# Patient Record
Sex: FEMALE | Race: WHITE | NOT HISPANIC OR LATINO | Employment: OTHER | URBAN - METROPOLITAN AREA
[De-identification: names, ages, dates, MRNs, and addresses within clinical notes are randomized per-mention and may not be internally consistent; named-entity substitution may affect disease eponyms.]

---

## 2020-04-08 ENCOUNTER — TELEPHONE (OUTPATIENT)
Dept: INTERNAL MEDICINE | Facility: CLINIC | Age: 65
End: 2020-04-08

## 2020-04-08 NOTE — TELEPHONE ENCOUNTER
----- Message from Annabel Burris sent at 4/8/2020 12:42 PM CDT -----  Contact: Self  Pt is calling to speak with Staff regarding rescheduling her appt she missed yesterday.    She can be reached at [phone].    Thank you.

## 2020-06-08 ENCOUNTER — OFFICE VISIT (OUTPATIENT)
Dept: OPHTHALMOLOGY | Facility: CLINIC | Age: 65
End: 2020-06-08
Payer: MEDICARE

## 2020-06-08 DIAGNOSIS — H25.13 NUCLEAR SCLEROSIS, BILATERAL: Primary | ICD-10-CM

## 2020-06-08 DIAGNOSIS — H52.03 HYPEROPIA WITH ASTIGMATISM AND PRESBYOPIA, BILATERAL: ICD-10-CM

## 2020-06-08 DIAGNOSIS — H25.043 POSTERIOR SUBCAPSULAR AGE-RELATED CATARACT OF BOTH EYES: ICD-10-CM

## 2020-06-08 DIAGNOSIS — H52.203 HYPEROPIA WITH ASTIGMATISM AND PRESBYOPIA, BILATERAL: ICD-10-CM

## 2020-06-08 DIAGNOSIS — H52.4 HYPEROPIA WITH ASTIGMATISM AND PRESBYOPIA, BILATERAL: ICD-10-CM

## 2020-06-08 PROCEDURE — 92310 PR CONTACT LENS FITTING (NO CHANGE): ICD-10-PCS | Mod: CSM,HCNC,S$GLB, | Performed by: OPTOMETRIST

## 2020-06-08 PROCEDURE — 99999 PR PBB SHADOW E&M-EST. PATIENT-LVL I: CPT | Mod: PBBFAC,HCNC,, | Performed by: OPTOMETRIST

## 2020-06-08 PROCEDURE — 92015 DETERMINE REFRACTIVE STATE: CPT | Mod: HCNC,S$GLB,, | Performed by: OPTOMETRIST

## 2020-06-08 PROCEDURE — 92004 COMPRE OPH EXAM NEW PT 1/>: CPT | Mod: HCNC,S$GLB,, | Performed by: OPTOMETRIST

## 2020-06-08 PROCEDURE — 92004 PR EYE EXAM, NEW PATIENT,COMPREHESV: ICD-10-PCS | Mod: HCNC,S$GLB,, | Performed by: OPTOMETRIST

## 2020-06-08 PROCEDURE — 92015 PR REFRACTION: ICD-10-PCS | Mod: HCNC,S$GLB,, | Performed by: OPTOMETRIST

## 2020-06-08 PROCEDURE — 92310 CONTACT LENS FITTING OU: CPT | Mod: CSM,HCNC,S$GLB, | Performed by: OPTOMETRIST

## 2020-06-08 PROCEDURE — 99999 PR PBB SHADOW E&M-EST. PATIENT-LVL I: ICD-10-PCS | Mod: PBBFAC,HCNC,, | Performed by: OPTOMETRIST

## 2020-06-08 RX ORDER — IBUPROFEN 200 MG
600 TABLET ORAL EVERY 6 HOURS PRN
COMMUNITY

## 2020-06-08 RX ORDER — AMITRIPTYLINE HYDROCHLORIDE 50 MG/1
300 TABLET, FILM COATED ORAL NIGHTLY
COMMUNITY
Start: 2019-01-29 | End: 2020-06-17 | Stop reason: SDUPTHER

## 2020-06-08 NOTE — PROGRESS NOTES
HPI     New Patient  Last Eye Exam May 2019   HPI    Any vision changes since last exam: Yes, decrease with overall vision,   sometimes have to take contact lens out to see distance better and little   trouble reading small print while wearing contacts.   Eye pain: No  Other ocular symptoms: Sometimes Itchy and Dry eyes, using visine ou prn       Do you wear currently wear glasses or contacts? Both    Interested in contacts today? Yes    Do you plan on getting new glasses today? Yes        Last edited by Bethany Bermudez on 6/8/2020  3:12 PM. (History)            Assessment /Plan     For exam results, see Encounter Report.    Nuclear sclerosis, bilateral    Posterior subcapsular age-related cataract of both eyes  Cataract accounts for change in vision. Patient is at the option stage.   Cataract surgery will improve vision, but necessity is dependent on patient's symptoms.   Patient prefers to wait on surgery at this time. Pt to call back if symptoms worsen before next appointment.    Hyperopia with astigmatism and presbyopia, bilateral  Eyeglass Final Rx     Eyeglass Final Rx       Sphere Cylinder Axis Add    Right +0.75 +1.50 175 +2.50    Left +0.25 +0.75 005 +2.50    Expiration Date:  6/9/2021              Will try updating mono contacts first, if va not adequate, consider cat eval    Order trials       See at dispense

## 2020-06-17 ENCOUNTER — HOSPITAL ENCOUNTER (OUTPATIENT)
Dept: RADIOLOGY | Facility: HOSPITAL | Age: 65
Discharge: HOME | End: 2020-06-17
Attending: INTERNAL MEDICINE
Payer: MEDICARE

## 2020-06-17 ENCOUNTER — OFFICE VISIT (OUTPATIENT)
Dept: INTERNAL MEDICINE | Facility: CLINIC | Age: 65
End: 2020-06-17
Payer: MEDICARE

## 2020-06-17 ENCOUNTER — TELEPHONE (OUTPATIENT)
Dept: INTERNAL MEDICINE | Facility: CLINIC | Age: 65
End: 2020-06-17

## 2020-06-17 VITALS
HEART RATE: 96 BPM | WEIGHT: 150.13 LBS | OXYGEN SATURATION: 95 % | TEMPERATURE: 97 F | DIASTOLIC BLOOD PRESSURE: 82 MMHG | SYSTOLIC BLOOD PRESSURE: 130 MMHG

## 2020-06-17 DIAGNOSIS — N30.00 ACUTE CYSTITIS WITHOUT HEMATURIA: Primary | ICD-10-CM

## 2020-06-17 DIAGNOSIS — Z12.31 ENCOUNTER FOR SCREENING MAMMOGRAM FOR BREAST CANCER: ICD-10-CM

## 2020-06-17 DIAGNOSIS — Z23 NEED FOR PNEUMOCOCCAL VACCINATION: ICD-10-CM

## 2020-06-17 DIAGNOSIS — R05.3 CHRONIC COUGH: ICD-10-CM

## 2020-06-17 DIAGNOSIS — Z00.00 ROUTINE GENERAL MEDICAL EXAMINATION AT A HEALTH CARE FACILITY: Primary | ICD-10-CM

## 2020-06-17 DIAGNOSIS — F33.41 RECURRENT MAJOR DEPRESSIVE DISORDER, IN PARTIAL REMISSION: ICD-10-CM

## 2020-06-17 DIAGNOSIS — R09.89 ABNORMAL LUNG SOUNDS: ICD-10-CM

## 2020-06-17 DIAGNOSIS — R30.0 DYSURIA: ICD-10-CM

## 2020-06-17 DIAGNOSIS — Z78.0 ASYMPTOMATIC MENOPAUSAL STATE: ICD-10-CM

## 2020-06-17 DIAGNOSIS — Z13.6 SCREENING FOR CARDIOVASCULAR CONDITION: ICD-10-CM

## 2020-06-17 DIAGNOSIS — Z11.59 NEED FOR HEPATITIS C SCREENING TEST: ICD-10-CM

## 2020-06-17 PROCEDURE — 99387 INIT PM E/M NEW PAT 65+ YRS: CPT | Mod: 25,HCNC,S$GLB, | Performed by: INTERNAL MEDICINE

## 2020-06-17 PROCEDURE — 99499 UNLISTED E&M SERVICE: CPT | Mod: HCNC,S$GLB,, | Performed by: INTERNAL MEDICINE

## 2020-06-17 PROCEDURE — 99499 RISK ADDL DX/OHS AUDIT: ICD-10-PCS | Mod: HCNC,S$GLB,, | Performed by: INTERNAL MEDICINE

## 2020-06-17 PROCEDURE — 90732 PNEUMOCOCCAL POLYSACCHARIDE VACCINE 23-VALENT =>2YO SQ IM: ICD-10-PCS | Mod: HCNC,S$GLB,, | Performed by: INTERNAL MEDICINE

## 2020-06-17 PROCEDURE — 1126F AMNT PAIN NOTED NONE PRSNT: CPT | Mod: HCNC,S$GLB,, | Performed by: INTERNAL MEDICINE

## 2020-06-17 PROCEDURE — 71046 XR CHEST PA AND LATERAL: ICD-10-PCS | Mod: 26,HCNC,, | Performed by: RADIOLOGY

## 2020-06-17 PROCEDURE — 1126F PR PAIN SEVERITY QUANTIFIED, NO PAIN PRESENT: ICD-10-PCS | Mod: HCNC,S$GLB,, | Performed by: INTERNAL MEDICINE

## 2020-06-17 PROCEDURE — 1101F PR PT FALLS ASSESS DOC 0-1 FALLS W/OUT INJ PAST YR: ICD-10-PCS | Mod: HCNC,CPTII,S$GLB, | Performed by: INTERNAL MEDICINE

## 2020-06-17 PROCEDURE — 90732 PPSV23 VACC 2 YRS+ SUBQ/IM: CPT | Mod: HCNC,S$GLB,, | Performed by: INTERNAL MEDICINE

## 2020-06-17 PROCEDURE — 71046 X-RAY EXAM CHEST 2 VIEWS: CPT | Mod: 26,HCNC,, | Performed by: RADIOLOGY

## 2020-06-17 PROCEDURE — 99213 PR OFFICE/OUTPT VISIT, EST, LEVL III, 20-29 MIN: ICD-10-PCS | Mod: HCNC,25,S$GLB, | Performed by: INTERNAL MEDICINE

## 2020-06-17 PROCEDURE — 1159F PR MEDICATION LIST DOCUMENTED IN MEDICAL RECORD: ICD-10-PCS | Mod: HCNC,S$GLB,, | Performed by: INTERNAL MEDICINE

## 2020-06-17 PROCEDURE — 1159F MED LIST DOCD IN RCRD: CPT | Mod: HCNC,S$GLB,, | Performed by: INTERNAL MEDICINE

## 2020-06-17 PROCEDURE — 71046 X-RAY EXAM CHEST 2 VIEWS: CPT | Mod: TC,HCNC

## 2020-06-17 PROCEDURE — 99213 OFFICE O/P EST LOW 20 MIN: CPT | Mod: HCNC,25,S$GLB, | Performed by: INTERNAL MEDICINE

## 2020-06-17 PROCEDURE — 99999 PR PBB SHADOW E&M-EST. PATIENT-LVL V: CPT | Mod: PBBFAC,HCNC,, | Performed by: INTERNAL MEDICINE

## 2020-06-17 PROCEDURE — G0009 PNEUMOCOCCAL POLYSACCHARIDE VACCINE 23-VALENT =>2YO SQ IM: ICD-10-PCS | Mod: HCNC,S$GLB,, | Performed by: INTERNAL MEDICINE

## 2020-06-17 PROCEDURE — G0009 ADMIN PNEUMOCOCCAL VACCINE: HCPCS | Mod: HCNC,S$GLB,, | Performed by: INTERNAL MEDICINE

## 2020-06-17 PROCEDURE — 99387 PR PREVENTIVE VISIT,NEW,65 & OVER: ICD-10-PCS | Mod: 25,HCNC,S$GLB, | Performed by: INTERNAL MEDICINE

## 2020-06-17 PROCEDURE — 1101F PT FALLS ASSESS-DOCD LE1/YR: CPT | Mod: HCNC,CPTII,S$GLB, | Performed by: INTERNAL MEDICINE

## 2020-06-17 PROCEDURE — 99999 PR PBB SHADOW E&M-EST. PATIENT-LVL V: ICD-10-PCS | Mod: PBBFAC,HCNC,, | Performed by: INTERNAL MEDICINE

## 2020-06-17 RX ORDER — NITROFURANTOIN 25; 75 MG/1; MG/1
100 CAPSULE ORAL 2 TIMES DAILY
Qty: 14 CAPSULE | Refills: 0 | Status: SHIPPED | OUTPATIENT
Start: 2020-06-17 | End: 2020-06-24

## 2020-06-17 RX ORDER — AMITRIPTYLINE HYDROCHLORIDE 100 MG/1
300 TABLET ORAL NIGHTLY
Qty: 90 TABLET | Refills: 0 | Status: SHIPPED | OUTPATIENT
Start: 2020-06-17 | End: 2020-07-17

## 2020-06-17 NOTE — PROGRESS NOTES
Subjective:      Patient ID: Caitlin Ahmadi is a 65 y.o. female.    Chief Complaint: Establish Care    Dysuria   This is a new problem. Episode onset: 2 to 3 weeks. The problem occurs intermittently. The problem has been waxing and waning. The quality of the pain is described as aching (at end of urination). The pain is moderate. There has been no fever. There is no history of pyelonephritis. Pertinent negatives include no chills, discharge, flank pain, hematuria, hesitancy or rash. Treatments tried: cranberry tablets. The treatment provided mild relief. There is no history of diabetes mellitus, hypertension, kidney stones, recurrent UTIs or a urological procedure.    Physical Exam  Constitutional:       General: She is not in acute distress.     Appearance: She is well-developed.   HENT:      Head: Normocephalic and atraumatic.   Eyes:      Pupils: Pupils are equal, round, and reactive to light.   Neck:      Musculoskeletal: Neck supple.      Thyroid: No thyromegaly.   Cardiovascular:      Rate and Rhythm: Normal rate and regular rhythm.   Pulmonary:      Breath sounds: Rales present. No wheezing or rhonchi.   Abdominal:      General: Bowel sounds are normal.      Palpations: Abdomen is soft.      Tenderness: There is no abdominal tenderness.   Lymphadenopathy:      Cervical: No cervical adenopathy.   Skin:     General: Skin is warm and dry.   Neurological:      Mental Status: She is alert and oriented to person, place, and time.   Psychiatric:         Behavior: Behavior normal.       64 yo with   Patient Active Problem List   Diagnosis    Recurrent major depressive disorder, in partial remission     History reviewed. No pertinent past medical history.    Here today for annual prev exam.  Compliant with meds without significant side effects. Energy and appetite are good.   Reports nl cologuard one year ago with Dr. Erazo. Quit smoking about 20 years ago.   Pt also c/o dysuria  Pt also reports chronic cough.        Past Surgical History:   Procedure Laterality Date    ADENOIDECTOMY      CHOLECYSTECTOMY      HERNIA REPAIR      HYSTERECTOMY      TONSILLECTOMY      TOTAL KNEE ARTHROPLASTY Bilateral      Social History     Socioeconomic History    Marital status:      Spouse name: Not on file    Number of children: Not on file    Years of education: Not on file    Highest education level: Not on file   Occupational History    Not on file   Social Needs    Financial resource strain: Not on file    Food insecurity     Worry: Not on file     Inability: Not on file    Transportation needs     Medical: Not on file     Non-medical: Not on file   Tobacco Use    Smoking status: Former Smoker     Types: Cigarettes     Start date: 1970     Quit date: 1990     Years since quittin.0    Smokeless tobacco: Never Used   Substance and Sexual Activity    Alcohol use: Yes     Alcohol/week: 5.0 standard drinks     Types: 5 Glasses of wine per week    Drug use: Never    Sexual activity: Yes     Partners: Male   Lifestyle    Physical activity     Days per week: Not on file     Minutes per session: Not on file    Stress: Not on file   Relationships    Social connections     Talks on phone: Not on file     Gets together: Not on file     Attends Synagogue service: Not on file     Active member of club or organization: Not on file     Attends meetings of clubs or organizations: Not on file     Relationship status: Not on file   Other Topics Concern    Not on file   Social History Narrative    Not on file     family history includes Alzheimer's disease in her father; No Known Problems in her mother.    Review of Systems   Constitutional: Negative for chills and fever.   HENT: Negative for ear pain and sore throat.    Respiratory: Positive for cough (chronic intermittent for years). Negative for shortness of breath and wheezing.    Cardiovascular: Negative for chest pain.   Gastrointestinal: Negative for  abdominal pain and blood in stool.   Genitourinary: Negative for dysuria, flank pain, hematuria and hesitancy.   Skin: Negative for rash and wound.   Neurological: Negative for seizures and syncope.     Objective:   /82 (BP Location: Left arm, Patient Position: Sitting, BP Method: Medium (Manual))   Pulse 96   Temp 97.2 °F (36.2 °C) (Tympanic)   Wt 68.1 kg (150 lb 2.1 oz)   SpO2 95%     Physical Exam  Constitutional:       General: She is not in acute distress.     Appearance: She is well-developed.   HENT:      Head: Normocephalic and atraumatic.   Eyes:      Pupils: Pupils are equal, round, and reactive to light.   Neck:      Musculoskeletal: Neck supple.      Thyroid: No thyromegaly.   Cardiovascular:      Rate and Rhythm: Normal rate and regular rhythm.   Pulmonary:      Breath sounds: Rales present. No wheezing or rhonchi.   Abdominal:      General: Bowel sounds are normal.      Palpations: Abdomen is soft.      Tenderness: There is no abdominal tenderness.   Lymphadenopathy:      Cervical: No cervical adenopathy.   Skin:     General: Skin is warm and dry.   Neurological:      Mental Status: She is alert and oriented to person, place, and time.   Psychiatric:         Behavior: Behavior normal.         Assessment:     1. Routine general medical examination at a health care facility    2. Encounter for screening mammogram for breast cancer    3. Recurrent major depressive disorder, in partial remission    4. Asymptomatic menopausal state    5. Screening for cardiovascular condition    6. Need for hepatitis C screening test    7. Need for pneumococcal vaccination    8. Dysuria    9. Abnormal lung sounds    10. Chronic cough      Plan:   Routine general medical examination at a health care facility  Heart healthy diet and reg exercise   reviewed    Encounter for screening mammogram for breast cancer  -     Mammo Digital Screening Bilat; Future; Expected date: 06/17/2020    Recurrent major depressive  disorder, in partial remission  Stable on current meds  -     Comprehensive metabolic panel; Future; Expected date: 06/17/2020  -     CBC auto differential; Future; Expected date: 06/17/2020  -     TSH; Future; Expected date: 06/17/2020    Asymptomatic menopausal state  -     DXA Bone Density Spine And Hip; Future; Expected date: 06/17/2020    Screening for cardiovascular condition  -     Lipid Panel; Future; Expected date: 06/17/2020    Need for hepatitis C screening test  -     Hepatitis C Antibody; Future; Expected date: 06/17/2020    Need for pneumococcal vaccination  -     (In Office Administered) Pneumococcal Polysaccharide Vaccine (23 Valent) (SQ/IM)    Dysuria  -     Urinalysis; Future; Expected date: 06/17/2020    Abnormal lung sounds  -     X-Ray Chest PA And Lateral; Future; Expected date: 06/17/2020  -     Ambulatory referral/consult to Pulmonology; Future; Expected date: 06/24/2020    Chronic cough  Comments:  present for years. reports h/o mult normal cxr. also EF good 10 years ago.   Orders:  -     X-Ray Chest PA And Lateral; Future; Expected date: 06/17/2020    Other orders  -     amitriptyline (ELAVIL) 100 MG tablet; Take 3 tablets (300 mg total) by mouth every evening.  Dispense: 90 tablet; Refill: 0          Problem List Items Addressed This Visit        Psychiatric    Recurrent major depressive disorder, in partial remission    Overview     Started amitriptyline in her 30s. 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 Has gradually increased amitriptyline over past several years. Self increased from 250 to 300 during covid. On 250mg for 2 to 3 years.          Relevant Orders    Comprehensive metabolic panel    CBC auto differential    TSH      Other Visit Diagnoses     Routine general medical examination at a health care facility    -  Primary    Encounter for screening mammogram for breast cancer        Relevant Orders    Mammo Digital Screening Bilat    Asymptomatic  menopausal state        Relevant Orders    DXA Bone Density Spine And Hip    Screening for cardiovascular condition        Relevant Orders    Lipid Panel    Need for hepatitis C screening test        Relevant Orders    Hepatitis C Antibody    Need for pneumococcal vaccination        Relevant Orders    (In Office Administered) Pneumococcal Polysaccharide Vaccine (23 Valent) (SQ/IM) (Completed)    Dysuria        Relevant Orders    Urinalysis (Completed)    Abnormal lung sounds        Relevant Orders    X-Ray Chest PA And Lateral (Completed)    Ambulatory referral/consult to Pulmonology    Chronic cough        present for years. reports h/o mult normal cxr. also EF good 10 years ago.     Relevant Orders    X-Ray Chest PA And Lateral (Completed)          Follow up in 4 weeks (on 7/15/2020), or if symptoms worsen or fail to improve.

## 2020-06-18 ENCOUNTER — TELEPHONE (OUTPATIENT)
Dept: INTERNAL MEDICINE | Facility: CLINIC | Age: 65
End: 2020-06-18

## 2020-06-18 NOTE — TELEPHONE ENCOUNTER
Pt stated her arm is red and irritated after having her pneumonia vaccine today. I advised her to put some ice on it and monitor for now, and if it gets worse or does not get better, to contact us so she can come in to have it looked at. She verbalized understanding.

## 2020-06-18 NOTE — TELEPHONE ENCOUNTER
----- Message from Jennyfer Madden sent at 6/18/2020  1:06 PM CDT -----  .Type:  Needs Medical Advice    Who Called: Caitlin Ahmadi  Symptoms (please be specific): redness/pain arm  How long has patient had these symptoms:  1 day  Pharmacy name and phone #:    Would the patient rather a call back or a response via [facility]? Call back  Best Call Back Number:[phone]  Additional Information: pt states she is experiencing redness and soreness following pneumonia vaccination

## 2020-07-06 ENCOUNTER — LAB VISIT (OUTPATIENT)
Dept: LAB | Facility: HOSPITAL | Age: 65
End: 2020-07-06
Attending: INTERNAL MEDICINE
Payer: MEDICARE

## 2020-07-06 ENCOUNTER — OFFICE VISIT (OUTPATIENT)
Dept: OPHTHALMOLOGY | Facility: CLINIC | Age: 65
End: 2020-07-06
Payer: MEDICARE

## 2020-07-06 DIAGNOSIS — F33.41 RECURRENT MAJOR DEPRESSIVE DISORDER, IN PARTIAL REMISSION: ICD-10-CM

## 2020-07-06 DIAGNOSIS — Z11.59 NEED FOR HEPATITIS C SCREENING TEST: ICD-10-CM

## 2020-07-06 DIAGNOSIS — Z13.6 SCREENING FOR CARDIOVASCULAR CONDITION: ICD-10-CM

## 2020-07-06 DIAGNOSIS — H52.4 HYPEROPIA WITH ASTIGMATISM AND PRESBYOPIA, BILATERAL: Primary | ICD-10-CM

## 2020-07-06 DIAGNOSIS — H52.03 HYPEROPIA WITH ASTIGMATISM AND PRESBYOPIA, BILATERAL: Primary | ICD-10-CM

## 2020-07-06 DIAGNOSIS — H52.203 HYPEROPIA WITH ASTIGMATISM AND PRESBYOPIA, BILATERAL: Primary | ICD-10-CM

## 2020-07-06 LAB
ALBUMIN SERPL BCP-MCNC: 3.8 G/DL (ref 3.5–5.2)
ALP SERPL-CCNC: 64 U/L (ref 55–135)
ALT SERPL W/O P-5'-P-CCNC: 23 U/L (ref 10–44)
ANION GAP SERPL CALC-SCNC: 9 MMOL/L (ref 8–16)
AST SERPL-CCNC: 20 U/L (ref 10–40)
BASOPHILS # BLD AUTO: 0.04 K/UL (ref 0–0.2)
BASOPHILS NFR BLD: 0.9 % (ref 0–1.9)
BILIRUB SERPL-MCNC: 0.4 MG/DL (ref 0.1–1)
BUN SERPL-MCNC: 16 MG/DL (ref 8–23)
CALCIUM SERPL-MCNC: 9 MG/DL (ref 8.7–10.5)
CHLORIDE SERPL-SCNC: 105 MMOL/L (ref 95–110)
CHOLEST SERPL-MCNC: 247 MG/DL (ref 120–199)
CHOLEST/HDLC SERPL: 4.2 {RATIO} (ref 2–5)
CO2 SERPL-SCNC: 25 MMOL/L (ref 23–29)
CREAT SERPL-MCNC: 0.8 MG/DL (ref 0.5–1.4)
DIFFERENTIAL METHOD: ABNORMAL
EOSINOPHIL # BLD AUTO: 0.1 K/UL (ref 0–0.5)
EOSINOPHIL NFR BLD: 3 % (ref 0–8)
ERYTHROCYTE [DISTWIDTH] IN BLOOD BY AUTOMATED COUNT: 13.5 % (ref 11.5–14.5)
EST. GFR  (AFRICAN AMERICAN): >60 ML/MIN/1.73 M^2
EST. GFR  (NON AFRICAN AMERICAN): >60 ML/MIN/1.73 M^2
GLUCOSE SERPL-MCNC: 101 MG/DL (ref 70–110)
HCT VFR BLD AUTO: 39.3 % (ref 37–48.5)
HDLC SERPL-MCNC: 59 MG/DL (ref 40–75)
HDLC SERPL: 23.9 % (ref 20–50)
HGB BLD-MCNC: 12.3 G/DL (ref 12–16)
IMM GRANULOCYTES # BLD AUTO: 0.03 K/UL (ref 0–0.04)
IMM GRANULOCYTES NFR BLD AUTO: 0.7 % (ref 0–0.5)
LDLC SERPL CALC-MCNC: 149 MG/DL (ref 63–159)
LYMPHOCYTES # BLD AUTO: 1.6 K/UL (ref 1–4.8)
LYMPHOCYTES NFR BLD: 36.6 % (ref 18–48)
MCH RBC QN AUTO: 29.9 PG (ref 27–31)
MCHC RBC AUTO-ENTMCNC: 31.3 G/DL (ref 32–36)
MCV RBC AUTO: 95 FL (ref 82–98)
MONOCYTES # BLD AUTO: 0.6 K/UL (ref 0.3–1)
MONOCYTES NFR BLD: 13.2 % (ref 4–15)
NEUTROPHILS # BLD AUTO: 2 K/UL (ref 1.8–7.7)
NEUTROPHILS NFR BLD: 45.6 % (ref 38–73)
NONHDLC SERPL-MCNC: 188 MG/DL
NRBC BLD-RTO: 0 /100 WBC
PLATELET # BLD AUTO: 223 K/UL (ref 150–350)
PMV BLD AUTO: 10.8 FL (ref 9.2–12.9)
POTASSIUM SERPL-SCNC: 4.3 MMOL/L (ref 3.5–5.1)
PROT SERPL-MCNC: 7.4 G/DL (ref 6–8.4)
RBC # BLD AUTO: 4.12 M/UL (ref 4–5.4)
SODIUM SERPL-SCNC: 139 MMOL/L (ref 136–145)
TRIGL SERPL-MCNC: 195 MG/DL (ref 30–150)
TSH SERPL DL<=0.005 MIU/L-ACNC: 2.4 UIU/ML (ref 0.4–4)
WBC # BLD AUTO: 4.32 K/UL (ref 3.9–12.7)

## 2020-07-06 PROCEDURE — 99499 UNLISTED E&M SERVICE: CPT | Mod: HCNC,S$GLB,, | Performed by: OPTOMETRIST

## 2020-07-06 PROCEDURE — 80061 LIPID PANEL: CPT | Mod: HCNC

## 2020-07-06 PROCEDURE — 80053 COMPREHEN METABOLIC PANEL: CPT | Mod: HCNC

## 2020-07-06 PROCEDURE — 99499 NO LOS: ICD-10-PCS | Mod: HCNC,S$GLB,, | Performed by: OPTOMETRIST

## 2020-07-06 PROCEDURE — 85025 COMPLETE CBC W/AUTO DIFF WBC: CPT | Mod: HCNC

## 2020-07-06 PROCEDURE — 99999 PR PBB SHADOW E&M-EST. PATIENT-LVL II: ICD-10-PCS | Mod: PBBFAC,HCNC,, | Performed by: OPTOMETRIST

## 2020-07-06 PROCEDURE — 99999 PR PBB SHADOW E&M-EST. PATIENT-LVL II: CPT | Mod: PBBFAC,HCNC,, | Performed by: OPTOMETRIST

## 2020-07-06 PROCEDURE — 36415 COLL VENOUS BLD VENIPUNCTURE: CPT | Mod: HCNC

## 2020-07-06 PROCEDURE — 84443 ASSAY THYROID STIM HORMONE: CPT | Mod: HCNC

## 2020-07-06 PROCEDURE — 86803 HEPATITIS C AB TEST: CPT | Mod: HCNC

## 2020-07-06 NOTE — PROGRESS NOTES
HPI     Last seen by DNL on 6/8/2020 for yearly eye exam  Patient here today for CTL follow up  Patient states ctl are making her dizzy   States CTL are comfortable  Vision blurred    Last edited by Alma Delia Phillips, PCT on 7/6/2020  9:12 AM. (History)            Assessment /Plan     For exam results, see Encounter Report.    Hyperopia with astigmatism and presbyopia, bilateral      Updated cls with improved near va  Distance unchanged    Contact Lens Prescription (7/6/2020)        Brand Sphere Cylinder Axis    Right 1-Day Acuvue Moist for Astigmatism +3.50 -0.75 090    Left 1-Day Acuvue Moist +1.25      Expiration Date: 7/7/2021    Replacement: Daily    Wearing Schedule: Daily wear        She will try this and if va not adequate, she will call back for cat eval appt    Otherwise, ok to order supply and f/u 1 yr with dilated exam or PRN  Discussed above and all questions were answered.     7/7/2020  Pt called and req to go back to her original rx  Contact Lens Prescription (7/6/2020)        Brand Base Curve Diameter Sphere    Right 1-Day Acuvue Moist 8.5 14.2 +3.75    Left 1-Day Acuvue Moist 8.5 14.2 +1.25    Expiration Date: 7/9/2021    Replacement: Daily    Wearing Schedule: Daily wear        RTC PRN

## 2020-07-08 ENCOUNTER — TELEPHONE (OUTPATIENT)
Dept: OPHTHALMOLOGY | Facility: CLINIC | Age: 65
End: 2020-07-08

## 2020-07-08 LAB — HCV AB SERPL QL IA: NEGATIVE

## 2020-07-08 NOTE — TELEPHONE ENCOUNTER
Left message letting her know we updated her RX and she can view it in MYCHART      ----- Message from Del Boyce OD sent at 7/8/2020  9:02 AM CDT -----  Contact: pt  I updated her rx in her chart. Please contact her and let her know.     Thanks  ----- Message -----  From: ROVERTO Cobb  Sent: 7/7/2020  11:03 AM CDT  To: Del Boyce OD    States she wants to go back to her old RX    ----- Message -----  From: Kyara Anguiano  Sent: 7/7/2020  10:15 AM CDT  To: Aust[facility] Joseph Staff    Pt requesting a call back regarding her contact lenses. She states that they are making her dizzy. Please call pt back at [phone]

## 2020-07-10 ENCOUNTER — HOSPITAL ENCOUNTER (OUTPATIENT)
Dept: RADIOLOGY | Facility: HOSPITAL | Age: 65
Discharge: HOME | End: 2020-07-10
Attending: INTERNAL MEDICINE
Payer: MEDICARE

## 2020-07-10 VITALS — WEIGHT: 150.13 LBS

## 2020-07-10 DIAGNOSIS — Z12.31 ENCOUNTER FOR SCREENING MAMMOGRAM FOR BREAST CANCER: ICD-10-CM

## 2020-07-10 PROCEDURE — 77067 SCR MAMMO BI INCL CAD: CPT | Mod: 26,HCNC,, | Performed by: RADIOLOGY

## 2020-07-10 PROCEDURE — 77063 BREAST TOMOSYNTHESIS BI: CPT | Mod: 26,HCNC,, | Performed by: RADIOLOGY

## 2020-07-10 PROCEDURE — 77063 MAMMO DIGITAL SCREENING BILAT WITH TOMOSYNTHESIS_CAD: ICD-10-PCS | Mod: 26,HCNC,, | Performed by: RADIOLOGY

## 2020-07-10 PROCEDURE — 77067 SCR MAMMO BI INCL CAD: CPT | Mod: TC,HCNC

## 2020-07-10 PROCEDURE — 77067 MAMMO DIGITAL SCREENING BILAT WITH TOMOSYNTHESIS_CAD: ICD-10-PCS | Mod: 26,HCNC,, | Performed by: RADIOLOGY

## 2020-07-22 ENCOUNTER — OFFICE VISIT (OUTPATIENT)
Dept: INTERNAL MEDICINE | Facility: CLINIC | Age: 65
End: 2020-07-22
Payer: MEDICARE

## 2020-07-22 VITALS
BODY MASS INDEX: 23.56 KG/M2 | TEMPERATURE: 99 F | WEIGHT: 155.44 LBS | HEART RATE: 98 BPM | OXYGEN SATURATION: 97 % | DIASTOLIC BLOOD PRESSURE: 82 MMHG | SYSTOLIC BLOOD PRESSURE: 138 MMHG | HEIGHT: 68 IN

## 2020-07-22 DIAGNOSIS — N30.00 ACUTE CYSTITIS WITHOUT HEMATURIA: ICD-10-CM

## 2020-07-22 DIAGNOSIS — M85.89 OSTEOPENIA OF MULTIPLE SITES: ICD-10-CM

## 2020-07-22 DIAGNOSIS — R09.89 ABNORMAL LUNG SOUNDS: Primary | ICD-10-CM

## 2020-07-22 PROCEDURE — 1159F PR MEDICATION LIST DOCUMENTED IN MEDICAL RECORD: ICD-10-PCS | Mod: HCNC,S$GLB,, | Performed by: INTERNAL MEDICINE

## 2020-07-22 PROCEDURE — 3288F FALL RISK ASSESSMENT DOCD: CPT | Mod: HCNC,CPTII,S$GLB, | Performed by: INTERNAL MEDICINE

## 2020-07-22 PROCEDURE — 1159F MED LIST DOCD IN RCRD: CPT | Mod: HCNC,S$GLB,, | Performed by: INTERNAL MEDICINE

## 2020-07-22 PROCEDURE — 3288F PR FALLS RISK ASSESSMENT DOCUMENTED: ICD-10-PCS | Mod: HCNC,CPTII,S$GLB, | Performed by: INTERNAL MEDICINE

## 2020-07-22 PROCEDURE — 3008F PR BODY MASS INDEX (BMI) DOCUMENTED: ICD-10-PCS | Mod: HCNC,CPTII,S$GLB, | Performed by: INTERNAL MEDICINE

## 2020-07-22 PROCEDURE — 1100F PR PT FALLS ASSESS DOC 2+ FALLS/FALL W/INJURY/YR: ICD-10-PCS | Mod: HCNC,CPTII,S$GLB, | Performed by: INTERNAL MEDICINE

## 2020-07-22 PROCEDURE — 3008F BODY MASS INDEX DOCD: CPT | Mod: HCNC,CPTII,S$GLB, | Performed by: INTERNAL MEDICINE

## 2020-07-22 PROCEDURE — 99213 OFFICE O/P EST LOW 20 MIN: CPT | Mod: HCNC,S$GLB,, | Performed by: INTERNAL MEDICINE

## 2020-07-22 PROCEDURE — 1100F PTFALLS ASSESS-DOCD GE2>/YR: CPT | Mod: HCNC,CPTII,S$GLB, | Performed by: INTERNAL MEDICINE

## 2020-07-22 PROCEDURE — 1126F PR PAIN SEVERITY QUANTIFIED, NO PAIN PRESENT: ICD-10-PCS | Mod: HCNC,S$GLB,, | Performed by: INTERNAL MEDICINE

## 2020-07-22 PROCEDURE — 99999 PR PBB SHADOW E&M-EST. PATIENT-LVL IV: ICD-10-PCS | Mod: PBBFAC,HCNC,, | Performed by: INTERNAL MEDICINE

## 2020-07-22 PROCEDURE — 1126F AMNT PAIN NOTED NONE PRSNT: CPT | Mod: HCNC,S$GLB,, | Performed by: INTERNAL MEDICINE

## 2020-07-22 PROCEDURE — 99213 PR OFFICE/OUTPT VISIT, EST, LEVL III, 20-29 MIN: ICD-10-PCS | Mod: HCNC,S$GLB,, | Performed by: INTERNAL MEDICINE

## 2020-07-22 PROCEDURE — 99999 PR PBB SHADOW E&M-EST. PATIENT-LVL IV: CPT | Mod: PBBFAC,HCNC,, | Performed by: INTERNAL MEDICINE

## 2020-07-22 NOTE — PROGRESS NOTES
"Subjective:      Patient ID: Caitlin Ahmadi is a 65 y.o. female.    Chief Complaint: Follow-up    HPI     64 yo with   Patient Active Problem List   Diagnosis    Recurrent major depressive disorder, in partial remission    Osteopenia of multiple sites     History reviewed. No pertinent past medical history.    Here today for f/u on mult med problems. Feeling well. No new c/o.     family history includes Alzheimer's disease in her father; No Known Problems in her mother.  1 out 3 Brothers with bypass surgery in his 60s.   Parents without h/o heart disease.    Review of Systems   Constitutional: Negative for chills and fever.   Respiratory: Negative for cough.    Cardiovascular: Negative for chest pain.   Gastrointestinal: Negative for abdominal pain.     Objective:   /82 (BP Location: Left arm, Patient Position: Sitting, BP Method: Medium (Manual))   Pulse 98   Temp 98.9 °F (37.2 °C) (Tympanic)   Ht 5' 8" (1.727 m)   Wt 70.5 kg (155 lb 6.8 oz)   SpO2 97%   BMI 23.63 kg/m²     Physical Exam  Constitutional:       General: She is not in acute distress.     Appearance: She is well-developed.   Cardiovascular:      Rate and Rhythm: Normal rate.   Pulmonary:      Effort: Pulmonary effort is normal.   Skin:     General: Skin is warm and dry.   Neurological:      Mental Status: She is alert.   Psychiatric:         Behavior: Behavior normal.         Assessment:     1. Abnormal lung sounds    2. Osteopenia of multiple sites    3. Acute cystitis without hematuria      Plan:   Abnormal lung sounds  Has pulm f/u  Osteopenia of multiple sites  New dx  Calcium and vit d as discussed    Acute cystitis without hematuria  Symptoms resolved      Lab Frequency Next Occurrence   Ambulatory referral/consult to Pulmonology Once 06/24/2020       Problem List Items Addressed This Visit        Orthopedic    Osteopenia of multiple sites    Overview     Low fx risk on dxa 7/2020           Other Visit Diagnoses     Abnormal lung " sounds    -  Primary    Acute cystitis without hematuria              Follow up in about 1 year (around 7/22/2021), or if symptoms worsen or fail to improve.

## 2020-07-22 NOTE — PATIENT INSTRUCTIONS
0183-4841 mg of calcium daily. Preferably through diet. If use a supplement, take calcium citrate 500-600 mg per dose.     Vitamin D3 over counter at 1000 IU daily.

## 2020-08-18 ENCOUNTER — OFFICE VISIT (OUTPATIENT)
Dept: PULMONOLOGY | Facility: CLINIC | Age: 65
End: 2020-08-18
Payer: MEDICARE

## 2020-08-18 ENCOUNTER — LAB VISIT (OUTPATIENT)
Dept: LAB | Facility: HOSPITAL | Age: 65
End: 2020-08-18
Attending: INTERNAL MEDICINE
Payer: MEDICARE

## 2020-08-18 ENCOUNTER — PATIENT OUTREACH (OUTPATIENT)
Dept: ADMINISTRATIVE | Facility: OTHER | Age: 65
End: 2020-08-18

## 2020-08-18 VITALS
HEIGHT: 68 IN | WEIGHT: 156.5 LBS | OXYGEN SATURATION: 97 % | BODY MASS INDEX: 23.72 KG/M2 | RESPIRATION RATE: 18 BRPM | TEMPERATURE: 98 F | DIASTOLIC BLOOD PRESSURE: 78 MMHG | SYSTOLIC BLOOD PRESSURE: 124 MMHG | HEART RATE: 98 BPM

## 2020-08-18 DIAGNOSIS — R09.89 BIBASILAR CRACKLES: ICD-10-CM

## 2020-08-18 DIAGNOSIS — R09.89 ABNORMAL LUNG SOUNDS: ICD-10-CM

## 2020-08-18 DIAGNOSIS — R06.02 SOBOE (SHORTNESS OF BREATH ON EXERTION): ICD-10-CM

## 2020-08-18 DIAGNOSIS — R05.3 CHRONIC COUGH: ICD-10-CM

## 2020-08-18 DIAGNOSIS — Z87.891 FORMER SMOKER: ICD-10-CM

## 2020-08-18 DIAGNOSIS — R09.89 BIBASILAR CRACKLES: Primary | ICD-10-CM

## 2020-08-18 LAB
BNP SERPL-MCNC: 19 PG/ML (ref 0–99)
RHEUMATOID FACT SERPL-ACNC: 157 IU/ML (ref 0–15)

## 2020-08-18 PROCEDURE — 99204 OFFICE O/P NEW MOD 45 MIN: CPT | Mod: HCNC,S$GLB,, | Performed by: INTERNAL MEDICINE

## 2020-08-18 PROCEDURE — 86606 ASPERGILLUS ANTIBODY: CPT | Mod: 59,HCNC

## 2020-08-18 PROCEDURE — 86039 ANTINUCLEAR ANTIBODIES (ANA): CPT | Mod: HCNC

## 2020-08-18 PROCEDURE — 82164 ANGIOTENSIN I ENZYME TEST: CPT | Mod: HCNC

## 2020-08-18 PROCEDURE — 86235 NUCLEAR ANTIGEN ANTIBODY: CPT | Mod: 59,HCNC

## 2020-08-18 PROCEDURE — 85652 RBC SED RATE AUTOMATED: CPT | Mod: HCNC

## 2020-08-18 PROCEDURE — 1101F PR PT FALLS ASSESS DOC 0-1 FALLS W/OUT INJ PAST YR: ICD-10-PCS | Mod: HCNC,CPTII,S$GLB, | Performed by: INTERNAL MEDICINE

## 2020-08-18 PROCEDURE — 83880 ASSAY OF NATRIURETIC PEPTIDE: CPT | Mod: HCNC

## 2020-08-18 PROCEDURE — 86038 ANTINUCLEAR ANTIBODIES: CPT | Mod: HCNC

## 2020-08-18 PROCEDURE — 82785 ASSAY OF IGE: CPT | Mod: HCNC

## 2020-08-18 PROCEDURE — 1159F PR MEDICATION LIST DOCUMENTED IN MEDICAL RECORD: ICD-10-PCS | Mod: HCNC,S$GLB,, | Performed by: INTERNAL MEDICINE

## 2020-08-18 PROCEDURE — 86140 C-REACTIVE PROTEIN: CPT | Mod: HCNC

## 2020-08-18 PROCEDURE — 3008F BODY MASS INDEX DOCD: CPT | Mod: HCNC,CPTII,S$GLB, | Performed by: INTERNAL MEDICINE

## 2020-08-18 PROCEDURE — 99204 PR OFFICE/OUTPT VISIT, NEW, LEVL IV, 45-59 MIN: ICD-10-PCS | Mod: HCNC,S$GLB,, | Performed by: INTERNAL MEDICINE

## 2020-08-18 PROCEDURE — 3008F PR BODY MASS INDEX (BMI) DOCUMENTED: ICD-10-PCS | Mod: HCNC,CPTII,S$GLB, | Performed by: INTERNAL MEDICINE

## 2020-08-18 PROCEDURE — 1101F PT FALLS ASSESS-DOCD LE1/YR: CPT | Mod: HCNC,CPTII,S$GLB, | Performed by: INTERNAL MEDICINE

## 2020-08-18 PROCEDURE — 99999 PR PBB SHADOW E&M-EST. PATIENT-LVL III: CPT | Mod: PBBFAC,HCNC,, | Performed by: INTERNAL MEDICINE

## 2020-08-18 PROCEDURE — 86431 RHEUMATOID FACTOR QUANT: CPT | Mod: HCNC

## 2020-08-18 PROCEDURE — 1159F MED LIST DOCD IN RCRD: CPT | Mod: HCNC,S$GLB,, | Performed by: INTERNAL MEDICINE

## 2020-08-18 PROCEDURE — 99999 PR PBB SHADOW E&M-EST. PATIENT-LVL III: ICD-10-PCS | Mod: PBBFAC,HCNC,, | Performed by: INTERNAL MEDICINE

## 2020-08-18 RX ORDER — ALBUTEROL SULFATE 90 UG/1
2 AEROSOL, METERED RESPIRATORY (INHALATION) EVERY 6 HOURS PRN
Qty: 6.7 G | Refills: 11 | Status: SHIPPED | OUTPATIENT
Start: 2020-08-18 | End: 2021-06-07 | Stop reason: SDUPTHER

## 2020-08-18 NOTE — LETTER
August 18, 2020      Amol Steve MD  [address] [place] [address]           [facility]  [address] [facility] [place] [address]  Phone: [phone]  Fax: [phone]          Patient: Caitlin Ahmadi   MR Number: [id number]   YOB: 1955   Date of Visit: 8/18/2020       Dear Dr. Amol Steve:    Thank you for referring Caitlin Ahmadi to me for evaluation. Attached you will find relevant portions of my assessment and plan of care.    If you have questions, please do not hesitate to call me. I look forward to following Caitlin Ahmadi along with you.    Sincerely,    Jin Lloyd MD    Enclosure  CC:  No Recipients    If you would like to receive this communication electronically, please contact [email or web address] or [phone] to request more information on [email or web address] Link access.    For providers and/or their staff who would like to refer a patient to [facility], please contact us through our one-stop-shop provider referral line, [facility], at [phone].    If you feel you have received this communication in error or would no longer like to receive these types of communications, please e-mail [email or web address]

## 2020-08-18 NOTE — PROGRESS NOTES
Health Maintenance Due   Topic Date Due    TETANUS VACCINE  03/11/1972    Shingles Vaccine (1 of 2) 03/11/2004    Colorectal Cancer Screening  03/11/2004     Updates were requested from care everywhere.  Chart was reviewed for overdue Proactive [facility] Encounters ([facility]) topics (CRS, Breast Cancer Screening, Eye exam)  Health Maintenance has been updated.  LINKS immunization registry triggered.  Immunizations were reconciled.

## 2020-08-18 NOTE — PROGRESS NOTES
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    No wheezing no history of asthma.    Twenty pack year smoker quit 30 years ago.    Does have 4 cats and 2 dogs at home.    History of mitral valve prolapse last echo 5 years ago was okay.    Denies personal family history of connective tissue disorder.        Review of Systems   Constitutional: Positive for fatigue and weakness. Negative for fever and chills.   HENT: Positive for congestion. Negative for nosebleeds.    Eyes: Negative for redness.   Respiratory: Positive for cough, sputum production, shortness of breath and dyspnea on extertion. Negative for choking.    Cardiovascular: Negative for chest pain.   Genitourinary: Negative for hematuria.   Endocrine: Negative for cold intolerance.    Musculoskeletal: Positive for arthralgias.   Skin: Negative for rash.   Gastrointestinal: Negative for vomiting.   Neurological: Negative for syncope.   Hematological: Negative for adenopathy.   Psychiatric/Behavioral: Negative for confusion. The patient is nervous/anxious.        Review of patient's allergies indicates:   Allergen Reactions    Hydrocodone-acetaminophen Nausea Only and Nausea And Vomiting    Meloxicam Rash    Sulfa (sulfonamide antibiotics) Rash       Current Outpatient Medications   Medication Sig Dispense Refill    albuterol (PROVENTIL/VENTOLIN HFA) 90 mcg/actuation inhaler Inhale 2 puffs into the lungs every 6 (six) hours as needed for Wheezing. Rescue 6.7 g 11    amitriptyline (ELAVIL) 100 MG tablet TAKE 3 TABLETS(300 MG) BY MOUTH EVERY EVENING (Patient taking differently: 300 mg. ) 90 tablet 0    ibuprofen (ADVIL,MOTRIN)  "200 MG tablet Take 600 mg by mouth every 6 (six) hours as needed.       No current facility-administered medications for this visit.        History reviewed. No pertinent past medical history.  Past Surgical History:   Procedure Laterality Date    ADENOIDECTOMY      CHOLECYSTECTOMY      HERNIA REPAIR      HYSTERECTOMY      TONSILLECTOMY      TOTAL KNEE ARTHROPLASTY Bilateral      Family History   Problem Relation Age of Onset    No Known Problems Mother     Alzheimer's disease Father      Social History     Tobacco Use    Smoking status: Former Smoker     Packs/day: 1.00     Years: 20.00     Pack years: 20.00     Types: Cigarettes     Start date: 1970     Quit date: 1990     Years since quittin.1    Smokeless tobacco: Never Used   Substance Use Topics    Alcohol use: Yes     Alcohol/week: 5.0 standard drinks     Types: 5 Glasses of wine per week    Drug use: Never          OBJECTIVE:     Vital Signs (Most Recent)  Vital Signs  Temp: 97.5 °F (36.4 °C)  Temp src: Temporal  Pulse: 98  Resp: 18  SpO2: 97 %  BP: 124/78  Height and Weight  Height: 5' 8" (172.7 cm)  Weight: 71 kg (156 lb 8.4 oz)  BSA (Calculated - sq m): 1.85 sq meters  BMI (Calculated): 23.8  Weight in (lb) to have BMI = 25: 164.1]  Wt Readings from Last 2 Encounters:   20 71 kg (156 lb 8.4 oz)   20 70.5 kg (155 lb 6.8 oz)         Physical Exam:  Physical Exam   Constitutional: She is oriented to person, place, and time. She appears well-developed and well-nourished.   HENT:   Head: Normocephalic.   Neck: Neck supple.   Cardiovascular: Normal rate and regular rhythm.   Pulmonary/Chest: Normal expansion and effort normal. No stridor. No respiratory distress. She has rales. She exhibits no tenderness.   Noticeable bilateral crackles more pronounced at the bases   Abdominal: Soft.   Musculoskeletal:         General: No tenderness.   Lymphadenopathy:     She has no cervical adenopathy.   Neurological: She is alert and " oriented to person, place, and time. Gait normal.   Skin: Skin is warm. No cyanosis. Nails show no clubbing.   Psychiatric: She has a normal mood and affect. Her behavior is normal. Judgment and thought content normal.   Nursing note and vitals reviewed.      Laboratory  Lab Results   Component Value Date    WBC 4.32 07/06/2020    RBC 4.12 07/06/2020    HGB 12.3 07/06/2020    HCT 39.3 07/06/2020    MCV 95 07/06/2020    MCH 29.9 07/06/2020    MCHC 31.3 (L) 07/06/2020    RDW 13.5 07/06/2020     07/06/2020    MPV 10.8 07/06/2020    GRAN 2.0 07/06/2020    GRAN 45.6 07/06/2020    LYMPH 1.6 07/06/2020    LYMPH 36.6 07/06/2020    MONO 0.6 07/06/2020    MONO 13.2 07/06/2020    EOS 0.1 07/06/2020    BASO 0.04 07/06/2020    EOSINOPHIL 3.0 07/06/2020    BASOPHIL 0.9 07/06/2020       BMP  Lab Results   Component Value Date     07/06/2020    K 4.3 07/06/2020     07/06/2020    CO2 25 07/06/2020    BUN 16 07/06/2020    CREATININE 0.8 07/06/2020    CALCIUM 9.0 07/06/2020    ANIONGAP 9 07/06/2020    ESTGFRAFRICA >60.0 07/06/2020    EGFRNONAA >60.0 07/06/2020    AST 20 07/06/2020    ALT 23 07/06/2020    PROT 7.4 07/06/2020       No results found for: BNP    Lab Results   Component Value Date    TSH 2.397 07/06/2020       No results found for: SEDRATE    No results found for: CRP    No results found for: IGE    No results found for: ASPERGILLUS  No results found for: AFUMIGATUSCL     No results found for: ACE    Diagnostic Results:  I have personally reviewed today the following studies :    CXR 6/2020:    The cardiac and mediastinal silhouettes appear within normal limits.   The lungs are clear bilaterally.  No acute osseous findings demonstrated      ASSESSMENT/PLAN:     Bibasilar crackles  -     Complete PFT without bronchodilator; Future  -     Brain Natriuretic Peptide; Future; Expected date: 08/18/2020  -     Sedimentation rate; Future; Expected date: 08/18/2020  -     C-Reactive Protein; Future; Expected  date: 08/18/2020  -     MATT Screen w/Reflex; Future; Expected date: 08/18/2020  -     Rheumatoid factor; Future; Expected date: 08/18/2020  -     Angiotensin Converting Enzyme; Future; Expected date: 08/18/2020  -     Fungal Precipitins (Hypersensitivity PneumonitisI); Future; Expected date: 08/18/2020    Abnormal lung sounds  -     Ambulatory referral/consult to Pulmonology  -     Complete PFT without bronchodilator; Future  -     Brain Natriuretic Peptide; Future; Expected date: 08/18/2020  -     Sedimentation rate; Future; Expected date: 08/18/2020  -     C-Reactive Protein; Future; Expected date: 08/18/2020  -     MATT Screen w/Reflex; Future; Expected date: 08/18/2020  -     Rheumatoid factor; Future; Expected date: 08/18/2020  -     Angiotensin Converting Enzyme; Future; Expected date: 08/18/2020    SOBOE (shortness of breath on exertion)  -     Complete PFT without bronchodilator; Future  -     Brain Natriuretic Peptide; Future; Expected date: 08/18/2020  -     Sedimentation rate; Future; Expected date: 08/18/2020  -     C-Reactive Protein; Future; Expected date: 08/18/2020  -     MATT Screen w/Reflex; Future; Expected date: 08/18/2020  -     Rheumatoid factor; Future; Expected date: 08/18/2020  -     Angiotensin Converting Enzyme; Future; Expected date: 08/18/2020  -     Stress test, pulmonary; Future  -     albuterol (PROVENTIL/VENTOLIN HFA) 90 mcg/actuation inhaler; Inhale 2 puffs into the lungs every 6 (six) hours as needed for Wheezing. Rescue  Dispense: 6.7 g; Refill: 11    Chronic cough  -     Complete PFT without bronchodilator; Future  -     Brain Natriuretic Peptide; Future; Expected date: 08/18/2020  -     Sedimentation rate; Future; Expected date: 08/18/2020  -     C-Reactive Protein; Future; Expected date: 08/18/2020  -     MATT Screen w/Reflex; Future; Expected date: 08/18/2020  -     Rheumatoid factor; Future; Expected date: 08/18/2020  -     Angiotensin Converting Enzyme; Future; Expected date:  08/18/2020  -     IgE; Future; Expected date: 08/18/2020  -     albuterol (PROVENTIL/VENTOLIN HFA) 90 mcg/actuation inhaler; Inhale 2 puffs into the lungs every 6 (six) hours as needed for Wheezing. Rescue  Dispense: 6.7 g; Refill: 11  -     COVID-19 Routine Screening; Future  -     Fungal Precipitins (Hypersensitivity PneumonitisI); Future; Expected date: 08/18/2020    Former smoker     Albuterol p.r.n.    Rule out CHF/ILD/rule out exposure related etiologies.    Check 6 min walking test and PFT.      Will decide on the need of high-resolution CT scan of the chest and 2D echo accordingly.    Further recommendation to follow above workup.        Follow up in about 1 month (around 9/18/2020).    This note was prepared using voice recognition system and is likely to have sound alike errors that may have been overlooked even after proof reading.  Please call me with any questions    Discussed diagnosis, its evaluation, treatment and usual course. All questions answered.    Thank you for the courtesy of participating in the care of this patient    Jin Lloyd MD

## 2020-08-19 ENCOUNTER — TELEPHONE (OUTPATIENT)
Dept: PULMONOLOGY | Facility: CLINIC | Age: 65
End: 2020-08-19

## 2020-08-19 DIAGNOSIS — R09.89 BIBASILAR CRACKLES: Primary | ICD-10-CM

## 2020-08-19 DIAGNOSIS — R76.8 RHEUMATOID FACTOR POSITIVE: ICD-10-CM

## 2020-08-19 DIAGNOSIS — J84.9 ILD (INTERSTITIAL LUNG DISEASE): ICD-10-CM

## 2020-08-19 LAB
CRP SERPL-MCNC: 4 MG/L (ref 0–8.2)
ERYTHROCYTE [SEDIMENTATION RATE] IN BLOOD BY WESTERGREN METHOD: 11 MM/HR (ref 0–36)
IGE SERPL-ACNC: <35 IU/ML (ref 0–100)

## 2020-08-20 ENCOUNTER — TELEPHONE (OUTPATIENT)
Dept: PULMONOLOGY | Facility: CLINIC | Age: 65
End: 2020-08-20

## 2020-08-20 LAB
ACE SERPL-CCNC: NORMAL U/L
ANA PATTERN 1: NORMAL
ANA SER QL IF: POSITIVE
ANA TITR SER IF: NORMAL {TITER}

## 2020-08-20 NOTE — TELEPHONE ENCOUNTER
----- Message from Maricruz Camara sent at 8/20/2020 11:35 AM CDT -----  Regarding: Return Call  Contact: pt  Type:  Patient Returning Call    Who Called: pt  Who Left Message for Patient: Jin Lloyd MD office   Does the patient know what this is regarding?:   Would the patient rather a call back or a response via [facility]?  Call back   Best Call Back Number:[phone]  Additional Information:

## 2020-08-21 ENCOUNTER — LAB VISIT (OUTPATIENT)
Dept: OTOLARYNGOLOGY | Facility: CLINIC | Age: 65
End: 2020-08-21
Payer: MEDICARE

## 2020-08-21 DIAGNOSIS — R05.3 CHRONIC COUGH: ICD-10-CM

## 2020-08-21 PROCEDURE — U0003 INFECTIOUS AGENT DETECTION BY NUCLEIC ACID (DNA OR RNA); SEVERE ACUTE RESPIRATORY SYNDROME CORONAVIRUS 2 (SARS-COV-2) (CORONAVIRUS DISEASE [COVID-19]), AMPLIFIED PROBE TECHNIQUE, MAKING USE OF HIGH THROUGHPUT TECHNOLOGIES AS DESCRIBED BY CMS-2020-01-R: HCPCS | Mod: HCNC

## 2020-08-22 LAB — SARS-COV-2 RNA RESP QL NAA+PROBE: NOT DETECTED

## 2020-08-24 ENCOUNTER — CLINICAL SUPPORT (OUTPATIENT)
Dept: PULMONOLOGY | Facility: CLINIC | Age: 65
End: 2020-08-24
Payer: MEDICARE

## 2020-08-24 VITALS — HEIGHT: 68 IN | WEIGHT: 158.75 LBS | BODY MASS INDEX: 24.06 KG/M2

## 2020-08-24 DIAGNOSIS — R06.02 SOBOE (SHORTNESS OF BREATH ON EXERTION): ICD-10-CM

## 2020-08-24 DIAGNOSIS — R09.89 BIBASILAR CRACKLES: ICD-10-CM

## 2020-08-24 DIAGNOSIS — R05.3 CHRONIC COUGH: ICD-10-CM

## 2020-08-24 DIAGNOSIS — R09.89 ABNORMAL LUNG SOUNDS: ICD-10-CM

## 2020-08-24 LAB
A FUMIGATUS1 AB SER QL ID: ABNORMAL
A FUMIGATUS6 AB SER QL ID: ABNORMAL
A PULLULANS AB SER QL ID: DETECTED
ANTI SM ANTIBODY: 0.12 RATIO (ref 0–0.99)
ANTI SM/RNP ANTIBODY: 0.15 RATIO (ref 0–0.99)
ANTI-SM INTERPRETATION: NEGATIVE
ANTI-SM/RNP INTERPRETATION: NEGATIVE
ANTI-SSA ANTIBODY: 0.13 RATIO (ref 0–0.99)
ANTI-SSA INTERPRETATION: NEGATIVE
ANTI-SSB ANTIBODY: 0.1 RATIO (ref 0–0.99)
ANTI-SSB INTERPRETATION: NEGATIVE
BRPFT: ABNORMAL
DLCO ADJ PRE: 17.22 ML/(MIN*MMHG) (ref 18.68–30.15)
DLCO SINGLE BREATH LLN: 18.68
DLCO SINGLE BREATH PRE REF: 70.5 %
DLCO SINGLE BREATH REF: 24.42
DLCOC SBVA LLN: 3.07
DLCOC SBVA PRE REF: 100.1 %
DLCOC SBVA REF: 4.34
DLCOC SINGLE BREATH LLN: 18.68
DLCOC SINGLE BREATH PRE REF: 70.5 %
DLCOC SINGLE BREATH REF: 24.42
DLCOVA LLN: 3.07
DLCOVA PRE REF: 100.1 %
DLCOVA PRE: 4.34 ML/(MIN*MMHG*L) (ref 3.07–5.61)
DLCOVA REF: 4.34
DLVAADJ PRE: 4.34 ML/(MIN*MMHG*L) (ref 3.07–5.61)
DSDNA AB SER-ACNC: NORMAL [IU]/ML
ERV LLN: -16449.25
ERV PRE REF: 51.3 %
ERV REF: 0.75
FEF 25 75 LLN: 1.05
FEF 25 75 PRE REF: 78.1 %
FEF 25 75 REF: 2.19
FEV1 FVC LLN: 65
FEV1 FVC PRE REF: 96.7 %
FEV1 FVC REF: 78
FEV1 LLN: 1.95
FEV1 PRE REF: 81.3 %
FEV1 REF: 2.64
FRCPLETH LLN: 2.12
FRCPLETH PREREF: 86.8 %
FRCPLETH REF: 2.94
FVC LLN: 2.53
FVC PRE REF: 83.3 %
FVC REF: 3.41
IVC PRE: 2.85 L (ref 2.53–4.3)
IVC SINGLE BREATH LLN: 2.53
IVC SINGLE BREATH PRE REF: 83.6 %
IVC SINGLE BREATH REF: 3.41
MVV LLN: 88
MVV PRE REF: 75.5 %
MVV REF: 103
PEF LLN: 4.64
PEF PRE REF: 79.3 %
PEF REF: 6.58
PIGEON SERUM AB QL ID: ABNORMAL
PRE DLCO: 17.22 ML/(MIN*MMHG) (ref 18.68–30.15)
PRE ERV: 0.39 L (ref -16449.25–16450.75)
PRE FEF 25 75: 1.71 L/S (ref 1.05–3.33)
PRE FET 100: 7.38 SEC
PRE FEV1 FVC: 75.5 % (ref 65.33–90.76)
PRE FEV1: 2.15 L (ref 1.95–3.33)
PRE FRC PL: 2.55 L
PRE FVC: 2.84 L (ref 2.53–4.3)
PRE MVV: 78 L/MIN (ref 87.84–118.84)
PRE PEF: 5.22 L/S (ref 4.64–8.52)
PRE RV: 2.14 L (ref 1.61–2.76)
PRE TLC: 5.02 L (ref 4.64–6.62)
RAW LLN: 3.06
RAW PRE REF: 164.1 %
RAW PRE: 5.02 CMH2O*S/L (ref 3.06–3.06)
RAW REF: 3.06
RV LLN: 1.61
RV PRE REF: 98 %
RV REF: 2.19
RVTLC LLN: 32
RVTLC PRE REF: 103.1 %
RVTLC PRE: 42.66 % (ref 31.81–50.99)
RVTLC REF: 41
S RECTIVIRGULA AB SER QL ID: ABNORMAL
T VULGARIS1 AB SER QL ID: ABNORMAL
TLC LLN: 4.64
TLC PRE REF: 89.3 %
TLC REF: 5.63
VA PRE: 3.97 L (ref 5.48–5.48)
VA SINGLE BREATH LLN: 5.48
VA SINGLE BREATH PRE REF: 72.5 %
VA SINGLE BREATH REF: 5.48
VC LLN: 2.53
VC PRE REF: 84.4 %
VC PRE: 2.88 L (ref 2.53–4.3)
VC REF: 3.41
VTGRAWPRE: 2.41 L

## 2020-08-24 PROCEDURE — 94618 PULMONARY STRESS TESTING: ICD-10-PCS | Mod: HCNC,S$GLB,, | Performed by: INTERNAL MEDICINE

## 2020-08-24 PROCEDURE — 94726 PULM FUNCT TST PLETHYSMOGRAP: ICD-10-PCS | Mod: HCNC,S$GLB,, | Performed by: INTERNAL MEDICINE

## 2020-08-24 PROCEDURE — 94726 PLETHYSMOGRAPHY LUNG VOLUMES: CPT | Mod: HCNC,S$GLB,, | Performed by: INTERNAL MEDICINE

## 2020-08-24 PROCEDURE — 94729 PR C02/MEMBANE DIFFUSE CAPACITY: ICD-10-PCS | Mod: HCNC,S$GLB,, | Performed by: INTERNAL MEDICINE

## 2020-08-24 PROCEDURE — 94010 BREATHING CAPACITY TEST: ICD-10-PCS | Mod: HCNC,S$GLB,, | Performed by: INTERNAL MEDICINE

## 2020-08-24 PROCEDURE — 94729 DIFFUSING CAPACITY: CPT | Mod: HCNC,S$GLB,, | Performed by: INTERNAL MEDICINE

## 2020-08-24 PROCEDURE — 94618 PULMONARY STRESS TESTING: CPT | Mod: HCNC,S$GLB,, | Performed by: INTERNAL MEDICINE

## 2020-08-24 PROCEDURE — 94010 BREATHING CAPACITY TEST: CPT | Mod: HCNC,S$GLB,, | Performed by: INTERNAL MEDICINE

## 2020-08-24 NOTE — PROCEDURES
"[facility] - Pulmonary Function [facility]  Six Minute Walk     SUMMARY     Ordering Provider: Dr. Lloyd   Interpreting Provider: Dr. Lloyd  Performing nurse/tech/RT: CINDY Hunter CRT  Diagnosis: Shortness of Breath  Height: 5' 8" (172.7 cm)  Weight: 72 kg (158 lb 11.7 oz)  BMI (Calculated): 24.1   Patient Race:             Phase Oxygen Assessment Supplemental O2 Heart   Rate Blood Pressure [place] Dyspnea Scale Rating   Resting 100 % Room Air 96 bpm 138/82 3   Exercise        Minute        1 99 % Room Air 105 bpm     2 97 % Room Air 108 bpm     3 99 % Room Air 110 bpm     4 100 % Room Air 109 bpm     5 99 % Room Air 107 bpm     6  99 % Room Air 108 bpm 148/79 4   Recovery        Minute        1 100 % Room Air 107 bpm     2 100 % Room Air 99 bpm     3 100 % Room Air 98 bpm     4 100 % Room Air 94 bpm 133/70 3     Six Minute Walk Summary  6MWT Status: completed without stopping  Patient Reported: Dyspnea, Lightheadedness     Interpretation:  Did the patient stop or pause?: No                                         Total Time Walked (Calculated): 360 seconds  Final Partial Lap Distance (feet): 0 feet  Total Distance Meters (Calculated): 487.68 meters  Predicted Distance Meters (Calculated): 485.04 meters  Percentage of Predicted (Calculated): 100.54  Peak VO2 (Calculated): 18.61  Mets: 5.32  Has The Patient Had a Previous Six Minute Walk Test?: No       Previous 6MWT Results  Has The Patient Had a Previous Six Minute Walk Test?: No    "

## 2020-08-27 ENCOUNTER — TELEPHONE (OUTPATIENT)
Dept: RHEUMATOLOGY | Facility: CLINIC | Age: 65
End: 2020-08-27

## 2020-08-28 ENCOUNTER — OFFICE VISIT (OUTPATIENT)
Dept: RHEUMATOLOGY | Facility: CLINIC | Age: 65
End: 2020-08-28
Payer: MEDICARE

## 2020-08-28 ENCOUNTER — HOSPITAL ENCOUNTER (OUTPATIENT)
Dept: RADIOLOGY | Facility: HOSPITAL | Age: 65
Discharge: HOME | End: 2020-08-28
Attending: INTERNAL MEDICINE
Payer: MEDICARE

## 2020-08-28 VITALS
HEART RATE: 92 BPM | SYSTOLIC BLOOD PRESSURE: 126 MMHG | BODY MASS INDEX: 23.87 KG/M2 | DIASTOLIC BLOOD PRESSURE: 79 MMHG | WEIGHT: 156.94 LBS

## 2020-08-28 DIAGNOSIS — Z71.89 COUNSELING ON HEALTH PROMOTION AND DISEASE PREVENTION: ICD-10-CM

## 2020-08-28 DIAGNOSIS — R76.8 RHEUMATOID FACTOR POSITIVE: Primary | ICD-10-CM

## 2020-08-28 DIAGNOSIS — R76.8 RHEUMATOID FACTOR POSITIVE: ICD-10-CM

## 2020-08-28 DIAGNOSIS — R53.82 CHRONIC FATIGUE: ICD-10-CM

## 2020-08-28 PROCEDURE — 73130 XR HAND COMPLETE 3 VIEWS BILATERAL: ICD-10-PCS | Mod: 26,50,HCNC, | Performed by: RADIOLOGY

## 2020-08-28 PROCEDURE — 1101F PT FALLS ASSESS-DOCD LE1/YR: CPT | Mod: HCNC,CPTII,S$GLB, | Performed by: INTERNAL MEDICINE

## 2020-08-28 PROCEDURE — 99999 PR PBB SHADOW E&M-EST. PATIENT-LVL III: CPT | Mod: PBBFAC,HCNC,, | Performed by: INTERNAL MEDICINE

## 2020-08-28 PROCEDURE — 99999 PR PBB SHADOW E&M-EST. PATIENT-LVL III: ICD-10-PCS | Mod: PBBFAC,HCNC,, | Performed by: INTERNAL MEDICINE

## 2020-08-28 PROCEDURE — 1159F PR MEDICATION LIST DOCUMENTED IN MEDICAL RECORD: ICD-10-PCS | Mod: HCNC,S$GLB,, | Performed by: INTERNAL MEDICINE

## 2020-08-28 PROCEDURE — 1159F MED LIST DOCD IN RCRD: CPT | Mod: HCNC,S$GLB,, | Performed by: INTERNAL MEDICINE

## 2020-08-28 PROCEDURE — 73130 X-RAY EXAM OF HAND: CPT | Mod: TC,50,HCNC

## 2020-08-28 PROCEDURE — 73130 X-RAY EXAM OF HAND: CPT | Mod: 26,50,HCNC, | Performed by: RADIOLOGY

## 2020-08-28 PROCEDURE — 1126F PR PAIN SEVERITY QUANTIFIED, NO PAIN PRESENT: ICD-10-PCS | Mod: HCNC,S$GLB,, | Performed by: INTERNAL MEDICINE

## 2020-08-28 PROCEDURE — 99205 OFFICE O/P NEW HI 60 MIN: CPT | Mod: HCNC,S$GLB,, | Performed by: INTERNAL MEDICINE

## 2020-08-28 PROCEDURE — 3008F PR BODY MASS INDEX (BMI) DOCUMENTED: ICD-10-PCS | Mod: HCNC,CPTII,S$GLB, | Performed by: INTERNAL MEDICINE

## 2020-08-28 PROCEDURE — 1126F AMNT PAIN NOTED NONE PRSNT: CPT | Mod: HCNC,S$GLB,, | Performed by: INTERNAL MEDICINE

## 2020-08-28 PROCEDURE — 3008F BODY MASS INDEX DOCD: CPT | Mod: HCNC,CPTII,S$GLB, | Performed by: INTERNAL MEDICINE

## 2020-08-28 PROCEDURE — 1101F PR PT FALLS ASSESS DOC 0-1 FALLS W/OUT INJ PAST YR: ICD-10-PCS | Mod: HCNC,CPTII,S$GLB, | Performed by: INTERNAL MEDICINE

## 2020-08-28 PROCEDURE — 99205 PR OFFICE/OUTPT VISIT, NEW, LEVL V, 60-74 MIN: ICD-10-PCS | Mod: HCNC,S$GLB,, | Performed by: INTERNAL MEDICINE

## 2020-08-28 RX ORDER — PREDNISONE 5 MG/1
TABLET ORAL
Qty: 56 TABLET | Refills: 0 | Status: SHIPPED | OUTPATIENT
Start: 2020-08-28 | End: 2020-09-27

## 2020-08-28 RX ORDER — PREDNISONE 5 MG/1
TABLET ORAL
Qty: 56 TABLET | Refills: 0 | Status: SHIPPED | OUTPATIENT
Start: 2020-08-28 | End: 2020-08-28

## 2020-08-28 NOTE — PROGRESS NOTES
RHEUMATOLOGY OUTPATIENT CLINIC NOTE    8/28/2020    Attending Rheumatologist: Akhil Hernadez  Primary Care Provider: Amol Steve MD   Physician Requesting Consultation: Jin Lloyd MD  [address] [place] [address] [place] [address]  Chief Complaint/Reason For Consultation:  New pt (+RF -stiffness in hands, hips & knees)    Subjective:       HPI  Caitlin Ahmadi is a 65 y.o. White female referred for abnormal lab results (RF).  Labs done as part of work-up for chronic cough.  Patient describes chronic hand arthralgias as well.  Worst with prolonged inactivity, improved somewhat by light motion, alleviated with NSAIDs.  Denies association with prolonged morning stiffness, but refers episodic joint swelling.  Denies any active rash or photosensitivity.  Does not have tri-color discoloration of fingertips upon cold exposure or ulcers.  Denies persisent paresthesias, fever, or hematuria.    Review of Systems   Constitutional: Negative for chills, fever and malaise/fatigue.   Eyes: Negative for pain and redness.        No history uveitis.   Respiratory: Positive for cough (Chronic, stable.) and shortness of breath ([place], chronic). Negative for hemoptysis and sputum production.    Cardiovascular: Negative for chest pain and leg swelling.   Gastrointestinal: Negative for abdominal pain, blood in stool and melena.        No history of IBD.   Genitourinary: Negative for dysuria and hematuria.   Musculoskeletal: Positive for joint pain (Chronic, intermittent.  Inflammatory features.  Worst on hands.). Negative for falls.   Skin: Negative for rash.        Denies personal or family history of psoriasis.  No photosensitivity, ulcers, or Raynaud's phenomena.   Neurological: Negative for tingling and focal weakness.   Psychiatric/Behavioral: Negative for memory loss. The patient does not have insomnia.        Chronic comorbid conditions affecting medical decision making today:  History reviewed. No pertinent past medical  "history.  Past Surgical History:   Procedure Laterality Date    ADENOIDECTOMY      CHOLECYSTECTOMY      HERNIA REPAIR      HYSTERECTOMY      TONSILLECTOMY      TOTAL KNEE ARTHROPLASTY Bilateral      Family History   Problem Relation Age of Onset    No Known Problems Mother     Alzheimer's disease Father      Social History     Substance and Sexual Activity   Alcohol Use Yes    Alcohol/week: 5.0 standard drinks    Types: 5 Glasses of wine per week     Social History     Tobacco Use   Smoking Status Former Smoker    Packs/day: 1.00    Years: 20.00    Pack years: 20.00    Types: Cigarettes    Start date: 1970    Quit date: 1990    Years since quittin.2   Smokeless Tobacco Never Used     Social History     Substance and Sexual Activity   Drug Use Never       Current Outpatient Medications:     albuterol (PROVENTIL/VENTOLIN HFA) 90 mcg/actuation inhaler, Inhale 2 puffs into the lungs every 6 (six) hours as needed for Wheezing. Rescue, Disp: 6.7 g, Rfl: 11    amitriptyline (ELAVIL) 100 MG tablet, TAKE 3 TABLETS(300 MG) BY MOUTH EVERY EVENING (Patient taking differently: 300 mg. ), Disp: 90 tablet, Rfl: 0    ibuprofen (ADVIL,MOTRIN) 200 MG tablet, Take 600 mg by mouth every 6 (six) hours as needed., Disp: , Rfl:     predniSONE (DELTASONE) 5 MG tablet, Take 4 tablets (20 mg total) by mouth once daily for 4 days, THEN 3 tablets (15 mg total) once daily for 4 days, THEN 2.5 tablets (12.5 mg total) once daily for 4 days, THEN 2 tablets (10 mg total) once daily for 4 days, THEN 1 tablet (5 mg total) once daily for 7 days, THEN 1 tablet (5 mg total) every other day for 7 days., Disp: 56 tablet, Rfl: 0     Objective:         Vitals:    20 1352   BP: 126/79   Pulse: 92     Physical Exam   Constitutional: No distress.   Estimated body mass index is 23.87 kg/m² as calculated from the following:    Height as of 20: 5' 8" (1.727 m).    Weight as of this encounter: 71.2 kg (156 lb 15.5 " oz).    Wt Readings from Last 1 Encounters:  08/28/20 1352 : 71.2 kg (156 lb 15.5 oz)     HENT:   Head: Normocephalic and atraumatic.   Eyes: Conjunctivae are normal. Pupils are equal, round, and reactive to light.   Neck: Normal range of motion.   Cardiovascular: Normal rate and intact distal pulses.    Pulmonary/Chest: Effort normal. No respiratory distress.   Abdominal: Soft. She exhibits no distension.   Neurological: She is alert. Gait normal.   Skin: No rash noted. No erythema.     Musculoskeletal: Normal range of motion. Tenderness and deformity (Heberden's, Tommy's.) present.      Comments: : strong  Slight squeeze tenderness and bogginess on most PIPs, 2nd and 3rd MCP bilaterally.  No erythema, slight warmth.    AROM: intact  PROM: intact    Devices used by patient: none       Reviewed old and all outside pertinent medical records available.    All lab results personally reviewed and interpreted by me.  Lab Results   Component Value Date    WBC 4.32 07/06/2020    HGB 12.3 07/06/2020    HCT 39.3 07/06/2020    MCV 95 07/06/2020    MCH 29.9 07/06/2020    MCHC 31.3 (L) 07/06/2020    RDW 13.5 07/06/2020     07/06/2020    MPV 10.8 07/06/2020       Lab Results   Component Value Date     07/06/2020    K 4.3 07/06/2020     07/06/2020    CO2 25 07/06/2020     07/06/2020    BUN 16 07/06/2020    CALCIUM 9.0 07/06/2020    PROT 7.4 07/06/2020    ALBUMIN 3.8 07/06/2020    BILITOT 0.4 07/06/2020    AST 20 07/06/2020    ALKPHOS 64 07/06/2020    ALT 23 07/06/2020       Lab Results   Component Value Date    COLORU Yellow 06/17/2020    APPEARANCEUA Clear 06/17/2020    SPECGRAV 1.015 06/17/2020    PHUR 7.0 06/17/2020    PROTEINUA Negative 06/17/2020    KETONESU Negative 06/17/2020    LEUKOCYTESUR 2+ (A) 06/17/2020    NITRITE Positive (A) 06/17/2020       Lab Results   Component Value Date    CRP 4.0 08/18/2020       Lab Results   Component Value Date    SEDRATE 11 08/18/2020       Lab Results    Component Value Date    .0 (H) 08/18/2020    SEDRATE 11 08/18/2020       No components found for: 25OHVITDTOT, 02KMTEWC8, 68OMIUKI2, METHODNOTE    No results found for: URICACID    No components found for: TSPOTTB    Rheum Labs:   MATT 1 in 80 speckled   JULISA negative   Rheumatoid factor 157.    Infectious Labs:   COVID-19 screening negative.     Hypersensitivity pneumonitis panel: A. Pullulans Abs detected    Imaging:  All imaging reviewed and independently  interpreted by me.    Chest x-ray June 2020  The cardiac and mediastinal silhouettes appear within normal limits.   The lungs are clear bilaterally.  No acute osseous findings demonstrated.    Pulmonary function test: FVC / FEV1 / Ratio / TLC / DLCO  August 2020  83 / 81 / 76 / 89 / 70    DEXA scan  July 2020  FN: -1.9  LS: -1.8  FRAX: 1.5% hip / 10% major     ASSESSMENT / PLAN:     Caitlin Ahmadi is a 65 y.o. White female with:    1. Rheumatoid factor positive  - inflammatory joint pain, probable synovitis present, onset over 6 weeks,  several small joints joints, RF >3x ULN  - acute phase reactants WNR.  Chronic cough, abnormal hypersensitivity pneumonitis panel, Normal lung volumes with decreased DLCO  - awaiting CT scan chest per pulmonary.  Will follow up to assess for interstitial lung disease.  - Cyclic Citrullinated Peptide Antibody, IgG; Future  - Comprehensive metabolic panel; Standing  - CBC auto differential; Standing  - Quantiferon Gold TB; Future  - Protein electrophoresis, serum; Standing  - Immunofixation Electrophoresis; Standing  - predniSONE (DELTASONE) 5 MG tablet; Take 4 tablets (20 mg total) by mouth once daily for 4 days, THEN 3 tablets (15 mg total) once daily for 4 days, THEN 2.5 tablets (12.5 mg total) once daily for 4 days, THEN 2 tablets (10 mg total) once daily for 4 days, THEN 1 tablet (5 mg total) once daily for 7 days, THEN 1 tablet (5 mg total) every other day for 7 days.  Dispense: 56 tablet; Refill: 0  - X-Ray Hand  Complete Bilateral; Future  - Hepatitis Panel, Acute; Future    2. Other specified counseling  - over 10 minutes spent regarding below topics:  - Immunization counseling done.  - Weight loss counseling done.  - Nutrition and exercise counseling.  - Limitation of alcohol consumption.  - Regular exercise:  Aerobic and resistance.  - Medication counseling provided.    Follow up in about 6 weeks (around 10/9/2020).    Method of contact with patient concerns: [facility]    Disclaimer:  This note is prepared using voice recognition software and as such is likely to have errors and has not been proof read. Please contact me for questions.     Time spent: 60 minutes in face to face discussion concerning diagnosis, prognosis, review of lab and test results, benefits of treatment as well as management of disease, counseling of patient and coordination of care between various health care providers.  Greater than half the time spent was used for coordination of care and counseling of patient.    Akhil Hernadez M.D.  Rheumatology Department   [facility]

## 2020-08-28 NOTE — LETTER
August 28, 2020      Jin Lloyd MD  [address] [place] [address] [address] [place] [address]           [facility] Rheumatology  [address] [facility]  [place] [address]  Phone: [phone]  Fax: [phone]          Patient: Caitlin Ahmadi   MR Number: [id number]   YOB: 1955   Date of Visit: 8/28/2020       Dear Dr. Jin Lloyd:    Thank you for referring Caitlin Ahmadi to me for evaluation. Attached you will find relevant portions of my assessment and plan of care.    If you have questions, please do not hesitate to call me. I look forward to following Caitlin Ahmadi along with you.    Sincerely,    Akhil Hernadez MD    Enclosure  CC:  No Recipients    If you would like to receive this communication electronically, please contact [email or web address] or [phone] to request more information on [facility] access.    For providers and/or their staff who would like to refer a patient to [facility], please contact us through our one-stop-shop provider referral line, [facility], at [phone].    If you feel you have received this communication in error or would no longer like to receive these types of communications, please e-mail [email or web address]

## 2020-08-31 ENCOUNTER — HOSPITAL ENCOUNTER (OUTPATIENT)
Dept: RADIOLOGY | Facility: HOSPITAL | Age: 65
Discharge: HOME | End: 2020-08-31
Attending: INTERNAL MEDICINE
Payer: MEDICARE

## 2020-08-31 DIAGNOSIS — R09.89 BIBASILAR CRACKLES: ICD-10-CM

## 2020-08-31 DIAGNOSIS — J84.9 ILD (INTERSTITIAL LUNG DISEASE): ICD-10-CM

## 2020-08-31 PROCEDURE — 71250 CT THORAX DX C-: CPT | Mod: TC,HCNC

## 2020-08-31 PROCEDURE — 71250 CT THORAX DX C-: CPT | Mod: 26,HCNC,, | Performed by: RADIOLOGY

## 2020-08-31 PROCEDURE — 71250 CT CHEST WITHOUT CONTRAST: ICD-10-PCS | Mod: 26,HCNC,, | Performed by: RADIOLOGY

## 2020-09-22 ENCOUNTER — PATIENT OUTREACH (OUTPATIENT)
Dept: ADMINISTRATIVE | Facility: OTHER | Age: 65
End: 2020-09-22

## 2020-09-22 ENCOUNTER — OFFICE VISIT (OUTPATIENT)
Dept: PULMONOLOGY | Facility: CLINIC | Age: 65
End: 2020-09-22
Payer: MEDICARE

## 2020-09-22 VITALS
BODY MASS INDEX: 23.76 KG/M2 | HEIGHT: 68 IN | SYSTOLIC BLOOD PRESSURE: 110 MMHG | DIASTOLIC BLOOD PRESSURE: 80 MMHG | HEART RATE: 96 BPM | RESPIRATION RATE: 17 BRPM | WEIGHT: 156.75 LBS | OXYGEN SATURATION: 98 % | TEMPERATURE: 98 F

## 2020-09-22 DIAGNOSIS — R05.3 CHRONIC COUGH: ICD-10-CM

## 2020-09-22 DIAGNOSIS — Z77.120 SUSPECTED EXPOSURE TO MOLD: Primary | ICD-10-CM

## 2020-09-22 DIAGNOSIS — R76.8 RHEUMATOID FACTOR POSITIVE: ICD-10-CM

## 2020-09-22 DIAGNOSIS — R76.8 ANA POSITIVE: ICD-10-CM

## 2020-09-22 PROCEDURE — 1101F PT FALLS ASSESS-DOCD LE1/YR: CPT | Mod: HCNC,CPTII,S$GLB, | Performed by: INTERNAL MEDICINE

## 2020-09-22 PROCEDURE — 99999 PR PBB SHADOW E&M-EST. PATIENT-LVL IV: CPT | Mod: PBBFAC,HCNC,, | Performed by: INTERNAL MEDICINE

## 2020-09-22 PROCEDURE — 1101F PR PT FALLS ASSESS DOC 0-1 FALLS W/OUT INJ PAST YR: ICD-10-PCS | Mod: HCNC,CPTII,S$GLB, | Performed by: INTERNAL MEDICINE

## 2020-09-22 PROCEDURE — 99214 PR OFFICE/OUTPT VISIT, EST, LEVL IV, 30-39 MIN: ICD-10-PCS | Mod: HCNC,S$GLB,, | Performed by: INTERNAL MEDICINE

## 2020-09-22 PROCEDURE — 99999 PR PBB SHADOW E&M-EST. PATIENT-LVL IV: ICD-10-PCS | Mod: PBBFAC,HCNC,, | Performed by: INTERNAL MEDICINE

## 2020-09-22 PROCEDURE — 1159F PR MEDICATION LIST DOCUMENTED IN MEDICAL RECORD: ICD-10-PCS | Mod: HCNC,S$GLB,, | Performed by: INTERNAL MEDICINE

## 2020-09-22 PROCEDURE — 3008F PR BODY MASS INDEX (BMI) DOCUMENTED: ICD-10-PCS | Mod: HCNC,CPTII,S$GLB, | Performed by: INTERNAL MEDICINE

## 2020-09-22 PROCEDURE — 3008F BODY MASS INDEX DOCD: CPT | Mod: HCNC,CPTII,S$GLB, | Performed by: INTERNAL MEDICINE

## 2020-09-22 PROCEDURE — 99214 OFFICE O/P EST MOD 30 MIN: CPT | Mod: HCNC,S$GLB,, | Performed by: INTERNAL MEDICINE

## 2020-09-22 PROCEDURE — 1159F MED LIST DOCD IN RCRD: CPT | Mod: HCNC,S$GLB,, | Performed by: INTERNAL MEDICINE

## 2020-09-22 RX ORDER — FLUTICASONE FUROATE AND VILANTEROL TRIFENATATE 100; 25 UG/1; UG/1
1 POWDER RESPIRATORY (INHALATION) DAILY
Qty: 60 EACH | Refills: 6 | Status: SHIPPED | OUTPATIENT
Start: 2020-09-22 | End: 2021-02-08

## 2020-09-22 NOTE — PROGRESS NOTES
Pulmonary Outpatient Follow Up Visit     Subjective:       Patient ID: Caitlin Ahmadi is a 65 y.o. female.    Chief Complaint: Shortness of Breath      HPI          65-year-old female patient presenting for 6 weeks follow-up.    She has been complaining for 1 year of coughing with clear sputum production and shortness of breath.    Blood work revealed elevated rheumatoid factor, positive HP precipitins panel, positive MATT a screening test.    Treated for 1 month with slow taper of prednisone by Rheumatology.         No wheezing no history of asthma.     Twenty pack year smoker quit 30 years ago.     Does have 4 cats and 2 dogs at home.     History of mitral valve prolapse last echo 5 years ago was okay.     Denies personal family history of connective tissue disorder.  Review of Systems   Constitutional: Positive for fatigue and weakness. Negative for fever and chills.   HENT: Positive for congestion. Negative for nosebleeds.    Eyes: Negative for redness.   Respiratory: Positive for cough, sputum production, shortness of breath and dyspnea on extertion. Negative for choking.    Cardiovascular: Negative for chest pain.   Genitourinary: Negative for hematuria.   Endocrine: Negative for cold intolerance.    Musculoskeletal: Positive for arthralgias.   Skin: Negative for rash.   Gastrointestinal: Negative for vomiting.   Neurological: Negative for syncope.   Hematological: Negative for adenopathy.   Psychiatric/Behavioral: Negative for confusion. The patient is nervous/anxious.        Outpatient Encounter Medications as of 9/22/2020   Medication Sig Dispense Refill    albuterol (PROVENTIL/VENTOLIN HFA) 90 mcg/actuation inhaler Inhale 2 puffs into the lungs every 6 (six) hours as needed for Wheezing. Rescue 6.7 g 11    amitriptyline (ELAVIL) 100 MG tablet TAKE 3 TABLETS(300 MG) BY MOUTH EVERY EVENING 90 tablet 0    ibuprofen (ADVIL,MOTRIN) 200 MG tablet Take 600 mg by mouth every  "6 (six) hours as needed.      predniSONE (DELTASONE) 5 MG tablet Take 4 tablets (20 mg total) by mouth once daily for 4 days, THEN 3 tablets (15 mg total) once daily for 4 days, THEN 2.5 tablets (12.5 mg total) once daily for 4 days, THEN 2 tablets (10 mg total) once daily for 4 days, THEN 1 tablet (5 mg total) once daily for 7 days, THEN 1 tablet (5 mg total) every other day for 7 days. 56 tablet 0    fluticasone furoate-vilanteroL (BREO ELLIPTA) 100-25 mcg/dose diskus inhaler Inhale 1 puff into the lungs once daily. Controller 60 each 6     No facility-administered encounter medications on file as of 9/22/2020.        Objective:     Vital Signs (Most Recent)  Vital Signs  Temp: 97.9 °F (36.6 °C)  Pulse: 96  Resp: 17  SpO2: 98 %  BP: 110/80  Height and Weight  Height: 5' 8" (172.7 cm)  Weight: 71.1 kg (156 lb 12 oz)  BSA (Calculated - sq m): 1.85 sq meters  BMI (Calculated): 23.8  Weight in (lb) to have BMI = 25: 164.1]  Wt Readings from Last 2 Encounters:   09/22/20 71.1 kg (156 lb 12 oz)   08/28/20 71.2 kg (156 lb 15.5 oz)       Physical Exam   Constitutional: She is oriented to person, place, and time. She appears well-developed and well-nourished.   HENT:   Head: Normocephalic.   Neck: Neck supple.   Cardiovascular: Normal rate and regular rhythm.   Pulmonary/Chest: Normal expansion, effort normal and breath sounds normal. No stridor. No respiratory distress. She has no rales. She exhibits no tenderness.   Abdominal: Soft.   Musculoskeletal:         General: No tenderness.   Lymphadenopathy:     She has no cervical adenopathy.   Neurological: She is alert and oriented to person, place, and time. Gait normal.   Skin: Skin is warm. No cyanosis. Nails show no clubbing.   Psychiatric: She has a normal mood and affect. Her behavior is normal. Judgment and thought content normal.   Nursing note and vitals reviewed.      Laboratory    MATT screen positive    Rheumatoid factor elevated to 157.    A. Pullulans Abs None " Detected DetectedAbnormal          Lab Results   Component Value Date    WBC 5.92 08/28/2020    RBC 4.27 08/28/2020    HGB 12.7 08/28/2020    HCT 39.0 08/28/2020    MCV 91 08/28/2020    MCH 29.7 08/28/2020    MCHC 32.6 08/28/2020    RDW 13.4 08/28/2020     08/28/2020    MPV 10.1 08/28/2020    GRAN 3.2 08/28/2020    GRAN 54.3 08/28/2020    LYMPH 1.9 08/28/2020    LYMPH 32.4 08/28/2020    MONO 0.6 08/28/2020    MONO 9.6 08/28/2020    EOS 0.2 08/28/2020    BASO 0.05 08/28/2020    EOSINOPHIL 2.9 08/28/2020    BASOPHIL 0.8 08/28/2020       BMP  Lab Results   Component Value Date     08/28/2020    K 3.8 08/28/2020     08/28/2020    CO2 25 08/28/2020    BUN 13 08/28/2020    CREATININE 0.8 08/28/2020    CALCIUM 9.3 08/28/2020    ANIONGAP 11 08/28/2020    ESTGFRAFRICA >60 08/28/2020    EGFRNONAA >60 08/28/2020    AST 19 08/28/2020    ALT 21 08/28/2020    PROT 7.9 08/28/2020       Lab Results   Component Value Date    BNP 19 08/18/2020       Lab Results   Component Value Date    TSH 2.397 07/06/2020       Lab Results   Component Value Date    SEDRATE 11 08/18/2020       Lab Results   Component Value Date    CRP 4.0 08/18/2020     Lab Results   Component Value Date    IGE <35 08/18/2020        No results found for: ASPERGILLUS  No results found for: AFUMIGATUSCL     Lab Results   Component Value Date    ACE Test Not Performed 08/18/2020        Diagnostic Results:  I have personally reviewed today the following studies:  High-resolution CT chest August 31, 2020      FINDINGS:  Minimal bilateral apical pleuroparenchymal scarring.  Minimal bilateral scattered septal thickening.  Minimal bibasilar gravitational changes.  No focal or diffuse pleural thickening.  Mild scarring and or subsegmental atelectasis within the lingula.  No parenchymal bands, calcified or noncalcified pleural plaque.  No effusion.  No bronchiectasis.  No consolidation or mass lesion.     Trachea and mainstem bronchi unremarkable.  Thyroid  normal in appearance.  A few bilateral subcentimeter and shotty short axis axillary and mediastinal nodes.  No bulky or matted adenopathy.  No significant hilar adenopathy.     Heart size within normal limits.  No pericardial effusion.  Minimal atherosclerotic calcific plaque within the aorta without aneurysmal dilatation.     Included upper abdomen remarkable for prior cholecystectomy.  No other significant findings appreciated.  Mild degenerative change throughout the spine and generalized osteopenia.  Degenerative changes also noted in the partially visualized C-spine.  No fracture or subluxation.  No lytic or blastic lesion.    PFT August 2020      Grade A-D -acceptable quality of tracings   Normal spirometry. (FEV1/VC greater than or equal to LLN and FVC greater than or equal to LLN)   Normal lung volumes.(TLC> equal to 80% of predicted and RV % predicted is >80%)   Mild reduction in diffusion capacity -unadjusted for hemoglobin (DLCO > or equal to 60% predicted and < LLN) .     Mild decrease in maximal voluntary ventilation. (MVV % predicted is >65% predicted and less than LLN)   Coughing noted on the expiratory limb otherwise Flow volume loops are normal.   Overall there is no significant ventilatory impairment. (FEV1 >LLN)   Spirometry is normal. Lung volume determination is normal. Airway mechanics are abnormal showing increased airway resistance and decreased conductance. DLCO is mildly decreased. MVV is mildly decreased. Notes: No recent hemoglobin value available.  DLCO   interpretation assumes a normal hemoglobin value.       6 min walking test 08/24/2020 no significant O2 desaturation.  Normal walk distance.  Assessment/Plan:   Suspected exposure to mold  -     Ambulatory referral/consult to Allergy; Future; Expected date: 09/29/2020    Chronic cough  -     Spirometry with/without bronchodilator & DLCO; Future; Expected date: 12/22/2020  -     fluticasone furoate-vilanteroL (BREO ELLIPTA) 100-25  mcg/dose diskus inhaler; Inhale 1 puff into the lungs once daily. Controller  Dispense: 60 each; Refill: 6    Rheumatoid factor positive  -     Spirometry with/without bronchodilator & DLCO; Future; Expected date: 12/22/2020    MATT positive  -     Spirometry with/without bronchodilator & DLCO; Future; Expected date: 12/22/2020     Today significant resolution of bilateral crackles noted last visit on lung auscultation was noted after 1 month slow taper of prednisone.    CT scan of the chest shows septal thickening possible early RA- ILD/HP/ ?  Small airways disease     Continue albuterol p.r.n. trial of Breo 100 mcg 1 puff daily.    Patient not aware of mold exposure at her house.  Will refer to allergy for further evaluation and instructions regarding antigen avoidance.    Follow-up with rheumatology.    Repeat spirometry and DLCO next visit.    Yearly influenza vaccination.  Up-to-date on pneumococcal vaccines.      Follow up in about 3 months (around 12/22/2020).    This note was prepared using voice recognition system and is likely to have sound alike errors that may have been overlooked even after proof reading.  Please call me with any questions    Discussed diagnosis, its evaluation, treatment and usual course. All questions answered.      Jin Lloyd MD

## 2020-09-22 NOTE — PROGRESS NOTES
Health Maintenance Due   Topic Date Due    TETANUS VACCINE  03/11/1973    Shingles Vaccine (1 of 2) 03/11/2004    Colorectal Cancer Screening  03/11/2004    Influenza Vaccine (1) 08/01/2020     Updates were requested from care everywhere.  Chart was reviewed for overdue Proactive [facility] Encounters ([facility]) topics (CRS, Breast Cancer Screening, Eye exam)  Health Maintenance has been updated.  LINKS immunization registry triggered.  Immunizations were reconciled.

## 2020-10-02 ENCOUNTER — OFFICE VISIT (OUTPATIENT)
Dept: ALLERGY | Facility: CLINIC | Age: 65
End: 2020-10-02
Payer: MEDICARE

## 2020-10-02 ENCOUNTER — LAB VISIT (OUTPATIENT)
Dept: LAB | Facility: HOSPITAL | Age: 65
End: 2020-10-02
Attending: ALLERGY & IMMUNOLOGY
Payer: MEDICARE

## 2020-10-02 VITALS
BODY MASS INDEX: 23.95 KG/M2 | DIASTOLIC BLOOD PRESSURE: 74 MMHG | SYSTOLIC BLOOD PRESSURE: 120 MMHG | HEIGHT: 68 IN | TEMPERATURE: 98 F | HEART RATE: 89 BPM | WEIGHT: 158.06 LBS

## 2020-10-02 DIAGNOSIS — Z77.120 SUSPECTED EXPOSURE TO MOLD: ICD-10-CM

## 2020-10-02 DIAGNOSIS — R05.9 COUGH: ICD-10-CM

## 2020-10-02 DIAGNOSIS — R05.9 COUGH: Primary | ICD-10-CM

## 2020-10-02 LAB — IGE SERPL-ACNC: <35 IU/ML (ref 0–100)

## 2020-10-02 PROCEDURE — 1159F PR MEDICATION LIST DOCUMENTED IN MEDICAL RECORD: ICD-10-PCS | Mod: HCNC,S$GLB,, | Performed by: ALLERGY & IMMUNOLOGY

## 2020-10-02 PROCEDURE — 99999 PR PBB SHADOW E&M-EST. PATIENT-LVL IV: ICD-10-PCS | Mod: PBBFAC,HCNC,, | Performed by: ALLERGY & IMMUNOLOGY

## 2020-10-02 PROCEDURE — 99204 PR OFFICE/OUTPT VISIT, NEW, LEVL IV, 45-59 MIN: ICD-10-PCS | Mod: HCNC,S$GLB,, | Performed by: ALLERGY & IMMUNOLOGY

## 2020-10-02 PROCEDURE — 86003 ALLG SPEC IGE CRUDE XTRC EA: CPT | Mod: HCNC

## 2020-10-02 PROCEDURE — 86003 ALLG SPEC IGE CRUDE XTRC EA: CPT | Mod: 59,HCNC

## 2020-10-02 PROCEDURE — 3008F PR BODY MASS INDEX (BMI) DOCUMENTED: ICD-10-PCS | Mod: HCNC,CPTII,S$GLB, | Performed by: ALLERGY & IMMUNOLOGY

## 2020-10-02 PROCEDURE — 1101F PT FALLS ASSESS-DOCD LE1/YR: CPT | Mod: HCNC,CPTII,S$GLB, | Performed by: ALLERGY & IMMUNOLOGY

## 2020-10-02 PROCEDURE — 99999 PR PBB SHADOW E&M-EST. PATIENT-LVL IV: CPT | Mod: PBBFAC,HCNC,, | Performed by: ALLERGY & IMMUNOLOGY

## 2020-10-02 PROCEDURE — 82785 ASSAY OF IGE: CPT | Mod: HCNC

## 2020-10-02 PROCEDURE — 36415 COLL VENOUS BLD VENIPUNCTURE: CPT | Mod: HCNC

## 2020-10-02 PROCEDURE — 3008F BODY MASS INDEX DOCD: CPT | Mod: HCNC,CPTII,S$GLB, | Performed by: ALLERGY & IMMUNOLOGY

## 2020-10-02 PROCEDURE — 1101F PR PT FALLS ASSESS DOC 0-1 FALLS W/OUT INJ PAST YR: ICD-10-PCS | Mod: HCNC,CPTII,S$GLB, | Performed by: ALLERGY & IMMUNOLOGY

## 2020-10-02 PROCEDURE — 99204 OFFICE O/P NEW MOD 45 MIN: CPT | Mod: HCNC,S$GLB,, | Performed by: ALLERGY & IMMUNOLOGY

## 2020-10-02 PROCEDURE — 1159F MED LIST DOCD IN RCRD: CPT | Mod: HCNC,S$GLB,, | Performed by: ALLERGY & IMMUNOLOGY

## 2020-10-02 NOTE — PROGRESS NOTES
Subjective:       Patient ID: Caitlin Ahmadi is a 65 y.o. female.    Initial visit    Chief Complaint:  Other (Suspected exposure to mold)      HPI: 65 year old female with a history of shortness of breath for a year and cough for several years. She described the cough as dry. She was seen by Dr. 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 She denies mold in her home. She denies runny nose, itchy eyes, watery eyes. She denies nasal or eye symptoms. She does report fatigue.  She smoked for 20 years and stopped 30 years ago.    Past Medical History:  Non- contributory      Family History   Problem Relation Age of Onset    No Known Problems Mother     Alzheimer's disease Father      Current Outpatient Medications on File Prior to Visit   Medication Sig Dispense Refill    amitriptyline (ELAVIL) 100 MG tablet TAKE 3 TABLETS(300 MG) BY MOUTH EVERY EVENING 90 tablet 0    fluticasone furoate-vilanteroL (BREO ELLIPTA) 100-25 mcg/dose diskus inhaler Inhale 1 puff into the lungs once daily. Controller 60 each 6    ibuprofen (ADVIL,MOTRIN) 200 MG tablet Take 600 mg by mouth every 6 (six) hours as needed.      albuterol (PROVENTIL/VENTOLIN HFA) 90 mcg/actuation inhaler Inhale 2 puffs into the lungs every 6 (six) hours as needed for Wheezing. Rescue (Patient not taking: Reported on 10/2/2020) 6.7 g 11     No current facility-administered medications on file prior to visit.      Review of patient's allergies indicates:   Allergen Reactions    Pneumococcal vaccine Swelling    Hydrocodone-acetaminophen Nausea Only and Nausea And Vomiting    Meloxicam Rash    Sulfa (sulfonamide antibiotics) Rash       Environmental History: Cats, Dogs, Previous smoker  Review of Systems   Constitutional: Negative for chills and fever.   HENT: Negative for congestion, facial swelling, rhinorrhea and sinus pain.    Eyes: Negative for pain and discharge.   Respiratory: Positive for cough and shortness of breath.     Cardiovascular: Negative for chest pain and leg swelling.   Gastrointestinal: Negative for nausea and vomiting.   Endocrine: Negative for cold intolerance.   Skin: Negative for rash and wound.   Allergic/Immunologic: Negative for environmental allergies and immunocompromised state.   Neurological: Negative for dizziness and speech difficulty.   Hematological: Negative for adenopathy. Does not bruise/bleed easily.   Psychiatric/Behavioral: Negative for behavioral problems and suicidal ideas.        Objective:    Physical Exam  Vitals signs reviewed.   Constitutional:       General: She is not in acute distress.     Appearance: Normal appearance. She is well-developed. She is not ill-appearing, toxic-appearing or diaphoretic.   HENT:      Head: Normocephalic and atraumatic.      Right Ear: Tympanic membrane, ear canal and external ear normal. There is no impacted cerumen.      Left Ear: Tympanic membrane, ear canal and external ear normal. There is no impacted cerumen.      Nose: Nose normal. No congestion or rhinorrhea.      Mouth/Throat:      Pharynx: No oropharyngeal exudate or posterior oropharyngeal erythema.   Eyes:      General: No scleral icterus.        Right eye: No discharge.         Left eye: No discharge.      Pupils: Pupils are equal, round, and reactive to light.   Neck:      Musculoskeletal: Normal range of motion and neck supple. No neck rigidity.      Thyroid: No thyromegaly.   Cardiovascular:      Rate and Rhythm: Normal rate and regular rhythm.      Heart sounds: Normal heart sounds. No murmur. No friction rub. No gallop.    Pulmonary:      Effort: Pulmonary effort is normal. No respiratory distress.      Breath sounds: Normal breath sounds. No stridor. No wheezing, rhonchi or rales.   Abdominal:      General: Bowel sounds are normal. There is no distension.      Palpations: Abdomen is soft. There is no mass.      Tenderness: There is no abdominal tenderness. There is no guarding.    Musculoskeletal: Normal range of motion.   Lymphadenopathy:      Cervical: No cervical adenopathy.   Skin:     General: Skin is warm.      Coloration: Skin is not pale.      Findings: No erythema, lesion or rash.   Neurological:      Mental Status: She is alert and oriented to person, place, and time.   Psychiatric:         Mood and Affect: Mood normal.         Behavior: Behavior normal.         Thought Content: Thought content normal.         Judgment: Judgment normal.           Assessment:       1. Cough    2. Suspected exposure to mold         Plan:       Unable to skin test, as she is on a tricyclic antidepressant.  Will order specific igE to inhalant allergens.  She likely has hypersensitivity pneumonitis. She lacks nasal or eye symptoms.  RTC 2 weeks or sooner, if needed.    Cough  -     ALLERGEN-ALTERNARIA ALTERNATA; Future; Expected date: 10/02/2020  -     IgE; Future; Expected date: 10/02/2020  -     B[place]ia grass IgE; Future; Expected date: 10/02/2020  -     Aspergillus fumagatus IgE; Future; Expected date: 10/02/2020  -     Chaetomium globosum IgE; Future; Expected date: 10/02/2020  -     Cockroach, American IgE; Future; Expected date: 10/02/2020  -     Cladosporium IgE; Future; Expected date: 10/02/2020  -     Curvularia lunata IgE; Future; Expected date: 10/02/2020  -     D. farinae IgE; Future; Expected date: 10/02/2020  -     D. pteronyssinus IgE; Future; Expected date: 10/02/2020  -     Dog dander IgE; Future; Expected date: 10/02/2020  -     Plantain, English IgE; Future; Expected date: 10/02/2020  -     Eucalyptus IgE; Future; Expected date: 10/02/2020  -     Marsh elder, rough IgE; Future; Expected date: 10/02/2020  -     Mugwort IgE; Future; Expected date: 10/02/2020  -     Nettle IgE; Future; Expected date: 10/02/2020  -     Orchard grass IgE; Future; Expected date: 10/02/2020  -     Jerauld, western white IgE; Future; Expected date: 10/02/2020  -     Privet, common IgE; Future; Expected date:  10/02/2020  -     Ragweed, short, common IgE; Future; Expected date: 10/02/2020  -     Red top grass IgE; Future; Expected date: 10/02/2020  -     Rye grass, cultivated IgE; Future; Expected date: 10/02/2020  -     Thistle, Russian IgE; Future; Expected date: 10/02/2020  -     Stemphyllium IgE; Future; Expected date: 10/02/2020  -     David IgE; Future; Expected date: 10/02/2020  -     Faisal grass IgE; Future; Expected date: 10/02/2020  -     Allergen, Pecan Tree IgE; Future; Expected date: 10/02/2020  -     [place], black IgE; Future; Expected date: 10/02/2020  -     [place], bald IgE; Future; Expected date: 10/02/2020  -     Oak, white IgE; Future; Expected date: 10/02/2020  -     Allergen, Cocklebur; Future; Expected date: 10/02/2020  -     Cat epithelium IgE; Future; Expected date: 10/02/2020  -     Allergen, Hackberry Celtis; Future; Expected date: 10/02/2020  -     Allergen, Elm Cedar; Future; Expected date: 10/02/2020  -     Allergen-Kaufman; Future; Expected date: 10/02/2020    Suspected exposure to mold  -     Ambulatory referral/consult to Allergy

## 2020-10-02 NOTE — PATIENT INSTRUCTIONS
"Hypersensitivity pneumonitis (extrinsic allergic alveolitis): Epidemiology, causes, and pathogenesis  Author:  Clay Roberson Jr, MD  Section :  Robel Giraldo MD, MS  [place] :  Floresita Gomez MD  All topics are updated as new evidence becomes available and our peer review process is complete.  Literature review current through: Sep 2020.  This topic last updated: Aug 07, 2019.  INTRODUCTION -- Hypersensitivity pneumonitis (HP), also called extrinsic allergic alveolitis, is a complex syndrome of varying intensity, clinical presentation, and natural history rather than a single, uniform disease [1-3]. It represents an immunologic reaction to an inhaled agent, particularly an organic antigen, occurring within the pulmonary parenchyma. Numerous inciting agents have been described, including, but not limited to, agricultural dusts, bioaerosols, and certain reactive chemical species.  The epidemiology, pathogenesis, and etiologic agents of HP will be reviewed here. The major sources of these agents are discussed in the text, while the individual causes are listed in the tables. The clinical features, diagnosis, management, and prognosis of HP are discussed separately. (See "Hypersensitivity pneumonitis (extrinsic allergic alveolitis): Clinical manifestations and diagnosis" and "Hypersensitivity pneumonitis (extrinsic allergic alveolitis): Treatment, prognosis, and prevention".)  EPIDEMIOLOGY -- The overall prevalence and incidence of HP are thought to be low, but estimates vary considerably depending upon case definitions, intensity of exposure to inciting antigens, season, geographical conditions, local practices and customs, proximity to certain industries, and host risk factors [2-6]. Much of the epidemiologic information regarding HP has been derived from studies of farmers and bird fanciers. Mild or subclinical HP can escape detection or be misdiagnosed as a viral illnesses or asthma, either " of which may have nonspecific clinical findings that mimic HP.  In an analysis of a large healthcare database (150 million unique enrollees), a search algorithm was used to estimate prevalence and incidence of HP [5]. Between 2004 and 2013, 7498 cases of HP were identified. The one-year prevalence rate ranged from 1.67 to 2.71 per 100,000 persons and increased with age up to 11.2 per 100,000 among those age 65 years and older. Approximately half of the HP cases (3902) were classified as chronic hypersensitivity pneumonitis with half of those (1852) being classified as fibrotic HP. Prevalence varied by geographic location.  Farmers lung -- Farmer's lung is one of the most common forms of HP, affecting 0.4 to 7 percent of the farming population [7-11]. Prevalence (the number of cases in the population at risk at a given time) varies by region, climate, and farming practices from approximately 9 percent of farmers at risk in humid zones to <2 percent in drier zones [9,12].   The incidence of farmers lung (number of new cases in the population at risk in a given year) varies considerably and is influenced by similar factors. In [place], for example, significant variation in incidence according to season has been noted: most cases of farmer's lung occur in April near the end of the indoor feeding season for cattle, while the incidence is lowest in October, when outdoor feeding is the norm. The incidence of farmer's lung also correlates positively with the average daily rainfall during the preceding haymaking period.  Bird fanciers disease -- The reported prevalence of HP among bird fanciers is even more variable than farmers lung; estimates range from 20 to 20,000 affected individuals per 100,000 persons at risk [13,14]. The prevalence of HP is likely to be higher among bird fanciers than among farmers because contact with the inciting dominic antigens is less limited by season or geographic location. The species of  birds raised and the handling practices employed may affect prevalence, but data regarding such variables are sparse.  Despite some variation among study populations, the incidence rate of HP among pigeon breeders clearly is higher than that among farmers (see 'Farmers lung' above). Whereas the incidence rate of HP among farmers is between 8 and 540 per 100,000 per year, pigeon breeders have rates of 6000 to 21,000 per 100,000 per year (ie, 6 to 21 percent per year) [8,15-18].  The reason that only a small proportion of exposed individuals develop clinically significant HP is not known, although genetic factors have been postulated to play a major role in determining an individual's risk of disease. It is likely that the immunologic abnormalities underlying HP reflect the interplay of multiple genes involved in the immune response. (See 'Pathogenesis' below.)  Sporadic outbreaks of HP -- High attack rates have been documented during sporadic outbreaks of HP in a variety of settings:  ?52 percent of exposed office workers with humidifier lung [19]  ?37 percent of lifeguards exposed to a public swimming pool [20]  ?27 percent of workers at an injection molding plant manufacturing polyurethane foam parts for automobiles [21]  ?15 percent of office workers exposed to a contaminated forced air system [22]  Summer type HP accounts for 74 percent of cases of HP in [place] and is caused by Trichosporon cutaneum and other Trichosporon species [23,24]. The number of cases tends to increase in summers with high temperature and humidity, but overall have been on the decline since 1980 [23]. Over the same time period, the proportion of houses made of wood has declined. Symptom onset is typically described in August, but hospitalization increases in September and October. Women age 50 to 59 years are the most commonly affected group.  Effect of cigarette smoking -- Cigarette smoking is associated with a decreased risk of HP  [6,25]. As examples, smoking appears to diminish the risk of developing farmer's lung, pigeon breeder's disease, HP associated with contaminated air conditioners, and [place] summer-type HP caused by Trichosporon cutaneum [26,27]. Once the disease is established, however, smoking does not appear to attenuate its severity, and may predispose to a more chronic and severe course [28].  These observations may reflect diminished antibody responses to inhaled antigens among smokers despite similar degrees of exposure [27,29,30]. In a survey of 102 pigeon breeders, for example, an IgG antibody response to inhaled pigeon antigens was much less common among smokers (4 versus 55 percent in nonsmokers). This effect of smoking appeared to be reversible, since former smokers had a response similar to that of nonsmokers [27].  ETIOLOGIC AGENTS -- A wide range of occupations and avocations result in contact with airborne organic antigens and increase the risk of developing HP. Over 300 etiologies of HP have been reported. Some of these entities are derived from reports of a single or small number of cases and require further study. The following sections describe reported etiologies of HP by occupation and the major causative antigens [3,8,31-52]. A few organic chemical compounds (eg, isocyanates, mellitic anhydrides) are associated with HP [53] .  Farming, vegetable, or dairy cattle workers -- Farmer's lung is, as noted above, one of the most common forms of HP (see 'Epidemiology' above). There are several types of exposure and many different antigens that must be considered in farmers and cattle workers (table 1) [35,36,54]. The main source of antigens is proliferation of thermophilic actinomycetes (eg, Saccharopolyspora rickiivirgula) in hay or dust in areas of high humidity and temperatures between 40 to 60?C [54]. Round amol of hay with greater compaction tend to have higher humidity leading to increased microbial growth.  "Improved ventilation and protective respiratory equipment can reduce exposure to these microbial antigens.  HP in farmers must be distinguished from febrile, toxic reactions to inhaled mold dusts (organic dust toxic syndrome [ODTS]). ODTS is a nonimmunologic reaction that occurs 30 to 50 times more commonly than HP in farmers. HP is associated with high exposure on most days for prolonged periods, while ODTS is associated with intense exposure occurring on a single day. (See "Hypersensitivity pneumonitis (extrinsic allergic alveolitis): Clinical manifestations and diagnosis", section on 'Other processes caused by inhalation of organic agents'.)  Ventilation and water-related contamination -- HP can be caused by exposure to antigens of microorganisms that colonize forced air systems, heated water reservoirs, portable ultrasonic humidifiers, cool-mist vaporizers, wooden water buckets, swimming pools, whirlpools, hot tubs, spas, water flume slides, water-damaged carpeting, shower curtains, or metal working aerosols (table 2) [20,37-39,55-57].  Mycobacterium avium complex (MAC) is a pathogenic organism in patients with underlying lung disease or compromised immunity; in addition, MAC may cause HP in immunocompetent hosts [56,58-63]. Affected individuals often have a history of exposure to hot tubs or hot water aerosols, and the resulting HP has been referred to as "hot tub lung." This condition results in granulomatous inflammation, and avoidance of ongoing exposure is the cornerstone of therapy [58,64]. In contrast, true MAC infection may require long-term antibiotic therapy. (See "Overview of nontuberculous mycobacterial infections in HIV-negative patients" and "Treatment of Mycobacterium avium complex pulmonary infection in adults".)  A rare cause of HP was identified in association with use of a steam iron with a water reservoir that was contaminated with Sphingobacterium spiritivorum [65].  Bird and poultry " handling -- HP can be induced by exposure to excreta and proteinaceous material on dried, finely dispersed dust from pigeons, budgerigars and other parakeets, canaries, chickens, turkeys, and other birds and fowl (table 3) [66-68]. In addition to poultry workers and feather pickers, individuals can develop HP from aerosol spread of droppings by the vent of a clothes dryer, contamination of heating vents from a garage where birds are housed, and exposure to feather pillows, wreaths, and down comforters [66,69].  Veterinary work and animal handling -- Animal handling and other occupations that involve significant contact with animals and their associated organic antigens can result in a variety of HP syndromes, such as s lung due to exposure to urine, serum, and pelts of rats and gerbils and furriers lung due to exposure to animal pelts in the process of sewing furs (table 4).  Grain and flour processing and loading -- During the course of processing, grain can become colonized with a variety of microorganisms and insect pathogens, such as Sporobolomyces, Aspergillus fumigatus, Aspergillus clavatus, and Sitophilus granarius (weevil). The grain and associated micro-organisms are easily aerosolized, and significant exposure to inhaled organic antigens may ensue, leading to HP (table 5) [40,70,71].  Lumber milling, construction, wood stripping, paper and wallboard manufacture -- Trees, plants, and their products can become colonized with mold. Occupations that place an individual in contact with colonized plant products can lead to several varieties of HP (table 6) [41-43,72].  Plastic manufacture, painting, electronics industry, other chemicals -- Some inciting agents capable of causing HP may be synthetic in origin. Occupational exposure to aerosolized or gas phase organic chemicals may result in HP (table 7) [44-50,53,73]. These workers are also at increased risk for the development of occupational  "asthma or rhinitis. (See "Occupational asthma: Definitions, epidemiology, causes, and risk factors" and "Occupational rhinitis".)  Textile workers -- The pulmonary manifestations in exposures such as cotton mill dust, upholstery fabric, and nylon velvet are probably not true forms of HP, but they are included for completeness (table 8). These exposures lead to lung injury characterized pathologically by diffuse alveolar damage, desquamative interstitial pneumonitis, or other forms of pulmonary pathology [52]. A specific form of textile-related lung disease has been identified in workers exposed to rotary-cut nylon flock, and is discussed elsewhere. (See "Flock worker's lung".)  PATHOGENESIS  Immunopathogenesis -- The immunopathogenesis of HP is complex and varies with the affected individual, provocative antigen, frequency and intensity of exposure, and duration of disease [74]. It remains unclear why only few of the individuals exposed to the antigen develop the disease, consequently, a two-hit hypothesis has been suggested, wherein antigen exposure acts as the inducing factor, and genetic or environmental factors act as promoting risk factors.  In acute HP, individuals exposed to a culprit antigen elaborate specific IgG antibodies to that antigen, and with subsequent exposures develop episodic lung inflammation with immune complex formation and an influx of neutrophils [74-76].   Subacute and chronic HP are thought to result from CD4+ T helper (Th)-1 lymphocyte-mediated delayed hypersensitivity causing granuloma formation [74], likely with contributions from toll-like receptor (TLR)- 2 and 9 pathways [77]. Th 1 and Th 17 lymphocytes appear to promote lung inflammation, while T regulatory cells downregulate inflammation [78,79].   It is not known why certain individuals develop progressive lung fibrosis due to HP. An increase in CD4+ T lymphocytes relative to CD8+ T cells and increased Th 17 cells may contribute " "[75,80].   Genetic predisposition -- A genetic predisposition to HP has been suggested based on several lines of evidence [24,74,81,82]. Among exposed individuals (eg, farmers, pigeon breeders), only a minority develop HP, suggesting varying susceptibility [74]. As an example, a study of 44 patients with HP related to pigeon exposure, 50 exposed but asymptomatic individuals, and 99 healthy, unexposed controls found significant associations between HP and the presence of a number of specific MHC class II alleles [83].  A separate report describes family members with hypersensitivity due to dominic antigens, but associated with different exposures, such as a feather sleeping bag or having a bird as a pet [82]. Cohorts of familial HP have been described, predominantly in [place] summer-type HP, although the familial clustering could be related to common exposures as family members spend the summer in wooden dwellings with high ambient humidity [24,81].   An association between [id number] minor alleles and the risk of chronic hypersensitivity pneumonitis has been identified [84]. Moreover, a substantial portion of patients with chronic HP have rare, protein-altering variants in telomere-related genes, which are associated with short telomere length and reduced transplant-free survival [85].   SOCIETY GUIDELINE LINKS -- Links to society and government-sponsored guidelines from selected countries and regions around the world are provided separately. (See "Society guideline links: Interstitial lung disease".)  SUMMARY AND RECOMMENDATIONS  ?Hypersensitivity pneumonitis (HP), also called extrinsic allergic alveolitis, represents an immunologic reaction occurring within the pulmonary parenchyma caused by hypersensitivity to an inhaled agent, such as microbial, dominic, and animal antigens and, less commonly, organic compounds. (See 'Introduction' above.)  ?The prevalence and incidence of HP appear to vary considerably " "depending upon case definitions, intensity of exposure to inciting antigens, season, geographical conditions, local practices and customs, proximity to certain industries, and host risk factors. (See 'Epidemiology' above.)  ?A wide range of occupations and avocations result in inhalation of airborne organic antigens that increase the risk of developing HP; over 300 etiologies of HP have been reported. Occupations and activities associated with HP and their associated antigens are listed in the tables:  Farming, vegetable and dairy cattle workers (table 1). (See 'Farming, vegetable, or dairy cattle workers' above.)  Exposure to ventilation systems and water reservoirs (table 2). (See 'Ventilation and water-related contamination' above.)  Bird and poultry handlers (table 3). (See 'Bird and poultry handling' above.)  Animal handlers (table 4). (See 'Veterinary work and animal handling' above.)  Grain and flour processing (table 5). (See 'Grain and flour processing and loading' above.)  Lumber milling, construction, bark stripping (table 6). (See 'Lumber milling, construction, wood stripping, paper and wallboard manufacture' above.)  Plastics, paint/epoxy, electronics industries (table 7). (See 'Plastic manufacture, painting, electronics industry, other chemicals' above.)  Textile workers (table 8). The pulmonary manifestations of textile exposures such as byssinosis and flock workers lung are probably not true forms of HP. (See 'Textile workers' above and "Flock worker's lung".)  ?The immunopathogenesis of HP is complex and varies with the affected individual, provocative antigen, frequency and intensity of exposure, and duration of disease. Acute HP is predominantly mediated by antigen-antibody complex formation, while subacute and chronic HP result from an interplay of T helper (Th 1), T17, and T regulatory lymphocytes leading to lymphocyte infiltration and granuloma formation (delayed hypersensitivity) and, " "in some patients, progressive fibrotic lung disease. (See 'Pathogenesis' above.)  ?The clinical manifestations, evaluation, diagnosis, and treatment of hypersensitivity pneumonitis are discussed separately. (See "Hypersensitivity pneumonitis (extrinsic allergic alveolitis): Clinical manifestations and diagnosis" and "Hypersensitivity pneumonitis (extrinsic allergic alveolitis): Treatment, prognosis, and prevention".)  Use of UpToDate is subject to the Subscription and License Agreement.  REFERENCES  1. STERLING James, Armand Y. Keys to the diagnosis of hypersensitivity pneumonitis: The role of serum precipitins, lung biopsy, and high-resolution computed tomography. Clin Pulm Med 1996; 3:72.  2. Jenifer DE LOS SANTOS, King GERTRUDE Johnson. Controversies in hypersensitivity pneumonitis. Am Rev Respir Dis 1992; 145:1.  3. Amy SIMON. Hypersensitivity pneumonitis. In: Interstitial Lung Disease, 5th ed, King GERTRUDE Ye Jr (Eds), [facility], [place], [place], [facility] 2011. p.597.  4. Parag SIMON, Jayson DE LA CRUZ. Epidemiology of hypersensitivity pneumonitis/allergic alveolitis. Monogr Allergy 1987; 21:70.  5. Merrill Dallas ER, Junior AM, Phi K, et al. Epidemiology of Hypersensitivity Pneumonitis among an Insured Population in the [place]: A Claims-based Cohort Analysis. Sofia Am Thorac Soc 2018; 15:460.  6. Danial M, Ray J, Pieter C, Aracelis R. Extrinsic allergic alveolitis: incidence and mortality in the general population. QJM 2007; 100:233.  7. Cristian M, Mae G, Julio M, et al. Farmer's lung. Long-term outcome and lack of predictive value of bronchoalveolar lavage fibrosing factors. Am Rev Respir Dis 1993; 148:216.  8. Jayson DE LA CRUZ. The identification of hypersensitivity pneumonitis. Hosp Pract (1995) 1995; 30:57.  9. Lilly CEJA, Willie D, Apollo D, et al. Prevalence and risk factors for chronic bronchitis and farmer's lung in [place] dairy farmers. Br J Ind Med 1993; 50:941.  10. Oscar Louis " "Priscila RICHTER CP. Farmer's lung is now in decline. Ir Med J 2006; 99:203.  11. Eber SJ, Lilly MARCIAL, Jose G, et al. Hypersensitivity pneumonitis: current concepts. Eur Respir J Suppl 2001; 32:81s.  12. Mateo IW, Brett W, Kenney VE, et al. Prevalence of farmer's lung in [place]: a  survey. Br Med J 1972; 1:530.  13. Amado LT, Dorene CD, Andres L. [place] breeders' disease--a prevalence study and review. Clin Allergy 1975; 5:417.  14. Tuan DJ, Lindy JA, Chuy R. Budgerigar-fancier's lung: the commonest variety of allergic alveolitis in [place]. Br Med J 1978; 2:81.  15. Sherman QUINTANA, Rashel RICHTER, Melvina I. Prevalence and incidence of chronic bronchitis and farmer's lung with respect to age, sex, atopy, and smoking. Eur J Respir Dis Suppl 1987; 152:19.  16. Sherman EO. Work-related respiratory disorders among [place] farmers. Am J Ind Med 1990; 18:269.  17. Terho EO, Heinonen OP, Lammi S, Lamain V. Incidence of clinically confirmed farmer's lung in [place] and its relation to meteorological factors. Eur J Respir Dis Suppl 1987; 152:47.  18. Malmberg P, Rask-Ordonez A, Höglund S, et al. Incidence of organic dust toxic syndrome and allergic alveolitis in [place] farmers. Int Arch Allergy Appl Immunol 1988; 87:47.  19. Tio M, Jason P, Jessica GAITAN, Karen DG. Humidifier lung. An outbreak in office workers. Chest 1980; 77:183.  20. Jenifer CS, Chucky JW, Nickolas LS, et al. " lung": endemic granulomatous pneumonitis in an indoor swimming pool. Am J Public Health 1998; 88:1795.  21. Tello C, Yasmin D, Indio S, et al. Hypersensitivity pneumonitis-like reaction and occupational asthma associated with 1,3-bis(isocyanatomethyl) cyclohexane pre-polymer. Am J Ind Med 1996; 30:48.  22. Ashlee EF, Fredrick WH, Jessiac JN. Hypersensitivity pneumonitis due to contamination of an air conditioner. N Engl J Med 1970; 283:271.  23. Prosper Y, Jazz Y, iTff E, et al. The Relationship between the Incidence of " Summer-type Hypersensitivity Pneumonitis and Environmental Factors in [facility]. Intern Med 2017; 56:1023.  24. Asai N, Kaneko N, [place] Y, et al. Familial Summer-type Hypersensitivity Pneumonitis: A Review of 25 Families and 50 Cases in [place]. Intern Med 2016; 55:279.  25. Liam S, Ishan KS, Sarah R. Other smoking-affected pulmonary diseases. Clin Chest Med 2000; 21:121.  26. Arima K, Ando M, Hong K, et al. Effect of cigarette smoking on prevalence of summer-type hypersensitivity pneumonitis caused by Trichosporon cutaneum. Arch Environ Health 1992; 47:274.  27. Blancary C, Banganesh SW, Jose G. Effect of cigarette smoking on the antibody response to inhaled antigens and the prevalence of extrinsic allergic alveolitis among pigeon breeders. Clin Allergy 1985; 15:487.  28. Ohtsuka Y, Munakata M, Tanimura K, et al. Smoking promotes insidious and chronic farmer's lung disease, and deteriorates the clinical outcome. Intern Med 1995; 34:966.  29. Robertka H, Homma Y, Ogasawara H, et al. Five-year follow-up of Micropolyspora faeni antibody in smoking and nonsmoking farmers. Am Rev Respir Dis 1989; 140:695.  30. Jcarlos KH, Sedrick E, Gagandeep P, Niranjan MJ. Effects of cigarette smoking on diagnostic tests for work-related hypersensitivity pneumonitis: data from an outbreak of lung disease in metalworkers. Am J Ind Med 2004; 45:455.  31. Jose HY. Hypersensitivity pneumonitis. Clin Chest Med 1982; 3:503.  32. Domingo PICKARD. Hypersensitivity pneumonitis. Postgrad Med 1972; 51:120.  33. Abdulkadirtrejanee HB. Hypersensitivity pneumonitis. West J Med 1993; 159:570.  34. Torreyrsrini JW. Hypersensitivity pneumonitis. Radiol Clin North Am 1992; 30:1219.  35. Mar P, Rask-Ordonez A, Adrianna L. Exposure to microorganisms associated with allergic alveolitis and febrile reactions to mold dust in farmers. Chest 1993; 103:1202.  36. Clifford F, Quentin JR, Belkys S. Allergic alveolitis and late asthmatic reaction due to molds in  the tobacco

## 2020-10-02 NOTE — LETTER
October 2, 2020      Jin Lloyd MD  [address] [place] [address] Dr Marissa [place] [address]           [facility] Allergy/ Immunology  [address] [facility]  [place] [address]  Phone: [phone]  Fax: [phone]          Patient: Caitlin Ahmadi   MR Number: [id number]   YOB: 1955   Date of Visit: 10/2/2020       Dear Dr. Jin Lloyd:    Thank you for referring Caitlin Ahmadi to me for evaluation. Attached you will find relevant portions of my assessment and plan of care.    If you have questions, please do not hesitate to call me. I look forward to following Caitlin Ahmadi along with you.    Sincerely,    Emily Lane MD    Enclosure  CC:  No Recipients    If you would like to receive this communication electronically, please contact [email or web address] or [phone] to request more information on [email or web address] Link access.    For providers and/or their staff who would like to refer a patient to [facility], please contact us through our one-stop-shop provider referral line, [facility] , at [phone].    If you feel you have received this communication in error or would no longer like to receive these types of communications, please e-mail [email or web address]

## 2020-10-05 LAB
A ALTERNATA IGE QN: <0.1 KU/L
A FUMIGATUS IGE QN: <0.1 KU/L
ALLERGEN CHAETOMIUM GLOBOSUM IGE: <0.1 KU/L
ALLERGEN WHITE PINE TREE IGE: <0.1 KU/L
BAHIA GRASS IGE QN: <0.1 KU/L
BALD CYPRESS IGE QN: <0.1 KU/L
C HERBARUM IGE QN: <0.1 KU/L
C LUNATA IGE QN: <0.1 KU/L
CAT DANDER IGE QN: <0.1 KU/L
CHAETOMIUM GLOB. CLASS: NORMAL
COCKLEBUR IGE QN: <0.1 KU/L
COCKSFOOT IGE QN: <0.1 KU/L
COMMON RAGWEED IGE QN: <0.1 KU/L
COTTONWOOD IGE QN: <0.1 KU/L
D FARINAE IGE QN: <0.1 KU/L
D PTERONYSS IGE QN: <0.1 KU/L
DEPRECATED A ALTERNATA IGE RAST QL: NORMAL
DEPRECATED A FUMIGATUS IGE RAST QL: NORMAL
DEPRECATED BAHIA GRASS IGE RAST QL: NORMAL
DEPRECATED BALD CYPRESS IGE RAST QL: NORMAL
DEPRECATED C HERBARUM IGE RAST QL: NORMAL
DEPRECATED C LUNATA IGE RAST QL: NORMAL
DEPRECATED CAT DANDER IGE RAST QL: NORMAL
DEPRECATED COCKLEBUR IGE RAST QL: NORMAL
DEPRECATED COCKSFOOT IGE RAST QL: NORMAL
DEPRECATED COMMON RAGWEED IGE RAST QL: NORMAL
DEPRECATED COTTONWOOD IGE RAST QL: NORMAL
DEPRECATED D FARINAE IGE RAST QL: NORMAL
DEPRECATED D PTERONYSS IGE RAST QL: NORMAL
DEPRECATED DOG DANDER IGE RAST QL: NORMAL
DEPRECATED ELDER IGE RAST QL: NORMAL
DEPRECATED ENGL PLANTAIN IGE RAST QL: NORMAL
DEPRECATED GUM-TREE IGE RAST QL: NORMAL
DEPRECATED HACKBERRY TREE IGE RAST QL: NORMAL
DEPRECATED JOHNSON GRASS IGE RAST QL: NORMAL
DEPRECATED MUGWORT IGE RAST QL: NORMAL
DEPRECATED NETTLE IGE RAST QL: NORMAL
DEPRECATED PECAN/HICK TREE IGE RAST QL: NORMAL
DEPRECATED PER RYE GRASS IGE RAST QL: NORMAL
DEPRECATED PRIVET IGE RAST QL: NORMAL
DEPRECATED RED TOP GRASS IGE RAST QL: NORMAL
DEPRECATED ROACH IGE RAST QL: NORMAL
DEPRECATED SALTWORT IGE RAST QL: NORMAL
DEPRECATED TIMOTHY IGE RAST QL: NORMAL
DEPRECATED WHITE OAK IGE RAST QL: NORMAL
DEPRECATED WILLOW IGE RAST QL: NORMAL
DOG DANDER IGE QN: <0.1 KU/L
ELDER IGE QN: <0.1 KU/L
ELM CEDAR CLASS: NORMAL
ELM CEDAR, IGE: <0.1 KU/L
ENGL PLANTAIN IGE QN: <0.1 KU/L
GUM-TREE IGE QN: <0.1 KU/L
HACKBERRY CELTIS, IGE: <0.1 KU/L
JOHNSON GRASS IGE QN: <0.1 KU/L
MUGWORT IGE QN: <0.1 KU/L
NETTLE IGE QN: <0.1 KU/L
PECAN/HICK TREE IGE QN: <0.1 KU/L
PER RYE GRASS IGE QN: <0.1 KU/L
PRIVET IGE QN: <0.1 KU/L
RED TOP GRASS IGE QN: <0.1 KU/L
ROACH IGE QN: <0.1 KU/L
SALTWORT IGE QN: <0.1 KU/L
STEMPHYLIUM HERBARUM CLASS: NORMAL
STEMPHYLLIUM, IGE: <0.1 KU/L
TIMOTHY IGE QN: <0.1 KU/L
WHITE OAK IGE QN: <0.1 KU/L
WHITE PINE CLASS: NORMAL
WILLOW IGE QN: <0.1 KU/L

## 2020-10-08 ENCOUNTER — OFFICE VISIT (OUTPATIENT)
Dept: RHEUMATOLOGY | Facility: CLINIC | Age: 65
End: 2020-10-08
Payer: MEDICARE

## 2020-10-08 VITALS
BODY MASS INDEX: 24.67 KG/M2 | SYSTOLIC BLOOD PRESSURE: 128 MMHG | HEART RATE: 99 BPM | HEIGHT: 67 IN | WEIGHT: 157.19 LBS | DIASTOLIC BLOOD PRESSURE: 85 MMHG

## 2020-10-08 DIAGNOSIS — M05.9 SEROPOSITIVE RHEUMATOID ARTHRITIS: Primary | ICD-10-CM

## 2020-10-08 DIAGNOSIS — Z71.89 COUNSELING ON HEALTH PROMOTION AND DISEASE PREVENTION: ICD-10-CM

## 2020-10-08 DIAGNOSIS — Z79.899 HIGH RISK MEDICATION USE: ICD-10-CM

## 2020-10-08 PROCEDURE — 99214 OFFICE O/P EST MOD 30 MIN: CPT | Mod: HCNC,25,S$GLB, | Performed by: INTERNAL MEDICINE

## 2020-10-08 PROCEDURE — G0008 FLU VACCINE - QUADRIVALENT - ADJUVANTED: ICD-10-PCS | Mod: HCNC,S$GLB,, | Performed by: INTERNAL MEDICINE

## 2020-10-08 PROCEDURE — 1159F MED LIST DOCD IN RCRD: CPT | Mod: HCNC,S$GLB,, | Performed by: INTERNAL MEDICINE

## 2020-10-08 PROCEDURE — 1101F PT FALLS ASSESS-DOCD LE1/YR: CPT | Mod: HCNC,CPTII,S$GLB, | Performed by: INTERNAL MEDICINE

## 2020-10-08 PROCEDURE — G0008 ADMIN INFLUENZA VIRUS VAC: HCPCS | Mod: HCNC,S$GLB,, | Performed by: INTERNAL MEDICINE

## 2020-10-08 PROCEDURE — 3008F PR BODY MASS INDEX (BMI) DOCUMENTED: ICD-10-PCS | Mod: HCNC,CPTII,S$GLB, | Performed by: INTERNAL MEDICINE

## 2020-10-08 PROCEDURE — 3008F BODY MASS INDEX DOCD: CPT | Mod: HCNC,CPTII,S$GLB, | Performed by: INTERNAL MEDICINE

## 2020-10-08 PROCEDURE — 1125F PR PAIN SEVERITY QUANTIFIED, PAIN PRESENT: ICD-10-PCS | Mod: HCNC,S$GLB,, | Performed by: INTERNAL MEDICINE

## 2020-10-08 PROCEDURE — 1101F PR PT FALLS ASSESS DOC 0-1 FALLS W/OUT INJ PAST YR: ICD-10-PCS | Mod: HCNC,CPTII,S$GLB, | Performed by: INTERNAL MEDICINE

## 2020-10-08 PROCEDURE — 1159F PR MEDICATION LIST DOCUMENTED IN MEDICAL RECORD: ICD-10-PCS | Mod: HCNC,S$GLB,, | Performed by: INTERNAL MEDICINE

## 2020-10-08 PROCEDURE — 1125F AMNT PAIN NOTED PAIN PRSNT: CPT | Mod: HCNC,S$GLB,, | Performed by: INTERNAL MEDICINE

## 2020-10-08 PROCEDURE — 90694 FLU VACCINE - QUADRIVALENT - ADJUVANTED: ICD-10-PCS | Mod: HCNC,S$GLB,, | Performed by: INTERNAL MEDICINE

## 2020-10-08 PROCEDURE — 99999 PR PBB SHADOW E&M-EST. PATIENT-LVL III: CPT | Mod: PBBFAC,HCNC,, | Performed by: INTERNAL MEDICINE

## 2020-10-08 PROCEDURE — 99214 PR OFFICE/OUTPT VISIT, EST, LEVL IV, 30-39 MIN: ICD-10-PCS | Mod: HCNC,25,S$GLB, | Performed by: INTERNAL MEDICINE

## 2020-10-08 PROCEDURE — 99999 PR PBB SHADOW E&M-EST. PATIENT-LVL III: ICD-10-PCS | Mod: PBBFAC,HCNC,, | Performed by: INTERNAL MEDICINE

## 2020-10-08 PROCEDURE — 90694 VACC AIIV4 NO PRSRV 0.5ML IM: CPT | Mod: HCNC,S$GLB,, | Performed by: INTERNAL MEDICINE

## 2020-10-08 RX ORDER — FOLIC ACID 1 MG/1
1 TABLET ORAL DAILY
Qty: 30 TABLET | Refills: 4 | Status: SHIPPED | OUTPATIENT
Start: 2020-10-08 | End: 2021-03-09

## 2020-10-08 RX ORDER — METHOTREXATE 2.5 MG/1
10 TABLET ORAL
Qty: 16 TABLET | Refills: 3 | Status: SHIPPED | OUTPATIENT
Start: 2020-10-08 | End: 2021-01-08

## 2020-10-08 NOTE — PROGRESS NOTES
RHEUMATOLOGY OUTPATIENT CLINIC NOTE    10/8/2020    Attending Rheumatologist: Akhil Hernadez  Primary Care Provider: Amol Steve MD   Physician Requesting Consultation: No referring provider defined for this encounter.  Chief Complaint/Reason For Consultation:  positive rheumatoid factor    Subjective:       HPI  Caitlin Ahmadi is a 65 y.o. White female with positive rheumatoid factor comes for follow-up.    Today  Last seen August.  Provided with trial of systemic corticosteroid taper for mixed pattern arthralgias.  Refers excellent response to therapy, ran out of steroids approximately 1 week ago.  Currently denies any symptoms.  Refers significant improvement to joint pain and morning stiffness while on prednisone.  Denies new joint pain or swelling.  Does not have fever, rash, dyspnea, or worsening cough.    Addendum 11/12:  GI intolerance with oral methotrexate.  Recommended to start medication for 1 week, then resume taking 5 mg once per week.  If no symptoms should increase to 10 mg split dose after 1 month.  Will provide with Zofran to take p.r.n.    Review of Systems   Constitutional: Negative for chills, fever and malaise/fatigue.   Eyes: Negative for pain and redness.   Respiratory: Negative for cough, hemoptysis and shortness of breath.    Cardiovascular: Negative for chest pain and leg swelling.   Gastrointestinal: Negative for abdominal pain, blood in stool and melena.   Genitourinary: Negative for dysuria and hematuria.   Musculoskeletal: Negative for falls and joint pain.   Skin: Negative for rash.   Neurological: Negative for tingling, focal weakness and weakness.   Psychiatric/Behavioral: Negative for memory loss. The patient does not have insomnia.        Chronic comorbid conditions affecting medical decision making today:  History reviewed. No pertinent past medical history.  Past Surgical History:   Procedure Laterality Date    ADENOIDECTOMY      CHOLECYSTECTOMY      HERNIA REPAIR       "HYSTERECTOMY      TONSILLECTOMY      TOTAL KNEE ARTHROPLASTY Bilateral      Family History   Problem Relation Age of Onset    No Known Problems Mother     Alzheimer's disease Father      Social History     Substance and Sexual Activity   Alcohol Use Yes    Alcohol/week: 5.0 standard drinks    Types: 5 Glasses of wine per week     Social History     Tobacco Use   Smoking Status Former Smoker    Packs/day: 1.00    Years: 20.00    Pack years: 20.00    Types: Cigarettes    Start date: 1970    Quit date: 1990    Years since quittin.3   Smokeless Tobacco Never Used     Social History     Substance and Sexual Activity   Drug Use Never       Current Outpatient Medications:     amitriptyline (ELAVIL) 100 MG tablet, TAKE 3 TABLETS(300 MG) BY MOUTH EVERY EVENING, Disp: 90 tablet, Rfl: 0    fluticasone furoate-vilanteroL (BREO ELLIPTA) 100-25 mcg/dose diskus inhaler, Inhale 1 puff into the lungs once daily. Controller, Disp: 60 each, Rfl: 6    ibuprofen (ADVIL,MOTRIN) 200 MG tablet, Take 600 mg by mouth every 6 (six) hours as needed., Disp: , Rfl:     albuterol (PROVENTIL/VENTOLIN HFA) 90 mcg/actuation inhaler, Inhale 2 puffs into the lungs every 6 (six) hours as needed for Wheezing. Rescue, Disp: 6.7 g, Rfl: 11    folic acid (FOLVITE) 1 MG tablet, Take 1 tablet (1 mg total) by mouth once daily., Disp: 30 tablet, Rfl: 4    methotrexate 2.5 MG Tab, Take 4 tablets (10 mg total) by mouth every 7 days. Take 2 tablets in the AM and 2 in the PM, Disp: 16 tablet, Rfl: 3     Objective:         Vitals:    10/08/20 0916   BP: 128/85   Pulse: 99     Physical Exam   Constitutional: No distress.   Estimated body mass index is 23.87 kg/m² as calculated from the following:    Height as of 20: 5' 8" (1.727 m).    Weight as of this encounter: 71.2 kg (156 lb 15.5 oz).    Wt Readings from Last 1 Encounters:  20 1352 : 71.2 kg (156 lb 15.5 oz)     HENT:   Head: Normocephalic and atraumatic.   Eyes: " Conjunctivae are normal. Pupils are equal, round, and reactive to light.   Neck: Normal range of motion.   Cardiovascular: Normal rate and intact distal pulses.    Pulmonary/Chest: Effort normal. No respiratory distress.   Abdominal: Soft. She exhibits no distension.   Neurological: She is alert. Gait normal.   Skin: No rash noted. No erythema.     Musculoskeletal: Normal range of motion. Deformity (Heberden's, Tommy's.) present. No tenderness.      Comments: :  Able to make full fists without particular difficulty.  No synovitis appreciated.    AROM: intact  PROM: intact    Devices used by patient: none       Reviewed old and all outside pertinent medical records available.    All lab results personally reviewed and interpreted by me.  Lab Results   Component Value Date    WBC 5.92 08/28/2020    HGB 12.7 08/28/2020    HCT 39.0 08/28/2020    MCV 91 08/28/2020    MCH 29.7 08/28/2020    MCHC 32.6 08/28/2020    RDW 13.4 08/28/2020     08/28/2020    MPV 10.1 08/28/2020       Lab Results   Component Value Date     08/28/2020    K 3.8 08/28/2020     08/28/2020    CO2 25 08/28/2020    GLU 96 08/28/2020    BUN 13 08/28/2020    CALCIUM 9.3 08/28/2020    PROT 7.9 08/28/2020    ALBUMIN 4.1 08/28/2020    BILITOT 0.3 08/28/2020    AST 19 08/28/2020    ALKPHOS 72 08/28/2020    ALT 21 08/28/2020       Lab Results   Component Value Date    COLORU Yellow 06/17/2020    APPEARANCEUA Clear 06/17/2020    SPECGRAV 1.015 06/17/2020    PHUR 7.0 06/17/2020    PROTEINUA Negative 06/17/2020    KETONESU Negative 06/17/2020    LEUKOCYTESUR 2+ (A) 06/17/2020    NITRITE Positive (A) 06/17/2020       Lab Results   Component Value Date    CRP 4.0 08/18/2020       Lab Results   Component Value Date    SEDRATE 11 08/18/2020       Lab Results   Component Value Date    .0 (H) 08/18/2020    SEDRATE 11 08/18/2020       No components found for: 25OHVITDTOT, 25PHJDTT8, 83KQYSDU0, METHODNOTE    No results found for:  URICACID    No components found for: TSPOTTB    · SPE/MIRANDA 8/2020: Normal total protein, normal pattern.  No monoclonal peaks identified.     Rheum Labs:   MATT 1 in 80 speckled   JULISA negative   Rheumatoid factor 157.  CCP negative.    Infectious Labs:   COVID-19 screening negative.     Hypersensitivity pneumonitis panel: A. Pullulans Abs detected   Hepatitis profile nonreactive August 2020   QuantiFERON TB negative August 2020    Imaging:  All imaging reviewed and independently  interpreted by me.    Chest x-ray June 2020  The cardiac and mediastinal silhouettes appear within normal limits.   The lungs are clear bilaterally.  No acute osseous findings demonstrated.    X-ray hands August 2020  OA findings present most prevalent within the DIP joints.  No acute osseous abnormality or concerning erosions.      CT chest August 2020  Minimal bilateral apical pleuroparenchymal scarring.  Minimal bilateral scattered septal thickening.  Minimal bibasilar gravitational changes.  No focal or diffuse pleural thickening.  Mild scarring and or subsegmental atelectasis within the lingula.  No parenchymal bands, calcified or noncalcified pleural plaque.  No effusion.  No bronchiectasis.  No consolidation or mass lesion.  Mild degenerative change throughout the spine and generalized osteopenia. Degenerative changes also noted in the partially visualized C-spine.  No fracture or subluxation.  No lytic or blastic lesion.    Pulmonary function test: FVC / FEV1 / Ratio / TLC / DLCO  August 2020  83 / 81 / 76 / 89 / 70    DEXA scan  July 2020  FN: -1.9  LS: -1.8  FRAX: 1.5% hip / 10% major     ASSESSMENT / PLAN:     Caitlin Ahmadi is a 65 y.o. White female with:    1.  Seropositive rheumatoid arthritis  - inflammatory joint pain, probable synovitis on last visit, chronic,  several small joints joints, RF >3x ULN  - early RA very plausible.  No erosive changes noted on available imaging.  APR WNR.  - good response with systemic  corticosteroids.  Will continue DMARD therapy with methotrexate.  - clinical significant side effects of therapy discussed in detail.  - will provide with another steroid taper if patient has refractory joint pain/stiffness.    2. High risk medication use  - Compromised immune system secondary to autoimmune disease and use of immunosuppressive drugs.   - Monitor carefully for infections and toxicities.   - Advised to get immediate medical care if any infection.   - Also advised strict adherence to age appropriate vaccinations and cancer screenings with PCP.    3. Other specified counseling  - Immunization counseling done.  Will provide with high flu vaccine today  - screen for osteoporosis next visit.  - Nutrition and exercise counseling.  - Limitation of alcohol consumption.  - Regular exercise:  Aerobic and resistance.  - Medication counseling provided.    Follow up in about 3 months (around 1/8/2021).    Method of contact with patient concerns: [facility]    Disclaimer:  This note is prepared using voice recognition software and as such is likely to have errors and has not been proof read. Please contact me for questions.     Time spent: 25 minutes in face to face discussion concerning diagnosis, prognosis, review of lab and test results, benefits of treatment as well as management of disease, counseling of patient and coordination of care between various health care providers.  Greater than half the time spent was used for coordination of care and counseling of patient.    Akhil Hernadez M.D.  Rheumatology Department   [facility]

## 2020-10-09 ENCOUNTER — PATIENT MESSAGE (OUTPATIENT)
Dept: RHEUMATOLOGY | Facility: CLINIC | Age: 65
End: 2020-10-09

## 2020-10-12 RX ORDER — AMITRIPTYLINE HYDROCHLORIDE 100 MG/1
TABLET ORAL
Qty: 90 TABLET | Refills: 0 | Status: SHIPPED | OUTPATIENT
Start: 2020-10-12 | End: 2020-11-11

## 2020-10-15 ENCOUNTER — OFFICE VISIT (OUTPATIENT)
Dept: OPHTHALMOLOGY | Facility: CLINIC | Age: 65
End: 2020-10-15
Payer: MEDICARE

## 2020-10-15 DIAGNOSIS — H25.043 POSTERIOR SUBCAPSULAR AGE-RELATED CATARACT OF BOTH EYES: ICD-10-CM

## 2020-10-15 DIAGNOSIS — H52.4 HYPEROPIA WITH ASTIGMATISM AND PRESBYOPIA, BILATERAL: ICD-10-CM

## 2020-10-15 DIAGNOSIS — H52.203 HYPEROPIA WITH ASTIGMATISM AND PRESBYOPIA, BILATERAL: ICD-10-CM

## 2020-10-15 DIAGNOSIS — H25.13 NUCLEAR SCLEROSIS, BILATERAL: Primary | ICD-10-CM

## 2020-10-15 DIAGNOSIS — H52.03 HYPEROPIA WITH ASTIGMATISM AND PRESBYOPIA, BILATERAL: ICD-10-CM

## 2020-10-15 DIAGNOSIS — H04.129 DRY EYE: ICD-10-CM

## 2020-10-15 PROCEDURE — 92012 PR EYE EXAM, EST PATIENT,INTERMED: ICD-10-PCS | Mod: HCNC,S$GLB,, | Performed by: OPTOMETRIST

## 2020-10-15 PROCEDURE — 92012 INTRM OPH EXAM EST PATIENT: CPT | Mod: HCNC,S$GLB,, | Performed by: OPTOMETRIST

## 2020-10-15 NOTE — PROGRESS NOTES
HPI     Last seen by DNL on 7/6/2020 for CTL follow up  Patient here today for CTL follow up  Patient states she is currently wearing CTL for astigmatism states she can   not read well with the left eye  Since last visit she was diagnosed with RA and found out she has a mole on   her lungs  States she might just need to stick with glasses    Last edited by Alma Delia Phillips, PCT on 10/15/2020  2:01 PM.   (History)            Assessment /Plan     For exam results, see Encounter Report.    Nuclear sclerosis, bilateral  Posterior subcapsular age-related cataract of both eyes  Cataract accounts for vision change. New Rx for glasses/contacts will not improve vision.   Refer to ophthalmologist for cataract evaluation.      Dry eye  Recommended Theratears bid OU    Hyperopia with astigmatism and presbyopia, bilateral  Hold spec and cls Rx      mOCT done today, wnl OU       RTC next available for cataract evaluation with MGM or PRN if any problems.   Discussed above and answered questions.

## 2020-10-26 ENCOUNTER — TELEPHONE (OUTPATIENT)
Dept: RHEUMATOLOGY | Facility: CLINIC | Age: 65
End: 2020-10-26

## 2020-10-26 NOTE — TELEPHONE ENCOUNTER
[facility] has denied Methotrexate 2.5 mg tablets.  Called [employer] to start  expedited appeal.    EOC # [id number]      was informed it would be 72 hrs before results of appeal.

## 2020-10-29 ENCOUNTER — PATIENT MESSAGE (OUTPATIENT)
Dept: PULMONOLOGY | Facility: CLINIC | Age: 65
End: 2020-10-29

## 2020-10-30 ENCOUNTER — PATIENT MESSAGE (OUTPATIENT)
Dept: ADMINISTRATIVE | Facility: HOSPITAL | Age: 65
End: 2020-10-30

## 2020-10-30 DIAGNOSIS — R05.8 PRODUCTIVE COUGH: Primary | ICD-10-CM

## 2020-11-03 ENCOUNTER — TELEPHONE (OUTPATIENT)
Dept: RHEUMATOLOGY | Facility: CLINIC | Age: 65
End: 2020-11-03

## 2020-11-03 NOTE — TELEPHONE ENCOUNTER
[facility] has approved Methotrexate 2.5 mg tablets  16 tabs for 28 days .

## 2020-11-05 ENCOUNTER — OFFICE VISIT (OUTPATIENT)
Dept: OPHTHALMOLOGY | Facility: CLINIC | Age: 65
End: 2020-11-05
Payer: MEDICARE

## 2020-11-05 DIAGNOSIS — H25.12 NUCLEAR SCLEROSIS OF LEFT EYE: Primary | ICD-10-CM

## 2020-11-05 DIAGNOSIS — H25.11 NUCLEAR SCLEROSIS OF RIGHT EYE: ICD-10-CM

## 2020-11-05 DIAGNOSIS — H04.123 DRY EYES, BILATERAL: ICD-10-CM

## 2020-11-05 PROCEDURE — 92136 OPHTHALMIC BIOMETRY: CPT | Mod: HCNC,RT,S$GLB, | Performed by: STUDENT IN AN ORGANIZED HEALTH CARE EDUCATION/TRAINING PROGRAM

## 2020-11-05 PROCEDURE — 99999 PR PBB SHADOW E&M-EST. PATIENT-LVL II: ICD-10-PCS | Mod: PBBFAC,HCNC,, | Performed by: STUDENT IN AN ORGANIZED HEALTH CARE EDUCATION/TRAINING PROGRAM

## 2020-11-05 PROCEDURE — 99999 PR PBB SHADOW E&M-EST. PATIENT-LVL II: CPT | Mod: PBBFAC,HCNC,, | Performed by: STUDENT IN AN ORGANIZED HEALTH CARE EDUCATION/TRAINING PROGRAM

## 2020-11-05 PROCEDURE — 92004 COMPRE OPH EXAM NEW PT 1/>: CPT | Mod: HCNC,S$GLB,, | Performed by: STUDENT IN AN ORGANIZED HEALTH CARE EDUCATION/TRAINING PROGRAM

## 2020-11-05 PROCEDURE — 92004 PR EYE EXAM, NEW PATIENT,COMPREHESV: ICD-10-PCS | Mod: HCNC,S$GLB,, | Performed by: STUDENT IN AN ORGANIZED HEALTH CARE EDUCATION/TRAINING PROGRAM

## 2020-11-05 PROCEDURE — 92025 COMPUTERIZED CORNEAL TOPOGRAPHY: ICD-10-PCS | Mod: HCNC,S$GLB,, | Performed by: STUDENT IN AN ORGANIZED HEALTH CARE EDUCATION/TRAINING PROGRAM

## 2020-11-05 PROCEDURE — 92025 CPTRIZED CORNEAL TOPOGRAPHY: CPT | Mod: HCNC,S$GLB,, | Performed by: STUDENT IN AN ORGANIZED HEALTH CARE EDUCATION/TRAINING PROGRAM

## 2020-11-05 PROCEDURE — 92136 IOL MASTER - OD - RIGHT EYE: ICD-10-PCS | Mod: HCNC,RT,S$GLB, | Performed by: STUDENT IN AN ORGANIZED HEALTH CARE EDUCATION/TRAINING PROGRAM

## 2020-11-05 RX ORDER — PREDNISOLONE ACETATE 10 MG/ML
1 SUSPENSION/ DROPS OPHTHALMIC 4 TIMES DAILY
Qty: 1 BOTTLE | Refills: 3 | Status: SHIPPED | OUTPATIENT
Start: 2020-11-05 | End: 2021-02-08

## 2020-11-05 RX ORDER — OFLOXACIN 3 MG/ML
1 SOLUTION/ DROPS OPHTHALMIC 4 TIMES DAILY
Qty: 5 ML | Refills: 0 | Status: SHIPPED | OUTPATIENT
Start: 2020-11-05 | End: 2020-11-15

## 2020-11-05 NOTE — PROGRESS NOTES
HPI     The patient is here for a cataract eval referred by . The   patient states her vision is blurred and she complains of glare at night.   The patient denies any ocular pain at this time.    Last edited by Sasha Allen on 11/5/2020  9:59 AM. (History)            Assessment /Plan     For exam results, see Encounter Report.    Nuclear sclerosis of left eye- follow    Nuclear sclerosis of right eye- Visually Significant Cataract OU  Patient reports decreased vision consistent with the clinical amount of lenticular opacity, which reaches the level of visual significance and affects activities of daily living including reading and glare. Risks, benefits, and alternatives to cataract surgery were discussed.   The pt expressed a desire to proceed with surgery with the potential for some reasonable degree of visual improvement. Recommended regular use of artificial tears and good lid hygiene to optimize surgical outcome.     Discussed IOL options and refractive outcomes for this patient.    [place] right eye,   Topical  monofocal IOL.  Will aim for near  Referral to [facility] for Ophthalmic surgery  Prescriptions sent for preoperative medications  Pred, Ocuflox  Explained that patient may need glasses after surgery.      Sig hx: none  Dilation: good, 6MM OU  Alpha Blockers: none    Additional: none    Dry eyes, bilateral      Return to clinic for PCIOL OD

## 2020-11-10 ENCOUNTER — PATIENT MESSAGE (OUTPATIENT)
Dept: RHEUMATOLOGY | Facility: CLINIC | Age: 65
End: 2020-11-10

## 2020-11-10 ENCOUNTER — LAB VISIT (OUTPATIENT)
Dept: LAB | Facility: HOSPITAL | Age: 65
End: 2020-11-10
Attending: INTERNAL MEDICINE
Payer: MEDICARE

## 2020-11-10 DIAGNOSIS — R05.8 PRODUCTIVE COUGH: ICD-10-CM

## 2020-11-10 PROCEDURE — 87116 MYCOBACTERIA CULTURE: CPT | Mod: HCNC

## 2020-11-10 PROCEDURE — 87205 SMEAR GRAM STAIN: CPT | Mod: HCNC

## 2020-11-10 PROCEDURE — 87070 CULTURE OTHR SPECIMN AEROBIC: CPT | Mod: HCNC

## 2020-11-10 PROCEDURE — 87206 SMEAR FLUORESCENT/ACID STAI: CPT | Mod: HCNC

## 2020-11-10 PROCEDURE — 87015 SPECIMEN INFECT AGNT CONCNTJ: CPT | Mod: HCNC

## 2020-11-12 RX ORDER — ONDANSETRON 4 MG/1
4 TABLET, FILM COATED ORAL EVERY 8 HOURS PRN
Qty: 60 TABLET | Refills: 0 | Status: SHIPPED | OUTPATIENT
Start: 2020-11-12 | End: 2020-11-22

## 2020-11-13 LAB
BACTERIA SPEC AEROBE CULT: NORMAL
BACTERIA SPEC AEROBE CULT: NORMAL
GRAM STN SPEC: NORMAL

## 2020-11-18 DIAGNOSIS — R05.3 CHRONIC COUGHING: ICD-10-CM

## 2020-11-19 ENCOUNTER — LAB VISIT (OUTPATIENT)
Dept: LAB | Facility: HOSPITAL | Age: 65
End: 2020-11-19
Attending: INTERNAL MEDICINE
Payer: MEDICARE

## 2020-11-19 DIAGNOSIS — Z79.899 HIGH RISK MEDICATION USE: ICD-10-CM

## 2020-11-19 LAB
ALBUMIN SERPL BCP-MCNC: 3.7 G/DL (ref 3.5–5.2)
ALP SERPL-CCNC: 66 U/L (ref 55–135)
ALT SERPL W/O P-5'-P-CCNC: 35 U/L (ref 10–44)
ANION GAP SERPL CALC-SCNC: 7 MMOL/L (ref 8–16)
AST SERPL-CCNC: 25 U/L (ref 10–40)
BASOPHILS # BLD AUTO: 0.06 K/UL (ref 0–0.2)
BASOPHILS NFR BLD: 1.1 % (ref 0–1.9)
BILIRUB SERPL-MCNC: 0.4 MG/DL (ref 0.1–1)
BUN SERPL-MCNC: 14 MG/DL (ref 8–23)
CALCIUM SERPL-MCNC: 9.2 MG/DL (ref 8.7–10.5)
CHLORIDE SERPL-SCNC: 105 MMOL/L (ref 95–110)
CO2 SERPL-SCNC: 27 MMOL/L (ref 23–29)
CREAT SERPL-MCNC: 0.8 MG/DL (ref 0.5–1.4)
DIFFERENTIAL METHOD: NORMAL
EOSINOPHIL # BLD AUTO: 0.2 K/UL (ref 0–0.5)
EOSINOPHIL NFR BLD: 3.7 % (ref 0–8)
ERYTHROCYTE [DISTWIDTH] IN BLOOD BY AUTOMATED COUNT: 13.6 % (ref 11.5–14.5)
EST. GFR  (AFRICAN AMERICAN): >60 ML/MIN/1.73 M^2
EST. GFR  (NON AFRICAN AMERICAN): >60 ML/MIN/1.73 M^2
GLUCOSE SERPL-MCNC: 132 MG/DL (ref 70–110)
HCT VFR BLD AUTO: 37.8 % (ref 37–48.5)
HGB BLD-MCNC: 12.1 G/DL (ref 12–16)
IMM GRANULOCYTES # BLD AUTO: 0.02 K/UL (ref 0–0.04)
IMM GRANULOCYTES NFR BLD AUTO: 0.4 % (ref 0–0.5)
LYMPHOCYTES # BLD AUTO: 1.7 K/UL (ref 1–4.8)
LYMPHOCYTES NFR BLD: 31.3 % (ref 18–48)
MCH RBC QN AUTO: 30 PG (ref 27–31)
MCHC RBC AUTO-ENTMCNC: 32 G/DL (ref 32–36)
MCV RBC AUTO: 94 FL (ref 82–98)
MONOCYTES # BLD AUTO: 0.5 K/UL (ref 0.3–1)
MONOCYTES NFR BLD: 10.1 % (ref 4–15)
NEUTROPHILS # BLD AUTO: 2.9 K/UL (ref 1.8–7.7)
NEUTROPHILS NFR BLD: 53.4 % (ref 38–73)
NRBC BLD-RTO: 0 /100 WBC
PLATELET # BLD AUTO: 210 K/UL (ref 150–350)
PMV BLD AUTO: 10.5 FL (ref 9.2–12.9)
POTASSIUM SERPL-SCNC: 4 MMOL/L (ref 3.5–5.1)
PROT SERPL-MCNC: 7.1 G/DL (ref 6–8.4)
RBC # BLD AUTO: 4.03 M/UL (ref 4–5.4)
SODIUM SERPL-SCNC: 139 MMOL/L (ref 136–145)
WBC # BLD AUTO: 5.34 K/UL (ref 3.9–12.7)

## 2020-11-19 PROCEDURE — 80053 COMPREHEN METABOLIC PANEL: CPT | Mod: HCNC

## 2020-11-19 PROCEDURE — 36415 COLL VENOUS BLD VENIPUNCTURE: CPT | Mod: HCNC

## 2020-11-19 PROCEDURE — 85025 COMPLETE CBC W/AUTO DIFF WBC: CPT | Mod: HCNC

## 2020-11-30 ENCOUNTER — TELEPHONE (OUTPATIENT)
Dept: PULMONOLOGY | Facility: CLINIC | Age: 65
End: 2020-11-30

## 2020-11-30 NOTE — TELEPHONE ENCOUNTER
Called pt to schedule pre-procedural Covid-19 testing, 72 hours prior to Spirometry. No answer, unable to leave a message.

## 2020-12-06 ENCOUNTER — LAB VISIT (OUTPATIENT)
Dept: OTOLARYNGOLOGY | Facility: CLINIC | Age: 65
End: 2020-12-06
Payer: MEDICARE

## 2020-12-06 ENCOUNTER — PATIENT MESSAGE (OUTPATIENT)
Dept: ADMINISTRATIVE | Facility: OTHER | Age: 65
End: 2020-12-06

## 2020-12-06 DIAGNOSIS — R05.3 CHRONIC COUGHING: ICD-10-CM

## 2020-12-06 PROCEDURE — U0003 INFECTIOUS AGENT DETECTION BY NUCLEIC ACID (DNA OR RNA); SEVERE ACUTE RESPIRATORY SYNDROME CORONAVIRUS 2 (SARS-COV-2) (CORONAVIRUS DISEASE [COVID-19]), AMPLIFIED PROBE TECHNIQUE, MAKING USE OF HIGH THROUGHPUT TECHNOLOGIES AS DESCRIBED BY CMS-2020-01-R: HCPCS | Mod: HCNC

## 2020-12-07 LAB — SARS-COV-2 RNA RESP QL NAA+PROBE: NOT DETECTED

## 2020-12-08 ENCOUNTER — PATIENT OUTREACH (OUTPATIENT)
Dept: ADMINISTRATIVE | Facility: OTHER | Age: 65
End: 2020-12-08

## 2020-12-09 ENCOUNTER — OUTSIDE PLACE OF SERVICE (OUTPATIENT)
Dept: ADMINISTRATIVE | Facility: OTHER | Age: 65
End: 2020-12-09
Payer: MEDICARE

## 2020-12-09 ENCOUNTER — CLINICAL SUPPORT (OUTPATIENT)
Dept: PULMONOLOGY | Facility: CLINIC | Age: 65
End: 2020-12-09
Payer: MEDICARE

## 2020-12-09 ENCOUNTER — HOSPITAL ENCOUNTER (OUTPATIENT)
Dept: RADIOLOGY | Facility: HOSPITAL | Age: 65
Discharge: HOME | End: 2020-12-09
Attending: INTERNAL MEDICINE
Payer: MEDICARE

## 2020-12-09 ENCOUNTER — OFFICE VISIT (OUTPATIENT)
Dept: PULMONOLOGY | Facility: CLINIC | Age: 65
End: 2020-12-09
Payer: MEDICARE

## 2020-12-09 ENCOUNTER — PATIENT OUTREACH (OUTPATIENT)
Dept: ADMINISTRATIVE | Facility: HOSPITAL | Age: 65
End: 2020-12-09

## 2020-12-09 VITALS
DIASTOLIC BLOOD PRESSURE: 70 MMHG | HEART RATE: 86 BPM | WEIGHT: 160.94 LBS | TEMPERATURE: 99 F | OXYGEN SATURATION: 97 % | HEIGHT: 67 IN | SYSTOLIC BLOOD PRESSURE: 120 MMHG | BODY MASS INDEX: 25.26 KG/M2 | RESPIRATION RATE: 16 BRPM

## 2020-12-09 DIAGNOSIS — J84.9 ILD (INTERSTITIAL LUNG DISEASE): ICD-10-CM

## 2020-12-09 DIAGNOSIS — Z79.631 ON METHOTREXATE THERAPY: ICD-10-CM

## 2020-12-09 DIAGNOSIS — M05.9 SEROPOSITIVE RHEUMATOID ARTHRITIS: ICD-10-CM

## 2020-12-09 DIAGNOSIS — R76.8 RHEUMATOID FACTOR POSITIVE: ICD-10-CM

## 2020-12-09 DIAGNOSIS — R06.02 SOBOE (SHORTNESS OF BREATH ON EXERTION): ICD-10-CM

## 2020-12-09 DIAGNOSIS — J84.9 ILD (INTERSTITIAL LUNG DISEASE): Primary | ICD-10-CM

## 2020-12-09 DIAGNOSIS — R94.2 RESTRICTIVE PATTERN PRESENT ON PULMONARY FUNCTION TESTING: ICD-10-CM

## 2020-12-09 DIAGNOSIS — R94.2 DECREASED DIFFUSION CAPACITY: ICD-10-CM

## 2020-12-09 DIAGNOSIS — R76.8 ANA POSITIVE: ICD-10-CM

## 2020-12-09 DIAGNOSIS — R05.3 CHRONIC COUGH: ICD-10-CM

## 2020-12-09 LAB
BRPFT: ABNORMAL
DLCO ADJ PRE: 14.45 ML/(MIN*MMHG) (ref 17.95–29.42)
DLCO SINGLE BREATH LLN: 17.95
DLCO SINGLE BREATH PRE REF: 61 %
DLCO SINGLE BREATH REF: 23.68
DLCOC SBVA LLN: 3.04
DLCOC SBVA PRE REF: 93.1 %
DLCOC SBVA REF: 4.36
DLCOC SINGLE BREATH LLN: 17.95
DLCOC SINGLE BREATH PRE REF: 61 %
DLCOC SINGLE BREATH REF: 23.68
DLCOVA LLN: 3.04
DLCOVA PRE REF: 93.1 %
DLCOVA PRE: 4.06 ML/(MIN*MMHG*L) (ref 3.04–5.68)
DLCOVA REF: 4.36
DLVAADJ PRE: 4.06 ML/(MIN*MMHG*L) (ref 3.04–5.68)
FEF 25 75 LLN: 1.02
FEF 25 75 PRE REF: 85 %
FEF 25 75 REF: 2.12
FEV1 FVC LLN: 65
FEV1 FVC PRE REF: 98.3 %
FEV1 FVC REF: 78
FEV1 LLN: 1.87
FEV1 PRE REF: 82.7 %
FEV1 REF: 2.53
FVC LLN: 2.42
FVC PRE REF: 83.4 %
FVC REF: 3.27
IVC PRE: 2.46 L (ref 2.42–4.12)
IVC SINGLE BREATH LLN: 2.42
IVC SINGLE BREATH PRE REF: 75.3 %
IVC SINGLE BREATH REF: 3.27
PEF LLN: 4.52
PEF PRE REF: 73.1 %
PEF REF: 6.39
PRE DLCO: 14.45 ML/(MIN*MMHG) (ref 17.95–29.42)
PRE FEF 25 75: 1.8 L/S (ref 1.02–3.23)
PRE FET 100: 6.85 SEC
PRE FEV1 FVC: 76.79 % (ref 65.38–90.93)
PRE FEV1: 2.09 L (ref 1.87–3.2)
PRE FVC: 2.73 L (ref 2.42–4.12)
PRE PEF: 4.67 L/S (ref 4.52–8.26)
VA PRE: 3.56 L (ref 5.28–5.28)
VA SINGLE BREATH LLN: 5.28
VA SINGLE BREATH PRE REF: 67.5 %
VA SINGLE BREATH REF: 5.28

## 2020-12-09 PROCEDURE — 94010 BREATHING CAPACITY TEST: ICD-10-PCS | Mod: HCNC,S$GLB,, | Performed by: INTERNAL MEDICINE

## 2020-12-09 PROCEDURE — 1101F PR PT FALLS ASSESS DOC 0-1 FALLS W/OUT INJ PAST YR: ICD-10-PCS | Mod: HCNC,CPTII,S$GLB, | Performed by: INTERNAL MEDICINE

## 2020-12-09 PROCEDURE — 99999 PR PBB SHADOW E&M-EST. PATIENT-LVL IV: CPT | Mod: PBBFAC,HCNC,, | Performed by: INTERNAL MEDICINE

## 2020-12-09 PROCEDURE — 99215 OFFICE O/P EST HI 40 MIN: CPT | Mod: 25,HCNC,S$GLB, | Performed by: INTERNAL MEDICINE

## 2020-12-09 PROCEDURE — 1159F PR MEDICATION LIST DOCUMENTED IN MEDICAL RECORD: ICD-10-PCS | Mod: HCNC,S$GLB,, | Performed by: INTERNAL MEDICINE

## 2020-12-09 PROCEDURE — 94010 BREATHING CAPACITY TEST: CPT | Mod: HCNC,S$GLB,, | Performed by: INTERNAL MEDICINE

## 2020-12-09 PROCEDURE — 3008F BODY MASS INDEX DOCD: CPT | Mod: HCNC,CPTII,S$GLB, | Performed by: INTERNAL MEDICINE

## 2020-12-09 PROCEDURE — 66984 PR REMOVAL, CATARACT, W/INSRT INTRAOC LENS, W/O ENDO CYCLO: ICD-10-PCS | Mod: RT,,, | Performed by: STUDENT IN AN ORGANIZED HEALTH CARE EDUCATION/TRAINING PROGRAM

## 2020-12-09 PROCEDURE — 94729 DIFFUSING CAPACITY: CPT | Mod: HCNC,S$GLB,, | Performed by: INTERNAL MEDICINE

## 2020-12-09 PROCEDURE — 3288F PR FALLS RISK ASSESSMENT DOCUMENTED: ICD-10-PCS | Mod: HCNC,CPTII,S$GLB, | Performed by: INTERNAL MEDICINE

## 2020-12-09 PROCEDURE — 99499 UNLISTED E&M SERVICE: CPT | Mod: S$GLB,,, | Performed by: INTERNAL MEDICINE

## 2020-12-09 PROCEDURE — 94729 PR C02/MEMBANE DIFFUSE CAPACITY: ICD-10-PCS | Mod: HCNC,S$GLB,, | Performed by: INTERNAL MEDICINE

## 2020-12-09 PROCEDURE — 66984 XCAPSL CTRC RMVL W/O ECP: CPT | Mod: RT,,, | Performed by: STUDENT IN AN ORGANIZED HEALTH CARE EDUCATION/TRAINING PROGRAM

## 2020-12-09 PROCEDURE — 3008F PR BODY MASS INDEX (BMI) DOCUMENTED: ICD-10-PCS | Mod: HCNC,CPTII,S$GLB, | Performed by: INTERNAL MEDICINE

## 2020-12-09 PROCEDURE — 1101F PT FALLS ASSESS-DOCD LE1/YR: CPT | Mod: HCNC,CPTII,S$GLB, | Performed by: INTERNAL MEDICINE

## 2020-12-09 PROCEDURE — 99215 PR OFFICE/OUTPT VISIT, EST, LEVL V, 40-54 MIN: ICD-10-PCS | Mod: 25,HCNC,S$GLB, | Performed by: INTERNAL MEDICINE

## 2020-12-09 PROCEDURE — 3288F FALL RISK ASSESSMENT DOCD: CPT | Mod: HCNC,CPTII,S$GLB, | Performed by: INTERNAL MEDICINE

## 2020-12-09 PROCEDURE — 99499 RISK ADDL DX/OHS AUDIT: ICD-10-PCS | Mod: S$GLB,,, | Performed by: INTERNAL MEDICINE

## 2020-12-09 PROCEDURE — 99999 PR PBB SHADOW E&M-EST. PATIENT-LVL IV: ICD-10-PCS | Mod: PBBFAC,HCNC,, | Performed by: INTERNAL MEDICINE

## 2020-12-09 PROCEDURE — 99499 UNLISTED E&M SERVICE: CPT | Mod: ,,, | Performed by: STUDENT IN AN ORGANIZED HEALTH CARE EDUCATION/TRAINING PROGRAM

## 2020-12-09 PROCEDURE — 99499 NO LOS: ICD-10-PCS | Mod: ,,, | Performed by: STUDENT IN AN ORGANIZED HEALTH CARE EDUCATION/TRAINING PROGRAM

## 2020-12-09 PROCEDURE — 1159F MED LIST DOCD IN RCRD: CPT | Mod: HCNC,S$GLB,, | Performed by: INTERNAL MEDICINE

## 2020-12-09 NOTE — PROGRESS NOTES
Pulmonary Outpatient Follow Up Visit     Subjective:       Patient ID: Caitlin Ahmadi is a 65 y.o. female.    Chief Complaint: Shortness of Breath      HPI          65-year-old female patient presenting for 3 months follow-up.    Initially evaluated 8/18/2020  for 1 year of coughing with clear sputum production and shortness of breath.    Blood work revealed elevated rheumatoid factor, positive HP precipitins panel, positive MATT  screening test.    Treated for 1 month with slow taper of prednisone by Rheumatology Dr. Hernadez.  Drastic improvement noted.  No methotrexate 10 mg weekly.  Feeling overall well.    No wheezing no history of asthma.     Twenty pack year smoker quit 30 years ago.     Does have 4 cats and 2 dogs at home.  Allergist workup negative for allergies.     History of mitral valve prolapse last echo 5 years ago was okay.     Denies personal family history of connective tissue disorder.      Review of Systems   Constitutional: Positive for fatigue and weakness. Negative for chills.   HENT: Positive for congestion. Negative for nosebleeds.    Eyes: Negative for redness.   Respiratory: Positive for cough and dyspnea on extertion. Negative for choking.    Genitourinary: Negative for hematuria.   Endocrine: Negative for cold intolerance.    Musculoskeletal: Positive for arthralgias.   Neurological: Negative for syncope.   Hematological: Negative for adenopathy.   Psychiatric/Behavioral: Negative for confusion. The patient is nervous/anxious.        Outpatient Encounter Medications as of 12/9/2020   Medication Sig Dispense Refill    amitriptyline (ELAVIL) 100 MG tablet TAKE 3 TABLETS(300 MG) BY MOUTH EVERY EVENING 90 tablet 0    folic acid (FOLVITE) 1 MG tablet Take 1 tablet (1 mg total) by mouth once daily. 30 tablet 4    ibuprofen (ADVIL,MOTRIN) 200 MG tablet Take 600 mg by mouth every 6 (six) hours as needed.      methotrexate 2.5 MG Tab Take 4 tablets (10 mg  "total) by mouth every 7 days. Take 2 tablets in the AM and 2 in the PM 16 tablet 3    prednisoLONE acetate (PRED FORTE) 1 % DrpS Place 1 drop into the right eye 4 (four) times daily. 1 Bottle 3    albuterol (PROVENTIL/VENTOLIN HFA) 90 mcg/actuation inhaler Inhale 2 puffs into the lungs every 6 (six) hours as needed for Wheezing. Rescue (Patient not taking: Reported on 12/9/2020) 6.7 g 11    fluticasone furoate-vilanteroL (BREO ELLIPTA) 100-25 mcg/dose diskus inhaler Inhale 1 puff into the lungs once daily. Controller (Patient not taking: Reported on 12/9/2020) 60 each 6     No facility-administered encounter medications on file as of 12/9/2020.        Objective:     Vital Signs (Most Recent)  Vital Signs  Temp: 98.6 °F (37 °C)  Pulse: 86  Resp: 16  SpO2: 97 %  BP: 120/70  Height and Weight  Height: 5' 7" (170.2 cm)  Weight: 73 kg (160 lb 15 oz)  BSA (Calculated - sq m): 1.86 sq meters  BMI (Calculated): 25.2  Weight in (lb) to have BMI = 25: 159.3]  Wt Readings from Last 2 Encounters:   12/09/20 73 kg (160 lb 15 oz)   10/08/20 71.3 kg (157 lb 3 oz)       Physical Exam   Constitutional: She is oriented to person, place, and time. She appears well-developed and well-nourished.   HENT:   Head: Normocephalic.   Neck: Neck supple.   Cardiovascular: Normal rate and regular rhythm.   Pulmonary/Chest: Normal expansion, effort normal and breath sounds normal. No stridor. No respiratory distress. She has no rales. She exhibits no tenderness.   Abdominal: Soft.   Musculoskeletal:         General: No tenderness.   Lymphadenopathy:     She has no cervical adenopathy.   Neurological: She is alert and oriented to person, place, and time. Gait normal.   Skin: Skin is warm. No cyanosis. Nails show no clubbing.   Psychiatric: She has a normal mood and affect. Her behavior is normal. Judgment and thought content normal.   Nursing note and vitals reviewed.      Laboratory    MATT screen positive    Rheumatoid factor elevated to " 157.    A. Pullulans Abs None Detected DetectedAbnormal          Lab Results   Component Value Date    WBC 5.34 11/19/2020    RBC 4.03 11/19/2020    HGB 12.1 11/19/2020    HCT 37.8 11/19/2020    MCV 94 11/19/2020    MCH 30.0 11/19/2020    MCHC 32.0 11/19/2020    RDW 13.6 11/19/2020     11/19/2020    MPV 10.5 11/19/2020    GRAN 2.9 11/19/2020    GRAN 53.4 11/19/2020    LYMPH 1.7 11/19/2020    LYMPH 31.3 11/19/2020    MONO 0.5 11/19/2020    MONO 10.1 11/19/2020    EOS 0.2 11/19/2020    BASO 0.06 11/19/2020    EOSINOPHIL 3.7 11/19/2020    BASOPHIL 1.1 11/19/2020       BMP  Lab Results   Component Value Date     11/19/2020    K 4.0 11/19/2020     11/19/2020    CO2 27 11/19/2020    BUN 14 11/19/2020    CREATININE 0.8 11/19/2020    CALCIUM 9.2 11/19/2020    ANIONGAP 7 (L) 11/19/2020    ESTGFRAFRICA >60 11/19/2020    EGFRNONAA >60 11/19/2020    AST 25 11/19/2020    ALT 35 11/19/2020    PROT 7.1 11/19/2020       Lab Results   Component Value Date    BNP 19 08/18/2020       Lab Results   Component Value Date    TSH 2.397 07/06/2020       Lab Results   Component Value Date    SEDRATE 11 08/18/2020       Lab Results   Component Value Date    CRP 4.0 08/18/2020     Lab Results   Component Value Date    IGE <35 10/02/2020    IGE <35 08/18/2020        Lab Results   Component Value Date    ASPERGILLUS <0.10 10/02/2020     Lab Results   Component Value Date    AFUMIGATUSCL CLASS 0 10/02/2020        Lab Results   Component Value Date    ACE Test Not Performed 08/18/2020        Diagnostic Results:  I have personally reviewed today the following studies:    High-resolution CT chest August 31, 2020      FINDINGS:  Minimal bilateral apical pleuroparenchymal scarring.  Minimal bilateral scattered septal thickening.  Minimal bibasilar gravitational changes.  No focal or diffuse pleural thickening.  Mild scarring and or subsegmental atelectasis within the lingula.  No parenchymal bands, calcified or noncalcified pleural  plaque.  No effusion.  No bronchiectasis.  No consolidation or mass lesion.     Trachea and mainstem bronchi unremarkable.  Thyroid normal in appearance.  A few bilateral subcentimeter and shotty short axis axillary and mediastinal nodes.  No bulky or matted adenopathy.  No significant hilar adenopathy.     Heart size within normal limits.  No pericardial effusion.  Minimal atherosclerotic calcific plaque within the aorta without aneurysmal dilatation.     Included upper abdomen remarkable for prior cholecystectomy.  No other significant findings appreciated.  Mild degenerative change throughout the spine and generalized osteopenia.  Degenerative changes also noted in the partially visualized C-spine.  No fracture or subluxation.  No lytic or blastic lesion.    PFT August 2020      Grade A-D -acceptable quality of tracings   Normal spirometry. (FEV1/VC greater than or equal to LLN and FVC greater than or equal to LLN)   Normal lung volumes.(TLC> equal to 80% of predicted and RV % predicted is >80%)   Mild reduction in diffusion capacity -unadjusted for hemoglobin (DLCO > or equal to 60% predicted and < LLN) .     Mild decrease in maximal voluntary ventilation. (MVV % predicted is >65% predicted and less than LLN)   Coughing noted on the expiratory limb otherwise Flow volume loops are normal.   Overall there is no significant ventilatory impairment. (FEV1 >LLN)   Spirometry is normal. Lung volume determination is normal. Airway mechanics are abnormal showing increased airway resistance and decreased conductance. DLCO is mildly decreased. MVV is mildly decreased. Notes: No recent hemoglobin value available.  DLCO   interpretation assumes a normal hemoglobin value.       6 min walking test 08/24/2020 no significant O2 desaturation.  Normal walk distance.    Spirometry with DLCO 12/09/2020 decreasing FVC.  Decreasing DLCO.  Restrictive pattern.    Assessment/Plan:   ILD (interstitial lung disease)  -     Spirometry  Without Bronchodilator & DLCO; Future; Expected date: 03/09/2021  -     COVID-19 Routine Screening; Future; Expected date: 03/09/2021  -     X-Ray Chest PA And Lateral; Future; Expected date: 12/09/2020    SOBOE (shortness of breath on exertion)  -     Echo Color Flow Doppler? Yes; Future  -     Stress test, pulmonary; Future; Expected date: 03/09/2021    Restrictive pattern present on pulmonary function testing  -     Spirometry Without Bronchodilator & DLCO; Future; Expected date: 03/09/2021  -     Stress test, pulmonary; Future; Expected date: 03/09/2021    Decreased diffusion capacity  -     Spirometry Without Bronchodilator & DLCO; Future; Expected date: 03/09/2021  -     Stress test, pulmonary; Future; Expected date: 03/09/2021    Rheumatoid factor positive  -     X-Ray Chest PA And Lateral; Future; Expected date: 12/09/2020    Seropositive rheumatoid arthritis  -     X-Ray Chest PA And Lateral; Future; Expected date: 12/09/2020    On methotrexate therapy  -     X-Ray Chest PA And Lateral; Future; Expected date: 12/09/2020       Continue albuterol p.r.n.       Commit to Breo 100 mcg 1 puff daily.  Continue if improvement noted.    Although clinically overall stable, decline of FVC and DLCO noted.  Repeat chest x-ray today.    Will discuss with Rheumatology increasing methotrexate versus adding low-dose prednisone.    Continue folic acid.  Monitor LFTs and blood count.    Surveillance with spirometry and DLCO in 3 months.    Repeat 6 min walking test in 3 months.    Check 2D echo to rule out pulmonary hypertension.    Yearly influenza vaccination.  Up-to-date on pneumococcal vaccines.    Follow up in about 3 months (around 3/9/2021).    This note was prepared using voice recognition system and is likely to have sound alike errors that may have been overlooked even after proof reading.  Please call me with any questions    Discussed diagnosis, its evaluation, treatment and usual course. All questions  answered.      Jin Lloyd MD

## 2020-12-09 NOTE — PROGRESS NOTES
Working Colonoscopy Report     Called Pt to Scheduled Colonoscopy or Offfer CHRISTINA Tuttle/[place] for Pt to call office         Shantal PERKINS LPN Care Coordinator  Care Coordination Department  [facility]  [phone]

## 2020-12-10 ENCOUNTER — OFFICE VISIT (OUTPATIENT)
Dept: OPHTHALMOLOGY | Facility: CLINIC | Age: 65
End: 2020-12-10
Payer: MEDICARE

## 2020-12-10 DIAGNOSIS — Z98.41 CATARACT EXTRACTION STATUS OF EYE, RIGHT: Primary | ICD-10-CM

## 2020-12-10 PROCEDURE — 99024 POSTOP FOLLOW-UP VISIT: CPT | Mod: HCNC,S$GLB,, | Performed by: STUDENT IN AN ORGANIZED HEALTH CARE EDUCATION/TRAINING PROGRAM

## 2020-12-10 PROCEDURE — 99999 PR PBB SHADOW E&M-EST. PATIENT-LVL II: ICD-10-PCS | Mod: PBBFAC,HCNC,, | Performed by: STUDENT IN AN ORGANIZED HEALTH CARE EDUCATION/TRAINING PROGRAM

## 2020-12-10 PROCEDURE — 99024 PR POST-OP FOLLOW-UP VISIT: ICD-10-PCS | Mod: HCNC,S$GLB,, | Performed by: STUDENT IN AN ORGANIZED HEALTH CARE EDUCATION/TRAINING PROGRAM

## 2020-12-10 PROCEDURE — 99999 PR PBB SHADOW E&M-EST. PATIENT-LVL II: CPT | Mod: PBBFAC,HCNC,, | Performed by: STUDENT IN AN ORGANIZED HEALTH CARE EDUCATION/TRAINING PROGRAM

## 2020-12-10 NOTE — PROGRESS NOTES
HPI     Pt here today for s/p  PCIOL OD  Patient doing well  OU- Pred QID, Ocuflox QID    Last edited by Herminia Garcia on 12/10/2020  1:32 PM. (History)            Assessment /Plan     For exam results, see Encounter Report.    Cataract extraction status of eye, right- Impression/Plan:  POD#1 S/P CEIOL OD Doing well. Mild edema    Continue gtts to operative eye:  Pred 1% QID  Ciprofloxacin QID    Reinstructed in importance of absolute compliance with Post-OP instructions including medications, shield at bedtime, protective glasses during the day, and limitation of activities. Follow up appointments in approximately one and six weeks or call immediately for increased pain, redness or vision loss.     RTC 1 week w/ MRx in operated eye if planning for 2nd eye. MOCT if PH worse than 20/25

## 2020-12-16 ENCOUNTER — OFFICE VISIT (OUTPATIENT)
Dept: OPHTHALMOLOGY | Facility: CLINIC | Age: 65
End: 2020-12-16
Payer: MEDICARE

## 2020-12-16 DIAGNOSIS — Z98.41 CATARACT EXTRACTION STATUS OF EYE, RIGHT: Primary | ICD-10-CM

## 2020-12-16 PROCEDURE — 99999 PR PBB SHADOW E&M-EST. PATIENT-LVL II: ICD-10-PCS | Mod: PBBFAC,HCNC,, | Performed by: STUDENT IN AN ORGANIZED HEALTH CARE EDUCATION/TRAINING PROGRAM

## 2020-12-16 PROCEDURE — 99999 PR PBB SHADOW E&M-EST. PATIENT-LVL II: CPT | Mod: PBBFAC,HCNC,, | Performed by: STUDENT IN AN ORGANIZED HEALTH CARE EDUCATION/TRAINING PROGRAM

## 2020-12-16 PROCEDURE — 99024 PR POST-OP FOLLOW-UP VISIT: ICD-10-PCS | Mod: HCNC,S$GLB,, | Performed by: STUDENT IN AN ORGANIZED HEALTH CARE EDUCATION/TRAINING PROGRAM

## 2020-12-16 PROCEDURE — 99024 POSTOP FOLLOW-UP VISIT: CPT | Mod: HCNC,S$GLB,, | Performed by: STUDENT IN AN ORGANIZED HEALTH CARE EDUCATION/TRAINING PROGRAM

## 2020-12-16 NOTE — PROGRESS NOTES
HPI     Patient here for 1 week s/p PCIOL OD  Pt doing well still little soreness  Pt compliant with gtts    OD: Pred QID, Ocu QID    Last edited by Herminia Garcia on 12/16/2020  1:44 PM. (History)            Assessment /Plan     For exam results, see Encounter Report.    Cataract extraction status of eye, right- Impression/Plan  POW#1 S/P CEIOL OD : Doing well with no evidence of infection. Persistent inflammation. Will continue steroids.    PF QID until next visit  D/C ABx     Pt given and instructed in one week postop instructions. Can resume normal activitites and d/c eye shield. OTC reading glasses can be used until evaluated for final MR. Follow up in one month or PRN pain, redness, vision loss, or other concerns.        Return to clinic 1M DFE PO

## 2020-12-18 ENCOUNTER — HOSPITAL ENCOUNTER (OUTPATIENT)
Dept: RADIOLOGY | Facility: HOSPITAL | Age: 65
Discharge: HOME | End: 2020-12-18
Attending: INTERNAL MEDICINE
Payer: MEDICARE

## 2020-12-18 ENCOUNTER — HOSPITAL ENCOUNTER (OUTPATIENT)
Dept: CARDIOLOGY | Facility: HOSPITAL | Age: 65
Discharge: HOME | End: 2020-12-18
Attending: INTERNAL MEDICINE
Payer: MEDICARE

## 2020-12-18 VITALS — HEIGHT: 67 IN | WEIGHT: 160 LBS | BODY MASS INDEX: 25.11 KG/M2

## 2020-12-18 DIAGNOSIS — R06.02 SOBOE (SHORTNESS OF BREATH ON EXERTION): ICD-10-CM

## 2020-12-18 PROCEDURE — 93306 TTE W/DOPPLER COMPLETE: CPT | Mod: HCNC

## 2020-12-18 PROCEDURE — 93306 ECHO (CUPID ONLY): ICD-10-PCS | Mod: 26,HCNC,, | Performed by: INTERNAL MEDICINE

## 2020-12-18 PROCEDURE — 71046 XR CHEST PA AND LATERAL: ICD-10-PCS | Mod: 26,HCNC,, | Performed by: RADIOLOGY

## 2020-12-18 PROCEDURE — 71046 X-RAY EXAM CHEST 2 VIEWS: CPT | Mod: 26,HCNC,, | Performed by: RADIOLOGY

## 2020-12-18 PROCEDURE — 71046 X-RAY EXAM CHEST 2 VIEWS: CPT | Mod: TC,HCNC

## 2020-12-18 PROCEDURE — 93306 TTE W/DOPPLER COMPLETE: CPT | Mod: 26,HCNC,, | Performed by: INTERNAL MEDICINE

## 2020-12-20 LAB
AORTIC ROOT ANNULUS: 2.7 CM
AV INDEX (PROSTH): 0.78
AV MEAN GRADIENT: 5 MMHG
AV PEAK GRADIENT: 9 MMHG
AV VALVE AREA: 2.48 CM2
AV VELOCITY RATIO: 0.82
BSA FOR ECHO PROCEDURE: 1.85 M2
CV ECHO LV RWT: 0.52 CM
DOP CALC AO PEAK VEL: 1.49 M/S
DOP CALC AO VTI: 32.6 CM
DOP CALC LVOT AREA: 3.2 CM2
DOP CALC LVOT DIAMETER: 2.01 CM
DOP CALC LVOT PEAK VEL: 1.22 M/S
DOP CALC LVOT STROKE VOLUME: 80.81 CM3
DOP CALC RVOT PEAK VEL: 0.87 M/S
DOP CALC RVOT VTI: 20.82 CM
DOP CALCLVOT PEAK VEL VTI: 25.48 CM
E WAVE DECELERATION TIME: 118.67 MSEC
E/A RATIO: 0.8
E/E' RATIO: 8 M/S
ECHO LV POSTERIOR WALL: 1.04 CM (ref 0.6–1.1)
FRACTIONAL SHORTENING: 39 % (ref 28–44)
INTERVENTRICULAR SEPTUM: 1.19 CM (ref 0.6–1.1)
IVRT: 99.9 MSEC
LA MAJOR: 4.25 CM
LA MINOR: 3.3 CM
LA WIDTH: 2.99 CM
LEFT ATRIUM SIZE: 3.51 CM
LEFT ATRIUM VOLUME INDEX: 18 ML/M2
LEFT ATRIUM VOLUME: 33.14 CM3
LEFT INTERNAL DIMENSION IN SYSTOLE: 2.46 CM (ref 2.1–4)
LEFT VENTRICLE DIASTOLIC VOLUME INDEX: 38.4 ML/M2
LEFT VENTRICLE DIASTOLIC VOLUME: 70.62 ML
LEFT VENTRICLE MASS INDEX: 81 G/M2
LEFT VENTRICLE SYSTOLIC VOLUME INDEX: 11.7 ML/M2
LEFT VENTRICLE SYSTOLIC VOLUME: 21.52 ML
LEFT VENTRICULAR INTERNAL DIMENSION IN DIASTOLE: 4.01 CM (ref 3.5–6)
LEFT VENTRICULAR MASS: 149.1 G
LV LATERAL E/E' RATIO: 7.6 M/S
LV SEPTAL E/E' RATIO: 8.44 M/S
MV PEAK A VEL: 0.95 M/S
MV PEAK E VEL: 0.76 M/S
PISA TR MAX VEL: 2.04 M/S
PULM VEIN S/D RATIO: 1.63
PV MEAN GRADIENT: 2 MMHG
PV PEAK D VEL: 0.35 M/S
PV PEAK S VEL: 0.57 M/S
PV PEAK VELOCITY: 1.1 CM/S
RA MAJOR: 4.45 CM
RA WIDTH: 2.42 CM
RIGHT VENTRICULAR END-DIASTOLIC DIMENSION: 2.14 CM
SINUS: 2.31 CM
STJ: 2.24 CM
TDI LATERAL: 0.1 M/S
TDI SEPTAL: 0.09 M/S
TDI: 0.1 M/S
TR MAX PG: 17 MMHG
TRICUSPID ANNULAR PLANE SYSTOLIC EXCURSION: 2.27 CM

## 2020-12-21 ENCOUNTER — PATIENT MESSAGE (OUTPATIENT)
Dept: PULMONOLOGY | Facility: CLINIC | Age: 65
End: 2020-12-21

## 2021-01-05 ENCOUNTER — PATIENT MESSAGE (OUTPATIENT)
Dept: RHEUMATOLOGY | Facility: CLINIC | Age: 66
End: 2021-01-05

## 2021-01-08 ENCOUNTER — PATIENT MESSAGE (OUTPATIENT)
Dept: RHEUMATOLOGY | Facility: CLINIC | Age: 66
End: 2021-01-08

## 2021-01-08 ENCOUNTER — OFFICE VISIT (OUTPATIENT)
Dept: RHEUMATOLOGY | Facility: CLINIC | Age: 66
End: 2021-01-08
Payer: MEDICARE

## 2021-01-08 ENCOUNTER — LAB VISIT (OUTPATIENT)
Dept: LAB | Facility: HOSPITAL | Age: 66
End: 2021-01-08
Attending: INTERNAL MEDICINE
Payer: MEDICARE

## 2021-01-08 VITALS
HEART RATE: 97 BPM | WEIGHT: 163.13 LBS | BODY MASS INDEX: 25.6 KG/M2 | SYSTOLIC BLOOD PRESSURE: 146 MMHG | HEIGHT: 67 IN | DIASTOLIC BLOOD PRESSURE: 81 MMHG

## 2021-01-08 DIAGNOSIS — Z71.89 COUNSELING ON HEALTH PROMOTION AND DISEASE PREVENTION: ICD-10-CM

## 2021-01-08 DIAGNOSIS — M05.9 SEROPOSITIVE RHEUMATOID ARTHRITIS: Primary | ICD-10-CM

## 2021-01-08 DIAGNOSIS — M05.9 SEROPOSITIVE RHEUMATOID ARTHRITIS: ICD-10-CM

## 2021-01-08 DIAGNOSIS — Z79.899 HIGH RISK MEDICATION USE: ICD-10-CM

## 2021-01-08 LAB
ALBUMIN SERPL BCP-MCNC: 3.9 G/DL (ref 3.5–5.2)
ALP SERPL-CCNC: 85 U/L (ref 55–135)
ALT SERPL W/O P-5'-P-CCNC: 34 U/L (ref 10–44)
ANION GAP SERPL CALC-SCNC: 9 MMOL/L (ref 8–16)
AST SERPL-CCNC: 21 U/L (ref 10–40)
BASOPHILS # BLD AUTO: 0.06 K/UL (ref 0–0.2)
BASOPHILS NFR BLD: 1 % (ref 0–1.9)
BILIRUB SERPL-MCNC: 0.2 MG/DL (ref 0.1–1)
BUN SERPL-MCNC: 16 MG/DL (ref 8–23)
CALCIUM SERPL-MCNC: 9.3 MG/DL (ref 8.7–10.5)
CHLORIDE SERPL-SCNC: 105 MMOL/L (ref 95–110)
CO2 SERPL-SCNC: 25 MMOL/L (ref 23–29)
CREAT SERPL-MCNC: 0.8 MG/DL (ref 0.5–1.4)
DIFFERENTIAL METHOD: ABNORMAL
EOSINOPHIL # BLD AUTO: 0.2 K/UL (ref 0–0.5)
EOSINOPHIL NFR BLD: 2.7 % (ref 0–8)
ERYTHROCYTE [DISTWIDTH] IN BLOOD BY AUTOMATED COUNT: 14.6 % (ref 11.5–14.5)
EST. GFR  (AFRICAN AMERICAN): >60 ML/MIN/1.73 M^2
EST. GFR  (NON AFRICAN AMERICAN): >60 ML/MIN/1.73 M^2
GLUCOSE SERPL-MCNC: 105 MG/DL (ref 70–110)
HCT VFR BLD AUTO: 37.3 % (ref 37–48.5)
HGB BLD-MCNC: 12.2 G/DL (ref 12–16)
IMM GRANULOCYTES # BLD AUTO: 0.04 K/UL (ref 0–0.04)
IMM GRANULOCYTES NFR BLD AUTO: 0.6 % (ref 0–0.5)
LYMPHOCYTES # BLD AUTO: 2 K/UL (ref 1–4.8)
LYMPHOCYTES NFR BLD: 31.9 % (ref 18–48)
MCH RBC QN AUTO: 30.3 PG (ref 27–31)
MCHC RBC AUTO-ENTMCNC: 32.7 G/DL (ref 32–36)
MCV RBC AUTO: 93 FL (ref 82–98)
MONOCYTES # BLD AUTO: 0.7 K/UL (ref 0.3–1)
MONOCYTES NFR BLD: 11.3 % (ref 4–15)
NEUTROPHILS # BLD AUTO: 3.3 K/UL (ref 1.8–7.7)
NEUTROPHILS NFR BLD: 52.5 % (ref 38–73)
NRBC BLD-RTO: 0 /100 WBC
PLATELET # BLD AUTO: 238 K/UL (ref 150–350)
PMV BLD AUTO: 9.5 FL (ref 9.2–12.9)
POTASSIUM SERPL-SCNC: 3.8 MMOL/L (ref 3.5–5.1)
PROT SERPL-MCNC: 7.5 G/DL (ref 6–8.4)
RBC # BLD AUTO: 4.03 M/UL (ref 4–5.4)
SODIUM SERPL-SCNC: 139 MMOL/L (ref 136–145)
WBC # BLD AUTO: 6.3 K/UL (ref 3.9–12.7)

## 2021-01-08 PROCEDURE — 99214 OFFICE O/P EST MOD 30 MIN: CPT | Mod: S$GLB,,, | Performed by: INTERNAL MEDICINE

## 2021-01-08 PROCEDURE — 36415 COLL VENOUS BLD VENIPUNCTURE: CPT

## 2021-01-08 PROCEDURE — 1125F AMNT PAIN NOTED PAIN PRSNT: CPT | Mod: S$GLB,,, | Performed by: INTERNAL MEDICINE

## 2021-01-08 PROCEDURE — 85025 COMPLETE CBC W/AUTO DIFF WBC: CPT

## 2021-01-08 PROCEDURE — 99999 PR PBB SHADOW E&M-EST. PATIENT-LVL III: CPT | Mod: PBBFAC,,, | Performed by: INTERNAL MEDICINE

## 2021-01-08 PROCEDURE — 3008F PR BODY MASS INDEX (BMI) DOCUMENTED: ICD-10-PCS | Mod: CPTII,S$GLB,, | Performed by: INTERNAL MEDICINE

## 2021-01-08 PROCEDURE — 3288F PR FALLS RISK ASSESSMENT DOCUMENTED: ICD-10-PCS | Mod: CPTII,S$GLB,, | Performed by: INTERNAL MEDICINE

## 2021-01-08 PROCEDURE — 1125F PR PAIN SEVERITY QUANTIFIED, PAIN PRESENT: ICD-10-PCS | Mod: S$GLB,,, | Performed by: INTERNAL MEDICINE

## 2021-01-08 PROCEDURE — 3288F FALL RISK ASSESSMENT DOCD: CPT | Mod: CPTII,S$GLB,, | Performed by: INTERNAL MEDICINE

## 2021-01-08 PROCEDURE — 99214 PR OFFICE/OUTPT VISIT, EST, LEVL IV, 30-39 MIN: ICD-10-PCS | Mod: S$GLB,,, | Performed by: INTERNAL MEDICINE

## 2021-01-08 PROCEDURE — 99999 PR PBB SHADOW E&M-EST. PATIENT-LVL III: ICD-10-PCS | Mod: PBBFAC,,, | Performed by: INTERNAL MEDICINE

## 2021-01-08 PROCEDURE — 80053 COMPREHEN METABOLIC PANEL: CPT

## 2021-01-08 PROCEDURE — 1101F PR PT FALLS ASSESS DOC 0-1 FALLS W/OUT INJ PAST YR: ICD-10-PCS | Mod: CPTII,S$GLB,, | Performed by: INTERNAL MEDICINE

## 2021-01-08 PROCEDURE — 1159F MED LIST DOCD IN RCRD: CPT | Mod: S$GLB,,, | Performed by: INTERNAL MEDICINE

## 2021-01-08 PROCEDURE — 1101F PT FALLS ASSESS-DOCD LE1/YR: CPT | Mod: CPTII,S$GLB,, | Performed by: INTERNAL MEDICINE

## 2021-01-08 PROCEDURE — 3008F BODY MASS INDEX DOCD: CPT | Mod: CPTII,S$GLB,, | Performed by: INTERNAL MEDICINE

## 2021-01-08 PROCEDURE — 1159F PR MEDICATION LIST DOCUMENTED IN MEDICAL RECORD: ICD-10-PCS | Mod: S$GLB,,, | Performed by: INTERNAL MEDICINE

## 2021-01-08 RX ORDER — HYDROXYCHLOROQUINE SULFATE 200 MG/1
200 TABLET, FILM COATED ORAL 2 TIMES DAILY
Qty: 60 TABLET | Refills: 2 | Status: SHIPPED | OUTPATIENT
Start: 2021-01-08 | End: 2021-02-08

## 2021-01-08 RX ORDER — METHOTREXATE 2.5 MG/1
15 TABLET ORAL
Qty: 40 TABLET | Refills: 3 | Status: SHIPPED | OUTPATIENT
Start: 2021-01-08 | End: 2021-08-17

## 2021-01-13 LAB
ACID FAST MOD KINY STN SPEC: NORMAL
MYCOBACTERIUM SPEC QL CULT: NORMAL

## 2021-01-19 ENCOUNTER — PATIENT OUTREACH (OUTPATIENT)
Dept: ADMINISTRATIVE | Facility: OTHER | Age: 66
End: 2021-01-19

## 2021-01-19 ENCOUNTER — PATIENT MESSAGE (OUTPATIENT)
Dept: INTERNAL MEDICINE | Facility: CLINIC | Age: 66
End: 2021-01-19

## 2021-01-20 ENCOUNTER — PATIENT OUTREACH (OUTPATIENT)
Dept: ADMINISTRATIVE | Facility: OTHER | Age: 66
End: 2021-01-20

## 2021-01-21 ENCOUNTER — OFFICE VISIT (OUTPATIENT)
Dept: OPHTHALMOLOGY | Facility: CLINIC | Age: 66
End: 2021-01-21
Payer: MEDICARE

## 2021-01-21 DIAGNOSIS — Z98.41 CATARACT EXTRACTION STATUS OF EYE, RIGHT: Primary | ICD-10-CM

## 2021-01-21 DIAGNOSIS — H25.12 NUCLEAR SCLEROSIS OF LEFT EYE: ICD-10-CM

## 2021-01-21 PROCEDURE — 92136 IOL MASTER - OS - LEFT EYE: ICD-10-PCS | Mod: 26,LT,S$GLB, | Performed by: STUDENT IN AN ORGANIZED HEALTH CARE EDUCATION/TRAINING PROGRAM

## 2021-01-21 PROCEDURE — 99999 PR PBB SHADOW E&M-EST. PATIENT-LVL III: CPT | Mod: PBBFAC,,, | Performed by: STUDENT IN AN ORGANIZED HEALTH CARE EDUCATION/TRAINING PROGRAM

## 2021-01-21 PROCEDURE — 99024 POSTOP FOLLOW-UP VISIT: CPT | Mod: S$GLB,,, | Performed by: STUDENT IN AN ORGANIZED HEALTH CARE EDUCATION/TRAINING PROGRAM

## 2021-01-21 PROCEDURE — 99999 PR PBB SHADOW E&M-EST. PATIENT-LVL III: ICD-10-PCS | Mod: PBBFAC,,, | Performed by: STUDENT IN AN ORGANIZED HEALTH CARE EDUCATION/TRAINING PROGRAM

## 2021-01-21 PROCEDURE — 99024 PR POST-OP FOLLOW-UP VISIT: ICD-10-PCS | Mod: S$GLB,,, | Performed by: STUDENT IN AN ORGANIZED HEALTH CARE EDUCATION/TRAINING PROGRAM

## 2021-01-21 PROCEDURE — 92136 OPHTHALMIC BIOMETRY: CPT | Mod: 26,LT,S$GLB, | Performed by: STUDENT IN AN ORGANIZED HEALTH CARE EDUCATION/TRAINING PROGRAM

## 2021-01-21 RX ORDER — DUREZOL 0.5 MG/ML
1 EMULSION OPHTHALMIC 4 TIMES DAILY
Qty: 1 BOTTLE | Refills: 1 | Status: SHIPPED | OUTPATIENT
Start: 2021-01-21 | End: 2021-01-31

## 2021-01-26 ENCOUNTER — PATIENT MESSAGE (OUTPATIENT)
Dept: INTERNAL MEDICINE | Facility: CLINIC | Age: 66
End: 2021-01-26

## 2021-02-08 ENCOUNTER — OFFICE VISIT (OUTPATIENT)
Dept: INTERNAL MEDICINE | Facility: CLINIC | Age: 66
End: 2021-02-08
Payer: MEDICARE

## 2021-02-08 VITALS
BODY MASS INDEX: 25.64 KG/M2 | DIASTOLIC BLOOD PRESSURE: 78 MMHG | TEMPERATURE: 98 F | HEART RATE: 99 BPM | HEIGHT: 67 IN | WEIGHT: 163.38 LBS | SYSTOLIC BLOOD PRESSURE: 134 MMHG | OXYGEN SATURATION: 97 % | RESPIRATION RATE: 18 BRPM

## 2021-02-08 DIAGNOSIS — J84.9 ILD (INTERSTITIAL LUNG DISEASE): ICD-10-CM

## 2021-02-08 DIAGNOSIS — M54.50 ACUTE LEFT-SIDED LOW BACK PAIN WITHOUT SCIATICA: Primary | ICD-10-CM

## 2021-02-08 DIAGNOSIS — F33.41 RECURRENT MAJOR DEPRESSIVE DISORDER, IN PARTIAL REMISSION: ICD-10-CM

## 2021-02-08 DIAGNOSIS — F41.9 ANXIETY: ICD-10-CM

## 2021-02-08 PROCEDURE — 1125F AMNT PAIN NOTED PAIN PRSNT: CPT | Mod: S$GLB,,, | Performed by: INTERNAL MEDICINE

## 2021-02-08 PROCEDURE — 1101F PR PT FALLS ASSESS DOC 0-1 FALLS W/OUT INJ PAST YR: ICD-10-PCS | Mod: CPTII,S$GLB,, | Performed by: INTERNAL MEDICINE

## 2021-02-08 PROCEDURE — 1159F MED LIST DOCD IN RCRD: CPT | Mod: S$GLB,,, | Performed by: INTERNAL MEDICINE

## 2021-02-08 PROCEDURE — 99214 OFFICE O/P EST MOD 30 MIN: CPT | Mod: S$GLB,,, | Performed by: INTERNAL MEDICINE

## 2021-02-08 PROCEDURE — 3008F PR BODY MASS INDEX (BMI) DOCUMENTED: ICD-10-PCS | Mod: CPTII,S$GLB,, | Performed by: INTERNAL MEDICINE

## 2021-02-08 PROCEDURE — 1101F PT FALLS ASSESS-DOCD LE1/YR: CPT | Mod: CPTII,S$GLB,, | Performed by: INTERNAL MEDICINE

## 2021-02-08 PROCEDURE — 99499 RISK ADDL DX/OHS AUDIT: ICD-10-PCS | Mod: S$GLB,,, | Performed by: INTERNAL MEDICINE

## 2021-02-08 PROCEDURE — 99999 PR PBB SHADOW E&M-EST. PATIENT-LVL III: ICD-10-PCS | Mod: PBBFAC,,, | Performed by: INTERNAL MEDICINE

## 2021-02-08 PROCEDURE — 99499 UNLISTED E&M SERVICE: CPT | Mod: S$GLB,,, | Performed by: INTERNAL MEDICINE

## 2021-02-08 PROCEDURE — 1125F PR PAIN SEVERITY QUANTIFIED, PAIN PRESENT: ICD-10-PCS | Mod: S$GLB,,, | Performed by: INTERNAL MEDICINE

## 2021-02-08 PROCEDURE — 3008F BODY MASS INDEX DOCD: CPT | Mod: CPTII,S$GLB,, | Performed by: INTERNAL MEDICINE

## 2021-02-08 PROCEDURE — 1159F PR MEDICATION LIST DOCUMENTED IN MEDICAL RECORD: ICD-10-PCS | Mod: S$GLB,,, | Performed by: INTERNAL MEDICINE

## 2021-02-08 PROCEDURE — 3288F FALL RISK ASSESSMENT DOCD: CPT | Mod: CPTII,S$GLB,, | Performed by: INTERNAL MEDICINE

## 2021-02-08 PROCEDURE — 99214 PR OFFICE/OUTPT VISIT, EST, LEVL IV, 30-39 MIN: ICD-10-PCS | Mod: S$GLB,,, | Performed by: INTERNAL MEDICINE

## 2021-02-08 PROCEDURE — 99999 PR PBB SHADOW E&M-EST. PATIENT-LVL III: CPT | Mod: PBBFAC,,, | Performed by: INTERNAL MEDICINE

## 2021-02-08 PROCEDURE — 3288F PR FALLS RISK ASSESSMENT DOCUMENTED: ICD-10-PCS | Mod: CPTII,S$GLB,, | Performed by: INTERNAL MEDICINE

## 2021-02-08 RX ORDER — METHOCARBAMOL 500 MG/1
TABLET, FILM COATED ORAL
Qty: 30 TABLET | Refills: 0 | Status: SHIPPED | OUTPATIENT
Start: 2021-02-08 | End: 2021-03-08

## 2021-02-08 RX ORDER — BUSPIRONE HYDROCHLORIDE 10 MG/1
10 TABLET ORAL 2 TIMES DAILY
Qty: 180 TABLET | Refills: 1 | Status: SHIPPED | OUTPATIENT
Start: 2021-02-08 | End: 2021-03-08

## 2021-02-08 RX ORDER — METHYLPREDNISOLONE 4 MG/1
TABLET ORAL
Qty: 1 PACKAGE | Refills: 0 | Status: SHIPPED | OUTPATIENT
Start: 2021-02-08 | End: 2021-03-08

## 2021-02-21 ENCOUNTER — PATIENT MESSAGE (OUTPATIENT)
Dept: INTERNAL MEDICINE | Facility: CLINIC | Age: 66
End: 2021-02-21

## 2021-02-22 ENCOUNTER — PATIENT OUTREACH (OUTPATIENT)
Dept: ADMINISTRATIVE | Facility: OTHER | Age: 66
End: 2021-02-22

## 2021-02-23 ENCOUNTER — OFFICE VISIT (OUTPATIENT)
Dept: OPHTHALMOLOGY | Facility: CLINIC | Age: 66
End: 2021-02-23
Payer: MEDICARE

## 2021-02-23 DIAGNOSIS — H25.12 NUCLEAR SCLEROSIS OF LEFT EYE: Primary | ICD-10-CM

## 2021-02-23 PROCEDURE — 99499 NO LOS: ICD-10-PCS | Mod: S$GLB,,, | Performed by: STUDENT IN AN ORGANIZED HEALTH CARE EDUCATION/TRAINING PROGRAM

## 2021-02-23 PROCEDURE — 99999 PR PBB SHADOW E&M-EST. PATIENT-LVL I: ICD-10-PCS | Mod: PBBFAC,,, | Performed by: STUDENT IN AN ORGANIZED HEALTH CARE EDUCATION/TRAINING PROGRAM

## 2021-02-23 PROCEDURE — 99999 PR PBB SHADOW E&M-EST. PATIENT-LVL I: CPT | Mod: PBBFAC,,, | Performed by: STUDENT IN AN ORGANIZED HEALTH CARE EDUCATION/TRAINING PROGRAM

## 2021-02-23 PROCEDURE — 99499 UNLISTED E&M SERVICE: CPT | Mod: S$GLB,,, | Performed by: STUDENT IN AN ORGANIZED HEALTH CARE EDUCATION/TRAINING PROGRAM

## 2021-03-03 ENCOUNTER — OFFICE VISIT (OUTPATIENT)
Dept: PSYCHIATRY | Facility: CLINIC | Age: 66
End: 2021-03-03
Payer: MEDICARE

## 2021-03-03 DIAGNOSIS — F41.9 ANXIETY: Primary | ICD-10-CM

## 2021-03-03 DIAGNOSIS — F33.41 RECURRENT MAJOR DEPRESSIVE DISORDER, IN PARTIAL REMISSION: ICD-10-CM

## 2021-03-03 PROCEDURE — 90791 PR PSYCHIATRIC DIAGNOSTIC EVALUATION: ICD-10-PCS | Mod: S$GLB,,, | Performed by: SOCIAL WORKER

## 2021-03-03 PROCEDURE — 99999 PR PBB SHADOW E&M-EST. PATIENT-LVL I: ICD-10-PCS | Mod: PBBFAC,,, | Performed by: SOCIAL WORKER

## 2021-03-03 PROCEDURE — 99999 PR PBB SHADOW E&M-EST. PATIENT-LVL I: CPT | Mod: PBBFAC,,, | Performed by: SOCIAL WORKER

## 2021-03-03 PROCEDURE — 90791 PSYCH DIAGNOSTIC EVALUATION: CPT | Mod: S$GLB,,, | Performed by: SOCIAL WORKER

## 2021-03-08 ENCOUNTER — OFFICE VISIT (OUTPATIENT)
Dept: INTERNAL MEDICINE | Facility: CLINIC | Age: 66
End: 2021-03-08
Payer: MEDICARE

## 2021-03-08 ENCOUNTER — HOSPITAL ENCOUNTER (OUTPATIENT)
Dept: RADIOLOGY | Facility: HOSPITAL | Age: 66
Discharge: HOME | End: 2021-03-08
Attending: INTERNAL MEDICINE
Payer: MEDICARE

## 2021-03-08 VITALS
HEART RATE: 101 BPM | OXYGEN SATURATION: 98 % | HEIGHT: 67 IN | TEMPERATURE: 99 F | BODY MASS INDEX: 25.74 KG/M2 | DIASTOLIC BLOOD PRESSURE: 64 MMHG | SYSTOLIC BLOOD PRESSURE: 118 MMHG | WEIGHT: 164 LBS | RESPIRATION RATE: 18 BRPM

## 2021-03-08 DIAGNOSIS — M79.10 MYALGIA: Primary | ICD-10-CM

## 2021-03-08 DIAGNOSIS — M54.9 DORSALGIA, UNSPECIFIED: ICD-10-CM

## 2021-03-08 DIAGNOSIS — M25.559 HIP PAIN: ICD-10-CM

## 2021-03-08 PROCEDURE — 99999 PR PBB SHADOW E&M-EST. PATIENT-LVL IV: ICD-10-PCS | Mod: PBBFAC,,, | Performed by: INTERNAL MEDICINE

## 2021-03-08 PROCEDURE — 72100 XR LUMBAR SPINE AP AND LATERAL: ICD-10-PCS | Mod: 26,,, | Performed by: RADIOLOGY

## 2021-03-08 PROCEDURE — 99213 PR OFFICE/OUTPT VISIT, EST, LEVL III, 20-29 MIN: ICD-10-PCS | Mod: S$GLB,,, | Performed by: INTERNAL MEDICINE

## 2021-03-08 PROCEDURE — 3288F PR FALLS RISK ASSESSMENT DOCUMENTED: ICD-10-PCS | Mod: CPTII,S$GLB,, | Performed by: INTERNAL MEDICINE

## 2021-03-08 PROCEDURE — 99213 OFFICE O/P EST LOW 20 MIN: CPT | Mod: S$GLB,,, | Performed by: INTERNAL MEDICINE

## 2021-03-08 PROCEDURE — 1125F PR PAIN SEVERITY QUANTIFIED, PAIN PRESENT: ICD-10-PCS | Mod: S$GLB,,, | Performed by: INTERNAL MEDICINE

## 2021-03-08 PROCEDURE — 1125F AMNT PAIN NOTED PAIN PRSNT: CPT | Mod: S$GLB,,, | Performed by: INTERNAL MEDICINE

## 2021-03-08 PROCEDURE — 3008F PR BODY MASS INDEX (BMI) DOCUMENTED: ICD-10-PCS | Mod: CPTII,S$GLB,, | Performed by: INTERNAL MEDICINE

## 2021-03-08 PROCEDURE — 1101F PT FALLS ASSESS-DOCD LE1/YR: CPT | Mod: CPTII,S$GLB,, | Performed by: INTERNAL MEDICINE

## 2021-03-08 PROCEDURE — 1159F MED LIST DOCD IN RCRD: CPT | Mod: S$GLB,,, | Performed by: INTERNAL MEDICINE

## 2021-03-08 PROCEDURE — 73521 XR HIPS BILATERAL 2 VIEW INCL AP PELVIS: ICD-10-PCS | Mod: 26,,, | Performed by: RADIOLOGY

## 2021-03-08 PROCEDURE — 3008F BODY MASS INDEX DOCD: CPT | Mod: CPTII,S$GLB,, | Performed by: INTERNAL MEDICINE

## 2021-03-08 PROCEDURE — 73521 X-RAY EXAM HIPS BI 2 VIEWS: CPT | Mod: 26,,, | Performed by: RADIOLOGY

## 2021-03-08 PROCEDURE — 73521 X-RAY EXAM HIPS BI 2 VIEWS: CPT | Mod: TC

## 2021-03-08 PROCEDURE — 3288F FALL RISK ASSESSMENT DOCD: CPT | Mod: CPTII,S$GLB,, | Performed by: INTERNAL MEDICINE

## 2021-03-08 PROCEDURE — 72100 X-RAY EXAM L-S SPINE 2/3 VWS: CPT | Mod: 26,,, | Performed by: RADIOLOGY

## 2021-03-08 PROCEDURE — 99999 PR PBB SHADOW E&M-EST. PATIENT-LVL IV: CPT | Mod: PBBFAC,,, | Performed by: INTERNAL MEDICINE

## 2021-03-08 PROCEDURE — 1159F PR MEDICATION LIST DOCUMENTED IN MEDICAL RECORD: ICD-10-PCS | Mod: S$GLB,,, | Performed by: INTERNAL MEDICINE

## 2021-03-08 PROCEDURE — 1101F PR PT FALLS ASSESS DOC 0-1 FALLS W/OUT INJ PAST YR: ICD-10-PCS | Mod: CPTII,S$GLB,, | Performed by: INTERNAL MEDICINE

## 2021-03-08 PROCEDURE — 72100 X-RAY EXAM L-S SPINE 2/3 VWS: CPT | Mod: TC

## 2021-03-09 ENCOUNTER — PATIENT MESSAGE (OUTPATIENT)
Dept: PULMONOLOGY | Facility: CLINIC | Age: 66
End: 2021-03-09

## 2021-03-09 DIAGNOSIS — M05.9 SEROPOSITIVE RHEUMATOID ARTHRITIS: ICD-10-CM

## 2021-03-09 RX ORDER — FOLIC ACID 1 MG/1
1 TABLET ORAL DAILY
Qty: 30 TABLET | Refills: 4 | Status: ON HOLD | OUTPATIENT
Start: 2021-03-09 | End: 2021-10-13

## 2021-03-12 ENCOUNTER — OFFICE VISIT (OUTPATIENT)
Dept: RHEUMATOLOGY | Facility: CLINIC | Age: 66
End: 2021-03-12
Payer: MEDICARE

## 2021-03-12 VITALS
WEIGHT: 164.44 LBS | HEIGHT: 67 IN | SYSTOLIC BLOOD PRESSURE: 116 MMHG | DIASTOLIC BLOOD PRESSURE: 66 MMHG | BODY MASS INDEX: 25.81 KG/M2 | HEART RATE: 90 BPM

## 2021-03-12 DIAGNOSIS — Z79.899 HIGH RISK MEDICATION USE: ICD-10-CM

## 2021-03-12 DIAGNOSIS — M15.9 PRIMARY OSTEOARTHRITIS INVOLVING MULTIPLE JOINTS: ICD-10-CM

## 2021-03-12 DIAGNOSIS — M05.9 SEROPOSITIVE RHEUMATOID ARTHRITIS: Primary | ICD-10-CM

## 2021-03-12 DIAGNOSIS — Z71.89 COUNSELING ON HEALTH PROMOTION AND DISEASE PREVENTION: ICD-10-CM

## 2021-03-12 PROCEDURE — 3288F FALL RISK ASSESSMENT DOCD: CPT | Mod: CPTII,S$GLB,, | Performed by: INTERNAL MEDICINE

## 2021-03-12 PROCEDURE — 99214 PR OFFICE/OUTPT VISIT, EST, LEVL IV, 30-39 MIN: ICD-10-PCS | Mod: 25,S$GLB,, | Performed by: INTERNAL MEDICINE

## 2021-03-12 PROCEDURE — 99499 UNLISTED E&M SERVICE: CPT | Mod: S$GLB,,, | Performed by: INTERNAL MEDICINE

## 2021-03-12 PROCEDURE — 20610 DRAIN/INJ JOINT/BURSA W/O US: CPT | Mod: LT,S$GLB,, | Performed by: INTERNAL MEDICINE

## 2021-03-12 PROCEDURE — 1101F PR PT FALLS ASSESS DOC 0-1 FALLS W/OUT INJ PAST YR: ICD-10-PCS | Mod: CPTII,S$GLB,, | Performed by: INTERNAL MEDICINE

## 2021-03-12 PROCEDURE — 20610 LARGE JOINT ASPIRATION/INJECTION: L ANSERINE BURSA: ICD-10-PCS | Mod: LT,S$GLB,, | Performed by: INTERNAL MEDICINE

## 2021-03-12 PROCEDURE — 3008F BODY MASS INDEX DOCD: CPT | Mod: CPTII,S$GLB,, | Performed by: INTERNAL MEDICINE

## 2021-03-12 PROCEDURE — 3288F PR FALLS RISK ASSESSMENT DOCUMENTED: ICD-10-PCS | Mod: CPTII,S$GLB,, | Performed by: INTERNAL MEDICINE

## 2021-03-12 PROCEDURE — 99214 OFFICE O/P EST MOD 30 MIN: CPT | Mod: 25,S$GLB,, | Performed by: INTERNAL MEDICINE

## 2021-03-12 PROCEDURE — 1159F MED LIST DOCD IN RCRD: CPT | Mod: S$GLB,,, | Performed by: INTERNAL MEDICINE

## 2021-03-12 PROCEDURE — 1101F PT FALLS ASSESS-DOCD LE1/YR: CPT | Mod: CPTII,S$GLB,, | Performed by: INTERNAL MEDICINE

## 2021-03-12 PROCEDURE — 99999 PR PBB SHADOW E&M-EST. PATIENT-LVL IV: ICD-10-PCS | Mod: PBBFAC,,, | Performed by: INTERNAL MEDICINE

## 2021-03-12 PROCEDURE — 1125F PR PAIN SEVERITY QUANTIFIED, PAIN PRESENT: ICD-10-PCS | Mod: S$GLB,,, | Performed by: INTERNAL MEDICINE

## 2021-03-12 PROCEDURE — 1159F PR MEDICATION LIST DOCUMENTED IN MEDICAL RECORD: ICD-10-PCS | Mod: S$GLB,,, | Performed by: INTERNAL MEDICINE

## 2021-03-12 PROCEDURE — 3008F PR BODY MASS INDEX (BMI) DOCUMENTED: ICD-10-PCS | Mod: CPTII,S$GLB,, | Performed by: INTERNAL MEDICINE

## 2021-03-12 PROCEDURE — 99999 PR PBB SHADOW E&M-EST. PATIENT-LVL IV: CPT | Mod: PBBFAC,,, | Performed by: INTERNAL MEDICINE

## 2021-03-12 PROCEDURE — 99499 RISK ADDL DX/OHS AUDIT: ICD-10-PCS | Mod: S$GLB,,, | Performed by: INTERNAL MEDICINE

## 2021-03-12 PROCEDURE — 1125F AMNT PAIN NOTED PAIN PRSNT: CPT | Mod: S$GLB,,, | Performed by: INTERNAL MEDICINE

## 2021-03-12 RX ORDER — TRIAMCINOLONE ACETONIDE 40 MG/ML
40 INJECTION, SUSPENSION INTRA-ARTICULAR; INTRAMUSCULAR
Status: DISCONTINUED | OUTPATIENT
Start: 2021-03-12 | End: 2021-03-12 | Stop reason: HOSPADM

## 2021-03-12 RX ORDER — HYDROXYCHLOROQUINE SULFATE 200 MG/1
TABLET, FILM COATED ORAL 2 TIMES DAILY
COMMUNITY
End: 2021-04-06

## 2021-03-12 RX ADMIN — TRIAMCINOLONE ACETONIDE 40 MG: 40 INJECTION, SUSPENSION INTRA-ARTICULAR; INTRAMUSCULAR at 09:03

## 2021-03-18 ENCOUNTER — CLINICAL SUPPORT (OUTPATIENT)
Dept: REHABILITATION | Facility: HOSPITAL | Age: 66
End: 2021-03-18
Attending: INTERNAL MEDICINE
Payer: MEDICARE

## 2021-03-18 DIAGNOSIS — Z74.09 DECREASED STRENGTH, ENDURANCE, AND MOBILITY: ICD-10-CM

## 2021-03-18 DIAGNOSIS — M15.9 PRIMARY OSTEOARTHRITIS INVOLVING MULTIPLE JOINTS: ICD-10-CM

## 2021-03-18 DIAGNOSIS — R53.1 DECREASED STRENGTH, ENDURANCE, AND MOBILITY: ICD-10-CM

## 2021-03-18 DIAGNOSIS — R68.89 DECREASED STRENGTH, ENDURANCE, AND MOBILITY: ICD-10-CM

## 2021-03-18 DIAGNOSIS — R26.89 FUNCTIONAL GAIT ABNORMALITY: ICD-10-CM

## 2021-03-18 PROCEDURE — 97110 THERAPEUTIC EXERCISES: CPT

## 2021-03-18 PROCEDURE — 97162 PT EVAL MOD COMPLEX 30 MIN: CPT

## 2021-03-22 PROBLEM — R53.1 DECREASED STRENGTH, ENDURANCE, AND MOBILITY: Status: ACTIVE | Noted: 2021-03-22

## 2021-03-22 PROBLEM — R68.89 DECREASED STRENGTH, ENDURANCE, AND MOBILITY: Status: ACTIVE | Noted: 2021-03-22

## 2021-03-22 PROBLEM — Z74.09 DECREASED STRENGTH, ENDURANCE, AND MOBILITY: Status: ACTIVE | Noted: 2021-03-22

## 2021-03-22 PROBLEM — R26.89 FUNCTIONAL GAIT ABNORMALITY: Status: ACTIVE | Noted: 2021-03-22

## 2021-04-01 ENCOUNTER — CLINICAL SUPPORT (OUTPATIENT)
Dept: REHABILITATION | Facility: HOSPITAL | Age: 66
End: 2021-04-01
Payer: MEDICARE

## 2021-04-01 DIAGNOSIS — Z74.09 DECREASED STRENGTH, ENDURANCE, AND MOBILITY: ICD-10-CM

## 2021-04-01 DIAGNOSIS — R68.89 DECREASED STRENGTH, ENDURANCE, AND MOBILITY: ICD-10-CM

## 2021-04-01 DIAGNOSIS — R26.89 FUNCTIONAL GAIT ABNORMALITY: ICD-10-CM

## 2021-04-01 DIAGNOSIS — R53.1 DECREASED STRENGTH, ENDURANCE, AND MOBILITY: ICD-10-CM

## 2021-04-01 PROCEDURE — 97110 THERAPEUTIC EXERCISES: CPT

## 2021-04-14 ENCOUNTER — OUTSIDE PLACE OF SERVICE (OUTPATIENT)
Dept: ADMINISTRATIVE | Facility: OTHER | Age: 66
End: 2021-04-14
Payer: MEDICARE

## 2021-04-14 PROCEDURE — 66984 XCAPSL CTRC RMVL W/O ECP: CPT | Mod: LT,,, | Performed by: STUDENT IN AN ORGANIZED HEALTH CARE EDUCATION/TRAINING PROGRAM

## 2021-04-14 PROCEDURE — 66984 PR REMOVAL, CATARACT, W/INSRT INTRAOC LENS, W/O ENDO CYCLO: ICD-10-PCS | Mod: LT,,, | Performed by: STUDENT IN AN ORGANIZED HEALTH CARE EDUCATION/TRAINING PROGRAM

## 2021-04-15 ENCOUNTER — OFFICE VISIT (OUTPATIENT)
Dept: OPHTHALMOLOGY | Facility: CLINIC | Age: 66
End: 2021-04-15
Payer: MEDICARE

## 2021-04-15 DIAGNOSIS — Z98.42 CATARACT EXTRACTION STATUS OF EYE, LEFT: Primary | ICD-10-CM

## 2021-04-15 PROCEDURE — 99024 PR POST-OP FOLLOW-UP VISIT: ICD-10-PCS | Mod: S$GLB,,, | Performed by: STUDENT IN AN ORGANIZED HEALTH CARE EDUCATION/TRAINING PROGRAM

## 2021-04-15 PROCEDURE — 99999 PR PBB SHADOW E&M-EST. PATIENT-LVL II: ICD-10-PCS | Mod: PBBFAC,,, | Performed by: STUDENT IN AN ORGANIZED HEALTH CARE EDUCATION/TRAINING PROGRAM

## 2021-04-15 PROCEDURE — 99999 PR PBB SHADOW E&M-EST. PATIENT-LVL II: CPT | Mod: PBBFAC,,, | Performed by: STUDENT IN AN ORGANIZED HEALTH CARE EDUCATION/TRAINING PROGRAM

## 2021-04-15 PROCEDURE — 99024 POSTOP FOLLOW-UP VISIT: CPT | Mod: S$GLB,,, | Performed by: STUDENT IN AN ORGANIZED HEALTH CARE EDUCATION/TRAINING PROGRAM

## 2021-04-21 ENCOUNTER — PATIENT MESSAGE (OUTPATIENT)
Dept: PULMONOLOGY | Facility: CLINIC | Age: 66
End: 2021-04-21

## 2021-04-22 ENCOUNTER — OFFICE VISIT (OUTPATIENT)
Dept: OPHTHALMOLOGY | Facility: CLINIC | Age: 66
End: 2021-04-22
Payer: MEDICARE

## 2021-04-22 DIAGNOSIS — Z98.42 CATARACT EXTRACTION STATUS OF EYE, LEFT: Primary | ICD-10-CM

## 2021-04-22 PROCEDURE — 99024 POSTOP FOLLOW-UP VISIT: CPT | Mod: S$GLB,,, | Performed by: STUDENT IN AN ORGANIZED HEALTH CARE EDUCATION/TRAINING PROGRAM

## 2021-04-22 PROCEDURE — 99999 PR PBB SHADOW E&M-EST. PATIENT-LVL II: CPT | Mod: PBBFAC,,, | Performed by: STUDENT IN AN ORGANIZED HEALTH CARE EDUCATION/TRAINING PROGRAM

## 2021-04-22 PROCEDURE — 99999 PR PBB SHADOW E&M-EST. PATIENT-LVL II: ICD-10-PCS | Mod: PBBFAC,,, | Performed by: STUDENT IN AN ORGANIZED HEALTH CARE EDUCATION/TRAINING PROGRAM

## 2021-04-22 PROCEDURE — 99024 PR POST-OP FOLLOW-UP VISIT: ICD-10-PCS | Mod: S$GLB,,, | Performed by: STUDENT IN AN ORGANIZED HEALTH CARE EDUCATION/TRAINING PROGRAM

## 2021-04-27 ENCOUNTER — PATIENT MESSAGE (OUTPATIENT)
Dept: PULMONOLOGY | Facility: CLINIC | Age: 66
End: 2021-04-27

## 2021-04-27 ENCOUNTER — CLINICAL SUPPORT (OUTPATIENT)
Dept: REHABILITATION | Facility: HOSPITAL | Age: 66
End: 2021-04-27
Payer: MEDICARE

## 2021-04-27 DIAGNOSIS — Z74.09 DECREASED STRENGTH, ENDURANCE, AND MOBILITY: ICD-10-CM

## 2021-04-27 DIAGNOSIS — R53.1 DECREASED STRENGTH, ENDURANCE, AND MOBILITY: ICD-10-CM

## 2021-04-27 DIAGNOSIS — R26.89 FUNCTIONAL GAIT ABNORMALITY: ICD-10-CM

## 2021-04-27 DIAGNOSIS — R68.89 DECREASED STRENGTH, ENDURANCE, AND MOBILITY: ICD-10-CM

## 2021-04-27 PROCEDURE — 97110 THERAPEUTIC EXERCISES: CPT

## 2021-04-28 ENCOUNTER — PATIENT MESSAGE (OUTPATIENT)
Dept: RESEARCH | Facility: HOSPITAL | Age: 66
End: 2021-04-28

## 2021-04-29 ENCOUNTER — DOCUMENTATION ONLY (OUTPATIENT)
Dept: REHABILITATION | Facility: HOSPITAL | Age: 66
End: 2021-04-29

## 2021-05-13 ENCOUNTER — PATIENT MESSAGE (OUTPATIENT)
Dept: ADMINISTRATIVE | Facility: OTHER | Age: 66
End: 2021-05-13

## 2021-05-13 ENCOUNTER — PATIENT OUTREACH (OUTPATIENT)
Dept: ADMINISTRATIVE | Facility: OTHER | Age: 66
End: 2021-05-13

## 2021-05-14 ENCOUNTER — LAB VISIT (OUTPATIENT)
Dept: LAB | Facility: HOSPITAL | Age: 66
End: 2021-05-14
Attending: INTERNAL MEDICINE
Payer: MEDICARE

## 2021-05-14 ENCOUNTER — OFFICE VISIT (OUTPATIENT)
Dept: RHEUMATOLOGY | Facility: CLINIC | Age: 66
End: 2021-05-14
Payer: MEDICARE

## 2021-05-14 VITALS
SYSTOLIC BLOOD PRESSURE: 143 MMHG | WEIGHT: 169.56 LBS | BODY MASS INDEX: 26.61 KG/M2 | HEIGHT: 67 IN | DIASTOLIC BLOOD PRESSURE: 62 MMHG | HEART RATE: 96 BPM

## 2021-05-14 DIAGNOSIS — J84.9 INTERSTITIAL PULMONARY DISEASE, UNSPECIFIED: ICD-10-CM

## 2021-05-14 DIAGNOSIS — M05.9 SEROPOSITIVE RHEUMATOID ARTHRITIS: ICD-10-CM

## 2021-05-14 DIAGNOSIS — Z79.899 HIGH RISK MEDICATION USE: ICD-10-CM

## 2021-05-14 DIAGNOSIS — M05.9 SEROPOSITIVE RHEUMATOID ARTHRITIS: Primary | ICD-10-CM

## 2021-05-14 DIAGNOSIS — Z71.89 COUNSELING ON HEALTH PROMOTION AND DISEASE PREVENTION: ICD-10-CM

## 2021-05-14 DIAGNOSIS — R53.82 CHRONIC FATIGUE: ICD-10-CM

## 2021-05-14 LAB
ALBUMIN SERPL BCP-MCNC: 3.8 G/DL (ref 3.5–5.2)
ALP SERPL-CCNC: 85 U/L (ref 55–135)
ALT SERPL W/O P-5'-P-CCNC: 41 U/L (ref 10–44)
ANION GAP SERPL CALC-SCNC: 9 MMOL/L (ref 8–16)
AST SERPL-CCNC: 24 U/L (ref 10–40)
BASOPHILS # BLD AUTO: 0.04 K/UL (ref 0–0.2)
BASOPHILS NFR BLD: 0.5 % (ref 0–1.9)
BILIRUB SERPL-MCNC: 0.3 MG/DL (ref 0.1–1)
BUN SERPL-MCNC: 12 MG/DL (ref 8–23)
CALCIUM SERPL-MCNC: 9.2 MG/DL (ref 8.7–10.5)
CHLORIDE SERPL-SCNC: 103 MMOL/L (ref 95–110)
CO2 SERPL-SCNC: 27 MMOL/L (ref 23–29)
CREAT SERPL-MCNC: 0.8 MG/DL (ref 0.5–1.4)
DIFFERENTIAL METHOD: ABNORMAL
EOSINOPHIL # BLD AUTO: 0.2 K/UL (ref 0–0.5)
EOSINOPHIL NFR BLD: 2 % (ref 0–8)
ERYTHROCYTE [DISTWIDTH] IN BLOOD BY AUTOMATED COUNT: 14.3 % (ref 11.5–14.5)
EST. GFR  (AFRICAN AMERICAN): >60 ML/MIN/1.73 M^2
EST. GFR  (NON AFRICAN AMERICAN): >60 ML/MIN/1.73 M^2
GLUCOSE SERPL-MCNC: 132 MG/DL (ref 70–110)
HCT VFR BLD AUTO: 36.3 % (ref 37–48.5)
HGB BLD-MCNC: 12.3 G/DL (ref 12–16)
IMM GRANULOCYTES # BLD AUTO: 0.06 K/UL (ref 0–0.04)
IMM GRANULOCYTES NFR BLD AUTO: 0.8 % (ref 0–0.5)
LYMPHOCYTES # BLD AUTO: 1.6 K/UL (ref 1–4.8)
LYMPHOCYTES NFR BLD: 21.1 % (ref 18–48)
MCH RBC QN AUTO: 31.1 PG (ref 27–31)
MCHC RBC AUTO-ENTMCNC: 33.9 G/DL (ref 32–36)
MCV RBC AUTO: 92 FL (ref 82–98)
MONOCYTES # BLD AUTO: 0.5 K/UL (ref 0.3–1)
MONOCYTES NFR BLD: 6.9 % (ref 4–15)
NEUTROPHILS # BLD AUTO: 5.1 K/UL (ref 1.8–7.7)
NEUTROPHILS NFR BLD: 68.7 % (ref 38–73)
NRBC BLD-RTO: 0 /100 WBC
PLATELET # BLD AUTO: 212 K/UL (ref 150–450)
PMV BLD AUTO: 9.9 FL (ref 9.2–12.9)
POTASSIUM SERPL-SCNC: 3.9 MMOL/L (ref 3.5–5.1)
PROT SERPL-MCNC: 7.4 G/DL (ref 6–8.4)
RBC # BLD AUTO: 3.96 M/UL (ref 4–5.4)
SODIUM SERPL-SCNC: 139 MMOL/L (ref 136–145)
WBC # BLD AUTO: 7.41 K/UL (ref 3.9–12.7)

## 2021-05-14 PROCEDURE — 1125F PR PAIN SEVERITY QUANTIFIED, PAIN PRESENT: ICD-10-PCS | Mod: S$GLB,,, | Performed by: INTERNAL MEDICINE

## 2021-05-14 PROCEDURE — 3288F PR FALLS RISK ASSESSMENT DOCUMENTED: ICD-10-PCS | Mod: CPTII,S$GLB,, | Performed by: INTERNAL MEDICINE

## 2021-05-14 PROCEDURE — 1159F MED LIST DOCD IN RCRD: CPT | Mod: S$GLB,,, | Performed by: INTERNAL MEDICINE

## 2021-05-14 PROCEDURE — 3008F PR BODY MASS INDEX (BMI) DOCUMENTED: ICD-10-PCS | Mod: CPTII,S$GLB,, | Performed by: INTERNAL MEDICINE

## 2021-05-14 PROCEDURE — 99499 RISK ADDL DX/OHS AUDIT: ICD-10-PCS | Mod: S$GLB,,, | Performed by: INTERNAL MEDICINE

## 2021-05-14 PROCEDURE — 80053 COMPREHEN METABOLIC PANEL: CPT | Performed by: INTERNAL MEDICINE

## 2021-05-14 PROCEDURE — 99999 PR PBB SHADOW E&M-EST. PATIENT-LVL III: ICD-10-PCS | Mod: PBBFAC,,, | Performed by: INTERNAL MEDICINE

## 2021-05-14 PROCEDURE — 1101F PT FALLS ASSESS-DOCD LE1/YR: CPT | Mod: CPTII,S$GLB,, | Performed by: INTERNAL MEDICINE

## 2021-05-14 PROCEDURE — 99214 PR OFFICE/OUTPT VISIT, EST, LEVL IV, 30-39 MIN: ICD-10-PCS | Mod: S$GLB,,, | Performed by: INTERNAL MEDICINE

## 2021-05-14 PROCEDURE — 85025 COMPLETE CBC W/AUTO DIFF WBC: CPT | Performed by: INTERNAL MEDICINE

## 2021-05-14 PROCEDURE — 1125F AMNT PAIN NOTED PAIN PRSNT: CPT | Mod: S$GLB,,, | Performed by: INTERNAL MEDICINE

## 2021-05-14 PROCEDURE — 99499 UNLISTED E&M SERVICE: CPT | Mod: S$GLB,,, | Performed by: INTERNAL MEDICINE

## 2021-05-14 PROCEDURE — 3288F FALL RISK ASSESSMENT DOCD: CPT | Mod: CPTII,S$GLB,, | Performed by: INTERNAL MEDICINE

## 2021-05-14 PROCEDURE — 1159F PR MEDICATION LIST DOCUMENTED IN MEDICAL RECORD: ICD-10-PCS | Mod: S$GLB,,, | Performed by: INTERNAL MEDICINE

## 2021-05-14 PROCEDURE — 1101F PR PT FALLS ASSESS DOC 0-1 FALLS W/OUT INJ PAST YR: ICD-10-PCS | Mod: CPTII,S$GLB,, | Performed by: INTERNAL MEDICINE

## 2021-05-14 PROCEDURE — 3008F BODY MASS INDEX DOCD: CPT | Mod: CPTII,S$GLB,, | Performed by: INTERNAL MEDICINE

## 2021-05-14 PROCEDURE — 99214 OFFICE O/P EST MOD 30 MIN: CPT | Mod: S$GLB,,, | Performed by: INTERNAL MEDICINE

## 2021-05-14 PROCEDURE — 99999 PR PBB SHADOW E&M-EST. PATIENT-LVL III: CPT | Mod: PBBFAC,,, | Performed by: INTERNAL MEDICINE

## 2021-05-14 PROCEDURE — 36415 COLL VENOUS BLD VENIPUNCTURE: CPT | Performed by: INTERNAL MEDICINE

## 2021-05-17 ENCOUNTER — CLINICAL SUPPORT (OUTPATIENT)
Dept: PULMONOLOGY | Facility: CLINIC | Age: 66
End: 2021-05-17
Payer: MEDICARE

## 2021-05-17 ENCOUNTER — DOCUMENTATION ONLY (OUTPATIENT)
Dept: REHABILITATION | Facility: HOSPITAL | Age: 66
End: 2021-05-17

## 2021-05-17 VITALS — BODY MASS INDEX: 26.75 KG/M2 | WEIGHT: 170.44 LBS | HEIGHT: 67 IN

## 2021-05-17 DIAGNOSIS — R94.2 DECREASED DIFFUSION CAPACITY: ICD-10-CM

## 2021-05-17 DIAGNOSIS — R53.1 DECREASED STRENGTH, ENDURANCE, AND MOBILITY: Primary | ICD-10-CM

## 2021-05-17 DIAGNOSIS — R94.2 RESTRICTIVE PATTERN PRESENT ON PULMONARY FUNCTION TESTING: ICD-10-CM

## 2021-05-17 DIAGNOSIS — J84.9 ILD (INTERSTITIAL LUNG DISEASE): ICD-10-CM

## 2021-05-17 DIAGNOSIS — R68.89 DECREASED STRENGTH, ENDURANCE, AND MOBILITY: Primary | ICD-10-CM

## 2021-05-17 DIAGNOSIS — R26.89 FUNCTIONAL GAIT ABNORMALITY: ICD-10-CM

## 2021-05-17 DIAGNOSIS — R06.02 SOBOE (SHORTNESS OF BREATH ON EXERTION): ICD-10-CM

## 2021-05-17 DIAGNOSIS — Z74.09 DECREASED STRENGTH, ENDURANCE, AND MOBILITY: Primary | ICD-10-CM

## 2021-05-17 PROCEDURE — 94010 BREATHING CAPACITY TEST: ICD-10-PCS | Mod: S$GLB,,, | Performed by: INTERNAL MEDICINE

## 2021-05-17 PROCEDURE — 94010 BREATHING CAPACITY TEST: CPT | Mod: S$GLB,,, | Performed by: INTERNAL MEDICINE

## 2021-05-17 PROCEDURE — 94618 PULMONARY STRESS TESTING: CPT | Mod: S$GLB,,, | Performed by: INTERNAL MEDICINE

## 2021-05-17 PROCEDURE — 94618 PULMONARY STRESS TESTING: ICD-10-PCS | Mod: S$GLB,,, | Performed by: INTERNAL MEDICINE

## 2021-05-18 LAB
BRPFT: NORMAL
FEF 25 75 LLN: 1
FEF 25 75 PRE REF: 89 %
FEF 25 75 REF: 2.11
FEV1 FVC LLN: 65
FEV1 FVC PRE REF: 99.7 %
FEV1 FVC REF: 78
FEV1 LLN: 1.86
FEV1 PRE REF: 81.7 %
FEV1 REF: 2.52
FVC LLN: 2.4
FVC PRE REF: 81.2 %
FVC REF: 3.25
PEF LLN: 4.45
PEF PRE REF: 78.3 %
PEF REF: 6.32
PRE FEF 25 75: 1.87 L/S
PRE FET 100: 7.25 SEC
PRE FEV1 FVC: 77.9 %
PRE FEV1: 2.06 L
PRE FVC: 2.64 L
PRE PEF: 4.95 L/S

## 2021-05-24 ENCOUNTER — PATIENT MESSAGE (OUTPATIENT)
Dept: RHEUMATOLOGY | Facility: CLINIC | Age: 66
End: 2021-05-24

## 2021-05-28 ENCOUNTER — OFFICE VISIT (OUTPATIENT)
Dept: OPHTHALMOLOGY | Facility: CLINIC | Age: 66
End: 2021-05-28
Payer: MEDICARE

## 2021-05-28 ENCOUNTER — DOCUMENTATION ONLY (OUTPATIENT)
Dept: PULMONOLOGY | Facility: CLINIC | Age: 66
End: 2021-05-28

## 2021-05-28 DIAGNOSIS — Z98.42 CATARACT EXTRACTION STATUS OF EYE, LEFT: Primary | ICD-10-CM

## 2021-05-28 PROCEDURE — 99999 PR PBB SHADOW E&M-EST. PATIENT-LVL I: CPT | Mod: PBBFAC,,, | Performed by: STUDENT IN AN ORGANIZED HEALTH CARE EDUCATION/TRAINING PROGRAM

## 2021-05-28 PROCEDURE — 99024 POSTOP FOLLOW-UP VISIT: CPT | Mod: S$GLB,,, | Performed by: STUDENT IN AN ORGANIZED HEALTH CARE EDUCATION/TRAINING PROGRAM

## 2021-05-28 PROCEDURE — 99999 PR PBB SHADOW E&M-EST. PATIENT-LVL I: ICD-10-PCS | Mod: PBBFAC,,, | Performed by: STUDENT IN AN ORGANIZED HEALTH CARE EDUCATION/TRAINING PROGRAM

## 2021-05-28 PROCEDURE — 99024 PR POST-OP FOLLOW-UP VISIT: ICD-10-PCS | Mod: S$GLB,,, | Performed by: STUDENT IN AN ORGANIZED HEALTH CARE EDUCATION/TRAINING PROGRAM

## 2021-06-01 ENCOUNTER — HOSPITAL ENCOUNTER (OUTPATIENT)
Dept: RADIOLOGY | Facility: HOSPITAL | Age: 66
Discharge: HOME | End: 2021-06-01
Attending: INTERNAL MEDICINE
Payer: MEDICARE

## 2021-06-01 ENCOUNTER — HOSPITAL ENCOUNTER (OUTPATIENT)
Dept: RADIOLOGY | Facility: CLINIC | Age: 66
Discharge: HOME | End: 2021-06-01
Attending: NURSE PRACTITIONER

## 2021-06-01 ENCOUNTER — OFFICE VISIT (OUTPATIENT)
Dept: URGENT CARE | Facility: CLINIC | Age: 66
End: 2021-06-01
Payer: MEDICARE

## 2021-06-01 ENCOUNTER — HOSPITAL ENCOUNTER (OUTPATIENT)
Dept: RADIOLOGY | Facility: CLINIC | Age: 66
Discharge: HOME | End: 2021-06-01
Attending: NURSE PRACTITIONER
Payer: MEDICARE

## 2021-06-01 VITALS
WEIGHT: 170 LBS | DIASTOLIC BLOOD PRESSURE: 63 MMHG | HEIGHT: 67 IN | BODY MASS INDEX: 26.68 KG/M2 | RESPIRATION RATE: 18 BRPM | HEART RATE: 106 BPM | OXYGEN SATURATION: 98 % | SYSTOLIC BLOOD PRESSURE: 136 MMHG | TEMPERATURE: 98 F

## 2021-06-01 DIAGNOSIS — J84.9 INTERSTITIAL PULMONARY DISEASE, UNSPECIFIED: ICD-10-CM

## 2021-06-01 DIAGNOSIS — S63.502A SPRAIN OF LEFT WRIST, INITIAL ENCOUNTER: ICD-10-CM
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
## 2021-06-01 PROCEDURE — 71250 CT THORAX DX C-: CPT | Mod: TC

## 2021-06-01 PROCEDURE — 3008F PR BODY MASS INDEX (BMI) DOCUMENTED: ICD-10-PCS | Mod: CPTII,S$GLB,, | Performed by: NURSE PRACTITIONER

## 2021-06-01 PROCEDURE — 99214 OFFICE O/P EST MOD 30 MIN: CPT | Mod: S$GLB,,, | Performed by: NURSE PRACTITIONER

## 2021-06-01 PROCEDURE — 73130 X-RAY EXAM OF HAND: CPT | Mod: LT,S$GLB,, | Performed by: RADIOLOGY

## 2021-06-01 PROCEDURE — 71250 CT CHEST WITHOUT CONTRAST: ICD-10-PCS | Mod: 26,,, | Performed by: RADIOLOGY

## 2021-06-01 PROCEDURE — 72220 XR SACRUM AND COCCYX: ICD-10-PCS | Mod: S$GLB,,, | Performed by: RADIOLOGY

## 2021-06-01 PROCEDURE — 1125F PR PAIN SEVERITY QUANTIFIED, PAIN PRESENT: ICD-10-PCS | Mod: S$GLB,,, | Performed by: NURSE PRACTITIONER

## 2021-06-01 PROCEDURE — 3008F BODY MASS INDEX DOCD: CPT | Mod: CPTII,S$GLB,, | Performed by: NURSE PRACTITIONER

## 2021-06-01 PROCEDURE — 99214 PR OFFICE/OUTPT VISIT, EST, LEVL IV, 30-39 MIN: ICD-10-PCS | Mod: S$GLB,,, | Performed by: NURSE PRACTITIONER

## 2021-06-01 PROCEDURE — 1125F AMNT PAIN NOTED PAIN PRSNT: CPT | Mod: S$GLB,,, | Performed by: NURSE PRACTITIONER

## 2021-06-01 PROCEDURE — 71250 CT THORAX DX C-: CPT | Mod: 26,,, | Performed by: RADIOLOGY

## 2021-06-01 PROCEDURE — 73130 XR HAND COMPLETE 3 VIEW LEFT: ICD-10-PCS | Mod: LT,S$GLB,, | Performed by: RADIOLOGY

## 2021-06-01 PROCEDURE — 72220 X-RAY EXAM SACRUM TAILBONE: CPT | Mod: S$GLB,,, | Performed by: RADIOLOGY

## 2021-06-01 RX ORDER — TRAMADOL HYDROCHLORIDE 50 MG/1
50 TABLET ORAL EVERY 6 HOURS PRN
Qty: 8 TABLET | Refills: 0 | Status: SHIPPED | OUTPATIENT
Start: 2021-06-01 | End: 2021-06-03

## 2021-06-05 ENCOUNTER — TELEPHONE (OUTPATIENT)
Dept: URGENT CARE | Facility: CLINIC | Age: 66
End: 2021-06-05

## 2021-06-07 ENCOUNTER — OFFICE VISIT (OUTPATIENT)
Dept: PULMONOLOGY | Facility: CLINIC | Age: 66
End: 2021-06-07
Payer: MEDICARE

## 2021-06-07 VITALS
RESPIRATION RATE: 16 BRPM | HEIGHT: 67 IN | OXYGEN SATURATION: 97 % | WEIGHT: 171.5 LBS | DIASTOLIC BLOOD PRESSURE: 82 MMHG | SYSTOLIC BLOOD PRESSURE: 126 MMHG | HEART RATE: 98 BPM | BODY MASS INDEX: 26.92 KG/M2

## 2021-06-07 DIAGNOSIS — J84.9 INTERSTITIAL PULMONARY DISEASE, UNSPECIFIED: ICD-10-CM

## 2021-06-07 DIAGNOSIS — R06.02 SHORTNESS OF BREATH: ICD-10-CM

## 2021-06-07 DIAGNOSIS — R06.02 SOBOE (SHORTNESS OF BREATH ON EXERTION): ICD-10-CM

## 2021-06-07 DIAGNOSIS — R05.3 CHRONIC COUGH: ICD-10-CM

## 2021-06-07 PROCEDURE — 3008F PR BODY MASS INDEX (BMI) DOCUMENTED: ICD-10-PCS | Mod: CPTII,S$GLB,, | Performed by: INTERNAL MEDICINE

## 2021-06-07 PROCEDURE — 1159F PR MEDICATION LIST DOCUMENTED IN MEDICAL RECORD: ICD-10-PCS | Mod: S$GLB,,, | Performed by: INTERNAL MEDICINE

## 2021-06-07 PROCEDURE — 99215 OFFICE O/P EST HI 40 MIN: CPT | Mod: S$GLB,,, | Performed by: INTERNAL MEDICINE

## 2021-06-07 PROCEDURE — 1159F MED LIST DOCD IN RCRD: CPT | Mod: S$GLB,,, | Performed by: INTERNAL MEDICINE

## 2021-06-07 PROCEDURE — 99215 PR OFFICE/OUTPT VISIT, EST, LEVL V, 40-54 MIN: ICD-10-PCS | Mod: S$GLB,,, | Performed by: INTERNAL MEDICINE

## 2021-06-07 PROCEDURE — 1101F PR PT FALLS ASSESS DOC 0-1 FALLS W/OUT INJ PAST YR: ICD-10-PCS | Mod: CPTII,S$GLB,, | Performed by: INTERNAL MEDICINE

## 2021-06-07 PROCEDURE — 3008F BODY MASS INDEX DOCD: CPT | Mod: CPTII,S$GLB,, | Performed by: INTERNAL MEDICINE

## 2021-06-07 PROCEDURE — 99999 PR PBB SHADOW E&M-EST. PATIENT-LVL III: ICD-10-PCS | Mod: PBBFAC,,, | Performed by: INTERNAL MEDICINE

## 2021-06-07 PROCEDURE — 3288F PR FALLS RISK ASSESSMENT DOCUMENTED: ICD-10-PCS | Mod: CPTII,S$GLB,, | Performed by: INTERNAL MEDICINE

## 2021-06-07 PROCEDURE — 3288F FALL RISK ASSESSMENT DOCD: CPT | Mod: CPTII,S$GLB,, | Performed by: INTERNAL MEDICINE

## 2021-06-07 PROCEDURE — 1101F PT FALLS ASSESS-DOCD LE1/YR: CPT | Mod: CPTII,S$GLB,, | Performed by: INTERNAL MEDICINE

## 2021-06-07 PROCEDURE — 99999 PR PBB SHADOW E&M-EST. PATIENT-LVL III: CPT | Mod: PBBFAC,,, | Performed by: INTERNAL MEDICINE

## 2021-06-07 RX ORDER — ALBUTEROL SULFATE 90 UG/1
2 AEROSOL, METERED RESPIRATORY (INHALATION) EVERY 6 HOURS PRN
Qty: 6.7 G | Refills: 11 | Status: SHIPPED | OUTPATIENT
Start: 2021-06-07 | End: 2022-02-17 | Stop reason: SDUPTHER

## 2021-06-08 DIAGNOSIS — Z12.11 SPECIAL SCREENING FOR MALIGNANT NEOPLASM OF COLON: Primary | ICD-10-CM

## 2021-08-15 DIAGNOSIS — M05.9 SEROPOSITIVE RHEUMATOID ARTHRITIS: ICD-10-CM

## 2021-08-17 DIAGNOSIS — M05.9 SEROPOSITIVE RHEUMATOID ARTHRITIS: ICD-10-CM

## 2021-08-17 RX ORDER — METHOTREXATE 2.5 MG/1
15 TABLET ORAL
Qty: 24 TABLET | Refills: 3 | Status: SHIPPED | OUTPATIENT
Start: 2021-08-17 | End: 2021-09-16

## 2021-08-17 RX ORDER — METHOTREXATE 2.5 MG/1
15 TABLET ORAL
Qty: 24 TABLET | Refills: 3 | Status: SHIPPED | OUTPATIENT
Start: 2021-08-17 | End: 2021-10-07 | Stop reason: SDUPTHER

## 2021-08-19 ENCOUNTER — TELEPHONE (OUTPATIENT)
Dept: RHEUMATOLOGY | Facility: CLINIC | Age: 66
End: 2021-08-19

## 2021-08-21 ENCOUNTER — OFFICE VISIT (OUTPATIENT)
Dept: URGENT CARE | Facility: CLINIC | Age: 66
End: 2021-08-21
Payer: MEDICARE

## 2021-08-21 VITALS
HEART RATE: 76 BPM | SYSTOLIC BLOOD PRESSURE: 143 MMHG | RESPIRATION RATE: 20 BRPM | DIASTOLIC BLOOD PRESSURE: 61 MMHG | OXYGEN SATURATION: 96 % | TEMPERATURE: 98 F

## 2021-08-21 DIAGNOSIS — Z11.52 ENCOUNTER FOR SCREENING FOR COVID-19: ICD-10-CM

## 2021-08-21 DIAGNOSIS — J06.9 URI WITH COUGH AND CONGESTION: Primary | ICD-10-CM

## 2021-08-21 LAB
CTP QC/QA: YES
SARS-COV-2 RDRP RESP QL NAA+PROBE: NEGATIVE

## 2021-08-21 PROCEDURE — 1159F MED LIST DOCD IN RCRD: CPT | Mod: CPTII,S$GLB,, | Performed by: PHYSICIAN ASSISTANT

## 2021-08-21 PROCEDURE — 99214 PR OFFICE/OUTPT VISIT, EST, LEVL IV, 30-39 MIN: ICD-10-PCS | Mod: S$GLB,,, | Performed by: PHYSICIAN ASSISTANT

## 2021-08-21 PROCEDURE — 99214 OFFICE O/P EST MOD 30 MIN: CPT | Mod: S$GLB,,, | Performed by: PHYSICIAN ASSISTANT

## 2021-08-21 PROCEDURE — 1126F PR PAIN SEVERITY QUANTIFIED, NO PAIN PRESENT: ICD-10-PCS | Mod: CPTII,S$GLB,, | Performed by: PHYSICIAN ASSISTANT

## 2021-08-21 PROCEDURE — 1160F PR REVIEW ALL MEDS BY PRESCRIBER/CLIN PHARMACIST DOCUMENTED: ICD-10-PCS | Mod: CPTII,S$GLB,, | Performed by: PHYSICIAN ASSISTANT

## 2021-08-21 PROCEDURE — 3078F DIAST BP <80 MM HG: CPT | Mod: CPTII,S$GLB,, | Performed by: PHYSICIAN ASSISTANT

## 2021-08-21 PROCEDURE — 3077F SYST BP >= 140 MM HG: CPT | Mod: CPTII,S$GLB,, | Performed by: PHYSICIAN ASSISTANT

## 2021-08-21 PROCEDURE — 3077F PR MOST RECENT SYSTOLIC BLOOD PRESSURE >= 140 MM HG: ICD-10-PCS | Mod: CPTII,S$GLB,, | Performed by: PHYSICIAN ASSISTANT

## 2021-08-21 PROCEDURE — 1126F AMNT PAIN NOTED NONE PRSNT: CPT | Mod: CPTII,S$GLB,, | Performed by: PHYSICIAN ASSISTANT

## 2021-08-21 PROCEDURE — 3078F PR MOST RECENT DIASTOLIC BLOOD PRESSURE < 80 MM HG: ICD-10-PCS | Mod: CPTII,S$GLB,, | Performed by: PHYSICIAN ASSISTANT

## 2021-08-21 PROCEDURE — 1160F RVW MEDS BY RX/DR IN RCRD: CPT | Mod: CPTII,S$GLB,, | Performed by: PHYSICIAN ASSISTANT

## 2021-08-21 PROCEDURE — 1159F PR MEDICATION LIST DOCUMENTED IN MEDICAL RECORD: ICD-10-PCS | Mod: CPTII,S$GLB,, | Performed by: PHYSICIAN ASSISTANT

## 2021-08-21 RX ORDER — AZITHROMYCIN 250 MG/1
TABLET, FILM COATED ORAL
Qty: 6 TABLET | Refills: 0 | Status: SHIPPED | OUTPATIENT
Start: 2021-08-21 | End: 2021-09-30

## 2021-08-21 RX ORDER — PROMETHAZINE HYDROCHLORIDE AND DEXTROMETHORPHAN HYDROBROMIDE 6.25; 15 MG/5ML; MG/5ML
5 SYRUP ORAL EVERY 6 HOURS PRN
Qty: 118 ML | Refills: 0 | Status: SHIPPED | OUTPATIENT
Start: 2021-08-21 | End: 2021-09-30

## 2021-08-31 ENCOUNTER — TELEPHONE (OUTPATIENT)
Dept: OPHTHALMOLOGY | Facility: CLINIC | Age: 66
End: 2021-08-31

## 2021-08-31 ENCOUNTER — TELEPHONE (OUTPATIENT)
Dept: INTERNAL MEDICINE | Facility: CLINIC | Age: 66
End: 2021-08-31

## 2021-09-09 ENCOUNTER — PATIENT OUTREACH (OUTPATIENT)
Dept: ADMINISTRATIVE | Facility: OTHER | Age: 66
End: 2021-09-09

## 2021-09-10 ENCOUNTER — OFFICE VISIT (OUTPATIENT)
Dept: OPHTHALMOLOGY | Facility: CLINIC | Age: 66
End: 2021-09-10
Payer: MEDICARE

## 2021-09-10 DIAGNOSIS — H52.203 ASTIGMATISM WITH PRESBYOPIA, BILATERAL: Primary | ICD-10-CM

## 2021-09-10 DIAGNOSIS — H52.4 ASTIGMATISM WITH PRESBYOPIA, BILATERAL: Primary | ICD-10-CM

## 2021-09-10 PROCEDURE — 1159F PR MEDICATION LIST DOCUMENTED IN MEDICAL RECORD: ICD-10-PCS | Mod: CPTII,S$GLB,, | Performed by: OPTOMETRIST

## 2021-09-10 PROCEDURE — 99499 NO LOS: ICD-10-PCS | Mod: S$GLB,,, | Performed by: OPTOMETRIST

## 2021-09-10 PROCEDURE — 1160F PR REVIEW ALL MEDS BY PRESCRIBER/CLIN PHARMACIST DOCUMENTED: ICD-10-PCS | Mod: CPTII,S$GLB,, | Performed by: OPTOMETRIST

## 2021-09-10 PROCEDURE — 92310 PR CONTACT LENS FITTING (NO CHANGE): ICD-10-PCS | Mod: CSM,,, | Performed by: OPTOMETRIST

## 2021-09-10 PROCEDURE — 99499 UNLISTED E&M SERVICE: CPT | Mod: S$GLB,,, | Performed by: OPTOMETRIST

## 2021-09-10 PROCEDURE — 99999 PR PBB SHADOW E&M-EST. PATIENT-LVL II: ICD-10-PCS | Mod: PBBFAC,,, | Performed by: OPTOMETRIST

## 2021-09-10 PROCEDURE — 99999 PR PBB SHADOW E&M-EST. PATIENT-LVL II: CPT | Mod: PBBFAC,,, | Performed by: OPTOMETRIST

## 2021-09-10 PROCEDURE — 92310 CONTACT LENS FITTING OU: CPT | Mod: CSM,,, | Performed by: OPTOMETRIST

## 2021-09-10 PROCEDURE — 1160F RVW MEDS BY RX/DR IN RCRD: CPT | Mod: CPTII,S$GLB,, | Performed by: OPTOMETRIST

## 2021-09-10 PROCEDURE — 1159F MED LIST DOCD IN RCRD: CPT | Mod: CPTII,S$GLB,, | Performed by: OPTOMETRIST

## 2021-09-30 ENCOUNTER — TELEPHONE (OUTPATIENT)
Dept: RHEUMATOLOGY | Facility: CLINIC | Age: 66
End: 2021-09-30

## 2021-09-30 ENCOUNTER — OFFICE VISIT (OUTPATIENT)
Dept: INTERNAL MEDICINE | Facility: CLINIC | Age: 66
End: 2021-09-30
Payer: MEDICARE

## 2021-09-30 VITALS
SYSTOLIC BLOOD PRESSURE: 138 MMHG | WEIGHT: 170.19 LBS | DIASTOLIC BLOOD PRESSURE: 83 MMHG | TEMPERATURE: 98 F | BODY MASS INDEX: 26.71 KG/M2 | OXYGEN SATURATION: 96 % | HEART RATE: 93 BPM | HEIGHT: 67 IN | RESPIRATION RATE: 19 BRPM

## 2021-09-30 DIAGNOSIS — J84.9 ILD (INTERSTITIAL LUNG DISEASE): ICD-10-CM

## 2021-09-30 DIAGNOSIS — Z23 NEED FOR INFLUENZA VACCINATION: ICD-10-CM

## 2021-09-30 DIAGNOSIS — I10 HYPERTENSION, UNSPECIFIED TYPE: ICD-10-CM

## 2021-09-30 DIAGNOSIS — F33.41 RECURRENT MAJOR DEPRESSIVE DISORDER, IN PARTIAL REMISSION: ICD-10-CM

## 2021-09-30 DIAGNOSIS — Z00.00 ROUTINE GENERAL MEDICAL EXAMINATION AT A HEALTH CARE FACILITY: Primary | ICD-10-CM

## 2021-09-30 DIAGNOSIS — F41.9 ANXIETY: ICD-10-CM

## 2021-09-30 PROCEDURE — 1100F PTFALLS ASSESS-DOCD GE2>/YR: CPT | Mod: CPTII,S$GLB,, | Performed by: INTERNAL MEDICINE

## 2021-09-30 PROCEDURE — 3008F BODY MASS INDEX DOCD: CPT | Mod: CPTII,S$GLB,, | Performed by: INTERNAL MEDICINE

## 2021-09-30 PROCEDURE — G0008 ADMIN INFLUENZA VIRUS VAC: HCPCS | Mod: S$GLB,,, | Performed by: INTERNAL MEDICINE

## 2021-09-30 PROCEDURE — 99397 PER PM REEVAL EST PAT 65+ YR: CPT | Mod: 25,S$GLB,, | Performed by: INTERNAL MEDICINE

## 2021-09-30 PROCEDURE — 3288F PR FALLS RISK ASSESSMENT DOCUMENTED: ICD-10-PCS | Mod: CPTII,S$GLB,, | Performed by: INTERNAL MEDICINE

## 2021-09-30 PROCEDURE — 1126F AMNT PAIN NOTED NONE PRSNT: CPT | Mod: CPTII,S$GLB,, | Performed by: INTERNAL MEDICINE

## 2021-09-30 PROCEDURE — 1100F PR PT FALLS ASSESS DOC 2+ FALLS/FALL W/INJURY/YR: ICD-10-PCS | Mod: CPTII,S$GLB,, | Performed by: INTERNAL MEDICINE

## 2021-09-30 PROCEDURE — 90694 VACC AIIV4 NO PRSRV 0.5ML IM: CPT | Mod: S$GLB,,, | Performed by: INTERNAL MEDICINE

## 2021-09-30 PROCEDURE — 3079F DIAST BP 80-89 MM HG: CPT | Mod: CPTII,S$GLB,, | Performed by: INTERNAL MEDICINE

## 2021-09-30 PROCEDURE — 3008F PR BODY MASS INDEX (BMI) DOCUMENTED: ICD-10-PCS | Mod: CPTII,S$GLB,, | Performed by: INTERNAL MEDICINE

## 2021-09-30 PROCEDURE — 99999 PR PBB SHADOW E&M-EST. PATIENT-LVL IV: ICD-10-PCS | Mod: PBBFAC,,, | Performed by: INTERNAL MEDICINE

## 2021-09-30 PROCEDURE — 4010F ACE/ARB THERAPY RXD/TAKEN: CPT | Mod: CPTII,S$GLB,, | Performed by: INTERNAL MEDICINE

## 2021-09-30 PROCEDURE — 4010F PR ACE/ARB THEARPY RXD/TAKEN: ICD-10-PCS | Mod: CPTII,S$GLB,, | Performed by: INTERNAL MEDICINE

## 2021-09-30 PROCEDURE — 99499 RISK ADDL DX/OHS AUDIT: ICD-10-PCS | Mod: S$GLB,,, | Performed by: INTERNAL MEDICINE

## 2021-09-30 PROCEDURE — 90694 FLU VACCINE - QUADRIVALENT - ADJUVANTED: ICD-10-PCS | Mod: S$GLB,,, | Performed by: INTERNAL MEDICINE

## 2021-09-30 PROCEDURE — 1126F PR PAIN SEVERITY QUANTIFIED, NO PAIN PRESENT: ICD-10-PCS | Mod: CPTII,S$GLB,, | Performed by: INTERNAL MEDICINE

## 2021-09-30 PROCEDURE — 99999 PR PBB SHADOW E&M-EST. PATIENT-LVL IV: CPT | Mod: PBBFAC,,, | Performed by: INTERNAL MEDICINE

## 2021-09-30 PROCEDURE — 3075F PR MOST RECENT SYSTOLIC BLOOD PRESS GE 130-139MM HG: ICD-10-PCS | Mod: CPTII,S$GLB,, | Performed by: INTERNAL MEDICINE

## 2021-09-30 PROCEDURE — 3288F FALL RISK ASSESSMENT DOCD: CPT | Mod: CPTII,S$GLB,, | Performed by: INTERNAL MEDICINE

## 2021-09-30 PROCEDURE — G0008 FLU VACCINE - QUADRIVALENT - ADJUVANTED: ICD-10-PCS | Mod: S$GLB,,, | Performed by: INTERNAL MEDICINE

## 2021-09-30 PROCEDURE — 3079F PR MOST RECENT DIASTOLIC BLOOD PRESSURE 80-89 MM HG: ICD-10-PCS | Mod: CPTII,S$GLB,, | Performed by: INTERNAL MEDICINE

## 2021-09-30 PROCEDURE — 99397 PR PREVENTIVE VISIT,EST,65 & OVER: ICD-10-PCS | Mod: 25,S$GLB,, | Performed by: INTERNAL MEDICINE

## 2021-09-30 PROCEDURE — 99499 UNLISTED E&M SERVICE: CPT | Mod: S$GLB,,, | Performed by: INTERNAL MEDICINE

## 2021-09-30 PROCEDURE — 3075F SYST BP GE 130 - 139MM HG: CPT | Mod: CPTII,S$GLB,, | Performed by: INTERNAL MEDICINE

## 2021-09-30 RX ORDER — LOSARTAN POTASSIUM 25 MG/1
25 TABLET ORAL DAILY
Qty: 90 TABLET | Refills: 1 | Status: SHIPPED | OUTPATIENT
Start: 2021-09-30 | End: 2022-03-25

## 2021-10-01 ENCOUNTER — LAB VISIT (OUTPATIENT)
Dept: LAB | Facility: HOSPITAL | Age: 66
End: 2021-10-01
Attending: INTERNAL MEDICINE
Payer: MEDICARE

## 2021-10-01 ENCOUNTER — OFFICE VISIT (OUTPATIENT)
Dept: RHEUMATOLOGY | Facility: CLINIC | Age: 66
End: 2021-10-01
Payer: MEDICARE

## 2021-10-01 VITALS
BODY MASS INDEX: 26.61 KG/M2 | HEART RATE: 97 BPM | SYSTOLIC BLOOD PRESSURE: 129 MMHG | WEIGHT: 169.56 LBS | DIASTOLIC BLOOD PRESSURE: 74 MMHG | HEIGHT: 67 IN

## 2021-10-01 DIAGNOSIS — M05.9 SEROPOSITIVE RHEUMATOID ARTHRITIS: ICD-10-CM

## 2021-10-01 DIAGNOSIS — Z79.899 HIGH RISK MEDICATION USE: ICD-10-CM

## 2021-10-01 DIAGNOSIS — M05.9 SEROPOSITIVE RHEUMATOID ARTHRITIS: Primary | ICD-10-CM

## 2021-10-01 DIAGNOSIS — J84.9 INTERSTITIAL PULMONARY DISEASE, UNSPECIFIED: ICD-10-CM

## 2021-10-01 LAB
ALBUMIN SERPL BCP-MCNC: 4 G/DL (ref 3.5–5.2)
ALP SERPL-CCNC: 77 U/L (ref 55–135)
ALT SERPL W/O P-5'-P-CCNC: 50 U/L (ref 10–44)
ANION GAP SERPL CALC-SCNC: 10 MMOL/L (ref 8–16)
AST SERPL-CCNC: 27 U/L (ref 10–40)
BASOPHILS # BLD AUTO: 0.04 K/UL (ref 0–0.2)
BASOPHILS NFR BLD: 0.8 % (ref 0–1.9)
BILIRUB SERPL-MCNC: 0.4 MG/DL (ref 0.1–1)
BUN SERPL-MCNC: 13 MG/DL (ref 8–23)
CALCIUM SERPL-MCNC: 9 MG/DL (ref 8.7–10.5)
CHLORIDE SERPL-SCNC: 106 MMOL/L (ref 95–110)
CO2 SERPL-SCNC: 23 MMOL/L (ref 23–29)
CREAT SERPL-MCNC: 0.8 MG/DL (ref 0.5–1.4)
CRP SERPL-MCNC: 10.3 MG/L (ref 0–8.2)
DIFFERENTIAL METHOD: ABNORMAL
EOSINOPHIL # BLD AUTO: 0.1 K/UL (ref 0–0.5)
EOSINOPHIL NFR BLD: 2.3 % (ref 0–8)
ERYTHROCYTE [DISTWIDTH] IN BLOOD BY AUTOMATED COUNT: 14.2 % (ref 11.5–14.5)
ERYTHROCYTE [SEDIMENTATION RATE] IN BLOOD BY WESTERGREN METHOD: 21 MM/HR (ref 0–36)
EST. GFR  (AFRICAN AMERICAN): >60 ML/MIN/1.73 M^2
EST. GFR  (NON AFRICAN AMERICAN): >60 ML/MIN/1.73 M^2
GLUCOSE SERPL-MCNC: 109 MG/DL (ref 70–110)
HCT VFR BLD AUTO: 37.1 % (ref 37–48.5)
HGB BLD-MCNC: 12.6 G/DL (ref 12–16)
IMM GRANULOCYTES # BLD AUTO: 0.04 K/UL (ref 0–0.04)
IMM GRANULOCYTES NFR BLD AUTO: 0.8 % (ref 0–0.5)
LYMPHOCYTES # BLD AUTO: 0.9 K/UL (ref 1–4.8)
LYMPHOCYTES NFR BLD: 16.9 % (ref 18–48)
MCH RBC QN AUTO: 31.4 PG (ref 27–31)
MCHC RBC AUTO-ENTMCNC: 34 G/DL (ref 32–36)
MCV RBC AUTO: 93 FL (ref 82–98)
MONOCYTES # BLD AUTO: 0.6 K/UL (ref 0.3–1)
MONOCYTES NFR BLD: 11.4 % (ref 4–15)
NEUTROPHILS # BLD AUTO: 3.6 K/UL (ref 1.8–7.7)
NEUTROPHILS NFR BLD: 67.8 % (ref 38–73)
NRBC BLD-RTO: 0 /100 WBC
PLATELET # BLD AUTO: 206 K/UL (ref 150–450)
PMV BLD AUTO: 9.8 FL (ref 9.2–12.9)
POTASSIUM SERPL-SCNC: 4 MMOL/L (ref 3.5–5.1)
PROT SERPL-MCNC: 7.7 G/DL (ref 6–8.4)
RBC # BLD AUTO: 4.01 M/UL (ref 4–5.4)
SODIUM SERPL-SCNC: 139 MMOL/L (ref 136–145)
WBC # BLD AUTO: 5.27 K/UL (ref 3.9–12.7)

## 2021-10-01 PROCEDURE — 1160F PR REVIEW ALL MEDS BY PRESCRIBER/CLIN PHARMACIST DOCUMENTED: ICD-10-PCS | Mod: CPTII,S$GLB,, | Performed by: INTERNAL MEDICINE

## 2021-10-01 PROCEDURE — 80053 COMPREHEN METABOLIC PANEL: CPT | Performed by: INTERNAL MEDICINE

## 2021-10-01 PROCEDURE — 3074F PR MOST RECENT SYSTOLIC BLOOD PRESSURE < 130 MM HG: ICD-10-PCS | Mod: CPTII,S$GLB,, | Performed by: INTERNAL MEDICINE

## 2021-10-01 PROCEDURE — 99499 UNLISTED E&M SERVICE: CPT | Mod: S$GLB,,, | Performed by: INTERNAL MEDICINE

## 2021-10-01 PROCEDURE — 3008F BODY MASS INDEX DOCD: CPT | Mod: CPTII,S$GLB,, | Performed by: INTERNAL MEDICINE

## 2021-10-01 PROCEDURE — 36415 COLL VENOUS BLD VENIPUNCTURE: CPT | Performed by: INTERNAL MEDICINE

## 2021-10-01 PROCEDURE — 1159F MED LIST DOCD IN RCRD: CPT | Mod: CPTII,S$GLB,, | Performed by: INTERNAL MEDICINE

## 2021-10-01 PROCEDURE — 3008F PR BODY MASS INDEX (BMI) DOCUMENTED: ICD-10-PCS | Mod: CPTII,S$GLB,, | Performed by: INTERNAL MEDICINE

## 2021-10-01 PROCEDURE — 1101F PR PT FALLS ASSESS DOC 0-1 FALLS W/OUT INJ PAST YR: ICD-10-PCS | Mod: CPTII,S$GLB,, | Performed by: INTERNAL MEDICINE

## 2021-10-01 PROCEDURE — 3288F FALL RISK ASSESSMENT DOCD: CPT | Mod: CPTII,S$GLB,, | Performed by: INTERNAL MEDICINE

## 2021-10-01 PROCEDURE — 99999 PR PBB SHADOW E&M-EST. PATIENT-LVL III: CPT | Mod: PBBFAC,,, | Performed by: INTERNAL MEDICINE

## 2021-10-01 PROCEDURE — 4010F ACE/ARB THERAPY RXD/TAKEN: CPT | Mod: CPTII,S$GLB,, | Performed by: INTERNAL MEDICINE

## 2021-10-01 PROCEDURE — 1125F AMNT PAIN NOTED PAIN PRSNT: CPT | Mod: CPTII,S$GLB,, | Performed by: INTERNAL MEDICINE

## 2021-10-01 PROCEDURE — 99499 RISK ADDL DX/OHS AUDIT: ICD-10-PCS | Mod: S$GLB,,, | Performed by: INTERNAL MEDICINE

## 2021-10-01 PROCEDURE — 4010F PR ACE/ARB THEARPY RXD/TAKEN: ICD-10-PCS | Mod: CPTII,S$GLB,, | Performed by: INTERNAL MEDICINE

## 2021-10-01 PROCEDURE — 86140 C-REACTIVE PROTEIN: CPT | Performed by: INTERNAL MEDICINE

## 2021-10-01 PROCEDURE — 1159F PR MEDICATION LIST DOCUMENTED IN MEDICAL RECORD: ICD-10-PCS | Mod: CPTII,S$GLB,, | Performed by: INTERNAL MEDICINE

## 2021-10-01 PROCEDURE — 85025 COMPLETE CBC W/AUTO DIFF WBC: CPT | Performed by: INTERNAL MEDICINE

## 2021-10-01 PROCEDURE — 1125F PR PAIN SEVERITY QUANTIFIED, PAIN PRESENT: ICD-10-PCS | Mod: CPTII,S$GLB,, | Performed by: INTERNAL MEDICINE

## 2021-10-01 PROCEDURE — 99214 OFFICE O/P EST MOD 30 MIN: CPT | Mod: S$GLB,,, | Performed by: INTERNAL MEDICINE

## 2021-10-01 PROCEDURE — 1101F PT FALLS ASSESS-DOCD LE1/YR: CPT | Mod: CPTII,S$GLB,, | Performed by: INTERNAL MEDICINE

## 2021-10-01 PROCEDURE — 3078F DIAST BP <80 MM HG: CPT | Mod: CPTII,S$GLB,, | Performed by: INTERNAL MEDICINE

## 2021-10-01 PROCEDURE — 99214 PR OFFICE/OUTPT VISIT, EST, LEVL IV, 30-39 MIN: ICD-10-PCS | Mod: S$GLB,,, | Performed by: INTERNAL MEDICINE

## 2021-10-01 PROCEDURE — 85652 RBC SED RATE AUTOMATED: CPT | Performed by: INTERNAL MEDICINE

## 2021-10-01 PROCEDURE — 3288F PR FALLS RISK ASSESSMENT DOCUMENTED: ICD-10-PCS | Mod: CPTII,S$GLB,, | Performed by: INTERNAL MEDICINE

## 2021-10-01 PROCEDURE — 3078F PR MOST RECENT DIASTOLIC BLOOD PRESSURE < 80 MM HG: ICD-10-PCS | Mod: CPTII,S$GLB,, | Performed by: INTERNAL MEDICINE

## 2021-10-01 PROCEDURE — 3074F SYST BP LT 130 MM HG: CPT | Mod: CPTII,S$GLB,, | Performed by: INTERNAL MEDICINE

## 2021-10-01 PROCEDURE — 1160F RVW MEDS BY RX/DR IN RCRD: CPT | Mod: CPTII,S$GLB,, | Performed by: INTERNAL MEDICINE

## 2021-10-01 PROCEDURE — 99999 PR PBB SHADOW E&M-EST. PATIENT-LVL III: ICD-10-PCS | Mod: PBBFAC,,, | Performed by: INTERNAL MEDICINE

## 2021-10-04 ENCOUNTER — TELEPHONE (OUTPATIENT)
Dept: PREADMISSION TESTING | Facility: HOSPITAL | Age: 66
End: 2021-10-04

## 2021-10-04 DIAGNOSIS — Z01.818 PRE-OP TESTING: ICD-10-CM

## 2021-10-07 ENCOUNTER — PATIENT MESSAGE (OUTPATIENT)
Dept: ENDOSCOPY | Facility: HOSPITAL | Age: 66
End: 2021-10-07

## 2021-10-11 ENCOUNTER — TELEPHONE (OUTPATIENT)
Dept: GASTROENTEROLOGY | Facility: CLINIC | Age: 66
End: 2021-10-11

## 2021-10-12 ENCOUNTER — ANESTHESIA EVENT (OUTPATIENT)
Dept: ENDOSCOPY | Facility: HOSPITAL | Age: 66
End: 2021-10-12
Payer: MEDICARE

## 2021-10-13 ENCOUNTER — HOSPITAL ENCOUNTER (OUTPATIENT)
Dept: RADIOLOGY | Facility: HOSPITAL | Age: 66
Discharge: HOME | End: 2021-10-13
Attending: INTERNAL MEDICINE | Admitting: INTERNAL MEDICINE
Payer: MEDICARE

## 2021-10-13 ENCOUNTER — HOSPITAL ENCOUNTER (OUTPATIENT)
Facility: HOSPITAL | Age: 66
Discharge: HOME | End: 2021-10-13
Attending: INTERNAL MEDICINE | Admitting: INTERNAL MEDICINE
Payer: MEDICARE

## 2021-10-13 ENCOUNTER — ANESTHESIA (OUTPATIENT)
Dept: ENDOSCOPY | Facility: HOSPITAL | Age: 66
End: 2021-10-13
Payer: MEDICARE

## 2021-10-13 VITALS
WEIGHT: 166 LBS | OXYGEN SATURATION: 98 % | RESPIRATION RATE: 20 BRPM | SYSTOLIC BLOOD PRESSURE: 124 MMHG | TEMPERATURE: 98 F | HEART RATE: 81 BPM | DIASTOLIC BLOOD PRESSURE: 69 MMHG | HEIGHT: 67 IN | BODY MASS INDEX: 26.06 KG/M2

## 2021-10-13 DIAGNOSIS — D62 ACUTE BLOOD LOSS ANEMIA: Primary | ICD-10-CM

## 2021-10-13 DIAGNOSIS — D50.9 IRON DEFICIENCY ANEMIA, UNSPECIFIED IRON DEFICIENCY ANEMIA TYPE: ICD-10-CM

## 2021-10-13 LAB — SARS-COV-2 RDRP RESP QL NAA+PROBE: NEGATIVE

## 2021-10-13 PROCEDURE — 27202363 HC INJECTION AGENT, SUBMUCOSAL, ANY: Performed by: INTERNAL MEDICINE

## 2021-10-13 PROCEDURE — 45385 PR COLONOSCOPY,REMV LESN,SNARE: ICD-10-PCS | Mod: 59,,, | Performed by: INTERNAL MEDICINE

## 2021-10-13 PROCEDURE — 63600175 PHARM REV CODE 636 W HCPCS: Performed by: NURSE ANESTHETIST, CERTIFIED REGISTERED

## 2021-10-13 PROCEDURE — 88305 TISSUE EXAM BY PATHOLOGIST: CPT | Mod: 26,,, | Performed by: STUDENT IN AN ORGANIZED HEALTH CARE EDUCATION/TRAINING PROGRAM

## 2021-10-13 PROCEDURE — 45381 COLONOSCOPY SUBMUCOUS NJX: CPT | Mod: 59 | Performed by: INTERNAL MEDICINE

## 2021-10-13 PROCEDURE — 27201089 HC SNARE, DISP (ANY): Performed by: INTERNAL MEDICINE

## 2021-10-13 PROCEDURE — 37000009 HC ANESTHESIA EA ADD 15 MINS: Performed by: INTERNAL MEDICINE

## 2021-10-13 PROCEDURE — 74018 RADEX ABDOMEN 1 VIEW: CPT | Mod: 26,,, | Performed by: RADIOLOGY

## 2021-10-13 PROCEDURE — U0002 COVID-19 LAB TEST NON-CDC: HCPCS | Performed by: INTERNAL MEDICINE

## 2021-10-13 PROCEDURE — 27201028 HC NEEDLE, SCLERO: Performed by: INTERNAL MEDICINE

## 2021-10-13 PROCEDURE — D9220A PRA ANESTHESIA: ICD-10-PCS | Mod: PT,,, | Performed by: ANESTHESIOLOGY

## 2021-10-13 PROCEDURE — 88305 TISSUE EXAM BY PATHOLOGIST: CPT | Performed by: STUDENT IN AN ORGANIZED HEALTH CARE EDUCATION/TRAINING PROGRAM

## 2021-10-13 PROCEDURE — 45385 COLONOSCOPY W/LESION REMOVAL: CPT | Performed by: INTERNAL MEDICINE

## 2021-10-13 PROCEDURE — 27200997: Performed by: INTERNAL MEDICINE

## 2021-10-13 PROCEDURE — 63600175 PHARM REV CODE 636 W HCPCS: Performed by: INTERNAL MEDICINE

## 2021-10-13 PROCEDURE — 37000008 HC ANESTHESIA 1ST 15 MINUTES: Performed by: INTERNAL MEDICINE

## 2021-10-13 PROCEDURE — 45390 COLONOSCOPY W/RESECTION: CPT | Performed by: INTERNAL MEDICINE

## 2021-10-13 PROCEDURE — 45390: ICD-10-PCS | Mod: PT,,, | Performed by: INTERNAL MEDICINE

## 2021-10-13 PROCEDURE — 88305 TISSUE EXAM BY PATHOLOGIST: ICD-10-PCS | Mod: 26,,, | Performed by: STUDENT IN AN ORGANIZED HEALTH CARE EDUCATION/TRAINING PROGRAM

## 2021-10-13 PROCEDURE — 45385 COLONOSCOPY W/LESION REMOVAL: CPT | Mod: 59,,, | Performed by: INTERNAL MEDICINE

## 2021-10-13 PROCEDURE — D9220A PRA ANESTHESIA: Mod: PT,,, | Performed by: ANESTHESIOLOGY

## 2021-10-13 PROCEDURE — 74018 RADEX ABDOMEN 1 VIEW: CPT | Mod: TC

## 2021-10-13 PROCEDURE — 45390 COLONOSCOPY W/RESECTION: CPT | Mod: PT,,, | Performed by: INTERNAL MEDICINE

## 2021-10-13 PROCEDURE — 74018 XR ABDOMEN AP 1 VIEW: ICD-10-PCS | Mod: 26,,, | Performed by: RADIOLOGY

## 2021-10-13 RX ORDER — SODIUM CHLORIDE, SODIUM LACTATE, POTASSIUM CHLORIDE, CALCIUM CHLORIDE 600; 310; 30; 20 MG/100ML; MG/100ML; MG/100ML; MG/100ML
INJECTION, SOLUTION INTRAVENOUS CONTINUOUS
Status: DISCONTINUED | OUTPATIENT
Start: 2021-10-13 | End: 2021-10-13 | Stop reason: HOSPADM

## 2021-10-13 RX ORDER — PROPOFOL 10 MG/ML
VIAL (ML) INTRAVENOUS
Status: DISCONTINUED | OUTPATIENT
Start: 2021-10-13 | End: 2021-10-13

## 2021-10-13 RX ORDER — LIDOCAINE HCL/PF 100 MG/5ML
SYRINGE (ML) INTRAVENOUS
Status: DISCONTINUED | OUTPATIENT
Start: 2021-10-13 | End: 2021-10-13

## 2021-10-13 RX ADMIN — PROPOFOL 50 MG: 10 INJECTION, EMULSION INTRAVENOUS at 12:10

## 2021-10-13 RX ADMIN — PROPOFOL 75 MG: 10 INJECTION, EMULSION INTRAVENOUS at 12:10

## 2021-10-13 RX ADMIN — PROPOFOL 25 MG: 10 INJECTION, EMULSION INTRAVENOUS at 12:10

## 2021-10-13 RX ADMIN — Medication 50 MG: at 12:10

## 2021-10-13 RX ADMIN — SODIUM CHLORIDE, SODIUM LACTATE, POTASSIUM CHLORIDE, AND CALCIUM CHLORIDE: .6; .31; .03; .02 INJECTION, SOLUTION INTRAVENOUS at 10:10

## 2021-10-19 LAB
FINAL PATHOLOGIC DIAGNOSIS: NORMAL
GROSS: NORMAL
Lab: NORMAL
MICROSCOPIC EXAM: NORMAL

## 2021-11-15 ENCOUNTER — PATIENT OUTREACH (OUTPATIENT)
Dept: ADMINISTRATIVE | Facility: OTHER | Age: 66
End: 2021-11-15
Payer: MEDICARE

## 2021-11-18 RX ORDER — AMITRIPTYLINE HYDROCHLORIDE 100 MG/1
TABLET ORAL
Qty: 90 TABLET | Refills: 1 | Status: CANCELLED | OUTPATIENT
Start: 2021-11-18

## 2022-01-04 ENCOUNTER — PATIENT OUTREACH (OUTPATIENT)
Dept: ADMINISTRATIVE | Facility: OTHER | Age: 67
End: 2022-01-04
Payer: MEDICARE

## 2022-01-05 NOTE — PROGRESS NOTES
Health Maintenance Due   Topic Date Due    Shingles Vaccine (1 of 2) Never done    Mammogram  07/10/2021    COVID-19 Vaccine (3 - Booster for Pfizer series) 10/03/2021     Updates were requested from care everywhere.  Chart was reviewed for overdue Proactive [facility] Encounters ([facility]) topics (CRS, Breast Cancer Screening, Eye exam)  Health Maintenance has been updated.  LINKS immunization registry triggered.  Immunizations were reconciled.

## 2022-01-06 ENCOUNTER — OFFICE VISIT (OUTPATIENT)
Dept: OPHTHALMOLOGY | Facility: CLINIC | Age: 67
End: 2022-01-06
Payer: MEDICARE

## 2022-01-06 DIAGNOSIS — Z96.1 PSEUDOPHAKIA OF BOTH EYES: Primary | ICD-10-CM

## 2022-01-06 DIAGNOSIS — H04.129 DRY EYE: ICD-10-CM

## 2022-01-06 PROCEDURE — 1160F RVW MEDS BY RX/DR IN RCRD: CPT | Mod: CPTII,S$GLB,, | Performed by: OPTOMETRIST

## 2022-01-06 PROCEDURE — 99999 PR PBB SHADOW E&M-EST. PATIENT-LVL II: CPT | Mod: PBBFAC,,, | Performed by: OPTOMETRIST

## 2022-01-06 PROCEDURE — 1159F MED LIST DOCD IN RCRD: CPT | Mod: CPTII,S$GLB,, | Performed by: OPTOMETRIST

## 2022-01-06 PROCEDURE — 92014 COMPRE OPH EXAM EST PT 1/>: CPT | Mod: S$GLB,,, | Performed by: OPTOMETRIST

## 2022-01-06 PROCEDURE — 99999 PR PBB SHADOW E&M-EST. PATIENT-LVL II: ICD-10-PCS | Mod: PBBFAC,,, | Performed by: OPTOMETRIST

## 2022-01-06 PROCEDURE — 1160F PR REVIEW ALL MEDS BY PRESCRIBER/CLIN PHARMACIST DOCUMENTED: ICD-10-PCS | Mod: CPTII,S$GLB,, | Performed by: OPTOMETRIST

## 2022-01-06 PROCEDURE — 1159F PR MEDICATION LIST DOCUMENTED IN MEDICAL RECORD: ICD-10-PCS | Mod: CPTII,S$GLB,, | Performed by: OPTOMETRIST

## 2022-01-06 PROCEDURE — 92014 PR EYE EXAM, EST PATIENT,COMPREHESV: ICD-10-PCS | Mod: S$GLB,,, | Performed by: OPTOMETRIST

## 2022-01-06 NOTE — PROGRESS NOTES
HPI     Last visit with DNL on 09/10/2021.  Patient states no changes with vision.  Dry eyes, using Systane prn   Interested in contact lens fitting today? Yes    Last edited by Bethany Bermudez on 1/6/2022  9:20 AM. (History)            Assessment /Plan     For exam results, see Encounter Report.    Pseudophakia of both eyes  Doing well OU  Monitor 12 months    Dry eye  Continue systane bid OU      RTC 1 yr for dilated eye exam or PRN if any problems.   Discussed above and answered questions.

## 2022-01-12 ENCOUNTER — OFFICE VISIT (OUTPATIENT)
Dept: URGENT CARE | Facility: CLINIC | Age: 67
End: 2022-01-12
Payer: MEDICARE

## 2022-01-12 VITALS
HEIGHT: 67 IN | SYSTOLIC BLOOD PRESSURE: 130 MMHG | DIASTOLIC BLOOD PRESSURE: 59 MMHG | WEIGHT: 166 LBS | OXYGEN SATURATION: 99 % | HEART RATE: 95 BPM | TEMPERATURE: 98 F | RESPIRATION RATE: 18 BRPM | BODY MASS INDEX: 26.06 KG/M2

## 2022-01-12 DIAGNOSIS — S99.921A INJURY OF TOE ON RIGHT FOOT, INITIAL ENCOUNTER: ICD-10-CM

## 2022-01-12 DIAGNOSIS — S91.209A TOENAIL AVULSION, INITIAL ENCOUNTER: Primary | ICD-10-CM

## 2022-01-12 PROCEDURE — 3008F BODY MASS INDEX DOCD: CPT | Mod: CPTII,S$GLB,, | Performed by: PHYSICIAN ASSISTANT

## 2022-01-12 PROCEDURE — 1125F PR PAIN SEVERITY QUANTIFIED, PAIN PRESENT: ICD-10-PCS | Mod: CPTII,S$GLB,, | Performed by: PHYSICIAN ASSISTANT

## 2022-01-12 PROCEDURE — 3078F DIAST BP <80 MM HG: CPT | Mod: CPTII,S$GLB,, | Performed by: PHYSICIAN ASSISTANT

## 2022-01-12 PROCEDURE — 99213 PR OFFICE/OUTPT VISIT, EST, LEVL III, 20-29 MIN: ICD-10-PCS | Mod: S$GLB,,, | Performed by: PHYSICIAN ASSISTANT

## 2022-01-12 PROCEDURE — 1159F MED LIST DOCD IN RCRD: CPT | Mod: CPTII,S$GLB,, | Performed by: PHYSICIAN ASSISTANT

## 2022-01-12 PROCEDURE — 1159F PR MEDICATION LIST DOCUMENTED IN MEDICAL RECORD: ICD-10-PCS | Mod: CPTII,S$GLB,, | Performed by: PHYSICIAN ASSISTANT

## 2022-01-12 PROCEDURE — 3008F PR BODY MASS INDEX (BMI) DOCUMENTED: ICD-10-PCS | Mod: CPTII,S$GLB,, | Performed by: PHYSICIAN ASSISTANT

## 2022-01-12 PROCEDURE — 3078F PR MOST RECENT DIASTOLIC BLOOD PRESSURE < 80 MM HG: ICD-10-PCS | Mod: CPTII,S$GLB,, | Performed by: PHYSICIAN ASSISTANT

## 2022-01-12 PROCEDURE — 3075F PR MOST RECENT SYSTOLIC BLOOD PRESS GE 130-139MM HG: ICD-10-PCS | Mod: CPTII,S$GLB,, | Performed by: PHYSICIAN ASSISTANT

## 2022-01-12 PROCEDURE — 3075F SYST BP GE 130 - 139MM HG: CPT | Mod: CPTII,S$GLB,, | Performed by: PHYSICIAN ASSISTANT

## 2022-01-12 PROCEDURE — 1160F RVW MEDS BY RX/DR IN RCRD: CPT | Mod: CPTII,S$GLB,, | Performed by: PHYSICIAN ASSISTANT

## 2022-01-12 PROCEDURE — 99213 OFFICE O/P EST LOW 20 MIN: CPT | Mod: S$GLB,,, | Performed by: PHYSICIAN ASSISTANT

## 2022-01-12 PROCEDURE — 1125F AMNT PAIN NOTED PAIN PRSNT: CPT | Mod: CPTII,S$GLB,, | Performed by: PHYSICIAN ASSISTANT

## 2022-01-12 PROCEDURE — 1160F PR REVIEW ALL MEDS BY PRESCRIBER/CLIN PHARMACIST DOCUMENTED: ICD-10-PCS | Mod: CPTII,S$GLB,, | Performed by: PHYSICIAN ASSISTANT

## 2022-01-12 RX ORDER — MUPIROCIN 20 MG/G
OINTMENT TOPICAL
Qty: 22 G | Refills: 1 | Status: SHIPPED | OUTPATIENT
Start: 2022-01-12 | End: 2022-11-01

## 2022-01-12 NOTE — PROGRESS NOTES
"Subjective:       Patient ID: Caitlin Ahmadi is a 66 y.o. female.    Vitals:  height is 5' 7" (1.702 m) and weight is 75.3 kg (166 lb 0.1 oz). Her temperature is 98.3 °F (36.8 °C). Her blood pressure is 130/59 (abnormal) and her pulse is 95. Her respiration is 18 and oxygen saturation is 99%.     Chief Complaint: Toe Injury (PT PRESENTS TO [facility]/C WITH RIGHT TOE INJURY, PT STATED SHE HIT IT AND THE NAIL IS COMING UP.)    Patient presents to clinic with right little toe injury that occurred a few days ago.  Patient states she stubbed the toe and it caused the nail to come and detach on the top.  She reports some redness and small amount of clear fluid oozing from the nail bed.  Denies any bruising, swelling, inability to bear weight.  Patient was concerned for possible infection and wanted it to be looked that since she is taking methotrexate.  She has been cleaning with peroxide and applying Neosporin.    Toe Injury  This is a new problem. The current episode started in the past 7 days. The problem occurs constantly. The problem has been unchanged. Pertinent negatives include no numbness or weakness. Nothing aggravates the symptoms. She has tried nothing for the symptoms. The treatment provided no relief.       Musculoskeletal: Positive for pain and trauma.   Skin: Positive for avulsion (Toenail). Negative for bruising.   Neurological: Negative for numbness and tingling.       Objective:      Physical Exam   Constitutional: She does not appear ill. No distress. normal  HENT:   Head: Normocephalic.   Eyes: Conjunctivae are normal.   Pulmonary/Chest: Effort normal.   Abdominal: Normal appearance.   Musculoskeletal:        Feet:    Neurological: no focal deficit. She is alert. She displays no weakness. No sensory deficit. Gait normal.   Skin: Capillary refill takes less than 2 seconds.         Assessment:       1. Toenail avulsion, initial encounter    2. Injury of toe on right foot, initial encounter          Plan:     "   Offered to do x-ray to rule out fracture but patient declined at this time stating that pain is tolerable and she is not having any issues with weight-bearing.  Patient's main concern was possible infection since toenail was being lifted up.  She was advised to continue to clean the area and apply Bactroban 2-3 times per day.  May wrap the top of the toenail to help prevent any pulling on the nail.  Discussed with patient that I am nail may fall off on its own but should go back over time.  Will put in referral to Podiatry and patient was instructed to follow up with them if any concerns of healing.  Toenail avulsion, initial encounter  -     mupirocin (BACTROBAN) 2 % ointment; Apply to affected area 3 times daily  Dispense: 22 g; Refill: 1  -     Ambulatory referral/consult to Podiatry    Injury of toe on right foot, initial encounter  -     Ambulatory referral/consult to Podiatry

## 2022-01-13 NOTE — PATIENT INSTRUCTIONS
Patient Education       Toe Injury Discharge Instructions   About this topic   Your toes have many parts. You can see the skin and toenails. Other parts include bones, ligaments, tendons, and cartilage. You also have muscles, nerves, and blood vessels. You may have toe pain or other problems if any of these parts are hurt.  Common toe injuries are:  · Broken bone  · Sprained or torn ligament  · Dislocated toe ? Toe bone is moved out of its normal position  · Tendonitis, tendon injury, or muscle sprain  · Bunion ? Bump at the outer edge of the bottom of the big toe  · Hammertoe or mallet toe ? Toes are bent at one of the toe joints making the toe look like a claw  · Toenail problems like ingrown toenail, fungus infection, bruise under the nail  · Skin problems like corns, callus, blisters, rash, athlete's foot, warts  · Cuts or puncture wounds  · Amputation  · Growth on a nerve     What care is needed at home?   · Ask your doctor what you need to do when you go home. Make sure you ask questions if you do not understand what the doctor says. This way you will know what you need to do.  · Rest. Avoid activities that make your problem worse.  · Place an ice pack or a bag of frozen peas wrapped in a towel over the painful part. Never put ice right on the skin. Do not leave the ice on more than 10 to 15 minutes at a time.  · Prop your foot on pillows to help with swelling.  · Tape the toes together for healing if your doctor suggests you do so.  · Use a brace, splint, or walking boot if your doctor tells you to wear one.  · Use crutches, a cane, or a walker to take pressure off of your injured toe. Talk to your doctor about how much weight you are allowed to put on the foot with the injured toe.  · Change wound dressings if you have an open area. Your doctor will tell you how to do this.  · Be sure to have shoes that fit the right way. Get ones with a wide toe, good support, and without high heels.  · Use padding over a  bunion or corn to help lessen pain and pressure.  · Use creams, warm soaks, or a pumice stone to treat corns and calluses. Do this only if your doctor tells you to. If you have high blood sugar, you should not do anything at home that may cause an opening in the skin.  · If you have diabetes, keep your blood sugars under control. High blood sugars can delay healing.  What follow-up care is needed?   · Your doctor may ask you to make visits to the office to check on your progress. Be sure to keep these visits.  · Your doctor may send you to a doctor who specializes in foot problems.  · You may also need to see a physical therapist (PT). The PT will teach you exercises to help you get back your strength and motion.  What drugs may be needed?   The doctor may order drugs or creams to:  · Help with pain and swelling  · Prevent or fight an infection  · Treat a rash or skin problem  The doctor may give you a shot of an anti-inflammatory drug called a corticosteroid. This will help with swelling. Talk with your doctor about the risks of this shot.  Will physical activity be limited?   You may need to rest your foot for a while. You should not do physical activity that makes your health problem worse. If you run, work out, or play sports, you may not be able to do these things until your health problem gets better. If you have surgery, you may not be able to put any weight on your toe for a few weeks.  What problems could happen?   · Infection  · Loss of motion  · Weakness  · Injury to nerves, blood vessels, or other tissues  · Trouble walking or with balance  · Ongoing pain  · Problem, like a bunion or wart, comes back  · Poor healing  What can be done to prevent this health problem?   · Wear comfortable, supportive shoes with a wide toe. Avoid high heels and tight shoes. If your child has a toe injury, be sure to check shoe size often.  · Wear shoes when walking outdoors. Do not go barefoot.  · Keep a healthy weight.  Being overweight puts extra stress on your feet.  · If you are a runner, run on softer surfaces such as a track instead of concrete.  · Wash your feet every day. Make sure your feet are dry before putting on socks.  · Always wear clean, dry socks. Change them if they get damp.  · Do not go barefoot in wet areas such as swimming pools, public showers, or in locker rooms. This may help you avoid getting a fungus infection.  · If you have diabetes, be sure to check your feet every day. Make sure to use a mirror to check the bottoms of your feet, or have someone else check the bottoms of your feet for you. Sometimes, numbness from diabetes may prevent you from seeing a cut or problem on your foot.  · Do not attempt to remove warts, calluses, or corns yourself, especially if you have diabetes. Be sure to ask your doctor what is safe to do at home.  · Be careful when trimming your toenails. Cutting them too short may cause an ingrown toenail.  When do I need to call the doctor?   · Signs of infection. These include a fever of 100.4°F (38°C) or higher, chills, or wound that will not heal.  · Signs of wound infection. These include swelling, redness, warmth around the wound; too much pain when touched; yellowish, greenish, or bloody discharge; foul smell coming from the cut site; cut site opens up.  · Pain or swelling gets worse  · Numbness and tingling get worse  · Toe is cold and pale  · Toe changes color or gets darker  Teach Back: Helping You Understand   The Teach Back Method helps you understand the information we are giving you. After you talk with the staff, tell them in your own words what you learned. This helps to make sure the staff has described each thing clearly. It also helps to explain things that may have been confusing. Before going home, make sure you can do these:  · I can tell you about my condition.  · I can tell you how I will care for my injured area.  · I can tell you what may help ease my  pain.  · I can tell you what I will do if I have more pain or numbness and tingling or swelling.  Where can I learn more?   [facility]  https://www.nhs.uk/conditions/foot-pain/toe-pain/   Last Reviewed Date   2020-10-13  Consumer Information Use and Disclaimer   This information is not specific medical advice and does not replace information you receive from your health care provider. This is only a brief summary of general information. It does NOT include all information about conditions, illnesses, injuries, tests, procedures, treatments, therapies, discharge instructions or life-style choices that may apply to you. You must talk with your health care provider for complete information about your health and treatment options. This information should not be used to decide whether or not to accept your health care providers advice, instructions or recommendations. Only your health care provider has the knowledge and training to provide advice that is right for you.  Copyright   Copyright © 2021 [employer] and its affiliates and/or licensors. All rights reserved.

## 2022-01-15 NOTE — TELEPHONE ENCOUNTER
No new care gaps identified.  Powered by [employer]. Reference number: [id number].   1/15/2022 3:15:38 AM CST

## 2022-01-20 RX ORDER — AMITRIPTYLINE HYDROCHLORIDE 100 MG/1
TABLET ORAL
Qty: 90 TABLET | Refills: 1 | Status: CANCELLED | OUTPATIENT
Start: 2022-01-20

## 2022-01-20 NOTE — TELEPHONE ENCOUNTER
No new care gaps identified.  Powered by [employer] by [employer]. Reference number: [id number].   1/20/2022 2:06:33 PM CST

## 2022-01-21 ENCOUNTER — PATIENT MESSAGE (OUTPATIENT)
Dept: INTERNAL MEDICINE | Facility: CLINIC | Age: 67
End: 2022-01-21
Payer: MEDICARE

## 2022-01-21 RX ORDER — AMITRIPTYLINE HYDROCHLORIDE 100 MG/1
100 TABLET ORAL NIGHTLY
Qty: 90 TABLET | Refills: 1 | Status: SHIPPED | OUTPATIENT
Start: 2022-01-21 | End: 2022-01-24 | Stop reason: SDUPTHER

## 2022-01-21 RX ORDER — AMITRIPTYLINE HYDROCHLORIDE 100 MG/1
TABLET ORAL
Qty: 90 TABLET | Refills: 1 | OUTPATIENT
Start: 2022-01-21

## 2022-01-21 NOTE — TELEPHONE ENCOUNTER
No new care gaps identified.  Powered by [employer]. Reference number: [id number].   1/21/2022 10:41:29 AM CST

## 2022-01-21 NOTE — TELEPHONE ENCOUNTER
No new care gaps identified.  Powered by [employer] by [employer]. Reference number: [id number].   1/21/2022 10:58:28 AM CST

## 2022-01-21 NOTE — TELEPHONE ENCOUNTER
----- Message from Najma Aguirre sent at 1/21/2022  8:51 AM CST -----  Contact: Caitlin  Type:  RX Refill Request    Who Called: Caitlin   Refill or New Rx:refill  RX Name and Strength: amitriptyline (ELAVIL) 100 MG tablet  How is the patient currently taking it? (ex. 1XDay):3 x d  Is this a 30 day or 90 day RX:90 days   Preferred Pharmacy with phone number:  [id number] - [place] - [address] AT [facility]  [address] [address]  Phone: [phone] Fax: [phone]  Local or Mail Order:local  Ordering Provider:Dr delgado   Would the patient rather a call back or a response via [facility]? Call back   Best Call Back Number:[phone]   Additional Information:  patient is out of her medication and would like it to be sent today.    Thanks

## 2022-01-21 NOTE — TELEPHONE ENCOUNTER
No new care gaps identified.  Powered by [employer]. Reference number: [id number].   1/21/2022 4:20:00 AM CST

## 2022-01-24 NOTE — TELEPHONE ENCOUNTER
No new care gaps identified.  Powered by [employer] by [employer]. Reference number: [id number].   1/24/2022 7:57:16 AM CST

## 2022-01-25 RX ORDER — AMITRIPTYLINE HYDROCHLORIDE 100 MG/1
300 TABLET ORAL NIGHTLY
Qty: 270 TABLET | Refills: 1 | Status: SHIPPED | OUTPATIENT
Start: 2022-01-25 | End: 2022-03-29 | Stop reason: SDUPTHER

## 2022-01-25 NOTE — TELEPHONE ENCOUNTER
"Informed pt that the prescription states " take 3 tablets by mouth every evening" she verbalized understanding.  "

## 2022-01-26 RX ORDER — AMITRIPTYLINE HYDROCHLORIDE 100 MG/1
TABLET ORAL
Qty: 90 TABLET | Refills: 1 | OUTPATIENT
Start: 2022-01-26

## 2022-01-27 NOTE — TELEPHONE ENCOUNTER
Quick DC. Request already responded to by other means (e.g. phone or fax)   Refill Authorization Note   Caitlin Ahmadi  is requesting a refill authorization.  Brief Assessment and Rationale for Refill:  Quick Discontinue  Medication Therapy Plan:  quick dc: duplicate     Medication Reconciliation Completed:  No      Comments:   Pended Medication(s)       Requested Prescriptions     Pending Prescriptions Disp Refills    amitriptyline (ELAVIL) 100 MG tablet [Pharmacy Med Name: AMITRIPTYLINE 100MG (HUNDRED MG)TAB] 90 tablet 1     Sig: TAKE 3 TABLETS(300 MG) BY MOUTH EVERY EVENING        Duplicate Pended Encounter(s)/ Last Prescribed Details: (includes pharmacy & prescriber details)   amitriptyline (ELAVIL) 100 MG tablet 270 tablet 1 1/25/2022  No   Sig - Route: Take 3 tablets (300 mg total) by mouth every evening. - Oral   Sent to pharmacy as: amitriptyline (ELAVIL) 100 MG tablet   Class: Normal   Order: [id number]   Date/Time Signed: 1/25/2022 08:20       E-Prescribing Status: Receipt confirmed by pharmacy (1/25/2022  8:21 AM CST)       Ordering Encounter Report    Associated Reports   View Encounter          Note composed:6:42 PM 01/26/2022

## 2022-02-16 ENCOUNTER — PATIENT OUTREACH (OUTPATIENT)
Dept: ADMINISTRATIVE | Facility: OTHER | Age: 67
End: 2022-02-16
Payer: MEDICARE

## 2022-02-17 ENCOUNTER — OFFICE VISIT (OUTPATIENT)
Dept: PULMONOLOGY | Facility: CLINIC | Age: 67
End: 2022-02-17
Payer: MEDICARE

## 2022-02-17 VITALS
SYSTOLIC BLOOD PRESSURE: 118 MMHG | WEIGHT: 167.75 LBS | OXYGEN SATURATION: 95 % | HEIGHT: 67 IN | RESPIRATION RATE: 18 BRPM | BODY MASS INDEX: 26.33 KG/M2 | HEART RATE: 96 BPM | DIASTOLIC BLOOD PRESSURE: 80 MMHG

## 2022-02-17 DIAGNOSIS — J84.9 ILD (INTERSTITIAL LUNG DISEASE): Primary | ICD-10-CM

## 2022-02-17 DIAGNOSIS — J84.9 INTERSTITIAL PULMONARY DISEASE, UNSPECIFIED: ICD-10-CM

## 2022-02-17 DIAGNOSIS — J30.89 SEASONAL ALLERGIC RHINITIS DUE TO OTHER ALLERGIC TRIGGER: ICD-10-CM

## 2022-02-17 DIAGNOSIS — R05.3 CHRONIC COUGH: ICD-10-CM

## 2022-02-17 DIAGNOSIS — R06.02 SOBOE (SHORTNESS OF BREATH ON EXERTION): ICD-10-CM

## 2022-02-17 DIAGNOSIS — J84.9 ILD (INTERSTITIAL LUNG DISEASE): ICD-10-CM

## 2022-02-17 DIAGNOSIS — M05.9 SEROPOSITIVE RHEUMATOID ARTHRITIS: Primary | ICD-10-CM

## 2022-02-17 PROCEDURE — 1159F PR MEDICATION LIST DOCUMENTED IN MEDICAL RECORD: ICD-10-PCS | Mod: HCNC,CPTII,S$GLB, | Performed by: INTERNAL MEDICINE

## 2022-02-17 PROCEDURE — 3074F PR MOST RECENT SYSTOLIC BLOOD PRESSURE < 130 MM HG: ICD-10-PCS | Mod: HCNC,CPTII,S$GLB, | Performed by: INTERNAL MEDICINE

## 2022-02-17 PROCEDURE — 1101F PR PT FALLS ASSESS DOC 0-1 FALLS W/OUT INJ PAST YR: ICD-10-PCS | Mod: HCNC,CPTII,S$GLB, | Performed by: INTERNAL MEDICINE

## 2022-02-17 PROCEDURE — 3008F BODY MASS INDEX DOCD: CPT | Mod: HCNC,CPTII,S$GLB, | Performed by: INTERNAL MEDICINE

## 2022-02-17 PROCEDURE — 1159F MED LIST DOCD IN RCRD: CPT | Mod: HCNC,CPTII,S$GLB, | Performed by: INTERNAL MEDICINE

## 2022-02-17 PROCEDURE — 99214 OFFICE O/P EST MOD 30 MIN: CPT | Mod: HCNC,S$GLB,, | Performed by: INTERNAL MEDICINE

## 2022-02-17 PROCEDURE — 3074F SYST BP LT 130 MM HG: CPT | Mod: HCNC,CPTII,S$GLB, | Performed by: INTERNAL MEDICINE

## 2022-02-17 PROCEDURE — 3288F FALL RISK ASSESSMENT DOCD: CPT | Mod: HCNC,CPTII,S$GLB, | Performed by: INTERNAL MEDICINE

## 2022-02-17 PROCEDURE — 3288F PR FALLS RISK ASSESSMENT DOCUMENTED: ICD-10-PCS | Mod: HCNC,CPTII,S$GLB, | Performed by: INTERNAL MEDICINE

## 2022-02-17 PROCEDURE — 1101F PT FALLS ASSESS-DOCD LE1/YR: CPT | Mod: HCNC,CPTII,S$GLB, | Performed by: INTERNAL MEDICINE

## 2022-02-17 PROCEDURE — 3079F PR MOST RECENT DIASTOLIC BLOOD PRESSURE 80-89 MM HG: ICD-10-PCS | Mod: HCNC,CPTII,S$GLB, | Performed by: INTERNAL MEDICINE

## 2022-02-17 PROCEDURE — 3079F DIAST BP 80-89 MM HG: CPT | Mod: HCNC,CPTII,S$GLB, | Performed by: INTERNAL MEDICINE

## 2022-02-17 PROCEDURE — 3008F PR BODY MASS INDEX (BMI) DOCUMENTED: ICD-10-PCS | Mod: HCNC,CPTII,S$GLB, | Performed by: INTERNAL MEDICINE

## 2022-02-17 PROCEDURE — 99999 PR PBB SHADOW E&M-EST. PATIENT-LVL III: CPT | Mod: PBBFAC,HCNC,, | Performed by: INTERNAL MEDICINE

## 2022-02-17 PROCEDURE — 99999 PR PBB SHADOW E&M-EST. PATIENT-LVL III: ICD-10-PCS | Mod: PBBFAC,HCNC,, | Performed by: INTERNAL MEDICINE

## 2022-02-17 PROCEDURE — 99214 PR OFFICE/OUTPT VISIT, EST, LEVL IV, 30-39 MIN: ICD-10-PCS | Mod: HCNC,S$GLB,, | Performed by: INTERNAL MEDICINE

## 2022-02-17 RX ORDER — AZITHROMYCIN 250 MG/1
TABLET, FILM COATED ORAL
Qty: 6 TABLET | Refills: 0 | Status: SHIPPED | OUTPATIENT
Start: 2022-02-17 | End: 2022-09-14 | Stop reason: SDUPTHER

## 2022-02-17 RX ORDER — ALBUTEROL SULFATE 90 UG/1
2 AEROSOL, METERED RESPIRATORY (INHALATION) EVERY 6 HOURS PRN
Qty: 6.7 G | Refills: 11 | Status: SHIPPED | OUTPATIENT
Start: 2022-02-17 | End: 2024-02-15

## 2022-02-17 RX ORDER — FLUTICASONE PROPIONATE 50 MCG
2 SPRAY, SUSPENSION (ML) NASAL DAILY
Qty: 16 G | Refills: 11 | Status: SHIPPED | OUTPATIENT
Start: 2022-02-17 | End: 2023-03-14

## 2022-02-17 RX ORDER — METHYLPREDNISOLONE 4 MG/1
TABLET ORAL
Qty: 1 EACH | Refills: 0 | Status: SHIPPED | OUTPATIENT
Start: 2022-02-17 | End: 2022-05-31

## 2022-02-17 NOTE — PROGRESS NOTES
Subjective:     Patient ID: Caitlin Ahmadi is a 66 y.o. female.    Chief Complaint:  Cough, postnasal drip and shortness of breath     HPI    Asthma Follow-up  The patient has previously been evaluated here for asthma and presents for an asthma follow-up. The patient is currently having symptoms / an exacerbation. Current symptoms include dyspnea, productive cough and wheezing. Symptoms have been present since several weeks ago and have been gradually worsening. She denies chest pain and chest tightness. Associated symptoms include poor exercise tolerance.  This episode appears to have been triggered by no identifiable factor. Treatments tried for the current exacerbation include short-acting inhaled beta-adrenergic agonists, which have provided some relief of symptoms. The patient has been having similar episodes for approximately 1 year.    Current Disease Severity  The patient is having daytime symptoms more than 2 days per week but not daily. The patient is having daytime symptoms less than or equal to 2 times per month. The patient is using short-acting beta agonists for symptom control less than or equal to 2 days per week. She has exacerbations requiring oral systemic corticosteroids 0 times per year. Current limitations in activity from asthma: none. Number of days of school or work missed in the last month: not applicable. Number of urgent/emergent visit in last year: 0.  The patient is not using a spacer with MDIs. Her best peak flow rate is na. She is not monitoring peak flow rates at home.    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    Cataract     Encounter for blood transfusion     Hypertension     PONV (postoperative nausea and vomiting)     Seropositive rheumatoid arthritis 2/17/2022     Past Surgical History:   Procedure Laterality Date    ADENOIDECTOMY      CATARACT EXTRACTION W/  INTRAOCULAR LENS IMPLANT Left  2020    CATARACT EXTRACTION W/  INTRAOCULAR LENS IMPLANT      CHOLECYSTECTOMY      COLONOSCOPY N/A 10/13/2021    Procedure: COLONOSCOPY;  Surgeon: Elissa Yoon MD;  Location: [facility];  Service: Endoscopy;  Laterality: N/A;    HERNIA REPAIR      HYSTERECTOMY      TONSILLECTOMY      TOTAL KNEE ARTHROPLASTY Bilateral      Review of patient's allergies indicates:   Allergen Reactions    Pneumococcal vaccine Swelling    Hydrocodone-acetaminophen Nausea Only and Nausea And Vomiting    Meloxicam Rash    Sulfa (sulfonamide antibiotics) Rash     Current Outpatient Medications on File Prior to Visit   Medication Sig Dispense Refill    amitriptyline (ELAVIL) 100 MG tablet Take 3 tablets (300 mg total) by mouth every evening. 270 tablet 1    folic acid (FOLVITE) 1 MG tablet TAKE 1 TABLET(1 MG) BY MOUTH EVERY DAY 30 tablet 4    ibuprofen (ADVIL,MOTRIN) 200 MG tablet Take 600 mg by mouth every 6 (six) hours as needed.      methotrexate 2.5 MG Tab TAKE 6 TABLETS(15 MG) BY MOUTH EVERY 7 DAYS 24 tablet 3    mupirocin (BACTROBAN) 2 % ointment Apply to affected area 3 times daily 22 g 1    [DISCONTINUED] albuterol (PROVENTIL/VENTOLIN HFA) 90 mcg/actuation inhaler Inhale 2 puffs into the lungs every 6 (six) hours as needed for Wheezing. Rescue 6.7 g 11    losartan (COZAAR) 25 MG tablet Take 1 tablet (25 mg total) by mouth once daily. 90 tablet 1     No current facility-administered medications on file prior to visit.     Social History     Socioeconomic History    Marital status:    Tobacco Use    Smoking status: Former Smoker     Packs/day: 1.00     Years: 20.00     Pack years: 20.00     Types: Cigarettes     Start date: 1970     Quit date: 1990     Years since quittin.6    Smokeless tobacco: Never Used   Substance and Sexual Activity    Alcohol use: Yes     Alcohol/week: 5.0 standard drinks     Types: 5 Glasses of wine per week    Drug use: Never    Sexual activity:  "Yes     Partners: Male     Family History   Problem Relation Age of Onset    No Known Problems Mother     Alzheimer's disease Father        Review of Systems   HENT: Positive for postnasal drip.    Respiratory: Positive for shortness of breath and dyspnea on extertion.    Musculoskeletal: Positive for arthralgias.       Objective:      /80   Pulse 96   Resp 18   Ht 5' 7" (1.702 m)   Wt 76.1 kg (167 lb 12.3 oz)   SpO2 95%   BMI 26.28 kg/m²   Physical Exam  Vitals and nursing note reviewed.   Constitutional:       Appearance: She is well-developed and well-nourished.   HENT:      Head: Normocephalic and atraumatic.      Nose: Congestion present.   Eyes:      Conjunctiva/sclera: Conjunctivae normal.      Pupils: Pupils are equal, round, and reactive to light.   Neck:      Thyroid: No thyromegaly.      Vascular: No JVD.      Trachea: No tracheal deviation.   Cardiovascular:      Rate and Rhythm: Normal rate and regular rhythm.      Heart sounds: Normal heart sounds.   Pulmonary:      Effort: Pulmonary effort is normal. No respiratory distress.      Breath sounds: Normal breath sounds. No wheezing or rales.   Chest:      Chest wall: No tenderness.   Abdominal:      General: Bowel sounds are normal.      Palpations: Abdomen is soft.   Musculoskeletal:         General: No edema. Normal range of motion.      Cervical back: Neck supple.   Lymphadenopathy:      Cervical: No cervical adenopathy.   Skin:     General: Skin is warm and dry.   Neurological:      Mental Status: She is alert and oriented to person, place, and time.       Personal Diagnostic Review  CT of chest performed on  6/1/21 - no interstitial fibrosis    EXAMINATION:  CT CHEST WITHOUT CONTRAST     CLINICAL HISTORY:  Interstitial lung disease;monitor for development/progression of ILD. RA on MTX with chronic ST[place]; Interstitial pulmonary disease, unspecified     TECHNIQUE:  Low dose axial images, sagittal and coronal reformations were obtained from " the thoracic inlet to the lung bases. Contrast was not administered.     COMPARISON:  08/31/2020     FINDINGS:  Heart and Great vessels: Within normal limits     Thoracic Adenopathy: None demonstrated     Lungs: Minimal scarring versus subsegmental atelectasis in the lingula appears unchanged.  No significant septal thickening, bronchovascular bundle thickening, bronchiectasis, ground-glass opacity, consolidation, or nodularity demonstrated to suggest interstitial lung disease.  No significant air trapping.  No pleural effusions.  Incidental note is made of an accessory fissure in the right lower lobe.     Upper Abdomen: Prior cholecystectomy.  No acute findings.     Bones: No acute or suspicious osseous findings.     Miscellaneous: None     Impression:     Stable exam as above with no acute findings or definite evidence of interstitial lung disease.        Electronically signed by: Rigo Boyce MD  Date:                                            06/01/2021  Time:                                           11:25    Pulmonary Studies Review 2/17/2022   SpO2 95   Ordering Provider -   Interpreting Provider -   Performing nurse/tech/RT -   Diagnosis -   Height 67   Weight 2684.32   BMI (Calculated) 26.3   Predicted Distance 323.28   Patient Race -   6MWT Status -   Patient Reported -   Was O2 used? -   6MW Distance walked (feet) -   Distance walked (meters) -   Did patient stop? -   Type of assistive device(s) used? -   Is extra documentation required for this patient? -   Oxygen Saturation -   Supplemental Oxygen -   Heart Rate -   Blood Pressure -   [place] Dyspnea Rating  -   Oxygen Saturation -   Supplemental Oxygen -   Heart Rate -   Blood Pressure -   [place] Dyspnea Rating  -   Recovery Time (seconds) -   Oxygen Saturation -   Supplemental Oxygen -   Heart Rate -   Blood Pressure -   [place] Dyspnea Rating  -   Is procedure ready for interpretation? -   Did the patient stop or pause? -   Total Time Walked  (Calculated) -   Total Laps Walked -   Final Partial Lap Distance (feet) -   Total Distance Feet (Calculated) -   Total Distance Meters (Calculated) -   Predicted Distance Meters (Calculated) 464.59   Percentage of Predicted (Calculated) -   Peak VO2 (Calculated) -   Mets -   Has The Patient Had a Previous Six Minute Walk Test? -   Oxygen Qualification? -       X-Ray Abdomen AP 1 View  Narrative: EXAMINATION:  XR ABDOMEN AP 1 VIEW    CLINICAL HISTORY:  abdominal pain post colonoscopy;    TECHNIQUE:  AP View(s) of the abdomen was performed.    COMPARISON:  None    FINDINGS:  Two supine views of the abdomen demonstrate no evidence of free air.  However, the sensitivity of this study for free air would be significantly increased by the addition of an upright or left lateral decubitus view.    There is gas throughout the length of the colon.  No dilated loops of bowel are present.  Right upper quadrant cholecystectomy clips are noted.  There are 2 metallic densities overlying the sacrum which may be internal or external to the patient.  There is no acute bony abnormality.  Impression: No evidence of free air on these supine films, please see comments above.    Electronically signed by: Caitlin Durán  Date:    10/13/2021  Time:    14:16      Office Spirometry Results:     No flowsheet data found.  Pulmonary Studies Review 2/17/2022   SpO2 95   Ordering Provider -   Interpreting Provider -   Performing nurse/tech/RT -   Diagnosis -   Height 67   Weight 2684.32   BMI (Calculated) 26.3   Predicted Distance 323.28   Patient Race -   6MWT Status -   Patient Reported -   Was O2 used? -   6MW Distance walked (feet) -   Distance walked (meters) -   Did patient stop? -   Type of assistive device(s) used? -   Is extra documentation required for this patient? -   Oxygen Saturation -   Supplemental Oxygen -   Heart Rate -   Blood Pressure -   [place] Dyspnea Rating  -   Oxygen Saturation -   Supplemental Oxygen -   Heart Rate  -   Blood Pressure -   [place] Dyspnea Rating  -   Recovery Time (seconds) -   Oxygen Saturation -   Supplemental Oxygen -   Heart Rate -   Blood Pressure -   [place] Dyspnea Rating  -   Is procedure ready for interpretation? -   Did the patient stop or pause? -   Total Time Walked (Calculated) -   Total Laps Walked -   Final Partial Lap Distance (feet) -   Total Distance Feet (Calculated) -   Total Distance Meters (Calculated) -   Predicted Distance Meters (Calculated) 464.59   Percentage of Predicted (Calculated) -   Peak VO2 (Calculated) -   Mets -   Has The Patient Had a Previous Six Minute Walk Test? -   Oxygen Qualification? -         Assessment:            Seropositive rheumatoid arthritis  -     CT Chest Without Contrast; Future; Expected date: 02/17/2022    ILD (interstitial lung disease)    Interstitial pulmonary disease, unspecified  -     CT Chest Without Contrast; Future; Expected date: 02/17/2022  -     Spirometry with/without bronchodilator & DLCO; Future; Expected date: 02/17/2022    SOBOE (shortness of breath on exertion)  -     albuterol (PROVENTIL/VENTOLIN HFA) 90 mcg/actuation inhaler; Inhale 2 puffs into the lungs every 6 (six) hours as needed for Wheezing. Rescue  Dispense: 6.7 g; Refill: 11    Chronic cough  -     albuterol (PROVENTIL/VENTOLIN HFA) 90 mcg/actuation inhaler; Inhale 2 puffs into the lungs every 6 (six) hours as needed for Wheezing. Rescue  Dispense: 6.7 g; Refill: 11    Seasonal allergic rhinitis due to other allergic trigger  -     fluticasone propionate (FLONASE) 50 mcg/actuation nasal spray; 2 sprays (100 mcg total) by Each Nostril route once daily.  Dispense: 16 g; Refill: 11  -     methylPREDNISolone (MEDROL DOSEPACK) 4 mg tablet; use as directed  Dispense: 1 each; Refill: 0  -     azithromycin (ZITHROMAX Z-VENTURA) 250 MG tablet; 500 mg on day 1 (two tablets) followed by 250 mg once daily on days 2-5  Dispense: 6 tablet; Refill: 0          Outpatient Encounter Medications as of  2022   Medication Sig Dispense Refill    amitriptyline (ELAVIL) 100 MG tablet Take 3 tablets (300 mg total) by mouth every evening. 270 tablet 1    folic acid (FOLVITE) 1 MG tablet TAKE 1 TABLET(1 MG) BY MOUTH EVERY DAY 30 tablet 4    ibuprofen (ADVIL,MOTRIN) 200 MG tablet Take 600 mg by mouth every 6 (six) hours as needed.      methotrexate 2.5 MG Tab TAKE 6 TABLETS(15 MG) BY MOUTH EVERY 7 DAYS 24 tablet 3    mupirocin (BACTROBAN) 2 % ointment Apply to affected area 3 times daily 22 g 1    [DISCONTINUED] albuterol (PROVENTIL/VENTOLIN HFA) 90 mcg/actuation inhaler Inhale 2 puffs into the lungs every 6 (six) hours as needed for Wheezing. Rescue 6.7 g 11    albuterol (PROVENTIL/VENTOLIN HFA) 90 mcg/actuation inhaler Inhale 2 puffs into the lungs every 6 (six) hours as needed for Wheezing. Rescue 6.7 g 11    azithromycin (ZITHROMAX Z-VENTURA) 250 MG tablet 500 mg on day 1 (two tablets) followed by 250 mg once daily on days 2-5 6 tablet 0    fluticasone propionate (FLONASE) 50 mcg/actuation nasal spray 2 sprays (100 mcg total) by Each Nostril route once daily. 16 g 11    losartan (COZAAR) 25 MG tablet Take 1 tablet (25 mg total) by mouth once daily. 90 tablet 1    methylPREDNISolone (MEDROL DOSEPACK) 4 mg tablet use as directed 1 each 0     No facility-administered encounter medications on file as of 2022.     Plan:       Requested Prescriptions     Signed Prescriptions Disp Refills    albuterol (PROVENTIL/VENTOLIN HFA) 90 mcg/actuation inhaler 6.7 g 11     Sig: Inhale 2 puffs into the lungs every 6 (six) hours as needed for Wheezing. Rescue    fluticasone propionate (FLONASE) 50 mcg/actuation nasal spray 16 g 11     Si sprays (100 mcg total) by Each Nostril route once daily.    methylPREDNISolone (MEDROL DOSEPACK) 4 mg tablet 1 each 0     Sig: use as directed    azithromycin (ZITHROMAX Z-VENTURA) 250 MG tablet 6 tablet 0     Si mg on day 1 (two tablets) followed by 250 mg once daily on days  2-5     Problem List Items Addressed This Visit     ILD (interstitial lung disease)    Seropositive rheumatoid arthritis - Primary    Relevant Orders    CT Chest Without Contrast      Other Visit Diagnoses     Interstitial pulmonary disease, unspecified        Relevant Orders    CT Chest Without Contrast    Spirometry with/without bronchodilator & DLCO    SOBOE (shortness of breath on exertion)        Relevant Medications    albuterol (PROVENTIL/VENTOLIN HFA) 90 mcg/actuation inhaler    Chronic cough        Relevant Medications    albuterol (PROVENTIL/VENTOLIN HFA) 90 mcg/actuation inhaler    Seasonal allergic rhinitis due to other allergic trigger        Relevant Medications    fluticasone propionate (FLONASE) 50 mcg/actuation nasal spray    methylPREDNISolone (MEDROL DOSEPACK) 4 mg tablet    azithromycin (ZITHROMAX Z-VENTURA) 250 MG tablet             Follow up in about 1 year (around 2/17/2023) for CT/PET - ASAP, Review progress.    MEDICAL DECISION MAKING: Moderate to high complexity.  Overall, the multiple problems listed are of moderate to high severity that may impact quality of life and activities of daily living. Side effects of medications, treatment plan as well as options and alternatives reviewed and discussed with patient. There was counseling of patient concerning these issues.    Total time spent in counseling and coordination of care - 30  minutes of total time spent on the encounter, which includes face to face time and non-face to face time preparing to see the patient (eg, review of tests), Obtaining and/or reviewing separately obtained history, Documenting clinical information in the electronic or other health record, Independently interpreting results (not separately reported) and communicating results to the patient/family/caregiver, or Care coordination (not separately reported).    Time was used in discussion of prognosis, risks, benefits of treatment, instructions and compliance with regimen .  Discussion with other physicians and/or health care providers - home health or for use of durable medical equipment (oxygen, nebulizers, CPAP, BiPAP) occurred.

## 2022-02-17 NOTE — PROGRESS NOTES
Health Maintenance Due   Topic Date Due    Shingles Vaccine (1 of 2) Never done    Mammogram  07/10/2021    COVID-19 Vaccine (3 - Booster for Pfizer series) 10/03/2021     Updates were requested from care everywhere.  Chart was reviewed for overdue Proactive [facility] Encounters ([facility]) topics (CRS, Breast Cancer Screening, Eye exam)  Health Maintenance has been updated.  LINKS immunization registry triggered.  Immunizations were reconciled.     Patient/Caregiver provided printed discharge information.

## 2022-02-21 ENCOUNTER — TELEPHONE (OUTPATIENT)
Dept: INTERNAL MEDICINE | Facility: CLINIC | Age: 67
End: 2022-02-21
Payer: MEDICARE

## 2022-02-21 NOTE — TELEPHONE ENCOUNTER
S/w pt reporting last refill she rec'vd from [facility] for Amitriptyline rx was only #30 fill, and pt takes 3 tablets at night so needs #90 count. Advised pt Dr. Steve did send e-rx on 01/25/2022 for correct dosing rx as follows: Amitriptyline 100mg Sig: Take 3 tablets at night with 3mth supply at #270 w/ 1R, but I would call  to confirm they have rx and if not call in as prescribed for pt. Pt zac.

## 2022-02-21 NOTE — TELEPHONE ENCOUNTER
S/w Pharmacist Phoebe and confirmed they have 01/25/22 e-rx on file and will fill as prescribed for pt. Returned call to pt, no ans, lvm advising pt rx being readied by pharm and to CB if exp any further problems w/ [facility].

## 2022-02-23 DIAGNOSIS — D84.9 IMMUNOSUPPRESSED STATUS: ICD-10-CM

## 2022-02-24 ENCOUNTER — PATIENT MESSAGE (OUTPATIENT)
Dept: PULMONOLOGY | Facility: CLINIC | Age: 67
End: 2022-02-24
Payer: MEDICARE

## 2022-03-09 ENCOUNTER — PATIENT MESSAGE (OUTPATIENT)
Dept: PULMONOLOGY | Facility: CLINIC | Age: 67
End: 2022-03-09
Payer: MEDICARE

## 2022-03-15 ENCOUNTER — LAB VISIT (OUTPATIENT)
Dept: LAB | Facility: HOSPITAL | Age: 67
End: 2022-03-15
Attending: INTERNAL MEDICINE
Payer: MEDICARE

## 2022-03-15 ENCOUNTER — OFFICE VISIT (OUTPATIENT)
Dept: ALLERGY | Facility: CLINIC | Age: 67
End: 2022-03-15
Payer: MEDICARE

## 2022-03-15 VITALS
HEART RATE: 101 BPM | DIASTOLIC BLOOD PRESSURE: 70 MMHG | HEIGHT: 68 IN | SYSTOLIC BLOOD PRESSURE: 125 MMHG | WEIGHT: 168 LBS | BODY MASS INDEX: 25.46 KG/M2 | TEMPERATURE: 98 F

## 2022-03-15 DIAGNOSIS — J32.9 RECURRENT SINUS INFECTIONS: ICD-10-CM

## 2022-03-15 DIAGNOSIS — J84.9 ILD (INTERSTITIAL LUNG DISEASE): ICD-10-CM

## 2022-03-15 DIAGNOSIS — J32.9 RECURRENT SINUS INFECTIONS: Primary | ICD-10-CM

## 2022-03-15 DIAGNOSIS — M05.9 SEROPOSITIVE RHEUMATOID ARTHRITIS: ICD-10-CM

## 2022-03-15 LAB
BASOPHILS # BLD AUTO: 0.05 K/UL (ref 0–0.2)
BASOPHILS NFR BLD: 0.8 % (ref 0–1.9)
DIFFERENTIAL METHOD: ABNORMAL
EOSINOPHIL # BLD AUTO: 0.2 K/UL (ref 0–0.5)
EOSINOPHIL NFR BLD: 2.5 % (ref 0–8)
ERYTHROCYTE [DISTWIDTH] IN BLOOD BY AUTOMATED COUNT: 14.4 % (ref 11.5–14.5)
HCT VFR BLD AUTO: 39.8 % (ref 37–48.5)
HGB BLD-MCNC: 12.6 G/DL (ref 12–16)
IGA SERPL-MCNC: 192 MG/DL (ref 40–350)
IGG SERPL-MCNC: 1288 MG/DL (ref 650–1600)
IGM SERPL-MCNC: 163 MG/DL (ref 50–300)
IMM GRANULOCYTES # BLD AUTO: 0.04 K/UL (ref 0–0.04)
IMM GRANULOCYTES NFR BLD AUTO: 0.7 % (ref 0–0.5)
LYMPHOCYTES # BLD AUTO: 1.3 K/UL (ref 1–4.8)
LYMPHOCYTES NFR BLD: 21.5 % (ref 18–48)
MCH RBC QN AUTO: 31.3 PG (ref 27–31)
MCHC RBC AUTO-ENTMCNC: 31.7 G/DL (ref 32–36)
MCV RBC AUTO: 99 FL (ref 82–98)
MONOCYTES # BLD AUTO: 0.6 K/UL (ref 0.3–1)
MONOCYTES NFR BLD: 9.9 % (ref 4–15)
NEUTROPHILS # BLD AUTO: 3.9 K/UL (ref 1.8–7.7)
NEUTROPHILS NFR BLD: 64.6 % (ref 38–73)
NRBC BLD-RTO: 0 /100 WBC
PLATELET # BLD AUTO: 223 K/UL (ref 150–450)
PMV BLD AUTO: 10.8 FL (ref 9.2–12.9)
RBC # BLD AUTO: 4.03 M/UL (ref 4–5.4)
WBC # BLD AUTO: 6.06 K/UL (ref 3.9–12.7)

## 2022-03-15 PROCEDURE — 3074F PR MOST RECENT SYSTOLIC BLOOD PRESSURE < 130 MM HG: ICD-10-PCS | Mod: CPTII,S$GLB,, | Performed by: ALLERGY & IMMUNOLOGY

## 2022-03-15 PROCEDURE — 86774 TETANUS ANTIBODY: CPT | Performed by: ALLERGY & IMMUNOLOGY

## 2022-03-15 PROCEDURE — 82784 ASSAY IGA/IGD/IGG/IGM EACH: CPT | Performed by: ALLERGY & IMMUNOLOGY

## 2022-03-15 PROCEDURE — 86360 T CELL ABSOLUTE COUNT/RATIO: CPT | Performed by: ALLERGY & IMMUNOLOGY

## 2022-03-15 PROCEDURE — 3008F BODY MASS INDEX DOCD: CPT | Mod: CPTII,S$GLB,, | Performed by: ALLERGY & IMMUNOLOGY

## 2022-03-15 PROCEDURE — 99214 PR OFFICE/OUTPT VISIT, EST, LEVL IV, 30-39 MIN: ICD-10-PCS | Mod: S$GLB,,, | Performed by: ALLERGY & IMMUNOLOGY

## 2022-03-15 PROCEDURE — 86317 IMMUNOASSAY INFECTIOUS AGENT: CPT | Performed by: ALLERGY & IMMUNOLOGY

## 2022-03-15 PROCEDURE — 99999 PR PBB SHADOW E&M-EST. PATIENT-LVL IV: ICD-10-PCS | Mod: PBBFAC,,, | Performed by: ALLERGY & IMMUNOLOGY

## 2022-03-15 PROCEDURE — 3008F PR BODY MASS INDEX (BMI) DOCUMENTED: ICD-10-PCS | Mod: CPTII,S$GLB,, | Performed by: ALLERGY & IMMUNOLOGY

## 2022-03-15 PROCEDURE — 1126F PR PAIN SEVERITY QUANTIFIED, NO PAIN PRESENT: ICD-10-PCS | Mod: CPTII,S$GLB,, | Performed by: ALLERGY & IMMUNOLOGY

## 2022-03-15 PROCEDURE — 82787 IGG 1 2 3 OR 4 EACH: CPT | Performed by: ALLERGY & IMMUNOLOGY

## 2022-03-15 PROCEDURE — 86359 T CELLS TOTAL COUNT: CPT | Performed by: ALLERGY & IMMUNOLOGY

## 2022-03-15 PROCEDURE — 1159F PR MEDICATION LIST DOCUMENTED IN MEDICAL RECORD: ICD-10-PCS | Mod: CPTII,S$GLB,, | Performed by: ALLERGY & IMMUNOLOGY

## 2022-03-15 PROCEDURE — 3078F PR MOST RECENT DIASTOLIC BLOOD PRESSURE < 80 MM HG: ICD-10-PCS | Mod: CPTII,S$GLB,, | Performed by: ALLERGY & IMMUNOLOGY

## 2022-03-15 PROCEDURE — 86357 NK CELLS TOTAL COUNT: CPT | Performed by: ALLERGY & IMMUNOLOGY

## 2022-03-15 PROCEDURE — 85025 COMPLETE CBC W/AUTO DIFF WBC: CPT | Performed by: ALLERGY & IMMUNOLOGY

## 2022-03-15 PROCEDURE — 36415 COLL VENOUS BLD VENIPUNCTURE: CPT | Performed by: ALLERGY & IMMUNOLOGY

## 2022-03-15 PROCEDURE — 82784 ASSAY IGA/IGD/IGG/IGM EACH: CPT | Mod: 59 | Performed by: ALLERGY & IMMUNOLOGY

## 2022-03-15 PROCEDURE — 99999 PR PBB SHADOW E&M-EST. PATIENT-LVL IV: CPT | Mod: PBBFAC,,, | Performed by: ALLERGY & IMMUNOLOGY

## 2022-03-15 PROCEDURE — 1126F AMNT PAIN NOTED NONE PRSNT: CPT | Mod: CPTII,S$GLB,, | Performed by: ALLERGY & IMMUNOLOGY

## 2022-03-15 PROCEDURE — 86355 B CELLS TOTAL COUNT: CPT | Performed by: ALLERGY & IMMUNOLOGY

## 2022-03-15 PROCEDURE — 1159F MED LIST DOCD IN RCRD: CPT | Mod: CPTII,S$GLB,, | Performed by: ALLERGY & IMMUNOLOGY

## 2022-03-15 PROCEDURE — 3074F SYST BP LT 130 MM HG: CPT | Mod: CPTII,S$GLB,, | Performed by: ALLERGY & IMMUNOLOGY

## 2022-03-15 PROCEDURE — 3078F DIAST BP <80 MM HG: CPT | Mod: CPTII,S$GLB,, | Performed by: ALLERGY & IMMUNOLOGY

## 2022-03-15 PROCEDURE — 86684 HEMOPHILUS INFLUENZA ANTIBDY: CPT | Performed by: ALLERGY & IMMUNOLOGY

## 2022-03-15 PROCEDURE — 99214 OFFICE O/P EST MOD 30 MIN: CPT | Mod: S$GLB,,, | Performed by: ALLERGY & IMMUNOLOGY

## 2022-03-15 NOTE — PROGRESS NOTES
Subjective:       Patient ID: Caitlin Ahmadi is a 67 y.o. female.        Chief Complaint:  Follow-up      HPI 10/2/2020: 65 year old female with a history of shortness of breath for a year and cough for several years. She described the cough as dry. She was seen by Dr. 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 She denies mold in her home. She denies runny nose, itchy eyes, watery eyes. She denies nasal or eye symptoms. She does report fatigue.  She smoked for 20 years and stopped 30 years ago.        HPI today:  67 year old female with ILD seen previously- allergy inhalant testing was negative. She reports persistent shortness of breath. She has RA and is on methotrexate.  She reports intermittent fatigue. Green and bloody discharge from the nose. Antibiotics and prednisone.- 3 weeks ago. She was also started on Flonase as well. NO itchy or watery eyes  Since stopping the antibiotic and steroids, she has noticed green nasal discharge and blood again.  She reports that coughing has improved.      Past Medical History:  Non- contributory      Family History   Problem Relation Age of Onset    No Known Problems Mother     Alzheimer's disease Father      Current Outpatient Medications on File Prior to Visit   Medication Sig Dispense Refill    albuterol (PROVENTIL/VENTOLIN HFA) 90 mcg/actuation inhaler Inhale 2 puffs into the lungs every 6 (six) hours as needed for Wheezing. Rescue 6.7 g 11    amitriptyline (ELAVIL) 100 MG tablet Take 3 tablets (300 mg total) by mouth every evening. 270 tablet 1    azithromycin (ZITHROMAX Z-VENTURA) 250 MG tablet 500 mg on day 1 (two tablets) followed by 250 mg once daily on days 2-5 6 tablet 0    fluticasone propionate (FLONASE) 50 mcg/actuation nasal spray 2 sprays (100 mcg total) by Each Nostril route once daily. 16 g 11    folic acid (FOLVITE) 1 MG tablet TAKE 1 TABLET(1 MG) BY MOUTH EVERY DAY 30 tablet 4    ibuprofen (ADVIL,MOTRIN) 200 MG tablet Take 600  mg by mouth every 6 (six) hours as needed.      methotrexate 2.5 MG Tab TAKE 6 TABLETS(15 MG) BY MOUTH EVERY 7 DAYS 24 tablet 3    methylPREDNISolone (MEDROL DOSEPACK) 4 mg tablet use as directed 1 each 0    mupirocin (BACTROBAN) 2 % ointment Apply to affected area 3 times daily 22 g 1    losartan (COZAAR) 25 MG tablet Take 1 tablet (25 mg total) by mouth once daily. 90 tablet 1     No current facility-administered medications on file prior to visit.     Review of patient's allergies indicates:   Allergen Reactions    Pneumococcal vaccine Swelling    Hydrocodone-acetaminophen Nausea Only and Nausea And Vomiting    Meloxicam Rash    Sulfa (sulfonamide antibiotics) Rash       Environmental History: Cats, Dogs, Previous smoker  Review of Systems   Constitutional: Negative for chills and fever.   HENT: Negative for congestion, facial swelling, rhinorrhea and sinus pain.    Eyes: Negative for pain and discharge.   Respiratory: Positive for cough and shortness of breath.    Cardiovascular: Negative for chest pain and leg swelling.   Gastrointestinal: Negative for nausea and vomiting.   Endocrine: Negative for cold intolerance.   Skin: Negative for rash and wound.   Allergic/Immunologic: Negative for environmental allergies and immunocompromised state.   Neurological: Negative for dizziness and speech difficulty.   Hematological: Negative for adenopathy. Does not bruise/bleed easily.   Psychiatric/Behavioral: Negative for behavioral problems and suicidal ideas.        Objective:    Physical Exam  Vitals reviewed.   Constitutional:       General: She is not in acute distress.     Appearance: Normal appearance. She is well-developed. She is not ill-appearing, toxic-appearing or diaphoretic.   HENT:      Head: Normocephalic and atraumatic.      Right Ear: Tympanic membrane, ear canal and external ear normal. There is no impacted cerumen.      Left Ear: Tympanic membrane, ear canal and external ear normal. There is no  impacted cerumen.      Nose: Nose normal. No congestion or rhinorrhea.      Mouth/Throat:      Pharynx: No oropharyngeal exudate or posterior oropharyngeal erythema.   Eyes:      General: No scleral icterus.        Right eye: No discharge.         Left eye: No discharge.      Pupils: Pupils are equal, round, and reactive to light.   Neck:      Thyroid: No thyromegaly.   Cardiovascular:      Rate and Rhythm: Normal rate and regular rhythm.      Heart sounds: Normal heart sounds. No murmur heard.    No friction rub. No gallop.   Pulmonary:      Effort: Pulmonary effort is normal. No respiratory distress.      Breath sounds: Normal breath sounds. No stridor. No wheezing, rhonchi or rales.   Abdominal:      Palpations: Abdomen is soft.   Musculoskeletal:         General: No swelling, tenderness, deformity or signs of injury. Normal range of motion.      Cervical back: Normal range of motion and neck supple. No rigidity or tenderness.      Right lower leg: No edema.      Left lower leg: No edema.   Lymphadenopathy:      Cervical: No cervical adenopathy.   Skin:     General: Skin is warm.      Coloration: Skin is not pale.      Findings: No erythema, lesion or rash.   Neurological:      Mental Status: She is alert and oriented to person, place, and time.      Gait: Gait normal.   Psychiatric:         Mood and Affect: Mood normal.         Behavior: Behavior normal.         Thought Content: Thought content normal.         Judgment: Judgment normal.       Allergy inhalant testing- negative to common inhalants over a year ago.    Assessment:       1. Recurrent sinus infections    2. ILD (interstitial lung disease)    3. Seropositive rheumatoid arthritis         Plan:       Recurrent sinus infections  -     CBC Auto Differential; Future; Expected date: 03/15/2022  -     IgG 1, 2, 3, and 4; Future; Expected date: 03/15/2022  -     IgG; Future; Expected date: 03/15/2022  -     IgA; Future; Expected date: 03/15/2022  -     IgM;  Future; Expected date: 03/15/2022  -     Haemophilius influenzae B Ab IgG; Future; Expected date: 03/15/2022  -     S.pneumoniae IgG Serotypes; Future; Expected date: 03/15/2022  -     Tetanus Toxoid, IgG; Future; Expected date: 03/15/2022  -     Lymphocyte Profile II; Future; Expected date: 03/15/2022    ILD (interstitial lung disease)    Seropositive rheumatoid arthritis      Allergy testing to common inhalants- negative in 2020  Will check immune system.    RTC 4-6 weeks    YAMILET VICENTE spent a total of 30 minutes on the day of the visit.  This includes face to face time and non-face to face time preparing to see the patient (eg, review of tests), obtaining and/or reviewing separately obtained history, documenting clinical information in the electronic or other health record, independently interpreting results and communicating results to the patient/family/caregiver, or care coordinator.

## 2022-03-16 LAB
CD3+CD4+ CELLS # BLD: 603 CELLS/UL (ref 300–1400)
CD3+CD4+ CELLS NFR BLD: 44.9 % (ref 28–57)
LYMPHOCYTES NFR CSF MANUAL: 1.33 % (ref 0.9–3.6)
LYMPHOCYTES NFR CSF MANUAL: 1041 CELLS/UL (ref 700–2100)
LYMPHOCYTES NFR CSF MANUAL: 15.4 % (ref 6–19)
LYMPHOCYTES NFR CSF MANUAL: 230 CELLS/UL (ref 100–500)
LYMPHOCYTES NFR CSF MANUAL: 33.8 % (ref 10–39)
LYMPHOCYTES NFR CSF MANUAL: 454 CELLS/UL (ref 200–900)
LYMPHOCYTES NFR CSF MANUAL: 6 % (ref 7–31)
LYMPHOCYTES NFR CSF MANUAL: 77.5 % (ref 55–83)
LYMPHOCYTES NFR CSF MANUAL: 90 CELLS/UL (ref 90–600)

## 2022-03-17 LAB
TETANUS TOXOID IGG AB: POSITIVE
TETANUS TOXOID IGG VALUE: 2 IU/ML

## 2022-03-18 LAB
HAEM INFLU B IGG SER IA-MCNC: 0.3 MG/L
IGG1 SER-MCNC: 623 MG/DL (ref 382–929)
IGG2 SER-MCNC: 614 MG/DL (ref 242–700)
IGG3 SER-MCNC: 42 MG/DL (ref 22–176)
IGG4 SER-MCNC: 50 MG/DL (ref 4–86)

## 2022-03-20 ENCOUNTER — LAB VISIT (OUTPATIENT)
Dept: PRIMARY CARE CLINIC | Facility: CLINIC | Age: 67
End: 2022-03-20
Payer: MEDICARE

## 2022-03-20 DIAGNOSIS — J84.9 ILD (INTERSTITIAL LUNG DISEASE): ICD-10-CM

## 2022-03-20 PROCEDURE — U0005 INFEC AGEN DETEC AMPLI PROBE: HCPCS | Performed by: INTERNAL MEDICINE

## 2022-03-20 PROCEDURE — U0003 INFECTIOUS AGENT DETECTION BY NUCLEIC ACID (DNA OR RNA); SEVERE ACUTE RESPIRATORY SYNDROME CORONAVIRUS 2 (SARS-COV-2) (CORONAVIRUS DISEASE [COVID-19]), AMPLIFIED PROBE TECHNIQUE, MAKING USE OF HIGH THROUGHPUT TECHNOLOGIES AS DESCRIBED BY CMS-2020-01-R: HCPCS | Performed by: INTERNAL MEDICINE

## 2022-03-21 LAB
SARS-COV-2 RNA RESP QL NAA+PROBE: NOT DETECTED
SARS-COV-2- CYCLE NUMBER: NORMAL

## 2022-03-22 LAB
DEPRECATED S PNEUM12 IGG SER-MCNC: >30 UG/ML
DEPRECATED S PNEUM23 IGG SER-MCNC: 37.1 UG/ML
DEPRECATED S PNEUM4 IGG SER-MCNC: >28 UG/ML
DEPRECATED S PNEUM8 IGG SER-MCNC: 8.4 UG/ML
DEPRECATED S PNEUM9 IGG SER-MCNC: 6.8 UG/ML
S PN DA SERO 19F IGG SER-MCNC: 129 UG/ML
S PNEUM DA 1 IGG SER-MCNC: 36.3 UG/ML
S PNEUM DA 14 IGG SER-MCNC: 56
S PNEUM DA 18C IGG SER-MCNC: 37.1
S PNEUM DA 3 IGG SER-MCNC: 11.5 UG/ML
S PNEUM DA 5 IGG SER-MCNC: 137 UG/ML
S PNEUM DA 6B IGG SER-MCNC: 76.3 UG/ML
S PNEUM DA 7F IGG SER-MCNC: 29.3 UG/ML
S PNEUM DA 9V IGG SER-MCNC: 13.4 UG/ML

## 2022-03-23 ENCOUNTER — CLINICAL SUPPORT (OUTPATIENT)
Dept: PULMONOLOGY | Facility: CLINIC | Age: 67
End: 2022-03-23
Payer: MEDICARE

## 2022-03-23 ENCOUNTER — TELEPHONE (OUTPATIENT)
Dept: PULMONOLOGY | Facility: CLINIC | Age: 67
End: 2022-03-23
Payer: MEDICARE

## 2022-03-23 ENCOUNTER — HOSPITAL ENCOUNTER (OUTPATIENT)
Dept: RADIOLOGY | Facility: HOSPITAL | Age: 67
Discharge: HOME | End: 2022-03-23
Attending: INTERNAL MEDICINE
Payer: MEDICARE

## 2022-03-23 DIAGNOSIS — J84.9 INTERSTITIAL PULMONARY DISEASE, UNSPECIFIED: ICD-10-CM

## 2022-03-23 DIAGNOSIS — Z79.899 HIGH RISK MEDICATION USE: ICD-10-CM

## 2022-03-23 DIAGNOSIS — M05.9 SEROPOSITIVE RHEUMATOID ARTHRITIS: ICD-10-CM

## 2022-03-23 DIAGNOSIS — M05.9 SEROPOSITIVE RHEUMATOID ARTHRITIS: Primary | ICD-10-CM

## 2022-03-23 LAB
BRPFT: NORMAL
DLCO ADJ PRE: 14.51 ML/(MIN*MMHG)
DLCO SINGLE BREATH LLN: 17.66
DLCO SINGLE BREATH PRE REF: 60.5 %
DLCO SINGLE BREATH REF: 23.39
DLCOC SBVA LLN: 2.99
DLCOC SBVA PRE REF: 97 %
DLCOC SBVA REF: 4.31
DLCOC SINGLE BREATH LLN: 17.66
DLCOC SINGLE BREATH PRE REF: 62 %
DLCOC SINGLE BREATH REF: 23.39
DLCOVA LLN: 2.99
DLCOVA PRE REF: 94.5 %
DLCOVA PRE: 4.07 ML/(MIN*MMHG*L)
DLCOVA REF: 4.31
DLVAADJ PRE: 4.18 ML/(MIN*MMHG*L)
FEF 25 75 CHG: 14.7 %
FEF 25 75 LLN: 0.98
FEF 25 75 POST REF: 90.9 %
FEF 25 75 PRE REF: 79.2 %
FEF 25 75 REF: 2.07
FET100 CHG: -4.4 %
FEV1 CHG: 0.2 %
FEV1 FVC CHG: 3.8 %
FEV1 FVC LLN: 65
FEV1 FVC POST REF: 100 %
FEV1 FVC PRE REF: 96.3 %
FEV1 FVC REF: 78
FEV1 LLN: 1.83
FEV1 POST REF: 83.7 %
FEV1 PRE REF: 83.5 %
FEV1 REF: 2.49
FVC CHG: -3.5 %
FVC LLN: 2.37
FVC POST REF: 83 %
FVC PRE REF: 85.9 %
FVC REF: 3.22
IVC PRE: 2.37 L
IVC SINGLE BREATH LLN: 2.37
IVC SINGLE BREATH PRE REF: 73.4 %
IVC SINGLE BREATH REF: 3.22
PEF CHG: -3.1 %
PEF LLN: 4.38
PEF POST REF: 75.1 %
PEF PRE REF: 77.6 %
PEF REF: 6.25
POST FEF 25 75: 1.88 L/S
POST FET 100: 7.74 SEC
POST FEV1 FVC: 78.01 %
POST FEV1: 2.09 L
POST FVC: 2.67 L
POST PEF: 4.7 L/S
PRE DLCO: 14.14 ML/(MIN*MMHG)
PRE FEF 25 75: 1.64 L/S
PRE FET 100: 8.09 SEC
PRE FEV1 FVC: 75.13 %
PRE FEV1: 2.08 L
PRE FVC: 2.77 L
PRE PEF: 4.85 L/S
VA PRE: 3.48 L
VA SINGLE BREATH LLN: 5.28
VA SINGLE BREATH PRE REF: 65.9 %
VA SINGLE BREATH REF: 5.28

## 2022-03-23 PROCEDURE — 71250 CT THORAX DX C-: CPT | Mod: 26,,, | Performed by: RADIOLOGY

## 2022-03-23 PROCEDURE — 94729 PR C02/MEMBANE DIFFUSE CAPACITY: ICD-10-PCS | Mod: S$GLB,,, | Performed by: INTERNAL MEDICINE

## 2022-03-23 PROCEDURE — 94060 EVALUATION OF WHEEZING: CPT | Mod: S$GLB,,, | Performed by: INTERNAL MEDICINE

## 2022-03-23 PROCEDURE — 94729 DIFFUSING CAPACITY: CPT | Mod: S$GLB,,, | Performed by: INTERNAL MEDICINE

## 2022-03-23 PROCEDURE — 71250 CT THORAX DX C-: CPT | Mod: TC

## 2022-03-23 PROCEDURE — 71250 CT CHEST WITHOUT CONTRAST: ICD-10-PCS | Mod: 26,,, | Performed by: RADIOLOGY

## 2022-03-23 PROCEDURE — 94060 PR EVAL OF BRONCHOSPASM: ICD-10-PCS | Mod: S$GLB,,, | Performed by: INTERNAL MEDICINE

## 2022-03-24 ENCOUNTER — TELEPHONE (OUTPATIENT)
Dept: RHEUMATOLOGY | Facility: CLINIC | Age: 67
End: 2022-03-24
Payer: MEDICARE

## 2022-03-24 NOTE — TELEPHONE ENCOUNTER
Spoke to patient and informed her that the lab had contacted the clinic last night and stated that they did not have enough blood collected to process the TB Gold test to be processed.  Scheduled patient to have lab drawn again tomorrow at the [facility]

## 2022-03-25 ENCOUNTER — LAB VISIT (OUTPATIENT)
Dept: LAB | Facility: HOSPITAL | Age: 67
End: 2022-03-25
Attending: INTERNAL MEDICINE
Payer: MEDICARE

## 2022-03-25 DIAGNOSIS — M05.9 SEROPOSITIVE RHEUMATOID ARTHRITIS: ICD-10-CM

## 2022-03-25 DIAGNOSIS — Z79.899 HIGH RISK MEDICATION USE: ICD-10-CM

## 2022-03-25 DIAGNOSIS — I10 HYPERTENSION, UNSPECIFIED TYPE: ICD-10-CM

## 2022-03-25 PROCEDURE — 86480 TB TEST CELL IMMUN MEASURE: CPT | Performed by: INTERNAL MEDICINE

## 2022-03-25 RX ORDER — LOSARTAN POTASSIUM 25 MG/1
TABLET ORAL
Qty: 90 TABLET | Refills: 1 | Status: SHIPPED | OUTPATIENT
Start: 2022-03-25 | End: 2022-03-29 | Stop reason: SDUPTHER

## 2022-03-25 NOTE — TELEPHONE ENCOUNTER
No new care gaps identified.  Powered by [employer]. Reference number: [id number].   3/25/2022 3:15:15 AM CDT

## 2022-03-25 NOTE — TELEPHONE ENCOUNTER
This Rx Request does not qualify for assessment with the [facility]   Please review protocol details and the Care Due Message for extra clinical information    Reasons Rx Request may be deferred:  Patient has been seen in the ED/Hospital since the last PCP visit    Note composed:10:56 AM 03/25/2022

## 2022-03-28 LAB
GAMMA INTERFERON BACKGROUND BLD IA-ACNC: 0.07 IU/ML
M TB IFN-G CD4+ BCKGRND COR BLD-ACNC: 0 IU/ML
MITOGEN IGNF BCKGRD COR BLD-ACNC: 9.93 IU/ML
TB GOLD PLUS: NEGATIVE
TB2 - NIL: -0.02 IU/ML

## 2022-03-29 ENCOUNTER — OFFICE VISIT (OUTPATIENT)
Dept: INTERNAL MEDICINE | Facility: CLINIC | Age: 67
End: 2022-03-29
Payer: MEDICARE

## 2022-03-29 VITALS
TEMPERATURE: 99 F | DIASTOLIC BLOOD PRESSURE: 72 MMHG | HEIGHT: 68 IN | RESPIRATION RATE: 18 BRPM | OXYGEN SATURATION: 96 % | HEART RATE: 73 BPM | BODY MASS INDEX: 25.2 KG/M2 | SYSTOLIC BLOOD PRESSURE: 122 MMHG | WEIGHT: 166.25 LBS

## 2022-03-29 DIAGNOSIS — F41.9 ANXIETY: ICD-10-CM

## 2022-03-29 DIAGNOSIS — N94.9 VAGINAL DISCOMFORT: ICD-10-CM

## 2022-03-29 DIAGNOSIS — I10 PRIMARY HYPERTENSION: Primary | ICD-10-CM

## 2022-03-29 DIAGNOSIS — F33.41 RECURRENT MAJOR DEPRESSIVE DISORDER, IN PARTIAL REMISSION: ICD-10-CM

## 2022-03-29 DIAGNOSIS — D84.821 IMMUNODEFICIENCY DUE TO DRUGS (CODE): ICD-10-CM

## 2022-03-29 DIAGNOSIS — I10 HYPERTENSION, UNSPECIFIED TYPE: ICD-10-CM

## 2022-03-29 DIAGNOSIS — J84.9 ILD (INTERSTITIAL LUNG DISEASE): ICD-10-CM

## 2022-03-29 DIAGNOSIS — M85.89 OSTEOPENIA OF MULTIPLE SITES: ICD-10-CM

## 2022-03-29 DIAGNOSIS — L30.8 OTHER ECZEMA: ICD-10-CM

## 2022-03-29 PROCEDURE — 3074F SYST BP LT 130 MM HG: CPT | Mod: CPTII,S$GLB,, | Performed by: INTERNAL MEDICINE

## 2022-03-29 PROCEDURE — 1101F PT FALLS ASSESS-DOCD LE1/YR: CPT | Mod: CPTII,S$GLB,, | Performed by: INTERNAL MEDICINE

## 2022-03-29 PROCEDURE — 99999 PR PBB SHADOW E&M-EST. PATIENT-LVL IV: CPT | Mod: PBBFAC,,, | Performed by: INTERNAL MEDICINE

## 2022-03-29 PROCEDURE — 99214 OFFICE O/P EST MOD 30 MIN: CPT | Mod: S$GLB,,, | Performed by: INTERNAL MEDICINE

## 2022-03-29 PROCEDURE — 3008F PR BODY MASS INDEX (BMI) DOCUMENTED: ICD-10-PCS | Mod: CPTII,S$GLB,, | Performed by: INTERNAL MEDICINE

## 2022-03-29 PROCEDURE — 99999 PR PBB SHADOW E&M-EST. PATIENT-LVL IV: ICD-10-PCS | Mod: PBBFAC,,, | Performed by: INTERNAL MEDICINE

## 2022-03-29 PROCEDURE — 1101F PR PT FALLS ASSESS DOC 0-1 FALLS W/OUT INJ PAST YR: ICD-10-PCS | Mod: CPTII,S$GLB,, | Performed by: INTERNAL MEDICINE

## 2022-03-29 PROCEDURE — 3078F DIAST BP <80 MM HG: CPT | Mod: CPTII,S$GLB,, | Performed by: INTERNAL MEDICINE

## 2022-03-29 PROCEDURE — 99499 UNLISTED E&M SERVICE: CPT | Mod: S$GLB,,, | Performed by: INTERNAL MEDICINE

## 2022-03-29 PROCEDURE — 99499 RISK ADDL DX/OHS AUDIT: ICD-10-PCS | Mod: S$GLB,,, | Performed by: INTERNAL MEDICINE

## 2022-03-29 PROCEDURE — 4010F PR ACE/ARB THEARPY RXD/TAKEN: ICD-10-PCS | Mod: CPTII,S$GLB,, | Performed by: INTERNAL MEDICINE

## 2022-03-29 PROCEDURE — 3288F PR FALLS RISK ASSESSMENT DOCUMENTED: ICD-10-PCS | Mod: CPTII,S$GLB,, | Performed by: INTERNAL MEDICINE

## 2022-03-29 PROCEDURE — 3074F PR MOST RECENT SYSTOLIC BLOOD PRESSURE < 130 MM HG: ICD-10-PCS | Mod: CPTII,S$GLB,, | Performed by: INTERNAL MEDICINE

## 2022-03-29 PROCEDURE — 3008F BODY MASS INDEX DOCD: CPT | Mod: CPTII,S$GLB,, | Performed by: INTERNAL MEDICINE

## 2022-03-29 PROCEDURE — 3288F FALL RISK ASSESSMENT DOCD: CPT | Mod: CPTII,S$GLB,, | Performed by: INTERNAL MEDICINE

## 2022-03-29 PROCEDURE — 1125F AMNT PAIN NOTED PAIN PRSNT: CPT | Mod: CPTII,S$GLB,, | Performed by: INTERNAL MEDICINE

## 2022-03-29 PROCEDURE — 1125F PR PAIN SEVERITY QUANTIFIED, PAIN PRESENT: ICD-10-PCS | Mod: CPTII,S$GLB,, | Performed by: INTERNAL MEDICINE

## 2022-03-29 PROCEDURE — 99214 PR OFFICE/OUTPT VISIT, EST, LEVL IV, 30-39 MIN: ICD-10-PCS | Mod: S$GLB,,, | Performed by: INTERNAL MEDICINE

## 2022-03-29 PROCEDURE — 4010F ACE/ARB THERAPY RXD/TAKEN: CPT | Mod: CPTII,S$GLB,, | Performed by: INTERNAL MEDICINE

## 2022-03-29 PROCEDURE — 3078F PR MOST RECENT DIASTOLIC BLOOD PRESSURE < 80 MM HG: ICD-10-PCS | Mod: CPTII,S$GLB,, | Performed by: INTERNAL MEDICINE

## 2022-03-29 RX ORDER — AMITRIPTYLINE HYDROCHLORIDE 100 MG/1
300 TABLET ORAL NIGHTLY
Qty: 270 TABLET | Refills: 1 | Status: SHIPPED | OUTPATIENT
Start: 2022-03-29 | End: 2022-08-08

## 2022-03-29 RX ORDER — LOSARTAN POTASSIUM 25 MG/1
25 TABLET ORAL DAILY
Qty: 90 TABLET | Refills: 1 | Status: SHIPPED | OUTPATIENT
Start: 2022-03-29 | End: 2022-09-27

## 2022-03-29 RX ORDER — TRIAMCINOLONE ACETONIDE 1 MG/G
OINTMENT TOPICAL 2 TIMES DAILY
Qty: 30 G | Refills: 0 | Status: SHIPPED | OUTPATIENT
Start: 2022-03-29 | End: 2022-11-01

## 2022-03-29 NOTE — PROGRESS NOTES
"Subjective:      Patient ID: Caitlin Ahmadi is a 67 y.o. female.    Chief Complaint: No chief complaint on file.    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  Compliant with meds without significant side effects. Energy and appetite good.     Pt also c/o vaginal pain. No d/c.     Pt also reports rash to left ear for several days. Cleaning with peroxide and bactroban. Slowly improving.     Review of Systems   Constitutional: Negative for chills and fever.   Respiratory: Negative for cough.    Cardiovascular: Negative for chest pain.   Gastrointestinal: Negative for abdominal pain.     Objective:   /72 (BP Location: Left arm, Patient Position: Sitting, BP Method: Small (Manual))   Pulse 73   Temp 99.2 °F (37.3 °C) (Tympanic)   Resp 18   Ht 5' 8" (1.727 m)   Wt 75.4 kg (166 lb 3.6 oz)   SpO2 96%   BMI 25.27 kg/m²     Physical Exam  Constitutional:       General: She is not in acute distress.     Appearance: She is well-developed.   Cardiovascular:      Rate and Rhythm: Normal rate.   Pulmonary:      Effort: Pulmonary effort is normal.   Skin:     General: Skin is warm and dry.   Neurological:      Mental Status: She is alert.   Psychiatric:         Behavior: Behavior normal.     left pinna with mult small areas of erythematous dry peeling lesion.  No vesicles or blisters.  No discharge.  No tenderness.    Assessment:     1. " Primary hypertension    2. Anxiety    3. ILD (interstitial lung disease)    4. Recurrent major depressive disorder, in partial remission    5. Hypertension, unspecified type    6. Osteopenia of multiple sites    7. Other eczema    8. Vaginal discomfort    9. Immunodeficiency due to drugs (CODE)      Plan:   Primary hypertension  controlled  -     Lipid Panel; Future; Expected date: 09/30/2022  -     Comprehensive Metabolic Panel; Future; Expected date: 09/30/2022  -     CBC Auto Differential; Future; Expected date: 09/30/2022  -     TSH; Future; Expected date: 09/30/2022    Anxiety  stable  ILD (interstitial lung disease)  As per pulmonary   Recurrent major depressive disorder, in partial remission  stable  -     amitriptyline (ELAVIL) 100 MG tablet; Take 3 tablets (300 mg total) by mouth every evening.  Dispense: 270 tablet; Refill: 1    Hypertension, unspecified type   controlled  -     losartan (COZAAR) 25 MG tablet; Take 1 tablet (25 mg total) by mouth once daily.  Dispense: 90 tablet; Refill: 1    Osteopenia of multiple sites    Other eczema  -     triamcinolone acetonide 0.1% (KENALOG) 0.1 % ointment; Apply topically 2 (two) times daily.  Dispense: 30 g; Refill: 0    Vaginal discomfort  -     Ambulatory referral/consult to Gynecology; Future; Expected date: 04/05/2022  -     Urinalysis, Reflex to Urine Culture Urine, Clean Catch; Future; Expected date: 03/29/2022    Immunodeficiency due to drugs (CODE)        Lab Frequency Next Occurrence   Mammo Digital Screening Bilat w/ Mathew Once 05/13/2021   Complete PFT with bronchodilator Once 06/07/2021   Protein electrophoresis, serum     Immunofixation Electrophoresis     CBC Auto Differential     Comprehensive Metabolic Panel     C-Reactive Protein     Sedimentation rate     Comprehensive Metabolic Panel     CBC Auto Differential     C-Reactive Protein     Sedimentation rate         Problem List Items Addressed This Visit     Recurrent major depressive disorder, in  partial remission    Overview     Started amitriptyline in her 30s. 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 Has gradually increased amitriptyline over past several years. Self increased from 250 to 300 during covid. On 250mg for 2 to 3 years.            Relevant Medications    amitriptyline (ELAVIL) 100 MG tablet    Osteopenia of multiple sites    Overview     Low fx risk on dxa 7/2020           Current Assessment & Plan     Up-to-date with DEXA scan.           ILD (interstitial lung disease)    Anxiety    Primary hypertension - Primary    Relevant Orders    Lipid Panel    Comprehensive Metabolic Panel    CBC Auto Differential    TSH    Immunodeficiency due to drugs (CODE)    Current Assessment & Plan     stable             Other Visit Diagnoses     Hypertension, unspecified type        Relevant Medications    losartan (COZAAR) 25 MG tablet    Other eczema        Relevant Medications    triamcinolone acetonide 0.1% (KENALOG) 0.1 % ointment    Vaginal discomfort        Relevant Orders    Ambulatory referral/consult to Gynecology    Urinalysis, Reflex to Urine Culture Urine, Clean Catch          Follow up in about 6 months (around 9/29/2022), or if symptoms worsen or fail to improve.

## 2022-03-30 ENCOUNTER — PATIENT OUTREACH (OUTPATIENT)
Dept: ADMINISTRATIVE | Facility: OTHER | Age: 67
End: 2022-03-30
Payer: MEDICARE

## 2022-03-30 ENCOUNTER — TELEPHONE (OUTPATIENT)
Dept: RHEUMATOLOGY | Facility: CLINIC | Age: 67
End: 2022-03-30
Payer: MEDICARE

## 2022-03-30 NOTE — PROGRESS NOTES
Health Maintenance Due   Topic Date Due    Shingles Vaccine (1 of 2) Never done    Mammogram  07/10/2021    COVID-19 Vaccine (3 - Booster for Pfizer series) 09/03/2021     Updates were requested from care everywhere.  Chart was reviewed for overdue Proactive [facility] Encounters ([facility]) topics (CRS, Breast Cancer Screening, Eye exam)  Health Maintenance has been updated.  LINKS immunization registry triggered.  Immunizations were reconciled.

## 2022-03-31 ENCOUNTER — OFFICE VISIT (OUTPATIENT)
Dept: RHEUMATOLOGY | Facility: CLINIC | Age: 67
End: 2022-03-31
Payer: MEDICARE

## 2022-03-31 VITALS
HEIGHT: 68 IN | WEIGHT: 167.56 LBS | HEART RATE: 95 BPM | DIASTOLIC BLOOD PRESSURE: 65 MMHG | BODY MASS INDEX: 25.39 KG/M2 | SYSTOLIC BLOOD PRESSURE: 117 MMHG

## 2022-03-31 DIAGNOSIS — M05.9 SEROPOSITIVE RHEUMATOID ARTHRITIS: Primary | ICD-10-CM

## 2022-03-31 DIAGNOSIS — Z71.89 COUNSELING ON HEALTH PROMOTION AND DISEASE PREVENTION: ICD-10-CM

## 2022-03-31 DIAGNOSIS — Z79.60 LONG-TERM USE OF IMMUNOSUPPRESSANT MEDICATION: ICD-10-CM

## 2022-03-31 PROCEDURE — 4010F PR ACE/ARB THEARPY RXD/TAKEN: ICD-10-PCS | Mod: CPTII,S$GLB,, | Performed by: INTERNAL MEDICINE

## 2022-03-31 PROCEDURE — 4010F ACE/ARB THERAPY RXD/TAKEN: CPT | Mod: CPTII,S$GLB,, | Performed by: INTERNAL MEDICINE

## 2022-03-31 PROCEDURE — 1125F PR PAIN SEVERITY QUANTIFIED, PAIN PRESENT: ICD-10-PCS | Mod: CPTII,S$GLB,, | Performed by: INTERNAL MEDICINE

## 2022-03-31 PROCEDURE — 99214 PR OFFICE/OUTPT VISIT, EST, LEVL IV, 30-39 MIN: ICD-10-PCS | Mod: S$GLB,,, | Performed by: INTERNAL MEDICINE

## 2022-03-31 PROCEDURE — 1159F PR MEDICATION LIST DOCUMENTED IN MEDICAL RECORD: ICD-10-PCS | Mod: CPTII,S$GLB,, | Performed by: INTERNAL MEDICINE

## 2022-03-31 PROCEDURE — 99999 PR PBB SHADOW E&M-EST. PATIENT-LVL III: CPT | Mod: PBBFAC,,, | Performed by: INTERNAL MEDICINE

## 2022-03-31 PROCEDURE — 99214 OFFICE O/P EST MOD 30 MIN: CPT | Mod: S$GLB,,, | Performed by: INTERNAL MEDICINE

## 2022-03-31 PROCEDURE — 1101F PR PT FALLS ASSESS DOC 0-1 FALLS W/OUT INJ PAST YR: ICD-10-PCS | Mod: CPTII,S$GLB,, | Performed by: INTERNAL MEDICINE

## 2022-03-31 PROCEDURE — 3078F PR MOST RECENT DIASTOLIC BLOOD PRESSURE < 80 MM HG: ICD-10-PCS | Mod: CPTII,S$GLB,, | Performed by: INTERNAL MEDICINE

## 2022-03-31 PROCEDURE — 3074F PR MOST RECENT SYSTOLIC BLOOD PRESSURE < 130 MM HG: ICD-10-PCS | Mod: CPTII,S$GLB,, | Performed by: INTERNAL MEDICINE

## 2022-03-31 PROCEDURE — 3288F PR FALLS RISK ASSESSMENT DOCUMENTED: ICD-10-PCS | Mod: CPTII,S$GLB,, | Performed by: INTERNAL MEDICINE

## 2022-03-31 PROCEDURE — 1125F AMNT PAIN NOTED PAIN PRSNT: CPT | Mod: CPTII,S$GLB,, | Performed by: INTERNAL MEDICINE

## 2022-03-31 PROCEDURE — 1101F PT FALLS ASSESS-DOCD LE1/YR: CPT | Mod: CPTII,S$GLB,, | Performed by: INTERNAL MEDICINE

## 2022-03-31 PROCEDURE — 3074F SYST BP LT 130 MM HG: CPT | Mod: CPTII,S$GLB,, | Performed by: INTERNAL MEDICINE

## 2022-03-31 PROCEDURE — 3288F FALL RISK ASSESSMENT DOCD: CPT | Mod: CPTII,S$GLB,, | Performed by: INTERNAL MEDICINE

## 2022-03-31 PROCEDURE — 3008F BODY MASS INDEX DOCD: CPT | Mod: CPTII,S$GLB,, | Performed by: INTERNAL MEDICINE

## 2022-03-31 PROCEDURE — 3078F DIAST BP <80 MM HG: CPT | Mod: CPTII,S$GLB,, | Performed by: INTERNAL MEDICINE

## 2022-03-31 PROCEDURE — 3008F PR BODY MASS INDEX (BMI) DOCUMENTED: ICD-10-PCS | Mod: CPTII,S$GLB,, | Performed by: INTERNAL MEDICINE

## 2022-03-31 PROCEDURE — 99999 PR PBB SHADOW E&M-EST. PATIENT-LVL III: ICD-10-PCS | Mod: PBBFAC,,, | Performed by: INTERNAL MEDICINE

## 2022-03-31 PROCEDURE — 1159F MED LIST DOCD IN RCRD: CPT | Mod: CPTII,S$GLB,, | Performed by: INTERNAL MEDICINE

## 2022-03-31 NOTE — PROGRESS NOTES
RHEUMATOLOGY OUTPATIENT CLINIC NOTE    3/31/2022    Attending Rheumatologist: Akhil Hernadez  Primary Care Provider/Physician Requesting Consultation: Amol Steve MD   Chief Complaint/Reason For Consultation:  Rheumatoid Arthritis      Subjective:     Caitlin Ahmadi is a 67 y.o. White female with seropositive rheumatoid arthritis for follow-up visit.    No acute complaints reporting doing well.  Denies interference with activities of daily living from pain or stiffness.  Adherence with methotrexate 15 mg once per week with daily folic acid without side effects.  Denies active respiratory symptoms.    Addendum 4/29:  Message received regarding guidance on antidepressant that might help with chronic pain.  Consider trial of Savella to replace Elavil if amenable by mental health provider (potential interaction with combination therapy).      Review of Systems   Constitutional: Negative for fever and malaise/fatigue.   Eyes: Negative for photophobia and pain.   Respiratory: Negative for cough and shortness of breath.    Cardiovascular: Negative for leg swelling.   Musculoskeletal: Negative for falls and joint pain.   Skin: Negative for rash.   Neurological: Negative for focal weakness.       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    Cataract     Encounter for blood transfusion     Hypertension     PONV (postoperative nausea and vomiting)     Seropositive rheumatoid arthritis 2/17/2022     Past Surgical History:   Procedure Laterality Date    ADENOIDECTOMY      CATARACT EXTRACTION W/  INTRAOCULAR LENS IMPLANT Left 12/09/2020    CATARACT EXTRACTION W/  INTRAOCULAR LENS IMPLANT      CHOLECYSTECTOMY      COLONOSCOPY N/A 10/13/2021    Procedure: COLONOSCOPY;  Surgeon: Elissa Yoon MD;  [facility];  Service: Endoscopy;  Laterality: N/A;    HERNIA REPAIR       HYSTERECTOMY      TONSILLECTOMY      TOTAL KNEE ARTHROPLASTY Bilateral      Family History   Problem Relation Age of Onset    No Known Problems Mother     Alzheimer's disease Father      Social History     Substance and Sexual Activity   Alcohol Use Yes    Alcohol/week: 5.0 standard drinks    Types: 5 Glasses of wine per week     Social History     Tobacco Use   Smoking Status Former Smoker    Packs/day: 1.00    Years: 20.00    Pack years: 20.00    Types: Cigarettes    Start date: 1970    Quit date: 1990    Years since quittin.8   Smokeless Tobacco Never Used     Social History     Substance and Sexual Activity   Drug Use Never       Current Outpatient Medications:     albuterol (PROVENTIL/VENTOLIN HFA) 90 mcg/actuation inhaler, Inhale 2 puffs into the lungs every 6 (six) hours as needed for Wheezing. Rescue, Disp: 6.7 g, Rfl: 11    amitriptyline (ELAVIL) 100 MG tablet, Take 3 tablets (300 mg total) by mouth every evening., Disp: 270 tablet, Rfl: 1    fluticasone propionate (FLONASE) 50 mcg/actuation nasal spray, 2 sprays (100 mcg total) by Each Nostril route once daily., Disp: 16 g, Rfl: 11    folic acid (FOLVITE) 1 MG tablet, TAKE 1 TABLET(1 MG) BY MOUTH EVERY DAY, Disp: 30 tablet, Rfl: 4    ibuprofen (ADVIL,MOTRIN) 200 MG tablet, Take 600 mg by mouth every 6 (six) hours as needed., Disp: , Rfl:     losartan (COZAAR) 25 MG tablet, Take 1 tablet (25 mg total) by mouth once daily., Disp: 90 tablet, Rfl: 1    methotrexate 2.5 MG Tab, TAKE 6 TABLETS(15 MG) BY MOUTH EVERY 7 DAYS, Disp: 24 tablet, Rfl: 3    mupirocin (BACTROBAN) 2 % ointment, Apply to affected area 3 times daily, Disp: 22 g, Rfl: 1    triamcinolone acetonide 0.1% (KENALOG) 0.1 % ointment, Apply topically 2 (two) times daily., Disp: 30 g, Rfl: 0    azithromycin (ZITHROMAX Z-VENTURA) 250 MG tablet, 500 mg on day 1 (two tablets) followed by 250 mg once daily on days 2-5 (Patient not taking: Reported on 3/31/2022), Disp: 6  tablet, Rfl: 0    methylPREDNISolone (MEDROL DOSEPACK) 4 mg tablet, use as directed (Patient not taking: Reported on 3/31/2022), Disp: 1 each, Rfl: 0     Objective:     Vitals:    03/31/22 1308   BP: 117/65   Pulse: 95     Physical Exam   Pulmonary/Chest: Effort normal. No respiratory distress.   Musculoskeletal:         General: No swelling or tenderness. Normal range of motion.      Cervical back: Normal range of motion.   Neurological: She displays no weakness. Gait normal.       Reviewed available old and all outside pertinent medical records available.    All lab results personally reviewed and interpreted by me.  Lab Results   Component Value Date    WBC 5.41 03/23/2022    HGB 12.2 03/23/2022    HCT 37.2 03/23/2022    MCV 93 03/23/2022    RDW 14.2 03/23/2022     03/23/2022       Lab Results   Component Value Date     03/23/2022    K 3.9 03/23/2022     03/23/2022    CO2 23 03/23/2022     03/23/2022    BUN 13 03/23/2022    CALCIUM 9.1 03/23/2022    PROT 7.1 03/23/2022    ALBUMIN 3.7 03/23/2022    BILITOT 0.3 03/23/2022    AST 27 03/23/2022    ALKPHOS 85 03/23/2022    ALT 37 03/23/2022       Lab Results   Component Value Date    COLORU Yellow 06/17/2020    APPEARANCEUA Clear 06/17/2020    SPECGRAV 1.015 06/17/2020    PHUR 7.0 06/17/2020    PROTEINUA Negative 06/17/2020    KETONESU Negative 06/17/2020    LEUKOCYTESUR 2+ (A) 06/17/2020    NITRITE Positive (A) 06/17/2020       No results found for: PTH    No results found for: URICACID    Lab Results   Component Value Date    CRP 3.8 03/23/2022       Lab Results   Component Value Date    ANATITER 1:80 08/18/2020       No components found for: TSPOTTB,  QUANTIFERON     ASSESSMENT:     Seropositive rheumatoid arthritis with mild disease activity.  Refractory intermittent arthritis with mechanical pattern likely from osteoarthritis.  Currently without active synovitis on exam.  Continue current regimen of methotrexate 15 mg once per week with  daily folic acid unchanged.  Consider lowering dose on next encounter if remains clinically quiescent.  Update x-rays next year to determine radiographic progression of disease.  Repeat blood work prior next encounter to monitor for toxicity related to methotrexate use.    PLAN:     1. Seropositive rheumatoid arthritis    - CBC Auto Differential; Standing  - C-Reactive Protein; Standing  - Comprehensive Metabolic Panel; Standing  - Sedimentation rate; Standing    2. Counseling on health promotion and disease prevention      3. Long-term use of immunosuppressant medication          Follow up in about 6 months (around 9/30/2022).      Disclaimer: This note is prepared using voice recognition software and as such is likely to have errors and has not been proof read. Please contact me for questions.     Akhil Hernadez M.D.

## 2022-04-03 PROBLEM — D84.821 IMMUNODEFICIENCY DUE TO DRUGS (CODE): Status: ACTIVE | Noted: 2022-04-03

## 2022-04-06 ENCOUNTER — OFFICE VISIT (OUTPATIENT)
Dept: OBSTETRICS AND GYNECOLOGY | Facility: CLINIC | Age: 67
End: 2022-04-06
Payer: MEDICARE

## 2022-04-06 VITALS
DIASTOLIC BLOOD PRESSURE: 64 MMHG | BODY MASS INDEX: 24.91 KG/M2 | SYSTOLIC BLOOD PRESSURE: 122 MMHG | WEIGHT: 163.81 LBS

## 2022-04-06 DIAGNOSIS — N94.9 VAGINAL DISCOMFORT: ICD-10-CM

## 2022-04-06 DIAGNOSIS — N30.00 ACUTE CYSTITIS WITHOUT HEMATURIA: ICD-10-CM

## 2022-04-06 DIAGNOSIS — R30.0 DYSURIA: Primary | ICD-10-CM

## 2022-04-06 DIAGNOSIS — Z12.72 ENCOUNTER FOR PAPANICOLAOU SMEAR OF VAGINA: ICD-10-CM

## 2022-04-06 DIAGNOSIS — Z90.710 HISTORY OF HYSTERECTOMY FOR CANCER: ICD-10-CM

## 2022-04-06 PROCEDURE — 4010F ACE/ARB THERAPY RXD/TAKEN: CPT | Mod: CPTII,S$GLB,, | Performed by: NURSE PRACTITIONER

## 2022-04-06 PROCEDURE — 1126F PR PAIN SEVERITY QUANTIFIED, NO PAIN PRESENT: ICD-10-PCS | Mod: CPTII,S$GLB,, | Performed by: NURSE PRACTITIONER

## 2022-04-06 PROCEDURE — 88175 CYTOPATH C/V AUTO FLUID REDO: CPT | Performed by: NURSE PRACTITIONER

## 2022-04-06 PROCEDURE — 1101F PR PT FALLS ASSESS DOC 0-1 FALLS W/OUT INJ PAST YR: ICD-10-PCS | Mod: CPTII,S$GLB,, | Performed by: NURSE PRACTITIONER

## 2022-04-06 PROCEDURE — 99204 OFFICE O/P NEW MOD 45 MIN: CPT | Mod: S$GLB,,, | Performed by: NURSE PRACTITIONER

## 2022-04-06 PROCEDURE — 1160F RVW MEDS BY RX/DR IN RCRD: CPT | Mod: CPTII,S$GLB,, | Performed by: NURSE PRACTITIONER

## 2022-04-06 PROCEDURE — 1160F PR REVIEW ALL MEDS BY PRESCRIBER/CLIN PHARMACIST DOCUMENTED: ICD-10-PCS | Mod: CPTII,S$GLB,, | Performed by: NURSE PRACTITIONER

## 2022-04-06 PROCEDURE — 3288F PR FALLS RISK ASSESSMENT DOCUMENTED: ICD-10-PCS | Mod: CPTII,S$GLB,, | Performed by: NURSE PRACTITIONER

## 2022-04-06 PROCEDURE — 81002 URINALYSIS NONAUTO W/O SCOPE: CPT | Mod: S$GLB,,, | Performed by: NURSE PRACTITIONER

## 2022-04-06 PROCEDURE — 87086 URINE CULTURE/COLONY COUNT: CPT | Performed by: NURSE PRACTITIONER

## 2022-04-06 PROCEDURE — 3008F BODY MASS INDEX DOCD: CPT | Mod: CPTII,S$GLB,, | Performed by: NURSE PRACTITIONER

## 2022-04-06 PROCEDURE — 4010F PR ACE/ARB THEARPY RXD/TAKEN: ICD-10-PCS | Mod: CPTII,S$GLB,, | Performed by: NURSE PRACTITIONER

## 2022-04-06 PROCEDURE — 99999 PR PBB SHADOW E&M-EST. PATIENT-LVL III: CPT | Mod: PBBFAC,,, | Performed by: NURSE PRACTITIONER

## 2022-04-06 PROCEDURE — 87624 HPV HI-RISK TYP POOLED RSLT: CPT | Performed by: NURSE PRACTITIONER

## 2022-04-06 PROCEDURE — 1159F MED LIST DOCD IN RCRD: CPT | Mod: CPTII,S$GLB,, | Performed by: NURSE PRACTITIONER

## 2022-04-06 PROCEDURE — 3074F PR MOST RECENT SYSTOLIC BLOOD PRESSURE < 130 MM HG: ICD-10-PCS | Mod: CPTII,S$GLB,, | Performed by: NURSE PRACTITIONER

## 2022-04-06 PROCEDURE — 1101F PT FALLS ASSESS-DOCD LE1/YR: CPT | Mod: CPTII,S$GLB,, | Performed by: NURSE PRACTITIONER

## 2022-04-06 PROCEDURE — 1126F AMNT PAIN NOTED NONE PRSNT: CPT | Mod: CPTII,S$GLB,, | Performed by: NURSE PRACTITIONER

## 2022-04-06 PROCEDURE — 3074F SYST BP LT 130 MM HG: CPT | Mod: CPTII,S$GLB,, | Performed by: NURSE PRACTITIONER

## 2022-04-06 PROCEDURE — 99999 PR PBB SHADOW E&M-EST. PATIENT-LVL III: ICD-10-PCS | Mod: PBBFAC,,, | Performed by: NURSE PRACTITIONER

## 2022-04-06 PROCEDURE — 3078F PR MOST RECENT DIASTOLIC BLOOD PRESSURE < 80 MM HG: ICD-10-PCS | Mod: CPTII,S$GLB,, | Performed by: NURSE PRACTITIONER

## 2022-04-06 PROCEDURE — 1159F PR MEDICATION LIST DOCUMENTED IN MEDICAL RECORD: ICD-10-PCS | Mod: CPTII,S$GLB,, | Performed by: NURSE PRACTITIONER

## 2022-04-06 PROCEDURE — 3078F DIAST BP <80 MM HG: CPT | Mod: CPTII,S$GLB,, | Performed by: NURSE PRACTITIONER

## 2022-04-06 PROCEDURE — 3008F PR BODY MASS INDEX (BMI) DOCUMENTED: ICD-10-PCS | Mod: CPTII,S$GLB,, | Performed by: NURSE PRACTITIONER

## 2022-04-06 PROCEDURE — 81002 PR URINALYSIS NONAUTO W/O SCOPE: ICD-10-PCS | Mod: S$GLB,,, | Performed by: NURSE PRACTITIONER

## 2022-04-06 PROCEDURE — 3288F FALL RISK ASSESSMENT DOCD: CPT | Mod: CPTII,S$GLB,, | Performed by: NURSE PRACTITIONER

## 2022-04-06 PROCEDURE — 99204 PR OFFICE/OUTPT VISIT, NEW, LEVL IV, 45-59 MIN: ICD-10-PCS | Mod: S$GLB,,, | Performed by: NURSE PRACTITIONER

## 2022-04-06 RX ORDER — NITROFURANTOIN 25; 75 MG/1; MG/1
100 CAPSULE ORAL 2 TIMES DAILY
Qty: 14 CAPSULE | Refills: 0 | Status: SHIPPED | OUTPATIENT
Start: 2022-04-06 | End: 2022-04-13

## 2022-04-06 NOTE — PROGRESS NOTES
Subjective:       Patient ID: Caitlin Ahmadi is a 67 y.o. female.    Chief Complaint:  Vaginal Pain and Pelvic Pain    No LMP recorded. Patient has had a hysterectomy.     History of Present Illness  Patient presents to clinic with complaints of suprapubic pressure for 1 week.  Patient reports pain with urination and increase in urinary urgency.  Patient does report increased frequency of urination although has attributed this to increase water intake.  Patient also reports experiencing increased pressure in vaginal canal.  Patient does report history of chronic constipation.  Takes MiraLax daily although denies regulation with MiraLax use.  Patient with history of hysterectomy with BSO due to cervical cancer.  Patient has not had Pap smear since . Patient reports Pap smears have been normal since hysterectomy.      OB History    Para Term  AB Living   1 1 1         SAB IAB Ectopic Multiple Live Births                  # Outcome Date GA Lbr Ramu/2nd Weight Sex Delivery Anes PTL Lv   1 Term                Review of Systems  Review of Systems   Constitutional: Negative for appetite change, fatigue and fever.   Gastrointestinal: Positive for constipation. Negative for abdominal pain, bloating, diarrhea, nausea and vomiting.   Genitourinary: Positive for dysuria, frequency and urgency. Negative for bladder incontinence, dysmenorrhea, dyspareunia, flank pain, genital sores, menorrhagia, menstrual problem, pelvic pain, vaginal bleeding, vaginal discharge, vaginal pain, postcoital bleeding, vaginal dryness and vaginal odor.        Suprapubic pressure, vaginal pressure   All other systems reviewed and are negative.           Objective:      Physical Exam:   Constitutional: She is oriented to person, place, and time. She appears well-developed and well-nourished.    HENT:   Head: Normocephalic and atraumatic.   Nose: Nose normal.    Eyes: Pupils are equal, round, and reactive to light. Conjunctivae and EOM  are normal.     Cardiovascular: Normal rate and regular rhythm.     Pulmonary/Chest: Effort normal.        Abdominal: Soft. She exhibits no distension. There is no abdominal tenderness. Hernia confirmed negative in the right inguinal area and confirmed negative in the left inguinal area.     Genitourinary:    Inguinal canal and rectum normal.      Pelvic exam was performed with patient supine.   The external female genitalia was normal.   Genitalia hair distrobution normal .   Labial bartholins normal.There is no rash, tenderness, lesion or injury on the right labia. There is no rash, tenderness, lesion or injury on the left labia. There is an absent right adnexa and an absent left adnexa. There is rectocele (grade 1-2 ) in the vagina. No erythema,  no vaginal discharge or cystocele in the vagina. Vaginal atrophy noted. Cervix is absent.Uterus is absent.           Musculoskeletal: Normal range of motion and moves all extremeties.      Lymphadenopathy: No inguinal adenopathy noted on the right or left side.    Neurological: She is alert and oriented to person, place, and time.    Skin: Skin is warm and dry. She is not diaphoretic.    Psychiatric: She has a normal mood and affect. Her behavior is normal. Judgment and thought content normal.        Urine dipstick shows positive for leukocytes, red blood cells.       Assessment:     1. Dysuria    2. Vaginal discomfort    3. Acute cystitis without hematuria    4. Encounter for Papanicolaou smear of vagina    5. History of hysterectomy for cancer              Plan:   Caitlin was seen today for vaginal pain and pelvic pain.    Diagnoses and all orders for this visit:    Dysuria  -     POCT urine dipstick without microscope  -     Urine culture    Vaginal discomfort  -     Ambulatory referral/consult to Gynecology    Acute cystitis without hematuria  -     nitrofurantoin, macrocrystal-monohydrate, (MACROBID) 100 MG capsule; Take 1 capsule (100 mg total) by mouth 2 (two) times  daily. for 7 days    Encounter for Papanicolaou smear of vagina  -     Liquid-Based Pap Smear, Screening  -     HPV High Risk Genotypes, PCR    History of hysterectomy for cancer  -     Liquid-Based Pap Smear, Screening  -     HPV High Risk Genotypes, PCR      Will treat empirically for urinary tract infection.  Will follow urine culture and adjust treatment if indicated.    Recommend dietary adjustments to increase fiber intake and water intake.  Recommend continued use of MiraLax.  If constipation not improved with dietary measures and use of MiraLax, may consider GI referral.

## 2022-04-08 LAB — BACTERIA UR CULT: NO GROWTH

## 2022-04-12 LAB
FINAL PATHOLOGIC DIAGNOSIS: NORMAL
HPV HR 12 DNA SPEC QL NAA+PROBE: NEGATIVE
HPV16 AG SPEC QL: NEGATIVE
HPV18 DNA SPEC QL NAA+PROBE: NEGATIVE
Lab: NORMAL

## 2022-04-26 ENCOUNTER — PATIENT MESSAGE (OUTPATIENT)
Dept: ADMINISTRATIVE | Facility: HOSPITAL | Age: 67
End: 2022-04-26
Payer: MEDICARE

## 2022-04-28 ENCOUNTER — PATIENT MESSAGE (OUTPATIENT)
Dept: RHEUMATOLOGY | Facility: CLINIC | Age: 67
End: 2022-04-28
Payer: MEDICARE

## 2022-05-07 ENCOUNTER — PATIENT MESSAGE (OUTPATIENT)
Dept: RHEUMATOLOGY | Facility: CLINIC | Age: 67
End: 2022-05-07
Payer: MEDICARE

## 2022-05-23 ENCOUNTER — OFFICE VISIT (OUTPATIENT)
Dept: ALLERGY | Facility: CLINIC | Age: 67
End: 2022-05-23
Payer: MEDICARE

## 2022-05-23 VITALS
DIASTOLIC BLOOD PRESSURE: 66 MMHG | HEART RATE: 89 BPM | WEIGHT: 160.25 LBS | BODY MASS INDEX: 24.29 KG/M2 | SYSTOLIC BLOOD PRESSURE: 116 MMHG | TEMPERATURE: 97 F | HEIGHT: 68 IN

## 2022-05-23 DIAGNOSIS — J84.9 ILD (INTERSTITIAL LUNG DISEASE): Primary | ICD-10-CM

## 2022-05-23 DIAGNOSIS — D84.821 IMMUNODEFICIENCY DUE TO DRUGS (CODE): ICD-10-CM

## 2022-05-23 PROCEDURE — 1126F PR PAIN SEVERITY QUANTIFIED, NO PAIN PRESENT: ICD-10-PCS | Mod: CPTII,S$GLB,, | Performed by: ALLERGY & IMMUNOLOGY

## 2022-05-23 PROCEDURE — 1101F PR PT FALLS ASSESS DOC 0-1 FALLS W/OUT INJ PAST YR: ICD-10-PCS | Mod: CPTII,S$GLB,, | Performed by: ALLERGY & IMMUNOLOGY

## 2022-05-23 PROCEDURE — 4010F ACE/ARB THERAPY RXD/TAKEN: CPT | Mod: CPTII,S$GLB,, | Performed by: ALLERGY & IMMUNOLOGY

## 2022-05-23 PROCEDURE — 3288F FALL RISK ASSESSMENT DOCD: CPT | Mod: CPTII,S$GLB,, | Performed by: ALLERGY & IMMUNOLOGY

## 2022-05-23 PROCEDURE — 3008F BODY MASS INDEX DOCD: CPT | Mod: CPTII,S$GLB,, | Performed by: ALLERGY & IMMUNOLOGY

## 2022-05-23 PROCEDURE — 99214 PR OFFICE/OUTPT VISIT, EST, LEVL IV, 30-39 MIN: ICD-10-PCS | Mod: S$GLB,,, | Performed by: ALLERGY & IMMUNOLOGY

## 2022-05-23 PROCEDURE — 4010F PR ACE/ARB THEARPY RXD/TAKEN: ICD-10-PCS | Mod: CPTII,S$GLB,, | Performed by: ALLERGY & IMMUNOLOGY

## 2022-05-23 PROCEDURE — 1126F AMNT PAIN NOTED NONE PRSNT: CPT | Mod: CPTII,S$GLB,, | Performed by: ALLERGY & IMMUNOLOGY

## 2022-05-23 PROCEDURE — 99214 OFFICE O/P EST MOD 30 MIN: CPT | Mod: S$GLB,,, | Performed by: ALLERGY & IMMUNOLOGY

## 2022-05-23 PROCEDURE — 3074F SYST BP LT 130 MM HG: CPT | Mod: CPTII,S$GLB,, | Performed by: ALLERGY & IMMUNOLOGY

## 2022-05-23 PROCEDURE — 3078F PR MOST RECENT DIASTOLIC BLOOD PRESSURE < 80 MM HG: ICD-10-PCS | Mod: CPTII,S$GLB,, | Performed by: ALLERGY & IMMUNOLOGY

## 2022-05-23 PROCEDURE — 3008F PR BODY MASS INDEX (BMI) DOCUMENTED: ICD-10-PCS | Mod: CPTII,S$GLB,, | Performed by: ALLERGY & IMMUNOLOGY

## 2022-05-23 PROCEDURE — 1101F PT FALLS ASSESS-DOCD LE1/YR: CPT | Mod: CPTII,S$GLB,, | Performed by: ALLERGY & IMMUNOLOGY

## 2022-05-23 PROCEDURE — 1159F PR MEDICATION LIST DOCUMENTED IN MEDICAL RECORD: ICD-10-PCS | Mod: CPTII,S$GLB,, | Performed by: ALLERGY & IMMUNOLOGY

## 2022-05-23 PROCEDURE — 3078F DIAST BP <80 MM HG: CPT | Mod: CPTII,S$GLB,, | Performed by: ALLERGY & IMMUNOLOGY

## 2022-05-23 PROCEDURE — 99999 PR PBB SHADOW E&M-EST. PATIENT-LVL III: ICD-10-PCS | Mod: PBBFAC,,, | Performed by: ALLERGY & IMMUNOLOGY

## 2022-05-23 PROCEDURE — 1159F MED LIST DOCD IN RCRD: CPT | Mod: CPTII,S$GLB,, | Performed by: ALLERGY & IMMUNOLOGY

## 2022-05-23 PROCEDURE — 99999 PR PBB SHADOW E&M-EST. PATIENT-LVL III: CPT | Mod: PBBFAC,,, | Performed by: ALLERGY & IMMUNOLOGY

## 2022-05-23 PROCEDURE — 3074F PR MOST RECENT SYSTOLIC BLOOD PRESSURE < 130 MM HG: ICD-10-PCS | Mod: CPTII,S$GLB,, | Performed by: ALLERGY & IMMUNOLOGY

## 2022-05-23 PROCEDURE — 3288F PR FALLS RISK ASSESSMENT DOCUMENTED: ICD-10-PCS | Mod: CPTII,S$GLB,, | Performed by: ALLERGY & IMMUNOLOGY

## 2022-05-23 NOTE — PROGRESS NOTES
Subjective:       Patient ID: Caitlin Ahmadi is a 67 y.o. female.        Chief Complaint:  Follow-up      HPI 10/2/2020: 65 year old female with a history of shortness of breath for a year and cough for several years. She described the cough as dry. She was seen by Dr. 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 She denies mold in her home. She denies runny nose, itchy eyes, watery eyes. She denies nasal or eye symptoms. She does report fatigue.  She smoked for 20 years and stopped 30 years ago.        HPI 3/15/2022:  67 year old female with ILD seen previously- allergy inhalant testing was negative. She reports persistent shortness of breath. She has RA and is on methotrexate.  She reports intermittent fatigue. Green and bloody discharge from the nose. Antibiotics and prednisone.- 3 weeks ago. She was also started on Flonase as well. NO itchy or watery eyes  Since stopping the antibiotic and steroids, she has noticed green nasal discharge and blood again.  She reports that coughing has improved.      HPI today: 67 year old female here for follow up. She denies infections. NO antibiotics.   Seeing Pulmonary. NO rhinitis symptoms.          Past Medical History:  Non- contributory      Family History   Problem Relation Age of Onset    No Known Problems Mother     Alzheimer's disease Father      Current Outpatient Medications on File Prior to Visit   Medication Sig Dispense Refill    albuterol (PROVENTIL/VENTOLIN HFA) 90 mcg/actuation inhaler Inhale 2 puffs into the lungs every 6 (six) hours as needed for Wheezing. Rescue 6.7 g 11    amitriptyline (ELAVIL) 100 MG tablet Take 3 tablets (300 mg total) by mouth every evening. 270 tablet 1    fluticasone propionate (FLONASE) 50 mcg/actuation nasal spray 2 sprays (100 mcg total) by Each Nostril route once daily. 16 g 11    folic acid (FOLVITE) 1 MG tablet TAKE 1 TABLET(1 MG) BY MOUTH EVERY DAY 30 tablet 4    ibuprofen (ADVIL,MOTRIN) 200 MG  tablet Take 600 mg by mouth every 6 (six) hours as needed.      losartan (COZAAR) 25 MG tablet Take 1 tablet (25 mg total) by mouth once daily. 90 tablet 1    methotrexate 2.5 MG Tab TAKE 6 TABLETS(15 MG) BY MOUTH EVERY 7 DAYS 24 tablet 3    azithromycin (ZITHROMAX Z-VENTURA) 250 MG tablet 500 mg on day 1 (two tablets) followed by 250 mg once daily on days 2-5 (Patient not taking: Reported on 5/23/2022) 6 tablet 0    methylPREDNISolone (MEDROL DOSEPACK) 4 mg tablet use as directed (Patient not taking: Reported on 5/23/2022) 1 each 0    mupirocin (BACTROBAN) 2 % ointment Apply to affected area 3 times daily (Patient not taking: Reported on 5/23/2022) 22 g 1    triamcinolone acetonide 0.1% (KENALOG) 0.1 % ointment Apply topically 2 (two) times daily. (Patient not taking: Reported on 5/23/2022) 30 g 0     No current facility-administered medications on file prior to visit.     Review of patient's allergies indicates:   Allergen Reactions    Pneumococcal vaccine Swelling    Hydrocodone-acetaminophen Nausea Only and Nausea And Vomiting    Meloxicam Rash    Sulfa (sulfonamide antibiotics) Rash       Environmental History: Cats, Dogs, Previous smoker  Review of Systems   Constitutional: Negative for chills and fever.   HENT: Positive for rhinorrhea. Negative for congestion, facial swelling and sinus pain.    Eyes: Negative for pain and discharge.   Respiratory: Positive for shortness of breath. Negative for cough.    Cardiovascular: Negative for chest pain and leg swelling.   Gastrointestinal: Negative for nausea and vomiting.   Endocrine: Negative for cold intolerance.   Skin: Negative for rash and wound.   Allergic/Immunologic: Negative for environmental allergies and immunocompromised state.   Neurological: Negative for dizziness and speech difficulty.   Hematological: Negative for adenopathy. Does not bruise/bleed easily.   Psychiatric/Behavioral: Negative for behavioral problems and suicidal ideas.         Objective:    Physical Exam  Vitals reviewed.   Constitutional:       General: She is not in acute distress.     Appearance: Normal appearance. She is well-developed. She is not ill-appearing, toxic-appearing or diaphoretic.   HENT:      Head: Normocephalic and atraumatic.      Nose: Nose normal. No congestion or rhinorrhea.      Mouth/Throat:      Pharynx: No oropharyngeal exudate or posterior oropharyngeal erythema.   Eyes:      General: No scleral icterus.        Right eye: No discharge.         Left eye: No discharge.      Pupils: Pupils are equal, round, and reactive to light.   Neck:      Thyroid: No thyromegaly.   Cardiovascular:      Rate and Rhythm: Normal rate and regular rhythm.      Heart sounds: Normal heart sounds. No murmur heard.    No friction rub. No gallop.   Pulmonary:      Effort: Pulmonary effort is normal. No respiratory distress.      Breath sounds: Normal breath sounds. No stridor. No wheezing, rhonchi or rales.   Abdominal:      Palpations: Abdomen is soft.   Musculoskeletal:         General: No swelling, tenderness, deformity or signs of injury. Normal range of motion.      Cervical back: Normal range of motion and neck supple. No rigidity or tenderness.      Right lower leg: No edema.      Left lower leg: No edema.   Lymphadenopathy:      Cervical: No cervical adenopathy.   Skin:     General: Skin is warm.      Coloration: Skin is not pale.      Findings: No erythema, lesion or rash.   Neurological:      Mental Status: She is alert and oriented to person, place, and time.      Gait: Gait normal.   Psychiatric:         Mood and Affect: Mood normal.         Behavior: Behavior normal.         Thought Content: Thought content normal.         Judgment: Judgment normal.       Allergy inhalant testing- negative to common inhalants over a year ago.    Assessment:       1. ILD (interstitial lung disease)    2. Immunodeficiency due to drugs (CODE)         Plan:       ILD (interstitial lung  disease)    Immunodeficiency due to drugs (CODE)      Allergy testing to common inhalants- negative in 2020  Reviewed immune labs, which were normal.  [facility] given.    RTC prn    YAMILET VICENTE spent a total of 30 minutes on the day of the visit.  This includes face to face time and non-face to face time preparing to see the patient (eg, review of tests), obtaining and/or reviewing separately obtained history, documenting clinical information in the electronic or other health record, independently interpreting results and communicating results to the patient/family/caregiver, or care coordinator.

## 2022-05-25 DIAGNOSIS — M05.9 SEROPOSITIVE RHEUMATOID ARTHRITIS: ICD-10-CM

## 2022-05-25 RX ORDER — METHOTREXATE 2.5 MG/1
TABLET ORAL
Qty: 24 TABLET | Refills: 3 | Status: SHIPPED | OUTPATIENT
Start: 2022-05-25 | End: 2023-05-08 | Stop reason: SDUPTHER

## 2022-05-31 ENCOUNTER — OFFICE VISIT (OUTPATIENT)
Dept: URGENT CARE | Facility: CLINIC | Age: 67
End: 2022-05-31
Payer: MEDICARE

## 2022-05-31 VITALS
OXYGEN SATURATION: 98 % | HEART RATE: 92 BPM | DIASTOLIC BLOOD PRESSURE: 58 MMHG | SYSTOLIC BLOOD PRESSURE: 126 MMHG | RESPIRATION RATE: 18 BRPM | TEMPERATURE: 99 F | HEIGHT: 68 IN | BODY MASS INDEX: 24.25 KG/M2 | WEIGHT: 160 LBS

## 2022-05-31 DIAGNOSIS — J06.9 VIRAL URI: ICD-10-CM

## 2022-05-31 DIAGNOSIS — J06.9 VIRAL URI WITH COUGH: Primary | ICD-10-CM

## 2022-05-31 DIAGNOSIS — R09.81 SINUS CONGESTION: ICD-10-CM

## 2022-05-31 LAB
CTP QC/QA: YES
CTP QC/QA: YES
FLUAV AG NPH QL: NEGATIVE
FLUBV AG NPH QL: NEGATIVE
SARS-COV-2 RDRP RESP QL NAA+PROBE: NEGATIVE

## 2022-05-31 PROCEDURE — U0002 COVID-19 LAB TEST NON-CDC: HCPCS | Mod: QW,S$GLB,, | Performed by: PHYSICIAN ASSISTANT

## 2022-05-31 PROCEDURE — 87804 INFLUENZA ASSAY W/OPTIC: CPT | Mod: QW,S$GLB,, | Performed by: PHYSICIAN ASSISTANT

## 2022-05-31 PROCEDURE — 1159F PR MEDICATION LIST DOCUMENTED IN MEDICAL RECORD: ICD-10-PCS | Mod: CPTII,S$GLB,, | Performed by: PHYSICIAN ASSISTANT

## 2022-05-31 PROCEDURE — 99214 PR OFFICE/OUTPT VISIT, EST, LEVL IV, 30-39 MIN: ICD-10-PCS | Mod: 25,S$GLB,, | Performed by: PHYSICIAN ASSISTANT

## 2022-05-31 PROCEDURE — 99214 OFFICE O/P EST MOD 30 MIN: CPT | Mod: 25,S$GLB,, | Performed by: PHYSICIAN ASSISTANT

## 2022-05-31 PROCEDURE — 3074F SYST BP LT 130 MM HG: CPT | Mod: CPTII,S$GLB,, | Performed by: PHYSICIAN ASSISTANT

## 2022-05-31 PROCEDURE — 1160F PR REVIEW ALL MEDS BY PRESCRIBER/CLIN PHARMACIST DOCUMENTED: ICD-10-PCS | Mod: CPTII,S$GLB,, | Performed by: PHYSICIAN ASSISTANT

## 2022-05-31 PROCEDURE — 87804 POCT INFLUENZA A/B: ICD-10-PCS | Mod: QW,S$GLB,, | Performed by: PHYSICIAN ASSISTANT

## 2022-05-31 PROCEDURE — 3008F PR BODY MASS INDEX (BMI) DOCUMENTED: ICD-10-PCS | Mod: CPTII,S$GLB,, | Performed by: PHYSICIAN ASSISTANT

## 2022-05-31 PROCEDURE — 3008F BODY MASS INDEX DOCD: CPT | Mod: CPTII,S$GLB,, | Performed by: PHYSICIAN ASSISTANT

## 2022-05-31 PROCEDURE — 4010F ACE/ARB THERAPY RXD/TAKEN: CPT | Mod: CPTII,S$GLB,, | Performed by: PHYSICIAN ASSISTANT

## 2022-05-31 PROCEDURE — 1125F PR PAIN SEVERITY QUANTIFIED, PAIN PRESENT: ICD-10-PCS | Mod: CPTII,S$GLB,, | Performed by: PHYSICIAN ASSISTANT

## 2022-05-31 PROCEDURE — 3078F PR MOST RECENT DIASTOLIC BLOOD PRESSURE < 80 MM HG: ICD-10-PCS | Mod: CPTII,S$GLB,, | Performed by: PHYSICIAN ASSISTANT

## 2022-05-31 PROCEDURE — 3078F DIAST BP <80 MM HG: CPT | Mod: CPTII,S$GLB,, | Performed by: PHYSICIAN ASSISTANT

## 2022-05-31 PROCEDURE — 4010F PR ACE/ARB THEARPY RXD/TAKEN: ICD-10-PCS | Mod: CPTII,S$GLB,, | Performed by: PHYSICIAN ASSISTANT

## 2022-05-31 PROCEDURE — 1160F RVW MEDS BY RX/DR IN RCRD: CPT | Mod: CPTII,S$GLB,, | Performed by: PHYSICIAN ASSISTANT

## 2022-05-31 PROCEDURE — U0002: ICD-10-PCS | Mod: QW,S$GLB,, | Performed by: PHYSICIAN ASSISTANT

## 2022-05-31 PROCEDURE — 1159F MED LIST DOCD IN RCRD: CPT | Mod: CPTII,S$GLB,, | Performed by: PHYSICIAN ASSISTANT

## 2022-05-31 PROCEDURE — 3074F PR MOST RECENT SYSTOLIC BLOOD PRESSURE < 130 MM HG: ICD-10-PCS | Mod: CPTII,S$GLB,, | Performed by: PHYSICIAN ASSISTANT

## 2022-05-31 PROCEDURE — 1125F AMNT PAIN NOTED PAIN PRSNT: CPT | Mod: CPTII,S$GLB,, | Performed by: PHYSICIAN ASSISTANT

## 2022-05-31 RX ORDER — PREDNISONE 20 MG/1
TABLET ORAL
Qty: 7 TABLET | Refills: 0 | Status: SHIPPED | OUTPATIENT
Start: 2022-05-31 | End: 2022-09-14

## 2022-05-31 RX ORDER — PROMETHAZINE HYDROCHLORIDE AND DEXTROMETHORPHAN HYDROBROMIDE 6.25; 15 MG/5ML; MG/5ML
5 SYRUP ORAL EVERY 6 HOURS PRN
Qty: 118 ML | Refills: 0 | Status: SHIPPED | OUTPATIENT
Start: 2022-05-31 | End: 2022-11-01

## 2022-05-31 NOTE — PROGRESS NOTES
"Subjective:       Patient ID: Caitlin Ahmadi is a 67 y.o. female.    Vitals:  height is 5' 8" (1.727 m) and weight is 72.6 kg (160 lb). Her tympanic temperature is 98.6 °F (37 °C). Her blood pressure is 126/58 (abnormal) and her pulse is 92. Her respiration is 18 and oxygen saturation is 98%.     Chief Complaint: Sinus Problem    Patient presents with cough, congestion, sinus pressure and body aches for the past few days.  Recently around grandson who has been sick.  Patient denies any known fever.  She has been using her inhaler and daily Flonase.  Reports some shortness of breath that time given her history of interstitial lung disease but states that it is controlled with inhaler.    Sinus Problem  This is a new problem. The current episode started in the past 7 days (2 Days Ago). The problem has been rapidly worsening since onset. There has been no fever. Her pain is at a severity of 8/10. The pain is mild. Associated symptoms include congestion, coughing, a hoarse voice, shortness of breath, sinus pressure, sneezing and swollen glands. Pertinent negatives include no chills, diaphoresis, ear pain, headaches, neck pain or sore throat. Treatments tried: nasal spray & inhaler  The treatment provided no relief.       Constitution: Positive for fatigue. Negative for chills, sweating and fever.   HENT: Positive for congestion and sinus pressure. Negative for ear pain and sore throat.    Neck: Negative for neck pain.   Cardiovascular: Negative.    Respiratory: Positive for cough and shortness of breath. Negative for chest tightness and wheezing.    Musculoskeletal: Positive for muscle ache.   Allergic/Immunologic: Positive for sneezing.   Neurological: Negative for dizziness and headaches.       Objective:      Physical Exam   Constitutional: She appears well-developed.  Non-toxic appearance. She appears ill. No distress.   HENT:   Head: Normocephalic and atraumatic.   Ears:   Right Ear: Tympanic membrane, external ear " and ear canal normal.   Left Ear: Tympanic membrane, external ear and ear canal normal.   Nose: Mucosal edema and congestion present. Right sinus exhibits no maxillary sinus tenderness. Left sinus exhibits no maxillary sinus tenderness.   Mouth/Throat: Mucous membranes are moist. Oropharynx is clear.   Eyes: Conjunctivae and EOM are normal. periorbital hyperpigmentation   Neck: Neck supple.   Pulmonary/Chest: Effort normal and breath sounds normal.         Comments: Frequent coughing    Abdominal: Normal appearance.   Musculoskeletal: Normal range of motion.         General: Normal range of motion.   Neurological: no focal deficit. She is alert. She displays no weakness. Gait normal.   Skin: Skin is warm, dry, not diaphoretic, not pale and no rash.   Psychiatric: Her behavior is normal.         Results for orders placed or performed in visit on 05/31/22   POCT COVID-19 Rapid Screening   Result Value Ref Range    POC Rapid COVID Negative Negative     Acceptable Yes    POCT Influenza A/B   Result Value Ref Range    Rapid Influenza A Ag Negative Negative    Rapid Influenza B Ag Negative Negative     Acceptable Yes        Assessment:       1. Viral URI with cough    2. Sinus congestion    3. Viral URI          Plan:       Monitor blood pressure while taking promethazine DM and oral steroid.  Patient has tolerated oral steroids okay in the past given her history of interstitial lung disease.  Continue Flonase daily.  Monitor for fever and treat with Tylenol as needed.  Follow-up with PCP return to clinic if symptoms worsen or fail to improve.  Viral URI with cough  -     promethazine-dextromethorphan (PROMETHAZINE-DM) 6.25-15 mg/5 mL Syrp; Take 5 mLs by mouth every 6 (six) hours as needed (cough).  Dispense: 118 mL; Refill: 0  -     predniSONE (DELTASONE) 20 MG tablet; Take 1 tablet twice daily for 2 days. Then take 1 tablet daily for 3 days.  Dispense: 7 tablet; Refill: 0    Sinus  congestion  -     POCT COVID-19 Rapid Screening  -     POCT Influenza A/B  -     promethazine-dextromethorphan (PROMETHAZINE-DM) 6.25-15 mg/5 mL Syrp; Take 5 mLs by mouth every 6 (six) hours as needed (cough).  Dispense: 118 mL; Refill: 0  -     predniSONE (DELTASONE) 20 MG tablet; Take 1 tablet twice daily for 2 days. Then take 1 tablet daily for 3 days.  Dispense: 7 tablet; Refill: 0    Viral URI

## 2022-06-03 ENCOUNTER — TELEPHONE (OUTPATIENT)
Dept: URGENT CARE | Facility: CLINIC | Age: 67
End: 2022-06-03
Payer: MEDICARE

## 2022-06-03 NOTE — TELEPHONE ENCOUNTER
Called pt to see how she was feeling. Pt stated that she is still coughing and hasn't slept to well. Advised her if it doesn't get any better or gets worse in the next several days to f/u with PCP or UC. Pt verbalized understanding.

## 2022-06-07 ENCOUNTER — TELEPHONE (OUTPATIENT)
Dept: INTERNAL MEDICINE | Facility: CLINIC | Age: 67
End: 2022-06-07
Payer: MEDICARE

## 2022-06-07 NOTE — TELEPHONE ENCOUNTER
----- Message from Mandie Cruz sent at 6/7/2022  2:23 PM CDT -----  Contact: pt  Type:  Sooner Appointment Request    Caller is requesting a sooner appointment.  Caller declined first available appointment listed below.  Caller will not accept being placed on the waitlist and is requesting a message be sent to doctor.  Name of Caller: pt  When is the first available appointment? 07/15  Symptoms: hospital f/u  Would the patient rather a call back or a response via [facility]? Call back  Best Call Back Number:[phone]  Additional Information: n/a

## 2022-06-09 ENCOUNTER — PATIENT MESSAGE (OUTPATIENT)
Dept: INTERNAL MEDICINE | Facility: CLINIC | Age: 67
End: 2022-06-09

## 2022-06-09 ENCOUNTER — OFFICE VISIT (OUTPATIENT)
Dept: INTERNAL MEDICINE | Facility: CLINIC | Age: 67
End: 2022-06-09
Payer: MEDICARE

## 2022-06-09 VITALS
HEART RATE: 103 BPM | SYSTOLIC BLOOD PRESSURE: 112 MMHG | WEIGHT: 158.06 LBS | HEIGHT: 68 IN | DIASTOLIC BLOOD PRESSURE: 64 MMHG | TEMPERATURE: 99 F | OXYGEN SATURATION: 99 % | BODY MASS INDEX: 23.95 KG/M2

## 2022-06-09 DIAGNOSIS — I10 PRIMARY HYPERTENSION: ICD-10-CM

## 2022-06-09 DIAGNOSIS — J84.9 ILD (INTERSTITIAL LUNG DISEASE): ICD-10-CM

## 2022-06-09 DIAGNOSIS — M05.9 SEROPOSITIVE RHEUMATOID ARTHRITIS: ICD-10-CM

## 2022-06-09 DIAGNOSIS — J18.9 PNEUMONIA OF RIGHT LOWER LOBE DUE TO INFECTIOUS ORGANISM: Primary | ICD-10-CM

## 2022-06-09 PROCEDURE — 4010F PR ACE/ARB THEARPY RXD/TAKEN: ICD-10-PCS | Mod: CPTII,S$GLB,, | Performed by: PHYSICIAN ASSISTANT

## 2022-06-09 PROCEDURE — 1101F PR PT FALLS ASSESS DOC 0-1 FALLS W/OUT INJ PAST YR: ICD-10-PCS | Mod: CPTII,S$GLB,, | Performed by: PHYSICIAN ASSISTANT

## 2022-06-09 PROCEDURE — 1160F RVW MEDS BY RX/DR IN RCRD: CPT | Mod: CPTII,S$GLB,, | Performed by: PHYSICIAN ASSISTANT

## 2022-06-09 PROCEDURE — 1126F AMNT PAIN NOTED NONE PRSNT: CPT | Mod: CPTII,S$GLB,, | Performed by: PHYSICIAN ASSISTANT

## 2022-06-09 PROCEDURE — 99213 OFFICE O/P EST LOW 20 MIN: CPT | Mod: S$GLB,,, | Performed by: PHYSICIAN ASSISTANT

## 2022-06-09 PROCEDURE — 1159F MED LIST DOCD IN RCRD: CPT | Mod: CPTII,S$GLB,, | Performed by: PHYSICIAN ASSISTANT

## 2022-06-09 PROCEDURE — 99213 PR OFFICE/OUTPT VISIT, EST, LEVL III, 20-29 MIN: ICD-10-PCS | Mod: S$GLB,,, | Performed by: PHYSICIAN ASSISTANT

## 2022-06-09 PROCEDURE — 3008F BODY MASS INDEX DOCD: CPT | Mod: CPTII,S$GLB,, | Performed by: PHYSICIAN ASSISTANT

## 2022-06-09 PROCEDURE — 3288F FALL RISK ASSESSMENT DOCD: CPT | Mod: CPTII,S$GLB,, | Performed by: PHYSICIAN ASSISTANT

## 2022-06-09 PROCEDURE — 99999 PR PBB SHADOW E&M-EST. PATIENT-LVL IV: ICD-10-PCS | Mod: PBBFAC,,, | Performed by: PHYSICIAN ASSISTANT

## 2022-06-09 PROCEDURE — 3078F PR MOST RECENT DIASTOLIC BLOOD PRESSURE < 80 MM HG: ICD-10-PCS | Mod: CPTII,S$GLB,, | Performed by: PHYSICIAN ASSISTANT

## 2022-06-09 PROCEDURE — 3074F SYST BP LT 130 MM HG: CPT | Mod: CPTII,S$GLB,, | Performed by: PHYSICIAN ASSISTANT

## 2022-06-09 PROCEDURE — 1160F PR REVIEW ALL MEDS BY PRESCRIBER/CLIN PHARMACIST DOCUMENTED: ICD-10-PCS | Mod: CPTII,S$GLB,, | Performed by: PHYSICIAN ASSISTANT

## 2022-06-09 PROCEDURE — 1126F PR PAIN SEVERITY QUANTIFIED, NO PAIN PRESENT: ICD-10-PCS | Mod: CPTII,S$GLB,, | Performed by: PHYSICIAN ASSISTANT

## 2022-06-09 PROCEDURE — 3078F DIAST BP <80 MM HG: CPT | Mod: CPTII,S$GLB,, | Performed by: PHYSICIAN ASSISTANT

## 2022-06-09 PROCEDURE — 3288F PR FALLS RISK ASSESSMENT DOCUMENTED: ICD-10-PCS | Mod: CPTII,S$GLB,, | Performed by: PHYSICIAN ASSISTANT

## 2022-06-09 PROCEDURE — 1101F PT FALLS ASSESS-DOCD LE1/YR: CPT | Mod: CPTII,S$GLB,, | Performed by: PHYSICIAN ASSISTANT

## 2022-06-09 PROCEDURE — 3008F PR BODY MASS INDEX (BMI) DOCUMENTED: ICD-10-PCS | Mod: CPTII,S$GLB,, | Performed by: PHYSICIAN ASSISTANT

## 2022-06-09 PROCEDURE — 99999 PR PBB SHADOW E&M-EST. PATIENT-LVL IV: CPT | Mod: PBBFAC,,, | Performed by: PHYSICIAN ASSISTANT

## 2022-06-09 PROCEDURE — 4010F ACE/ARB THERAPY RXD/TAKEN: CPT | Mod: CPTII,S$GLB,, | Performed by: PHYSICIAN ASSISTANT

## 2022-06-09 PROCEDURE — 1159F PR MEDICATION LIST DOCUMENTED IN MEDICAL RECORD: ICD-10-PCS | Mod: CPTII,S$GLB,, | Performed by: PHYSICIAN ASSISTANT

## 2022-06-09 PROCEDURE — 3074F PR MOST RECENT SYSTOLIC BLOOD PRESSURE < 130 MM HG: ICD-10-PCS | Mod: CPTII,S$GLB,, | Performed by: PHYSICIAN ASSISTANT

## 2022-06-09 NOTE — PROGRESS NOTES
Subjective:      Patient ID: Caitlin Ahmadi is a 67 y.o. female.    Chief Complaint: Hospital Follow Up    Ms. Ahmadi is here today for a hospital follow up for pneumonia. [facility] last weekend. Pneumonia showed on CT scan but not cxr. Currently on zithromax and cefdinir. Didn't start the guafenesin because it seems to make it worse.   Overall improving.   Got out of hospital on Tuesday.     Pneumonia  She complains of chest tightness, cough, shortness of breath and sputum production. There is no difficulty breathing, frequent throat clearing, hemoptysis, hoarse voice or wheezing. This is a new problem. The current episode started in the past 7 days. The problem has been rapidly improving. Associated symptoms include a fever. Pertinent negatives include no appetite change, chest pain, headaches, myalgias, postnasal drip, rhinorrhea, sore throat or trouble swallowing.         Patient Active Problem List   Diagnosis    Recurrent major depressive disorder, in partial remission    Osteopenia of multiple sites    ILD (interstitial lung disease)    Anxiety    Seropositive rheumatoid arthritis    Primary hypertension    Immunodeficiency due to drugs (CODE)       Current Outpatient Medications:     albuterol (PROVENTIL/VENTOLIN HFA) 90 mcg/actuation inhaler, Inhale 2 puffs into the lungs every 6 (six) hours as needed for Wheezing. Rescue, Disp: 6.7 g, Rfl: 11    amitriptyline (ELAVIL) 100 MG tablet, Take 3 tablets (300 mg total) by mouth every evening., Disp: 270 tablet, Rfl: 1    fluticasone propionate (FLONASE) 50 mcg/actuation nasal spray, 2 sprays (100 mcg total) by Each Nostril route once daily., Disp: 16 g, Rfl: 11    folic acid (FOLVITE) 1 MG tablet, TAKE 1 TABLET(1 MG) BY MOUTH EVERY DAY, Disp: 30 tablet, Rfl: 4    ibuprofen (ADVIL,MOTRIN) 200 MG tablet, Take 600 mg by mouth every 6 (six) hours as needed., Disp: , Rfl:     losartan (COZAAR) 25 MG tablet, Take 1 tablet (25 mg total) by mouth  once daily., Disp: 90 tablet, Rfl: 1    methotrexate 2.5 MG Tab, TAKE 6 TABLETS(15 MG) BY MOUTH EVERY 7 DAYS, Disp: 24 tablet, Rfl: 3    mupirocin (BACTROBAN) 2 % ointment, Apply to affected area 3 times daily, Disp: 22 g, Rfl: 1    predniSONE (DELTASONE) 20 MG tablet, Take 1 tablet twice daily for 2 days. Then take 1 tablet daily for 3 days., Disp: 7 tablet, Rfl: 0    promethazine-dextromethorphan (PROMETHAZINE-DM) 6.25-15 mg/5 mL Syrp, Take 5 mLs by mouth every 6 (six) hours as needed (cough)., Disp: 118 mL, Rfl: 0    triamcinolone acetonide 0.1% (KENALOG) 0.1 % ointment, Apply topically 2 (two) times daily., Disp: 30 g, Rfl: 0    azithromycin (ZITHROMAX Z-VENTURA) 250 MG tablet, 500 mg on day 1 (two tablets) followed by 250 mg once daily on days 2-5 (Patient not taking: Reported on 6/9/2022), Disp: 6 tablet, Rfl: 0    Review of Systems   Constitutional: Positive for chills, diaphoresis, fatigue and fever. Negative for activity change, appetite change and unexpected weight change.   HENT: Negative.  Negative for congestion, hearing loss, hoarse voice, postnasal drip, rhinorrhea, sore throat, trouble swallowing and voice change.    Eyes: Negative.  Negative for visual disturbance.   Respiratory: Positive for cough, sputum production, chest tightness and shortness of breath. Negative for hemoptysis, choking and wheezing.    Cardiovascular: Negative for chest pain, palpitations and leg swelling.   Gastrointestinal: Negative for abdominal distention, abdominal pain, blood in stool, constipation, diarrhea, nausea and vomiting.   Endocrine: Negative for cold intolerance, heat intolerance, polydipsia and polyuria.   Genitourinary: Negative.  Negative for difficulty urinating and frequency.   Musculoskeletal: Negative for arthralgias, back pain, gait problem, joint swelling and myalgias.   Skin: Negative for color change, pallor, rash and wound.   Neurological: Negative for dizziness, tremors, weakness,  "light-headedness, numbness and headaches.   Hematological: Negative for adenopathy.   Psychiatric/Behavioral: Negative for behavioral problems, confusion, self-injury, sleep disturbance and suicidal ideas. The patient is not nervous/anxious.      Objective:   /64 (BP Location: Right arm, Patient Position: Sitting, BP Method: Medium (Manual))   Pulse 103   Temp 98.7 °F (37.1 °C) (Tympanic)   Ht 5' 8" (1.727 m)   Wt 71.7 kg (158 lb 1.1 oz)   SpO2 99%   BMI 24.03 kg/m²     Physical Exam  Vitals reviewed.   Constitutional:       Appearance: She is well-developed.   HENT:      Head: Normocephalic and atraumatic.      Right Ear: External ear normal.      Left Ear: External ear normal.      Nose: Nose normal.   Eyes:      Conjunctiva/sclera: Conjunctivae normal.      Pupils: Pupils are equal, round, and reactive to light.   Cardiovascular:      Rate and Rhythm: Normal rate and regular rhythm.      Heart sounds: Normal heart sounds. No murmur heard.    No friction rub. No gallop.   Pulmonary:      Effort: Pulmonary effort is normal. No respiratory distress.      Breath sounds: No stridor. Examination of the right-lower field reveals decreased breath sounds and rhonchi. Decreased breath sounds and rhonchi present. No wheezing or rales.   Chest:      Chest wall: No tenderness.   Abdominal:      General: There is no distension.      Palpations: Abdomen is soft.      Tenderness: There is no abdominal tenderness.   Musculoskeletal:         General: Normal range of motion.      Cervical back: Normal range of motion and neck supple.   Lymphadenopathy:      Cervical: No cervical adenopathy.   Skin:     General: Skin is warm and dry.   Neurological:      Mental Status: She is alert and oriented to person, place, and time.   Psychiatric:         Behavior: Behavior normal.         Thought Content: Thought content normal.         Judgment: Judgment normal.         Assessment:     1. Pneumonia of right lower lobe due to " infectious organism    2. ILD (interstitial lung disease)    3. Seropositive rheumatoid arthritis    4. Primary hypertension      Plan:   Pneumonia of right lower lobe due to infectious organism  -     X-Ray Chest PA And Lateral; Future; Expected date: 07/09/2022  -if anything gets worse come in and if severe go to the ER  -recheck cxr in 1 month  -increase hydration    ILD (interstitial lung disease)  Seropositive rheumatoid arthritis  -stay off mtx until complete resolution    Primary hypertension  -bp stable      Follow up if symptoms worsen or fail to improve.

## 2022-06-24 ENCOUNTER — TELEPHONE (OUTPATIENT)
Dept: RHEUMATOLOGY | Facility: CLINIC | Age: 67
End: 2022-06-24
Payer: MEDICARE

## 2022-06-26 DIAGNOSIS — Z79.60 LONG-TERM USE OF IMMUNOSUPPRESSANT MEDICATION: ICD-10-CM

## 2022-06-26 DIAGNOSIS — Z79.899 HIGH RISK MEDICATION USE: ICD-10-CM

## 2022-06-26 DIAGNOSIS — Z71.89 COUNSELING ON HEALTH PROMOTION AND DISEASE PREVENTION: Primary | ICD-10-CM

## 2022-06-26 DIAGNOSIS — M05.9 SEROPOSITIVE RHEUMATOID ARTHRITIS: ICD-10-CM

## 2022-07-12 ENCOUNTER — HOSPITAL ENCOUNTER (OUTPATIENT)
Dept: RADIOLOGY | Facility: HOSPITAL | Age: 67
Discharge: HOME | End: 2022-07-12
Attending: PHYSICIAN ASSISTANT
Payer: MEDICARE

## 2022-07-12 DIAGNOSIS — J18.9 PNEUMONIA OF RIGHT LOWER LOBE DUE TO INFECTIOUS ORGANISM: ICD-10-CM

## 2022-07-12 PROCEDURE — 71046 X-RAY EXAM CHEST 2 VIEWS: CPT | Mod: TC

## 2022-07-12 PROCEDURE — 71046 X-RAY EXAM CHEST 2 VIEWS: CPT | Mod: 26,,, | Performed by: RADIOLOGY

## 2022-07-12 PROCEDURE — 71046 XR CHEST PA AND LATERAL: ICD-10-PCS | Mod: 26,,, | Performed by: RADIOLOGY

## 2022-07-19 ENCOUNTER — LAB VISIT (OUTPATIENT)
Dept: LAB | Facility: HOSPITAL | Age: 67
End: 2022-07-19
Payer: MEDICARE

## 2022-07-19 DIAGNOSIS — M05.9 SEROPOSITIVE RHEUMATOID ARTHRITIS: ICD-10-CM

## 2022-07-19 DIAGNOSIS — Z71.89 COUNSELING ON HEALTH PROMOTION AND DISEASE PREVENTION: ICD-10-CM

## 2022-07-19 DIAGNOSIS — Z79.899 HIGH RISK MEDICATION USE: ICD-10-CM

## 2022-07-19 LAB
ALBUMIN SERPL BCP-MCNC: 3.7 G/DL (ref 3.5–5.2)
ALP SERPL-CCNC: 86 U/L (ref 55–135)
ALT SERPL W/O P-5'-P-CCNC: 26 U/L (ref 10–44)
ANION GAP SERPL CALC-SCNC: 12 MMOL/L (ref 8–16)
AST SERPL-CCNC: 19 U/L (ref 10–40)
BASOPHILS # BLD AUTO: 0.08 K/UL (ref 0–0.2)
BASOPHILS NFR BLD: 1.2 % (ref 0–1.9)
BILIRUB SERPL-MCNC: 0.3 MG/DL (ref 0.1–1)
BUN SERPL-MCNC: 13 MG/DL (ref 8–23)
CALCIUM SERPL-MCNC: 9.3 MG/DL (ref 8.7–10.5)
CHLORIDE SERPL-SCNC: 106 MMOL/L (ref 95–110)
CO2 SERPL-SCNC: 22 MMOL/L (ref 23–29)
CREAT SERPL-MCNC: 0.8 MG/DL (ref 0.5–1.4)
CRP SERPL-MCNC: 2.6 MG/L (ref 0–8.2)
DIFFERENTIAL METHOD: ABNORMAL
EOSINOPHIL # BLD AUTO: 0.3 K/UL (ref 0–0.5)
EOSINOPHIL NFR BLD: 3.7 % (ref 0–8)
ERYTHROCYTE [DISTWIDTH] IN BLOOD BY AUTOMATED COUNT: 13.8 % (ref 11.5–14.5)
ERYTHROCYTE [SEDIMENTATION RATE] IN BLOOD BY PHOTOMETRIC METHOD: 22 MM/HR (ref 0–36)
EST. GFR  (AFRICAN AMERICAN): >60 ML/MIN/1.73 M^2
EST. GFR  (NON AFRICAN AMERICAN): >60 ML/MIN/1.73 M^2
GLUCOSE SERPL-MCNC: 143 MG/DL (ref 70–110)
HCT VFR BLD AUTO: 36.6 % (ref 37–48.5)
HGB BLD-MCNC: 12.5 G/DL (ref 12–16)
IMM GRANULOCYTES # BLD AUTO: 0.05 K/UL (ref 0–0.04)
IMM GRANULOCYTES NFR BLD AUTO: 0.7 % (ref 0–0.5)
LYMPHOCYTES # BLD AUTO: 2.2 K/UL (ref 1–4.8)
LYMPHOCYTES NFR BLD: 32.7 % (ref 18–48)
MCH RBC QN AUTO: 31 PG (ref 27–31)
MCHC RBC AUTO-ENTMCNC: 34.2 G/DL (ref 32–36)
MCV RBC AUTO: 91 FL (ref 82–98)
MONOCYTES # BLD AUTO: 0.7 K/UL (ref 0.3–1)
MONOCYTES NFR BLD: 9.5 % (ref 4–15)
NEUTROPHILS # BLD AUTO: 3.6 K/UL (ref 1.8–7.7)
NEUTROPHILS NFR BLD: 52.2 % (ref 38–73)
NRBC BLD-RTO: 0 /100 WBC
PLATELET # BLD AUTO: 211 K/UL (ref 150–450)
PMV BLD AUTO: 9.9 FL (ref 9.2–12.9)
POTASSIUM SERPL-SCNC: 3.9 MMOL/L (ref 3.5–5.1)
PROT SERPL-MCNC: 7.6 G/DL (ref 6–8.4)
RBC # BLD AUTO: 4.03 M/UL (ref 4–5.4)
SODIUM SERPL-SCNC: 140 MMOL/L (ref 136–145)
WBC # BLD AUTO: 6.82 K/UL (ref 3.9–12.7)

## 2022-07-19 PROCEDURE — 85025 COMPLETE CBC W/AUTO DIFF WBC: CPT

## 2022-07-19 PROCEDURE — 86140 C-REACTIVE PROTEIN: CPT

## 2022-07-19 PROCEDURE — 85652 RBC SED RATE AUTOMATED: CPT

## 2022-07-19 PROCEDURE — 36415 COLL VENOUS BLD VENIPUNCTURE: CPT

## 2022-07-19 PROCEDURE — 80053 COMPREHEN METABOLIC PANEL: CPT

## 2022-07-28 ENCOUNTER — PATIENT MESSAGE (OUTPATIENT)
Dept: RHEUMATOLOGY | Facility: CLINIC | Age: 67
End: 2022-07-28

## 2022-07-28 ENCOUNTER — PATIENT MESSAGE (OUTPATIENT)
Dept: PULMONOLOGY | Facility: CLINIC | Age: 67
End: 2022-07-28
Payer: MEDICARE

## 2022-07-28 ENCOUNTER — OFFICE VISIT (OUTPATIENT)
Dept: RHEUMATOLOGY | Facility: CLINIC | Age: 67
End: 2022-07-28
Payer: MEDICARE

## 2022-07-28 VITALS
RESPIRATION RATE: 18 BRPM | BODY MASS INDEX: 25.16 KG/M2 | DIASTOLIC BLOOD PRESSURE: 72 MMHG | HEART RATE: 99 BPM | SYSTOLIC BLOOD PRESSURE: 132 MMHG | WEIGHT: 166 LBS | HEIGHT: 68 IN

## 2022-07-28 DIAGNOSIS — Z79.899 HIGH RISK MEDICATION USE: ICD-10-CM

## 2022-07-28 DIAGNOSIS — Z79.60 LONG-TERM USE OF IMMUNOSUPPRESSANT MEDICATION: ICD-10-CM

## 2022-07-28 DIAGNOSIS — M05.9 SEROPOSITIVE RHEUMATOID ARTHRITIS: Primary | ICD-10-CM

## 2022-07-28 DIAGNOSIS — Z71.89 COUNSELING ON HEALTH PROMOTION AND DISEASE PREVENTION: ICD-10-CM

## 2022-07-28 PROCEDURE — 1101F PT FALLS ASSESS-DOCD LE1/YR: CPT | Mod: CPTII,S$GLB,,

## 2022-07-28 PROCEDURE — 3008F PR BODY MASS INDEX (BMI) DOCUMENTED: ICD-10-PCS | Mod: CPTII,S$GLB,,

## 2022-07-28 PROCEDURE — 3288F PR FALLS RISK ASSESSMENT DOCUMENTED: ICD-10-PCS | Mod: CPTII,S$GLB,,

## 2022-07-28 PROCEDURE — 3075F SYST BP GE 130 - 139MM HG: CPT | Mod: CPTII,S$GLB,,

## 2022-07-28 PROCEDURE — 99215 PR OFFICE/OUTPT VISIT, EST, LEVL V, 40-54 MIN: ICD-10-PCS | Mod: S$GLB,,,

## 2022-07-28 PROCEDURE — 3008F BODY MASS INDEX DOCD: CPT | Mod: CPTII,S$GLB,,

## 2022-07-28 PROCEDURE — 3078F PR MOST RECENT DIASTOLIC BLOOD PRESSURE < 80 MM HG: ICD-10-PCS | Mod: CPTII,S$GLB,,

## 2022-07-28 PROCEDURE — 3078F DIAST BP <80 MM HG: CPT | Mod: CPTII,S$GLB,,

## 2022-07-28 PROCEDURE — 4010F PR ACE/ARB THEARPY RXD/TAKEN: ICD-10-PCS | Mod: CPTII,S$GLB,,

## 2022-07-28 PROCEDURE — 1101F PR PT FALLS ASSESS DOC 0-1 FALLS W/OUT INJ PAST YR: ICD-10-PCS | Mod: CPTII,S$GLB,,

## 2022-07-28 PROCEDURE — 1126F PR PAIN SEVERITY QUANTIFIED, NO PAIN PRESENT: ICD-10-PCS | Mod: CPTII,S$GLB,,

## 2022-07-28 PROCEDURE — 3288F FALL RISK ASSESSMENT DOCD: CPT | Mod: CPTII,S$GLB,,

## 2022-07-28 PROCEDURE — 1159F MED LIST DOCD IN RCRD: CPT | Mod: CPTII,S$GLB,,

## 2022-07-28 PROCEDURE — 1159F PR MEDICATION LIST DOCUMENTED IN MEDICAL RECORD: ICD-10-PCS | Mod: CPTII,S$GLB,,

## 2022-07-28 PROCEDURE — 99215 OFFICE O/P EST HI 40 MIN: CPT | Mod: S$GLB,,,

## 2022-07-28 PROCEDURE — 3075F PR MOST RECENT SYSTOLIC BLOOD PRESS GE 130-139MM HG: ICD-10-PCS | Mod: CPTII,S$GLB,,

## 2022-07-28 PROCEDURE — 99999 PR PBB SHADOW E&M-EST. PATIENT-LVL III: CPT | Mod: PBBFAC,,,

## 2022-07-28 PROCEDURE — 4010F ACE/ARB THERAPY RXD/TAKEN: CPT | Mod: CPTII,S$GLB,,

## 2022-07-28 PROCEDURE — 99999 PR PBB SHADOW E&M-EST. PATIENT-LVL III: ICD-10-PCS | Mod: PBBFAC,,,

## 2022-07-28 PROCEDURE — 1126F AMNT PAIN NOTED NONE PRSNT: CPT | Mod: CPTII,S$GLB,,

## 2022-07-28 NOTE — PATIENT INSTRUCTIONS
Decrease methotrexate to 4 tablets (10 mg) weekly   Continue folic acid daily      Topical Voltaren/diclofenac to affected joint 3-4 times daily.

## 2022-07-28 NOTE — PROGRESS NOTES
RHEUMATOLOGY OUTPATIENT CLINIC NOTE    07/28/2022    Subjective:       Patient ID: Caitlin Ahmadi is a 67 y.o. female.    Chief Complaint: Rheumatoid Arthritis      HPI   Caitlin Ahmadi is a 67 y.o. pleasant female here for rheumatology follow up for seropositive rheumatoid arthritis.  Previously seen by my colleagues in Rheumatology; first time seeing me.      From a joint standpoint, she was doing well on methotrexate 15 mg/wk and daily folic acid.  She recently had pneumonia for which she was on Zithromax and cefdinir.  For the last month she has been holding her methotrexate.  Mild increase in morning stiffness and joint pain in her hands while off of methotrexate.  She would like to discuss whether she should resume this medication or not.  Occasional pain in her bilateral 1st CMCs  Rheumatologic review of systems negative otherwise.     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    Cataract     Encounter for blood transfusion     Hypertension     PONV (postoperative nausea and vomiting)     Seropositive rheumatoid arthritis 2/17/2022     Past Surgical History:   Procedure Laterality Date    ADENOIDECTOMY      CATARACT EXTRACTION W/  INTRAOCULAR LENS IMPLANT Left 12/09/2020    CATARACT EXTRACTION W/  INTRAOCULAR LENS IMPLANT      CHOLECYSTECTOMY      COLONOSCOPY N/A 10/13/2021    Procedure: COLONOSCOPY;  Surgeon: Elissa Yoon MD;  Location: [facility];  Service: Endoscopy;  Laterality: N/A;    HERNIA REPAIR      HYSTERECTOMY      TONSILLECTOMY      TOTAL KNEE ARTHROPLASTY Bilateral      Family History   Problem Relation Age of Onset    No Known Problems Mother     Alzheimer's disease Father      Social History     Socioeconomic History    Marital status:    Tobacco Use    Smoking status: Former Smoker     Packs/day: 1.00     Years: 20.00     Pack years: 20.00     Types: Cigarettes     Start date: 6/17/1970     Quit  "date: 1990     Years since quittin.1    Smokeless tobacco: Never Used   Substance and Sexual Activity    Alcohol use: Yes     Alcohol/week: 5.0 standard drinks     Types: 5 Glasses of wine per week    Drug use: Never    Sexual activity: Yes     Partners: Male     Review of patient's allergies indicates:   Allergen Reactions    Pneumococcal vaccine Swelling    Hydrocodone-acetaminophen Nausea Only and Nausea And Vomiting    Meloxicam Rash    Sulfa (sulfonamide antibiotics) Rash           Objective:   /72   Pulse 99   Resp 18   Ht 5' 8" (1.727 m)   Wt 75.3 kg (166 lb 0.1 oz)   BMI 25.24 kg/m²   Immunization History   Administered Date(s) Administered    COVID-19, MRNA, LN-S, PF (Pfizer) (Purple Cap) 2021, 2021    Influenza (FLUAD) - Quadrivalent - Adjuvanted - PF *Preferred* (65+) 10/08/2020, 2021    Influenza - High Dose - PF (65 years and older) 2019    Influenza - Quadrivalent - PF *Preferred* (6 months and older) 2018    Pneumococcal Conjugate - 13 Valent 2019    Pneumococcal Polysaccharide - 23 Valent 2020    Tdap 2021              Physical Exam   Constitutional: She appears well-developed. No distress.   HENT:   Head: Normocephalic and atraumatic.   Pulmonary/Chest: Effort normal. No respiratory distress.   Musculoskeletal:         General: No swelling, tenderness or deformity. Normal range of motion.      Comments: toni wrists, mcps, pips no synvoitis, no tenderness, no warmth, good rom  toni elbows shoulders no swelling or tenderness,full rom  toni knees no effusion, no warmth, no tenderness, full rom    Neurological: She is alert.   Skin: Skin is warm. Capillary refill takes less than 2 seconds. No rash noted. She is not diaphoretic. No erythema.   Psychiatric: Her behavior is normal. Judgment and thought content normal.   Vitals reviewed.          Recent Results (from the past 672 hour(s))   CBC Auto Differential    Collection " Time: 07/19/22  2:38 PM   Result Value Ref Range    WBC 6.82 3.90 - 12.70 K/uL    RBC 4.03 4.00 - 5.40 M/uL    Hemoglobin 12.5 12.0 - 16.0 g/dL    Hematocrit 36.6 (L) 37.0 - 48.5 %    MCV 91 82 - 98 fL    MCH 31.0 27.0 - 31.0 pg    MCHC 34.2 32.0 - 36.0 g/dL    RDW 13.8 11.5 - 14.5 %    Platelets 211 150 - 450 K/uL    MPV 9.9 9.2 - 12.9 fL    Immature Granulocytes 0.7 (H) 0.0 - 0.5 %    Gran # (ANC) 3.6 1.8 - 7.7 K/uL    Immature Grans (Abs) 0.05 (H) 0.00 - 0.04 K/uL    Lymph # 2.2 1.0 - 4.8 K/uL    Mono # 0.7 0.3 - 1.0 K/uL    Eos # 0.3 0.0 - 0.5 K/uL    Baso # 0.08 0.00 - 0.20 K/uL    nRBC 0 0 /100 WBC    Gran % 52.2 38.0 - 73.0 %    Lymph % 32.7 18.0 - 48.0 %    Mono % 9.5 4.0 - 15.0 %    Eosinophil % 3.7 0.0 - 8.0 %    Basophil % 1.2 0.0 - 1.9 %    Differential Method Automated    Comprehensive Metabolic Panel    Collection Time: 07/19/22  2:38 PM   Result Value Ref Range    Sodium 140 136 - 145 mmol/L    Potassium 3.9 3.5 - 5.1 mmol/L    Chloride 106 95 - 110 mmol/L    CO2 22 (L) 23 - 29 mmol/L    Glucose 143 (H) 70 - 110 mg/dL    BUN 13 8 - 23 mg/dL    Creatinine 0.8 0.5 - 1.4 mg/dL    Calcium 9.3 8.7 - 10.5 mg/dL    Total Protein 7.6 6.0 - 8.4 g/dL    Albumin 3.7 3.5 - 5.2 g/dL    Total Bilirubin 0.3 0.1 - 1.0 mg/dL    Alkaline Phosphatase 86 55 - 135 U/L    AST 19 10 - 40 U/L    ALT 26 10 - 44 U/L    Anion Gap 12 8 - 16 mmol/L    eGFR if African American >60 >60 mL/min/1.73 m^2    eGFR if non African American >60 >60 mL/min/1.73 m^2   Sedimentation rate    Collection Time: 07/19/22  2:38 PM   Result Value Ref Range    Sed Rate 22 0 - 36 mm/Hr   C-Reactive Protein    Collection Time: 07/19/22  2:38 PM   Result Value Ref Range    CRP 2.6 0.0 - 8.2 mg/L          Lab Results   Component Value Date    TBGOLDPLUS Negative 03/25/2022      Lab Results   Component Value Date    HEPAIGM Negative 08/28/2020    HEPBIGM Negative 08/28/2020    HEPBCAB Negative 03/23/2022    HEPCAB Negative 08/28/2020        Assessment:        1. Seropositive rheumatoid arthritis    2. High risk medication use    3. Long-term use of immunosuppressant medication    4. Counseling on health promotion and disease prevention          Impression:     Seropositive rheumatoid arthritis  Was doing well on methotrexate 15 mg weekly, daily folic acid.  Has been off of this medication due to recent pneumonia.  Okay to resume methotrexate, decrease dose to 10 mg per week.  Continue daily folic acid.  If worsening fatigue with methotrexate, discussed Mucinex DM at time of administration and 8-12 hours later.  MATT positive 1:80, panel normal.  She does report dryness which she feels is associated with albuterol inhaler.  Dryness improves when she is not using inhaler.    Drug therapy requiring intensive monitoring for toxicity  - High Risk Medication Monitoring encounter  -No current medication related issues, no evidence of toxicity  -I ordered lab results, reviewed and interpreted results as well as discussed findings with the patient     Immunosuppressed Status  - Compromised immune system secondary to autoimmune disease and use of immunosuppressive drugs.   - Monitor carefully for infections and toxicities- Currently denies issues with recurrent infections      - Advised to get immediate medical care if any infection.   - Also advised strict adherence to age appropriate vaccinations and cancer screenings with PCP.  - Recommend keeping Up to date on all recommended Vaccinations raya. ShingRx, Pneumovax, Prevnar, yearly flu vaccine      Counseling on health promotion and disease prevention  Over 10 minutes spent regarding below topics:  - Nutrition and exercise counseling.  - Medication counseling provided.     Osteoarthritis  Osteoarthritis bilateral 1st CMC joints.  Chronic deformities bilateral pink ease.  Reviewed hand x-rays and discussed with patient.    Plan:          Resume methotrexate.  Decreased dose to 10 mg weekly.  Continue daily folic acid.    Hold  immunosuppressant infection.    Can trial topical Voltaren bilateral 1st CMC joints 3-4 times daily as needed.    Continue Biotene, staying hydrated for oral dryness.  Could discuss using an inhaler spacer with her pulmonologist.    Follow up 3-4 months with reg4    Jeni Looney PA-C  [facility]  Rheumatology       40 minutes of total time spent on the encounter, which includes face to face time and non-face to face time preparing to see the patient (eg, review of tests), Obtaining and/or reviewing separately obtained history, Documenting clinical information in the electronic or other health record, Independently interpreting results (not separately reported) and communicating results to the patient/family/caregiver, or Care coordination (not separately reported).

## 2022-08-02 ENCOUNTER — PATIENT MESSAGE (OUTPATIENT)
Dept: OPTOMETRY | Facility: CLINIC | Age: 67
End: 2022-08-02
Payer: MEDICARE

## 2022-08-08 DIAGNOSIS — F33.41 RECURRENT MAJOR DEPRESSIVE DISORDER, IN PARTIAL REMISSION: ICD-10-CM

## 2022-08-08 RX ORDER — AMITRIPTYLINE HYDROCHLORIDE 100 MG/1
TABLET ORAL
Qty: 270 TABLET | Refills: 2 | Status: SHIPPED | OUTPATIENT
Start: 2022-08-08 | End: 2023-05-08

## 2022-08-08 NOTE — TELEPHONE ENCOUNTER
Refill Decision Note   Caitlin Ahmadi  is requesting a refill authorization.  Brief Assessment and Rationale for Refill:  Approve     Medication Therapy Plan:       Medication Reconciliation Completed: No   Comments:     No Care Gaps recommended.     Note composed:3:50 PM 08/08/2022

## 2022-08-08 NOTE — TELEPHONE ENCOUNTER
No new care gaps identified.  [facility] Embedded Care Gaps. Reference number: [id number]. 8/08/2022   7:59:05 AM HELENAT

## 2022-09-14 ENCOUNTER — OFFICE VISIT (OUTPATIENT)
Dept: PULMONOLOGY | Facility: CLINIC | Age: 67
End: 2022-09-14
Payer: MEDICARE

## 2022-09-14 VITALS
WEIGHT: 170.19 LBS | BODY MASS INDEX: 25.79 KG/M2 | SYSTOLIC BLOOD PRESSURE: 120 MMHG | HEIGHT: 68 IN | OXYGEN SATURATION: 96 % | RESPIRATION RATE: 16 BRPM | HEART RATE: 104 BPM | DIASTOLIC BLOOD PRESSURE: 80 MMHG

## 2022-09-14 DIAGNOSIS — M05.9 SEROPOSITIVE RHEUMATOID ARTHRITIS: ICD-10-CM

## 2022-09-14 DIAGNOSIS — J45.21 MILD INTERMITTENT ASTHMATIC BRONCHITIS WITH ACUTE EXACERBATION: Primary | ICD-10-CM

## 2022-09-14 DIAGNOSIS — J30.89 SEASONAL ALLERGIC RHINITIS DUE TO OTHER ALLERGIC TRIGGER: ICD-10-CM

## 2022-09-14 DIAGNOSIS — J84.9 ILD (INTERSTITIAL LUNG DISEASE): ICD-10-CM

## 2022-09-14 PROCEDURE — 3288F FALL RISK ASSESSMENT DOCD: CPT | Mod: CPTII,S$GLB,, | Performed by: INTERNAL MEDICINE

## 2022-09-14 PROCEDURE — 3008F BODY MASS INDEX DOCD: CPT | Mod: CPTII,S$GLB,, | Performed by: INTERNAL MEDICINE

## 2022-09-14 PROCEDURE — 1159F PR MEDICATION LIST DOCUMENTED IN MEDICAL RECORD: ICD-10-PCS | Mod: CPTII,S$GLB,, | Performed by: INTERNAL MEDICINE

## 2022-09-14 PROCEDURE — 3288F PR FALLS RISK ASSESSMENT DOCUMENTED: ICD-10-PCS | Mod: CPTII,S$GLB,, | Performed by: INTERNAL MEDICINE

## 2022-09-14 PROCEDURE — 99999 PR PBB SHADOW E&M-EST. PATIENT-LVL IV: ICD-10-PCS | Mod: PBBFAC,,, | Performed by: INTERNAL MEDICINE

## 2022-09-14 PROCEDURE — 3079F DIAST BP 80-89 MM HG: CPT | Mod: CPTII,S$GLB,, | Performed by: INTERNAL MEDICINE

## 2022-09-14 PROCEDURE — 3074F PR MOST RECENT SYSTOLIC BLOOD PRESSURE < 130 MM HG: ICD-10-PCS | Mod: CPTII,S$GLB,, | Performed by: INTERNAL MEDICINE

## 2022-09-14 PROCEDURE — 1101F PR PT FALLS ASSESS DOC 0-1 FALLS W/OUT INJ PAST YR: ICD-10-PCS | Mod: CPTII,S$GLB,, | Performed by: INTERNAL MEDICINE

## 2022-09-14 PROCEDURE — 4010F PR ACE/ARB THEARPY RXD/TAKEN: ICD-10-PCS | Mod: CPTII,S$GLB,, | Performed by: INTERNAL MEDICINE

## 2022-09-14 PROCEDURE — 3079F PR MOST RECENT DIASTOLIC BLOOD PRESSURE 80-89 MM HG: ICD-10-PCS | Mod: CPTII,S$GLB,, | Performed by: INTERNAL MEDICINE

## 2022-09-14 PROCEDURE — 99215 PR OFFICE/OUTPT VISIT, EST, LEVL V, 40-54 MIN: ICD-10-PCS | Mod: S$GLB,,, | Performed by: INTERNAL MEDICINE

## 2022-09-14 PROCEDURE — 1159F MED LIST DOCD IN RCRD: CPT | Mod: CPTII,S$GLB,, | Performed by: INTERNAL MEDICINE

## 2022-09-14 PROCEDURE — 3008F PR BODY MASS INDEX (BMI) DOCUMENTED: ICD-10-PCS | Mod: CPTII,S$GLB,, | Performed by: INTERNAL MEDICINE

## 2022-09-14 PROCEDURE — 3074F SYST BP LT 130 MM HG: CPT | Mod: CPTII,S$GLB,, | Performed by: INTERNAL MEDICINE

## 2022-09-14 PROCEDURE — 99999 PR PBB SHADOW E&M-EST. PATIENT-LVL IV: CPT | Mod: PBBFAC,,, | Performed by: INTERNAL MEDICINE

## 2022-09-14 PROCEDURE — 1101F PT FALLS ASSESS-DOCD LE1/YR: CPT | Mod: CPTII,S$GLB,, | Performed by: INTERNAL MEDICINE

## 2022-09-14 PROCEDURE — 4010F ACE/ARB THERAPY RXD/TAKEN: CPT | Mod: CPTII,S$GLB,, | Performed by: INTERNAL MEDICINE

## 2022-09-14 PROCEDURE — 99215 OFFICE O/P EST HI 40 MIN: CPT | Mod: S$GLB,,, | Performed by: INTERNAL MEDICINE

## 2022-09-14 RX ORDER — ALBUTEROL SULFATE 0.83 MG/ML
2.5 SOLUTION RESPIRATORY (INHALATION)
Qty: 360 ML | Refills: 11 | Status: SHIPPED | OUTPATIENT
Start: 2022-09-14 | End: 2023-09-14

## 2022-09-14 RX ORDER — PREDNISONE 20 MG/1
TABLET ORAL
Qty: 20 TABLET | Refills: 0 | Status: SHIPPED | OUTPATIENT
Start: 2022-09-14 | End: 2022-11-01

## 2022-09-14 RX ORDER — FLUTICASONE FUROATE AND VILANTEROL 100; 25 UG/1; UG/1
1 POWDER RESPIRATORY (INHALATION) DAILY
Qty: 60 EACH | Refills: 11 | Status: SHIPPED | OUTPATIENT
Start: 2022-09-14 | End: 2023-09-25

## 2022-09-14 RX ORDER — AZITHROMYCIN 250 MG/1
TABLET, FILM COATED ORAL
Qty: 6 TABLET | Refills: 0 | Status: SHIPPED | OUTPATIENT
Start: 2022-09-14 | End: 2022-11-01

## 2022-09-14 NOTE — PROGRESS NOTES
Subjective:     Patient ID: Caitlin Ahmadi is a 67 y.o. female.    Chief Complaint:      HPI  History of pneumonia in June - had  iv antibiotics for two days    Asthma Follow-up  The patient has previously been evaluated here for asthma and presents for an asthma follow-up. The patient is currently having symptoms / an exacerbation. Current symptoms include dyspnea, productive cough and wheezing. Symptoms have been present since several weeks ago and have been gradually worsening. She denies chest pain and chest tightness. Associated symptoms include poor exercise tolerance.  This episode appears to have been triggered by no identifiable factor. Treatments tried for the current exacerbation include short-acting inhaled beta-adrenergic agonists, which have provided some relief of symptoms. The patient has been having similar episodes for approximately 1 year.     Current Disease Severity  The patient is having daytime symptoms more than 2 days per week but not daily. The patient is having daytime symptoms less than or equal to 2 times per month. The patient is using short-acting beta agonists for symptom control less than or equal to 2 days per week. She has exacerbations requiring oral systemic corticosteroids 0 times per year. Current limitations in activity from asthma: none. Number of days of school or work missed in the last month: not applicable. Number of urgent/emergent visit in last year: 0.  The patient is not using a spacer with MDIs. Her best peak flow rate is na. She is not monitoring peak flow rates at home.      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;  Service: Endoscopy;  Laterality: N/A;    HERNIA REPAIR      HYSTERECTOMY      TONSILLECTOMY      TOTAL KNEE ARTHROPLASTY Bilateral      Review of patient's allergies indicates:   Allergen Reactions    Pneumococcal vaccine Swelling    Hydrocodone-acetaminophen Nausea Only and Nausea And Vomiting    Meloxicam Rash    Sulfa (sulfonamide antibiotics) Rash     Current Outpatient Medications on File Prior to Visit   Medication Sig Dispense Refill    albuterol (PROVENTIL/VENTOLIN HFA) 90 mcg/actuation inhaler Inhale 2 puffs into the lungs every 6 (six) hours as needed for Wheezing. Rescue 6.7 g 11    amitriptyline (ELAVIL) 100 MG tablet TAKE 3 TABLETS(300 MG) BY MOUTH EVERY EVENING 270 tablet 2    fluticasone propionate (FLONASE) 50 mcg/actuation nasal spray 2 sprays (100 mcg total) by Each Nostril route once daily. 16 g 11    folic acid (FOLVITE) 1 MG tablet TAKE 1 TABLET(1 MG) BY MOUTH EVERY DAY 30 tablet 4    ibuprofen (ADVIL,MOTRIN) 200 MG tablet Take 600 mg by mouth every 6 (six) hours as needed.      losartan (COZAAR) 25 MG tablet Take 1 tablet (25 mg total) by mouth once daily. 90 tablet 1    methotrexate 2.5 MG Tab TAKE 6 TABLETS(15 MG) BY MOUTH EVERY 7 DAYS 24 tablet 3    mupirocin (BACTROBAN) 2 % ointment Apply to affected area 3 times daily 22 g 1    promethazine-dextromethorphan (PROMETHAZINE-DM) 6.25-15 mg/5 mL Syrp Take 5 mLs by mouth every 6 (six) hours as needed (cough). 118 mL 0    triamcinolone acetonide 0.1% (KENALOG) 0.1 % ointment Apply topically 2 (two) times daily. 30 g 0    [DISCONTINUED] azithromycin (ZITHROMAX Z-VENTURA) 250 MG tablet 500 mg on day 1 (two tablets) followed by 250 mg once daily on days 2-5 6 tablet 0    [DISCONTINUED] predniSONE (DELTASONE) 20 MG tablet Take 1 tablet twice daily for 2 days. Then  "take 1 tablet daily for 3 days. 7 tablet 0     No current facility-administered medications on file prior to visit.     Social History     Socioeconomic History    Marital status:    Tobacco Use    Smoking status: Former     Packs/day: 1.00     Years: 20.00     Pack years: 20.00     Types: Cigarettes     Start date: 1970     Quit date: 1990     Years since quittin.2    Smokeless tobacco: Never   Substance and Sexual Activity    Alcohol use: Yes     Alcohol/week: 5.0 standard drinks     Types: 5 Glasses of wine per week    Drug use: Never    Sexual activity: Yes     Partners: Male     Family History   Problem Relation Age of Onset    No Known Problems Mother     Alzheimer's disease Father        Review of Systems   Constitutional:  Positive for fatigue. Negative for fever.   HENT:  Positive for postnasal drip, rhinorrhea and congestion.    Eyes:  Negative for redness and itching.   Respiratory:  Positive for cough, sputum production, shortness of breath, dyspnea on extertion, use of rescue inhaler and Paroxysmal Nocturnal Dyspnea.    Cardiovascular:  Negative for chest pain, palpitations and leg swelling.   Genitourinary:  Negative for difficulty urinating and hematuria.   Endocrine:  Negative for cold intolerance and heat intolerance.    Skin:  Negative for rash.   Gastrointestinal:  Negative for nausea and abdominal pain.   Neurological:  Negative for dizziness, syncope, weakness and light-headedness.   Hematological:  Negative for adenopathy. Does not bruise/bleed easily.   Psychiatric/Behavioral:  Negative for sleep disturbance. The patient is not nervous/anxious.      Objective:      /80   Pulse 104   Resp 16   Ht 5' 8" (1.727 m)   Wt 77.2 kg (170 lb 3.1 oz)   SpO2 96%   BMI 25.88 kg/m²   Physical Exam  Vitals and nursing note reviewed.   Constitutional:       Appearance: She is well-developed. She is ill-appearing.   HENT:      Head: Normocephalic and atraumatic.      Nose: " Nose normal.      Mouth/Throat:      Pharynx: No oropharyngeal exudate.   Eyes:      Conjunctiva/sclera: Conjunctivae normal.      Pupils: Pupils are equal, round, and reactive to light.   Neck:      Thyroid: No thyromegaly.      Vascular: No JVD.      Trachea: No tracheal deviation.   Cardiovascular:      Rate and Rhythm: Normal rate and regular rhythm.      Heart sounds: Normal heart sounds.   Pulmonary:      Effort: Pulmonary effort is normal. No respiratory distress.      Breath sounds: Examination of the right-lower field reveals wheezing. Examination of the left-lower field reveals wheezing. Decreased breath sounds and wheezing present. No rhonchi or rales.   Chest:      Chest wall: No tenderness.   Abdominal:      General: Bowel sounds are normal.      Palpations: Abdomen is soft.   Musculoskeletal:         General: Swelling present. Normal range of motion.      Cervical back: Neck supple.   Lymphadenopathy:      Cervical: No cervical adenopathy.   Skin:     General: Skin is warm and dry.   Neurological:      Mental Status: She is alert and oriented to person, place, and time.     Personal Diagnostic Review  Chest x-ray: mild interstitial changes      Pulmonary Studies Review 9/14/2022   SpO2 96   Ordering Provider -   Interpreting Provider -   Performing nurse/tech/RT -   Diagnosis -   Height 68   Weight 2723.12   BMI (Calculated) 25.9   Predicted Distance 319.94   Patient Race -   6MWT Status -   Patient Reported -   Was O2 used? -   6MW Distance walked (feet) -   Distance walked (meters) -   Did patient stop? -   Type of assistive device(s) used? -   Is extra documentation required for this patient? -   Oxygen Saturation -   Supplemental Oxygen -   Heart Rate -   Blood Pressure -   [place] Dyspnea Rating  -   Oxygen Saturation -   Supplemental Oxygen -   Heart Rate -   Blood Pressure -   [place] Dyspnea Rating  -   Recovery Time (seconds) -   Oxygen Saturation -   Supplemental Oxygen -   Heart Rate -   Blood  Pressure -   [place] Dyspnea Rating  -   Is procedure ready for interpretation? -   Did the patient stop or pause? -   Total Time Walked (Calculated) -   Total Laps Walked -   Final Partial Lap Distance (feet) -   Total Distance Feet (Calculated) -   Total Distance Meters (Calculated) -   Predicted Distance Meters (Calculated) 461.57   Percentage of Predicted (Calculated) -   Peak VO2 (Calculated) -   Mets -   Has The Patient Had a Previous Six Minute Walk Test? -   Oxygen Qualification? -       X-Ray Chest PA And Lateral  Narrative: EXAMINATION:  XR CHEST PA AND LATERAL    CLINICAL HISTORY:  Pneumonia, unspecified organism    TECHNIQUE:  PA and lateral views of the chest were performed.    COMPARISON:  12/18/2020    FINDINGS:  Cardiac silhouette and mediastinal contours are normal.  Lungs are clear.  Osseous structures are intact.  Impression: No acute cardiopulmonary process.    Electronically signed by: Dangelo Irene MD  Date:    07/12/2022  Time:    11:26      Office Spirometry Results:     No flowsheet data found.  Pulmonary Studies Review 9/14/2022   SpO2 96   Ordering Provider -   Interpreting Provider -   Performing nurse/tech/RT -   Diagnosis -   Height 68   Weight 2723.12   BMI (Calculated) 25.9   Predicted Distance 319.94   Patient Race -   6MWT Status -   Patient Reported -   Was O2 used? -   6MW Distance walked (feet) -   Distance walked (meters) -   Did patient stop? -   Type of assistive device(s) used? -   Is extra documentation required for this patient? -   Oxygen Saturation -   Supplemental Oxygen -   Heart Rate -   Blood Pressure -   [place] Dyspnea Rating  -   Oxygen Saturation -   Supplemental Oxygen -   Heart Rate -   Blood Pressure -   [place] Dyspnea Rating  -   Recovery Time (seconds) -   Oxygen Saturation -   Supplemental Oxygen -   Heart Rate -   Blood Pressure -   [place] Dyspnea Rating  -   Is procedure ready for interpretation? -   Did the patient stop or pause? -   Total Time Walked  (Calculated) -   Total Laps Walked -   Final Partial Lap Distance (feet) -   Total Distance Feet (Calculated) -   Total Distance Meters (Calculated) -   Predicted Distance Meters (Calculated) 461.57   Percentage of Predicted (Calculated) -   Peak VO2 (Calculated) -   Mets -   Has The Patient Had a Previous Six Minute Walk Test? -   Oxygen Qualification? -         Assessment:            Mild intermittent asthmatic bronchitis with acute exacerbation  -     predniSONE (DELTASONE) 20 MG tablet; Prednisone 60 mg/ day for 3 days, 40 mg/day for 3 days,20 mg/ day for 3 days, (1/2 tablet )10 mg a day for 3 days.  Dispense: 20 tablet; Refill: 0  -     albuterol (PROVENTIL) 2.5 mg /3 mL (0.083 %) nebulizer solution; Take 3 mLs (2.5 mg total) by nebulization every 4 to 6 hours as needed for Wheezing or Shortness of Breath.  Dispense: 360 mL; Refill: 11  -     NEBULIZER KIT (SUPPLIES) FOR HOME USE  -     NEBULIZER FOR HOME USE  -     azithromycin (ZITHROMAX Z-VENTURA) 250 MG tablet; 500 mg on day 1 (two tablets) followed by 250 mg once daily on days 2-5  Dispense: 6 tablet; Refill: 0  -     Complete PFT with bronchodilator; Future; Expected date: 09/14/2022  -     fluticasone furoate-vilanteroL (BREO ELLIPTA) 100-25 mcg/dose diskus inhaler; Inhale 1 puff into the lungs once daily. Controller.Wash out mouth after use  Dispense: 60 each; Refill: 11    ILD (interstitial lung disease)  -     Complete PFT with bronchodilator; Future; Expected date: 09/14/2022    Seropositive rheumatoid arthritis    Seasonal allergic rhinitis due to other allergic trigger  -     azithromycin (ZITHROMAX Z-VENTURA) 250 MG tablet; 500 mg on day 1 (two tablets) followed by 250 mg once daily on days 2-5  Dispense: 6 tablet; Refill: 0        Outpatient Encounter Medications as of 9/14/2022   Medication Sig Dispense Refill    albuterol (PROVENTIL/VENTOLIN HFA) 90 mcg/actuation inhaler Inhale 2 puffs into the lungs every 6 (six) hours as needed for Wheezing. Rescue  6.7 g 11    amitriptyline (ELAVIL) 100 MG tablet TAKE 3 TABLETS(300 MG) BY MOUTH EVERY EVENING 270 tablet 2    azithromycin (ZITHROMAX Z-VENTURA) 250 MG tablet 500 mg on day 1 (two tablets) followed by 250 mg once daily on days 2-5 6 tablet 0    fluticasone propionate (FLONASE) 50 mcg/actuation nasal spray 2 sprays (100 mcg total) by Each Nostril route once daily. 16 g 11    folic acid (FOLVITE) 1 MG tablet TAKE 1 TABLET(1 MG) BY MOUTH EVERY DAY 30 tablet 4    ibuprofen (ADVIL,MOTRIN) 200 MG tablet Take 600 mg by mouth every 6 (six) hours as needed.      losartan (COZAAR) 25 MG tablet Take 1 tablet (25 mg total) by mouth once daily. 90 tablet 1    methotrexate 2.5 MG Tab TAKE 6 TABLETS(15 MG) BY MOUTH EVERY 7 DAYS 24 tablet 3    mupirocin (BACTROBAN) 2 % ointment Apply to affected area 3 times daily 22 g 1    promethazine-dextromethorphan (PROMETHAZINE-DM) 6.25-15 mg/5 mL Syrp Take 5 mLs by mouth every 6 (six) hours as needed (cough). 118 mL 0    triamcinolone acetonide 0.1% (KENALOG) 0.1 % ointment Apply topically 2 (two) times daily. 30 g 0    [DISCONTINUED] azithromycin (ZITHROMAX Z-VENTURA) 250 MG tablet 500 mg on day 1 (two tablets) followed by 250 mg once daily on days 2-5 6 tablet 0    [DISCONTINUED] predniSONE (DELTASONE) 20 MG tablet Take 1 tablet twice daily for 2 days. Then take 1 tablet daily for 3 days. 7 tablet 0    albuterol (PROVENTIL) 2.5 mg /3 mL (0.083 %) nebulizer solution Take 3 mLs (2.5 mg total) by nebulization every 4 to 6 hours as needed for Wheezing or Shortness of Breath. 360 mL 11    fluticasone furoate-vilanteroL (BREO ELLIPTA) 100-25 mcg/dose diskus inhaler Inhale 1 puff into the lungs once daily. Controller.Wash out mouth after use 60 each 11    predniSONE (DELTASONE) 20 MG tablet Prednisone 60 mg/ day for 3 days, 40 mg/day for 3 days,20 mg/ day for 3 days, (1/2 tablet )10 mg a day for 3 days. 20 tablet 0     No facility-administered encounter medications on file as of 9/14/2022.     Plan:        Requested Prescriptions     Signed Prescriptions Disp Refills    predniSONE (DELTASONE) 20 MG tablet 20 tablet 0     Sig: Prednisone 60 mg/ day for 3 days, 40 mg/day for 3 days,20 mg/ day for 3 days, (1/2 tablet )10 mg a day for 3 days.    albuterol (PROVENTIL) 2.5 mg /3 mL (0.083 %) nebulizer solution 360 mL 11     Sig: Take 3 mLs (2.5 mg total) by nebulization every 4 to 6 hours as needed for Wheezing or Shortness of Breath.    azithromycin (ZITHROMAX Z-VENTURA) 250 MG tablet 6 tablet 0     Si mg on day 1 (two tablets) followed by 250 mg once daily on days 2-5    fluticasone furoate-vilanteroL (BREO ELLIPTA) 100-25 mcg/dose diskus inhaler 60 each 11     Sig: Inhale 1 puff into the lungs once daily. Controller.Wash out mouth after use     Problem List Items Addressed This Visit       ILD (interstitial lung disease)    Relevant Orders    Complete PFT with bronchodilator    Seropositive rheumatoid arthritis     Other Visit Diagnoses       Mild intermittent asthmatic bronchitis with acute exacerbation    -  Primary    Relevant Medications    predniSONE (DELTASONE) 20 MG tablet    albuterol (PROVENTIL) 2.5 mg /3 mL (0.083 %) nebulizer solution    azithromycin (ZITHROMAX Z-VENTURA) 250 MG tablet    fluticasone furoate-vilanteroL (BREO ELLIPTA) 100-25 mcg/dose diskus inhaler    Other Relevant Orders    NEBULIZER KIT (SUPPLIES) FOR HOME USE    NEBULIZER FOR HOME USE    Complete PFT with bronchodilator    Seasonal allergic rhinitis due to other allergic trigger        Relevant Medications    azithromycin (ZITHROMAX Z-VENTURA) 250 MG tablet               Follow up in about 6 months (around 3/14/2023) for PFT on return.    MEDICAL DECISION MAKING: Moderate to high complexity.  Overall, the multiple problems listed are of moderate to high severity that may impact quality of life and activities of daily living. Side effects of medications, treatment plan as well as options and alternatives reviewed and discussed with patient.  There was counseling of patient concerning these issues.    Total time spent in counseling and coordination of care - 40  minutes of total time spent on the encounter, which includes face to face time and non-face to face time preparing to see the patient (eg, review of tests), Obtaining and/or reviewing separately obtained history, Documenting clinical information in the electronic or other health record, Independently interpreting results (not separately reported) and communicating results to the patient/family/caregiver, or Care coordination (not separately reported).    Time was used in discussion of prognosis, risks, benefits of treatment, instructions and compliance with regimen . Discussion with other physicians and/or health care providers - home health or for use of durable medical equipment (oxygen, nebulizers, CPAP, BiPAP) occurred.

## 2022-09-26 ENCOUNTER — LAB VISIT (OUTPATIENT)
Dept: LAB | Facility: HOSPITAL | Age: 67
End: 2022-09-26
Attending: INTERNAL MEDICINE
Payer: MEDICARE

## 2022-09-26 DIAGNOSIS — I10 PRIMARY HYPERTENSION: ICD-10-CM

## 2022-09-26 DIAGNOSIS — M05.9 SEROPOSITIVE RHEUMATOID ARTHRITIS: ICD-10-CM

## 2022-09-26 LAB
ALBUMIN SERPL BCP-MCNC: 3.6 G/DL (ref 3.5–5.2)
ALBUMIN SERPL BCP-MCNC: 3.6 G/DL (ref 3.5–5.2)
ALP SERPL-CCNC: 74 U/L (ref 55–135)
ALP SERPL-CCNC: 74 U/L (ref 55–135)
ALT SERPL W/O P-5'-P-CCNC: 36 U/L (ref 10–44)
ALT SERPL W/O P-5'-P-CCNC: 36 U/L (ref 10–44)
ANION GAP SERPL CALC-SCNC: 7 MMOL/L (ref 8–16)
ANION GAP SERPL CALC-SCNC: 7 MMOL/L (ref 8–16)
AST SERPL-CCNC: 21 U/L (ref 10–40)
AST SERPL-CCNC: 21 U/L (ref 10–40)
BASOPHILS # BLD AUTO: 0.06 K/UL (ref 0–0.2)
BASOPHILS # BLD AUTO: 0.06 K/UL (ref 0–0.2)
BASOPHILS NFR BLD: 1 % (ref 0–1.9)
BASOPHILS NFR BLD: 1 % (ref 0–1.9)
BILIRUB SERPL-MCNC: 0.5 MG/DL (ref 0.1–1)
BILIRUB SERPL-MCNC: 0.5 MG/DL (ref 0.1–1)
BUN SERPL-MCNC: 13 MG/DL (ref 8–23)
BUN SERPL-MCNC: 13 MG/DL (ref 8–23)
CALCIUM SERPL-MCNC: 8.9 MG/DL (ref 8.7–10.5)
CALCIUM SERPL-MCNC: 8.9 MG/DL (ref 8.7–10.5)
CHLORIDE SERPL-SCNC: 108 MMOL/L (ref 95–110)
CHLORIDE SERPL-SCNC: 108 MMOL/L (ref 95–110)
CHOLEST SERPL-MCNC: 241 MG/DL (ref 120–199)
CHOLEST/HDLC SERPL: 4.8 {RATIO} (ref 2–5)
CO2 SERPL-SCNC: 25 MMOL/L (ref 23–29)
CO2 SERPL-SCNC: 25 MMOL/L (ref 23–29)
CREAT SERPL-MCNC: 0.7 MG/DL (ref 0.5–1.4)
CREAT SERPL-MCNC: 0.7 MG/DL (ref 0.5–1.4)
CRP SERPL-MCNC: 5.1 MG/L (ref 0–8.2)
DIFFERENTIAL METHOD: ABNORMAL
DIFFERENTIAL METHOD: ABNORMAL
EOSINOPHIL # BLD AUTO: 0.2 K/UL (ref 0–0.5)
EOSINOPHIL # BLD AUTO: 0.2 K/UL (ref 0–0.5)
EOSINOPHIL NFR BLD: 2.8 % (ref 0–8)
EOSINOPHIL NFR BLD: 2.8 % (ref 0–8)
ERYTHROCYTE [DISTWIDTH] IN BLOOD BY AUTOMATED COUNT: 14.3 % (ref 11.5–14.5)
ERYTHROCYTE [DISTWIDTH] IN BLOOD BY AUTOMATED COUNT: 14.3 % (ref 11.5–14.5)
ERYTHROCYTE [SEDIMENTATION RATE] IN BLOOD BY PHOTOMETRIC METHOD: 10 MM/HR (ref 0–36)
EST. GFR  (NO RACE VARIABLE): >60 ML/MIN/1.73 M^2
EST. GFR  (NO RACE VARIABLE): >60 ML/MIN/1.73 M^2
GLUCOSE SERPL-MCNC: 113 MG/DL (ref 70–110)
GLUCOSE SERPL-MCNC: 113 MG/DL (ref 70–110)
HCT VFR BLD AUTO: 36.8 % (ref 37–48.5)
HCT VFR BLD AUTO: 36.8 % (ref 37–48.5)
HDLC SERPL-MCNC: 50 MG/DL (ref 40–75)
HDLC SERPL: 20.7 % (ref 20–50)
HGB BLD-MCNC: 12.1 G/DL (ref 12–16)
HGB BLD-MCNC: 12.1 G/DL (ref 12–16)
IMM GRANULOCYTES # BLD AUTO: 0.14 K/UL (ref 0–0.04)
IMM GRANULOCYTES # BLD AUTO: 0.14 K/UL (ref 0–0.04)
IMM GRANULOCYTES NFR BLD AUTO: 2.3 % (ref 0–0.5)
IMM GRANULOCYTES NFR BLD AUTO: 2.3 % (ref 0–0.5)
LDLC SERPL CALC-MCNC: 135.6 MG/DL (ref 63–159)
LYMPHOCYTES # BLD AUTO: 2 K/UL (ref 1–4.8)
LYMPHOCYTES # BLD AUTO: 2 K/UL (ref 1–4.8)
LYMPHOCYTES NFR BLD: 32.3 % (ref 18–48)
LYMPHOCYTES NFR BLD: 32.3 % (ref 18–48)
MCH RBC QN AUTO: 30.1 PG (ref 27–31)
MCH RBC QN AUTO: 30.1 PG (ref 27–31)
MCHC RBC AUTO-ENTMCNC: 32.9 G/DL (ref 32–36)
MCHC RBC AUTO-ENTMCNC: 32.9 G/DL (ref 32–36)
MCV RBC AUTO: 92 FL (ref 82–98)
MCV RBC AUTO: 92 FL (ref 82–98)
MONOCYTES # BLD AUTO: 0.7 K/UL (ref 0.3–1)
MONOCYTES # BLD AUTO: 0.7 K/UL (ref 0.3–1)
MONOCYTES NFR BLD: 11.8 % (ref 4–15)
MONOCYTES NFR BLD: 11.8 % (ref 4–15)
NEUTROPHILS # BLD AUTO: 3 K/UL (ref 1.8–7.7)
NEUTROPHILS # BLD AUTO: 3 K/UL (ref 1.8–7.7)
NEUTROPHILS NFR BLD: 49.8 % (ref 38–73)
NEUTROPHILS NFR BLD: 49.8 % (ref 38–73)
NONHDLC SERPL-MCNC: 191 MG/DL
NRBC BLD-RTO: 0 /100 WBC
NRBC BLD-RTO: 0 /100 WBC
PLATELET # BLD AUTO: 185 K/UL (ref 150–450)
PLATELET # BLD AUTO: 185 K/UL (ref 150–450)
PMV BLD AUTO: 10 FL (ref 9.2–12.9)
PMV BLD AUTO: 10 FL (ref 9.2–12.9)
POTASSIUM SERPL-SCNC: 4.5 MMOL/L (ref 3.5–5.1)
POTASSIUM SERPL-SCNC: 4.5 MMOL/L (ref 3.5–5.1)
PROT SERPL-MCNC: 6.7 G/DL (ref 6–8.4)
PROT SERPL-MCNC: 6.7 G/DL (ref 6–8.4)
RBC # BLD AUTO: 4.02 M/UL (ref 4–5.4)
RBC # BLD AUTO: 4.02 M/UL (ref 4–5.4)
SODIUM SERPL-SCNC: 140 MMOL/L (ref 136–145)
SODIUM SERPL-SCNC: 140 MMOL/L (ref 136–145)
TRIGL SERPL-MCNC: 277 MG/DL (ref 30–150)
TSH SERPL DL<=0.005 MIU/L-ACNC: 2.22 UIU/ML (ref 0.4–4)
WBC # BLD AUTO: 6.09 K/UL (ref 3.9–12.7)
WBC # BLD AUTO: 6.09 K/UL (ref 3.9–12.7)

## 2022-09-26 PROCEDURE — 85652 RBC SED RATE AUTOMATED: CPT | Performed by: INTERNAL MEDICINE

## 2022-09-26 PROCEDURE — 86140 C-REACTIVE PROTEIN: CPT | Performed by: INTERNAL MEDICINE

## 2022-09-26 PROCEDURE — 80053 COMPREHEN METABOLIC PANEL: CPT | Performed by: INTERNAL MEDICINE

## 2022-09-26 PROCEDURE — 80061 LIPID PANEL: CPT | Performed by: INTERNAL MEDICINE

## 2022-09-26 PROCEDURE — 36415 COLL VENOUS BLD VENIPUNCTURE: CPT | Performed by: INTERNAL MEDICINE

## 2022-09-26 PROCEDURE — 84443 ASSAY THYROID STIM HORMONE: CPT | Performed by: INTERNAL MEDICINE

## 2022-09-26 PROCEDURE — 85025 COMPLETE CBC W/AUTO DIFF WBC: CPT | Performed by: INTERNAL MEDICINE

## 2022-10-05 NOTE — PROGRESS NOTES
RHEUMATOLOGY OUTPATIENT CLINIC NOTE    10/06/2022    Subjective:       Patient ID: Caitlin Ahmadi is a 67 y.o. female.    Chief Complaint: Rheumatoid Arthritis      HPI   Caitlin Ahmadi is a 67 y.o. pleasant female here for rheumatology follow up for seropositive rheumatoid arthritis.      From a joint standpoint, she is doing well on methotrexate 15 mg/wk and daily folic acid.  Today her main concern is low back pain 7/10 worse after sitting for prolonged periods of time.  It improves after getting up and walking.  Denies any radiation into her lower extremities.       No prolonged morning stiffness, no swelling, no erythema, no increased warmth to any joints.  Occasional pain in her bilateral 1st CMCs  Rheumatologic review of systems negative otherwise.     Physical exam   No obvious synovitis, no erythema, no increased warmth to any joints toni UE/LE.  Decreased flexion bilateral MCPs, otherwise full fist formation.  Mild tenderness to palpation right index finger, no obvious synovitis.  No rash.    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    Cataract     Encounter for blood transfusion     Hypertension     PONV (postoperative nausea and vomiting)     Seropositive rheumatoid arthritis 2/17/2022     Past Surgical History:   Procedure Laterality Date    ADENOIDECTOMY      CATARACT EXTRACTION W/  INTRAOCULAR LENS IMPLANT Left 12/09/2020    CATARACT EXTRACTION W/  INTRAOCULAR LENS IMPLANT      CHOLECYSTECTOMY      COLONOSCOPY N/A 10/13/2021    Procedure: COLONOSCOPY;  Surgeon: Elissa Yoon MD;  [facility]: [facility];  Service: Endoscopy;  Laterality: N/A;    HERNIA REPAIR      HYSTERECTOMY      TONSILLECTOMY      TOTAL KNEE ARTHROPLASTY Bilateral      Family History   Problem Relation Age of Onset    No Known Problems Mother     Alzheimer's disease Father      Social History     Socioeconomic History    Marital status:    Tobacco Use    Smoking  "status: Former     Packs/day: 1.00     Years: 20.00     Pack years: 20.00     Types: Cigarettes     Start date: 1970     Quit date: 1990     Years since quittin.3    Smokeless tobacco: Never   Substance and Sexual Activity    Alcohol use: Yes     Alcohol/week: 5.0 standard drinks     Types: 5 Glasses of wine per week    Drug use: Never    Sexual activity: Yes     Partners: Male     Review of patient's allergies indicates:   Allergen Reactions    Pneumococcal vaccine Swelling    Hydrocodone-acetaminophen Nausea Only and Nausea And Vomiting    Meloxicam Rash    Sulfa (sulfonamide antibiotics) Rash           Objective:   /79   Pulse 90   Resp 18   Ht 5' 8" (1.727 m)   Wt 77.2 kg (170 lb 3.1 oz)   BMI 25.88 kg/m²   Immunization History   Administered Date(s) Administered    COVID-19, MRNA, LN-S, PF (Pfizer) (Purple Cap) 2021, 2021    Influenza (FLUAD) - Quadrivalent - Adjuvanted - PF *Preferred* (65+) 10/08/2020, 2021    Influenza - High Dose - PF (65 years and older) 2019    Influenza - Quadrivalent - PF *Preferred* (6 months and older) 2018    Pneumococcal Conjugate - 13 Valent 2019    Pneumococcal Polysaccharide - 23 Valent 2020    Tdap 2021            Recent Results (from the past 672 hour(s))   Lipid Panel    Collection Time: 22  9:01 AM   Result Value Ref Range    Cholesterol 241 (H) 120 - 199 mg/dL    Triglycerides 277 (H) 30 - 150 mg/dL    HDL 50 40 - 75 mg/dL    LDL Cholesterol 135.6 63.0 - 159.0 mg/dL    HDL/Cholesterol Ratio 20.7 20.0 - 50.0 %    Total Cholesterol/HDL Ratio 4.8 2.0 - 5.0    Non-HDL Cholesterol 191 mg/dL   Comprehensive Metabolic Panel    Collection Time: 22  9:01 AM   Result Value Ref Range    Sodium 140 136 - 145 mmol/L    Potassium 4.5 3.5 - 5.1 mmol/L    Chloride 108 95 - 110 mmol/L    CO2 25 23 - 29 mmol/L    Glucose 113 (H) 70 - 110 mg/dL    BUN 13 8 - 23 mg/dL    Creatinine 0.7 0.5 - 1.4 mg/dL    " Calcium 8.9 8.7 - 10.5 mg/dL    Total Protein 6.7 6.0 - 8.4 g/dL    Albumin 3.6 3.5 - 5.2 g/dL    Total Bilirubin 0.5 0.1 - 1.0 mg/dL    Alkaline Phosphatase 74 55 - 135 U/L    AST 21 10 - 40 U/L    ALT 36 10 - 44 U/L    Anion Gap 7 (L) 8 - 16 mmol/L    eGFR >60 >60 mL/min/1.73 m^2   CBC Auto Differential    Collection Time: 09/26/22  9:01 AM   Result Value Ref Range    WBC 6.09 3.90 - 12.70 K/uL    RBC 4.02 4.00 - 5.40 M/uL    Hemoglobin 12.1 12.0 - 16.0 g/dL    Hematocrit 36.8 (L) 37.0 - 48.5 %    MCV 92 82 - 98 fL    MCH 30.1 27.0 - 31.0 pg    MCHC 32.9 32.0 - 36.0 g/dL    RDW 14.3 11.5 - 14.5 %    Platelets 185 150 - 450 K/uL    MPV 10.0 9.2 - 12.9 fL    Immature Granulocytes 2.3 (H) 0.0 - 0.5 %    Gran # (ANC) 3.0 1.8 - 7.7 K/uL    Immature Grans (Abs) 0.14 (H) 0.00 - 0.04 K/uL    Lymph # 2.0 1.0 - 4.8 K/uL    Mono # 0.7 0.3 - 1.0 K/uL    Eos # 0.2 0.0 - 0.5 K/uL    Baso # 0.06 0.00 - 0.20 K/uL    nRBC 0 0 /100 WBC    Gran % 49.8 38.0 - 73.0 %    Lymph % 32.3 18.0 - 48.0 %    Mono % 11.8 4.0 - 15.0 %    Eosinophil % 2.8 0.0 - 8.0 %    Basophil % 1.0 0.0 - 1.9 %    Differential Method Automated    TSH    Collection Time: 09/26/22  9:01 AM   Result Value Ref Range    TSH 2.218 0.400 - 4.000 uIU/mL   CBC Auto Differential    Collection Time: 09/26/22  9:01 AM   Result Value Ref Range    WBC 6.09 3.90 - 12.70 K/uL    RBC 4.02 4.00 - 5.40 M/uL    Hemoglobin 12.1 12.0 - 16.0 g/dL    Hematocrit 36.8 (L) 37.0 - 48.5 %    MCV 92 82 - 98 fL    MCH 30.1 27.0 - 31.0 pg    MCHC 32.9 32.0 - 36.0 g/dL    RDW 14.3 11.5 - 14.5 %    Platelets 185 150 - 450 K/uL    MPV 10.0 9.2 - 12.9 fL    Immature Granulocytes 2.3 (H) 0.0 - 0.5 %    Gran # (ANC) 3.0 1.8 - 7.7 K/uL    Immature Grans (Abs) 0.14 (H) 0.00 - 0.04 K/uL    Lymph # 2.0 1.0 - 4.8 K/uL    Mono # 0.7 0.3 - 1.0 K/uL    Eos # 0.2 0.0 - 0.5 K/uL    Baso # 0.06 0.00 - 0.20 K/uL    nRBC 0 0 /100 WBC    Gran % 49.8 38.0 - 73.0 %    Lymph % 32.3 18.0 - 48.0 %    Mono % 11.8 4.0  - 15.0 %    Eosinophil % 2.8 0.0 - 8.0 %    Basophil % 1.0 0.0 - 1.9 %    Differential Method Automated    C-Reactive Protein    Collection Time: 09/26/22  9:01 AM   Result Value Ref Range    CRP 5.1 0.0 - 8.2 mg/L   Comprehensive Metabolic Panel    Collection Time: 09/26/22  9:01 AM   Result Value Ref Range    Sodium 140 136 - 145 mmol/L    Potassium 4.5 3.5 - 5.1 mmol/L    Chloride 108 95 - 110 mmol/L    CO2 25 23 - 29 mmol/L    Glucose 113 (H) 70 - 110 mg/dL    BUN 13 8 - 23 mg/dL    Creatinine 0.7 0.5 - 1.4 mg/dL    Calcium 8.9 8.7 - 10.5 mg/dL    Total Protein 6.7 6.0 - 8.4 g/dL    Albumin 3.6 3.5 - 5.2 g/dL    Total Bilirubin 0.5 0.1 - 1.0 mg/dL    Alkaline Phosphatase 74 55 - 135 U/L    AST 21 10 - 40 U/L    ALT 36 10 - 44 U/L    Anion Gap 7 (L) 8 - 16 mmol/L    eGFR >60 >60 mL/min/1.73 m^2   Sedimentation rate    Collection Time: 09/26/22  9:01 AM   Result Value Ref Range    Sed Rate 10 0 - 36 mm/Hr          Lab Results   Component Value Date    TBGOLDPLUS Negative 03/25/2022      Lab Results   Component Value Date    HEPAIGM Negative 08/28/2020    HEPBIGM Negative 08/28/2020    HEPBCAB Negative 03/23/2022    HEPCAB Negative 08/28/2020        Assessment:       1. Seropositive rheumatoid arthritis    2. High risk medication use    3. Long-term use of immunosuppressant medication    4. Counseling on health promotion and disease prevention    5. Interstitial pulmonary disease, unspecified            Impression:     Seropositive rheumatoid arthritis  From a joint standpoint, doing well on 15 mg methotrexate once weekly.  Continue daily folic acid.  If worsening fatigue with methotrexate, discussed Mucinex DM at time of administration and 8-12 hours later.  MATT positive 1:80, panel normal.  She does report dryness which she feels is associated with albuterol inhaler.  Dryness improves when she is not using inhaler.  She uses minutes and lozenges occasionally, does not care for the taste of Biotene.  Main concern  today low back pain.  Nontender to palpation along cervical, thoracic, lumbar spine or paraspinals.  Nontender bilateral SI joints.  Multilevel spurring, disc height loss and facet arthropathy and lumbar spine.    Drug therapy requiring intensive monitoring for toxicity  - High Risk Medication Monitoring encounter  -No current medication related issues, no evidence of toxicity  -I ordered lab results, reviewed and interpreted results as well as discussed findings with the patient     Immunosuppressed Status  - Compromised immune system secondary to autoimmune disease and use of immunosuppressive drugs.   - Monitor carefully for infections and toxicities- Currently denies issues with recurrent infections      - Advised to get immediate medical care if any infection.   - Also advised strict adherence to age appropriate vaccinations and cancer screenings with PCP.  - Recommend keeping Up to date on all recommended Vaccinations raya. ShingRx, Pneumovax, Prevnar, yearly flu vaccine      Counseling on health promotion and disease prevention  Over 10 minutes spent regarding below topics:  - Nutrition and exercise counseling.  - Medication counseling provided.     Osteoarthritis  Osteoarthritis bilateral 1st CMC joints.  Chronic deformities bilateral pink ease.  Reviewed hand x-rays and discussed with patient.    Plan:          Continue methotrexate 15 mg weekly  Continue daily folic acid.    Hold immunosuppressant for any infection.    Can trial topical Voltaren bilateral 1st CMC joints 3-4 times daily as needed.    Printed various exercises for low back pain.  If continues, would refer to physical therapy.  If that fails, can consider back and spine referral.    Continue Biotene, can try other mouth washes for dry mouth, continue lozenges, staying hydrated for oral dryness.      Follow up 3-4 months with reg4    Jeni Looney PA-C  [facility]  Rheumatology       45 minutes of total time  spent on the encounter, which includes face to face time and non-face to face time preparing to see the patient (eg, review of tests), Obtaining and/or reviewing separately obtained history, Documenting clinical information in the electronic or other health record, Independently interpreting results (not separately reported) and communicating results to the patient/family/caregiver, or Care coordination (not separately reported).

## 2022-10-06 ENCOUNTER — OFFICE VISIT (OUTPATIENT)
Dept: RHEUMATOLOGY | Facility: CLINIC | Age: 67
End: 2022-10-06
Payer: MEDICARE

## 2022-10-06 ENCOUNTER — PATIENT MESSAGE (OUTPATIENT)
Dept: RHEUMATOLOGY | Facility: CLINIC | Age: 67
End: 2022-10-06

## 2022-10-06 VITALS
RESPIRATION RATE: 18 BRPM | HEIGHT: 68 IN | HEART RATE: 90 BPM | SYSTOLIC BLOOD PRESSURE: 128 MMHG | DIASTOLIC BLOOD PRESSURE: 79 MMHG | WEIGHT: 170.19 LBS | BODY MASS INDEX: 25.79 KG/M2

## 2022-10-06 DIAGNOSIS — M05.9 SEROPOSITIVE RHEUMATOID ARTHRITIS: Primary | ICD-10-CM

## 2022-10-06 DIAGNOSIS — Z79.60 LONG-TERM USE OF IMMUNOSUPPRESSANT MEDICATION: ICD-10-CM

## 2022-10-06 DIAGNOSIS — J84.9 INTERSTITIAL PULMONARY DISEASE, UNSPECIFIED: ICD-10-CM

## 2022-10-06 DIAGNOSIS — Z79.899 HIGH RISK MEDICATION USE: ICD-10-CM

## 2022-10-06 DIAGNOSIS — Z71.89 COUNSELING ON HEALTH PROMOTION AND DISEASE PREVENTION: ICD-10-CM

## 2022-10-06 PROCEDURE — 3074F PR MOST RECENT SYSTOLIC BLOOD PRESSURE < 130 MM HG: ICD-10-PCS | Mod: CPTII,S$GLB,,

## 2022-10-06 PROCEDURE — 1101F PR PT FALLS ASSESS DOC 0-1 FALLS W/OUT INJ PAST YR: ICD-10-PCS | Mod: CPTII,S$GLB,,

## 2022-10-06 PROCEDURE — 1160F RVW MEDS BY RX/DR IN RCRD: CPT | Mod: CPTII,S$GLB,,

## 2022-10-06 PROCEDURE — 4010F PR ACE/ARB THEARPY RXD/TAKEN: ICD-10-PCS | Mod: CPTII,S$GLB,,

## 2022-10-06 PROCEDURE — 1125F AMNT PAIN NOTED PAIN PRSNT: CPT | Mod: CPTII,S$GLB,,

## 2022-10-06 PROCEDURE — 1125F PR PAIN SEVERITY QUANTIFIED, PAIN PRESENT: ICD-10-PCS | Mod: CPTII,S$GLB,,

## 2022-10-06 PROCEDURE — 3008F PR BODY MASS INDEX (BMI) DOCUMENTED: ICD-10-PCS | Mod: CPTII,S$GLB,,

## 2022-10-06 PROCEDURE — 1160F PR REVIEW ALL MEDS BY PRESCRIBER/CLIN PHARMACIST DOCUMENTED: ICD-10-PCS | Mod: CPTII,S$GLB,,

## 2022-10-06 PROCEDURE — 3008F BODY MASS INDEX DOCD: CPT | Mod: CPTII,S$GLB,,

## 2022-10-06 PROCEDURE — 3078F DIAST BP <80 MM HG: CPT | Mod: CPTII,S$GLB,,

## 2022-10-06 PROCEDURE — 3288F PR FALLS RISK ASSESSMENT DOCUMENTED: ICD-10-PCS | Mod: CPTII,S$GLB,,

## 2022-10-06 PROCEDURE — 1159F MED LIST DOCD IN RCRD: CPT | Mod: CPTII,S$GLB,,

## 2022-10-06 PROCEDURE — 1159F PR MEDICATION LIST DOCUMENTED IN MEDICAL RECORD: ICD-10-PCS | Mod: CPTII,S$GLB,,

## 2022-10-06 PROCEDURE — 1101F PT FALLS ASSESS-DOCD LE1/YR: CPT | Mod: CPTII,S$GLB,,

## 2022-10-06 PROCEDURE — 3074F SYST BP LT 130 MM HG: CPT | Mod: CPTII,S$GLB,,

## 2022-10-06 PROCEDURE — 99999 PR PBB SHADOW E&M-EST. PATIENT-LVL V: ICD-10-PCS | Mod: PBBFAC,,,

## 2022-10-06 PROCEDURE — 3288F FALL RISK ASSESSMENT DOCD: CPT | Mod: CPTII,S$GLB,,

## 2022-10-06 PROCEDURE — 4010F ACE/ARB THERAPY RXD/TAKEN: CPT | Mod: CPTII,S$GLB,,

## 2022-10-06 PROCEDURE — 99215 OFFICE O/P EST HI 40 MIN: CPT | Mod: S$GLB,,,

## 2022-10-06 PROCEDURE — 99215 PR OFFICE/OUTPT VISIT, EST, LEVL V, 40-54 MIN: ICD-10-PCS | Mod: S$GLB,,,

## 2022-10-06 PROCEDURE — 99999 PR PBB SHADOW E&M-EST. PATIENT-LVL V: CPT | Mod: PBBFAC,,,

## 2022-10-06 PROCEDURE — 3078F PR MOST RECENT DIASTOLIC BLOOD PRESSURE < 80 MM HG: ICD-10-PCS | Mod: CPTII,S$GLB,,

## 2022-11-01 ENCOUNTER — OFFICE VISIT (OUTPATIENT)
Dept: INTERNAL MEDICINE | Facility: CLINIC | Age: 67
End: 2022-11-01
Payer: MEDICARE

## 2022-11-01 VITALS
HEIGHT: 68 IN | DIASTOLIC BLOOD PRESSURE: 72 MMHG | BODY MASS INDEX: 26.27 KG/M2 | HEART RATE: 89 BPM | OXYGEN SATURATION: 95 % | SYSTOLIC BLOOD PRESSURE: 102 MMHG | WEIGHT: 173.31 LBS | TEMPERATURE: 99 F

## 2022-11-01 DIAGNOSIS — I10 PRIMARY HYPERTENSION: ICD-10-CM

## 2022-11-01 DIAGNOSIS — D84.821 IMMUNODEFICIENCY DUE TO DRUGS (CODE): ICD-10-CM

## 2022-11-01 DIAGNOSIS — M05.9 SEROPOSITIVE RHEUMATOID ARTHRITIS: ICD-10-CM

## 2022-11-01 DIAGNOSIS — Z12.31 ENCOUNTER FOR SCREENING MAMMOGRAM FOR BREAST CANCER: Primary | ICD-10-CM

## 2022-11-01 DIAGNOSIS — J84.9 ILD (INTERSTITIAL LUNG DISEASE): ICD-10-CM

## 2022-11-01 DIAGNOSIS — M85.89 OSTEOPENIA OF MULTIPLE SITES: ICD-10-CM

## 2022-11-01 DIAGNOSIS — F33.41 RECURRENT MAJOR DEPRESSIVE DISORDER, IN PARTIAL REMISSION: ICD-10-CM

## 2022-11-01 DIAGNOSIS — F41.9 ANXIETY: ICD-10-CM

## 2022-11-01 DIAGNOSIS — Z23 NEED FOR INFLUENZA VACCINATION: ICD-10-CM

## 2022-11-01 PROCEDURE — 1159F MED LIST DOCD IN RCRD: CPT | Mod: CPTII,S$GLB,, | Performed by: INTERNAL MEDICINE

## 2022-11-01 PROCEDURE — 99499 RISK ADDL DX/OHS AUDIT: ICD-10-PCS | Mod: S$GLB,,, | Performed by: INTERNAL MEDICINE

## 2022-11-01 PROCEDURE — 90694 VACC AIIV4 NO PRSRV 0.5ML IM: CPT | Mod: S$GLB,,, | Performed by: INTERNAL MEDICINE

## 2022-11-01 PROCEDURE — 3008F BODY MASS INDEX DOCD: CPT | Mod: CPTII,S$GLB,, | Performed by: INTERNAL MEDICINE

## 2022-11-01 PROCEDURE — 4010F ACE/ARB THERAPY RXD/TAKEN: CPT | Mod: CPTII,S$GLB,, | Performed by: INTERNAL MEDICINE

## 2022-11-01 PROCEDURE — 1159F PR MEDICATION LIST DOCUMENTED IN MEDICAL RECORD: ICD-10-PCS | Mod: CPTII,S$GLB,, | Performed by: INTERNAL MEDICINE

## 2022-11-01 PROCEDURE — 3288F FALL RISK ASSESSMENT DOCD: CPT | Mod: CPTII,S$GLB,, | Performed by: INTERNAL MEDICINE

## 2022-11-01 PROCEDURE — 1101F PT FALLS ASSESS-DOCD LE1/YR: CPT | Mod: CPTII,S$GLB,, | Performed by: INTERNAL MEDICINE

## 2022-11-01 PROCEDURE — 3074F SYST BP LT 130 MM HG: CPT | Mod: CPTII,S$GLB,, | Performed by: INTERNAL MEDICINE

## 2022-11-01 PROCEDURE — 1101F PR PT FALLS ASSESS DOC 0-1 FALLS W/OUT INJ PAST YR: ICD-10-PCS | Mod: CPTII,S$GLB,, | Performed by: INTERNAL MEDICINE

## 2022-11-01 PROCEDURE — 3008F PR BODY MASS INDEX (BMI) DOCUMENTED: ICD-10-PCS | Mod: CPTII,S$GLB,, | Performed by: INTERNAL MEDICINE

## 2022-11-01 PROCEDURE — 3288F PR FALLS RISK ASSESSMENT DOCUMENTED: ICD-10-PCS | Mod: CPTII,S$GLB,, | Performed by: INTERNAL MEDICINE

## 2022-11-01 PROCEDURE — 4010F PR ACE/ARB THEARPY RXD/TAKEN: ICD-10-PCS | Mod: CPTII,S$GLB,, | Performed by: INTERNAL MEDICINE

## 2022-11-01 PROCEDURE — 3078F PR MOST RECENT DIASTOLIC BLOOD PRESSURE < 80 MM HG: ICD-10-PCS | Mod: CPTII,S$GLB,, | Performed by: INTERNAL MEDICINE

## 2022-11-01 PROCEDURE — 99214 OFFICE O/P EST MOD 30 MIN: CPT | Mod: 25,S$GLB,, | Performed by: INTERNAL MEDICINE

## 2022-11-01 PROCEDURE — 3078F DIAST BP <80 MM HG: CPT | Mod: CPTII,S$GLB,, | Performed by: INTERNAL MEDICINE

## 2022-11-01 PROCEDURE — 99999 PR PBB SHADOW E&M-EST. PATIENT-LVL IV: ICD-10-PCS | Mod: PBBFAC,,, | Performed by: INTERNAL MEDICINE

## 2022-11-01 PROCEDURE — 99214 PR OFFICE/OUTPT VISIT, EST, LEVL IV, 30-39 MIN: ICD-10-PCS | Mod: 25,S$GLB,, | Performed by: INTERNAL MEDICINE

## 2022-11-01 PROCEDURE — 99999 PR PBB SHADOW E&M-EST. PATIENT-LVL IV: CPT | Mod: PBBFAC,,, | Performed by: INTERNAL MEDICINE

## 2022-11-01 PROCEDURE — 90694 FLU VACCINE - QUADRIVALENT - ADJUVANTED: ICD-10-PCS | Mod: S$GLB,,, | Performed by: INTERNAL MEDICINE

## 2022-11-01 PROCEDURE — 99499 UNLISTED E&M SERVICE: CPT | Mod: S$GLB,,, | Performed by: INTERNAL MEDICINE

## 2022-11-01 PROCEDURE — 3074F PR MOST RECENT SYSTOLIC BLOOD PRESSURE < 130 MM HG: ICD-10-PCS | Mod: CPTII,S$GLB,, | Performed by: INTERNAL MEDICINE

## 2022-11-01 PROCEDURE — G0008 ADMIN INFLUENZA VIRUS VAC: HCPCS | Mod: S$GLB,,, | Performed by: INTERNAL MEDICINE

## 2022-11-01 PROCEDURE — 1126F AMNT PAIN NOTED NONE PRSNT: CPT | Mod: CPTII,S$GLB,, | Performed by: INTERNAL MEDICINE

## 2022-11-01 PROCEDURE — G0008 FLU VACCINE - QUADRIVALENT - ADJUVANTED: ICD-10-PCS | Mod: S$GLB,,, | Performed by: INTERNAL MEDICINE

## 2022-11-01 PROCEDURE — 1126F PR PAIN SEVERITY QUANTIFIED, NO PAIN PRESENT: ICD-10-PCS | Mod: CPTII,S$GLB,, | Performed by: INTERNAL MEDICINE

## 2022-11-01 RX ORDER — BUSPIRONE HYDROCHLORIDE 10 MG/1
10 TABLET ORAL 2 TIMES DAILY
Qty: 180 TABLET | Refills: 0 | Status: SHIPPED | OUTPATIENT
Start: 2022-11-01 | End: 2023-01-12 | Stop reason: SDUPTHER

## 2022-11-01 NOTE — PROGRESS NOTES
Subjective:      Patient ID: Caitlin Ahmadi is a 67 y.o. female.    Chief Complaint: Follow-up    Follow-up  Pertinent negatives include no abdominal pain, chest pain, chills, coughing, fever or sore throat.       67 y.o. 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  Compliant with meds without significant side effects. Energy and appetite are good.     Pt also c/o worsening anxiety.     Past Surgical History:   Procedure Laterality Date    ADENOIDECTOMY      CATARACT EXTRACTION W/  INTRAOCULAR LENS IMPLANT Left 2020    CATARACT EXTRACTION W/  INTRAOCULAR LENS IMPLANT      CHOLECYSTECTOMY      COLONOSCOPY N/A 10/13/2021    Procedure: COLONOSCOPY;  Surgeon: Elissa Yoon MD;  Location: [facility];  Service: Endoscopy;  Laterality: N/A;    HERNIA REPAIR      HYSTERECTOMY      TONSILLECTOMY      TOTAL KNEE ARTHROPLASTY Bilateral      Social History     Socioeconomic History    Marital status:    Tobacco Use    Smoking status: Former     Packs/day: 1.00     Years: 20.00     Pack years: 20.00     Types: Cigarettes     Start date: 1970     Quit date: 1990     Years since quittin.4    Smokeless tobacco: Never   Substance and Sexual Activity    Alcohol use: Yes     Alcohol/week: 5.0 standard drinks     Types: 5 Glasses of wine per week    Drug use: Never    Sexual activity: Yes     Partners: Male     family history includes Alzheimer's disease in her father; No  "Known Problems in her mother.    Review of Systems   Constitutional:  Negative for chills and fever.   HENT:  Negative for ear pain and sore throat.    Respiratory:  Negative for cough.    Cardiovascular:  Negative for chest pain.   Gastrointestinal:  Negative for abdominal pain and blood in stool.   Genitourinary:  Negative for dysuria and hematuria.   Neurological:  Negative for seizures and syncope.   Psychiatric/Behavioral:  Negative for dysphoric mood, hallucinations and suicidal ideas. The patient is nervous/anxious.    Objective:   /72 (BP Location: Left arm, Patient Position: Sitting, BP Method: Medium (Manual))   Pulse 89   Temp 98.6 °F (37 °C) (Tympanic)   Ht 5' 8" (1.727 m)   Wt 78.6 kg (173 lb 4.5 oz)   SpO2 95%   BMI 26.35 kg/m²     Physical Exam  Constitutional:       General: She is not in acute distress.     Appearance: She is well-developed. She is not ill-appearing.   Cardiovascular:      Rate and Rhythm: Normal rate.   Pulmonary:      Effort: Pulmonary effort is normal.   Skin:     General: Skin is warm and dry.   Neurological:      Mental Status: She is alert.   Psychiatric:         Behavior: Behavior normal.       Lab Results   Component Value Date    WBC 6.09 09/26/2022    WBC 6.09 09/26/2022    HGB 12.1 09/26/2022    HGB 12.1 09/26/2022    HGB 12.5 07/19/2022    HCT 36.8 (L) 09/26/2022    HCT 36.8 (L) 09/26/2022     09/26/2022     09/26/2022    CHOL 241 (H) 09/26/2022    TRIG 277 (H) 09/26/2022    HDL 50 09/26/2022    LDLCALC 135.6 09/26/2022    LDLCALC 149.0 07/06/2020    ALT 36 09/26/2022    ALT 36 09/26/2022    AST 21 09/26/2022    AST 21 09/26/2022     09/26/2022     09/26/2022    K 4.5 09/26/2022    K 4.5 09/26/2022     09/26/2022     09/26/2022    CREATININE 0.7 09/26/2022    CREATININE 0.7 09/26/2022    CREATININE 0.8 07/19/2022    BUN 13 09/26/2022    BUN 13 09/26/2022    CO2 25 09/26/2022    CO2 25 09/26/2022    TSH 2.218 09/26/2022    " EGFRNORACEVR >60 09/26/2022    EGFRNORACEVR >60 09/26/2022    BNP 19 08/18/2020        The 10-year ASCVD risk score (Mario LE, et al., 2019) is: 7.3%    Values used to calculate the score:      Age: 67 years      Sex: Female      Is Non- : No      Diabetic: No      Tobacco smoker: No      Systolic Blood Pressure: 102 mmHg      Is BP treated: Yes      HDL Cholesterol: 50 mg/dL      Total Cholesterol: 241 mg/dL     Assessment:     1. Encounter for screening mammogram for breast cancer    2. Primary hypertension    3. Anxiety    4. Immunodeficiency due to drugs (CODE)    5. ILD (interstitial lung disease)    6. Osteopenia of multiple sites    7. Recurrent major depressive disorder, in partial remission    8. Need for influenza vaccination    9. Seropositive rheumatoid arthritis      Plan:   1. Encounter for screening mammogram for breast cancer  -     Mammo Digital Screening Bilat w/ Mathew; Future; Expected date: 11/01/2022    2. Primary hypertension  Overview:  Controlled. Cont current meds.         3. Anxiety  Assessment & Plan:  Worsening anxiety due to family situation  Declines counseling    Orders:  -     busPIRone (BUSPAR) 10 MG tablet; Take 1 tablet (10 mg total) by mouth 2 (two) times daily.  Dispense: 180 tablet; Refill: 0    4. Immunodeficiency due to drugs (CODE)  Assessment & Plan:  No current infections      5. ILD (interstitial lung disease)  Overview:  As per pulmonary      6. Osteopenia of multiple sites  Overview:  Low fx risk on dxa 7/2020    Assessment & Plan:  Calcium and vit D      7. Recurrent major depressive disorder, in partial remission  Overview:  Started amitriptyline in her 30s. 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 Has gradually increased amitriptyline over past several years. Self increased from 250 to 300 during covid. On 250mg for 2 to 3 years.     Assessment & Plan:  Stable on amitriptyline      8. Need for influenza vaccination    9.  Seropositive rheumatoid arthritis  Overview:  As per rheum      Other orders  -     Influenza - Quadrivalent (Adjuvanted)        Future Appointments   Date Time Provider Department Center   11/29/2022 12:40 PM Amol Steve MD [facility]   12/1/2022  9:40 AM [facility] [id number] [facility]   1/17/2023 10:00 AM Jeni Looney PA-C [facility]   2/27/2023 11:30 AM Jeni Looney PA-C [facility] RHEUM [facility]   3/15/2023  9:15 AM PULMONARY LAB 2, [facility]   3/15/2023 10:20 [facility]       Lab Frequency Next Occurrence   Complete PFT with bronchodilator Once 06/07/2021   Urinalysis, Reflex to Urine Culture Urine, Clean Catch Once 03/29/2022   Complete PFT with bronchodilator Once 09/14/2022   Protein electrophoresis, serum     Immunofixation Electrophoresis     CBC Auto Differential     C-Reactive Protein     Comprehensive Metabolic Panel     Sedimentation rate     CBC Auto Differential     Comprehensive Metabolic Panel     Sedimentation rate     C-Reactive Protein         Follow up in about 4 weeks (around 11/29/2022), or if symptoms worsen or fail to improve, for Virtual Visit ok for f/u.

## 2022-11-01 NOTE — PATIENT INSTRUCTIONS
Check with your pharmacy regarding new shingles vaccine.      Covid vaccine phone number is [phone].     3514-5588 mg of calcium daily. Preferably through diet. If use a supplement, take calcium citrate 500-600 mg per dose.     Vitamin D3 1000 IU daily

## 2022-12-16 ENCOUNTER — PATIENT OUTREACH (OUTPATIENT)
Dept: ADMINISTRATIVE | Facility: HOSPITAL | Age: 67
End: 2022-12-16
Payer: MEDICARE

## 2022-12-16 ENCOUNTER — TELEPHONE (OUTPATIENT)
Dept: INTERNAL MEDICINE | Facility: CLINIC | Age: 67
End: 2022-12-16
Payer: MEDICARE

## 2022-12-16 ENCOUNTER — OFFICE VISIT (OUTPATIENT)
Dept: INTERNAL MEDICINE | Facility: CLINIC | Age: 67
End: 2022-12-16
Payer: MEDICARE

## 2022-12-16 VITALS
HEIGHT: 68 IN | DIASTOLIC BLOOD PRESSURE: 76 MMHG | TEMPERATURE: 98 F | RESPIRATION RATE: 16 BRPM | BODY MASS INDEX: 26.6 KG/M2 | OXYGEN SATURATION: 98 % | WEIGHT: 175.5 LBS | SYSTOLIC BLOOD PRESSURE: 120 MMHG | HEART RATE: 110 BPM

## 2022-12-16 DIAGNOSIS — I10 PRIMARY HYPERTENSION: ICD-10-CM

## 2022-12-16 DIAGNOSIS — J84.9 ILD (INTERSTITIAL LUNG DISEASE): ICD-10-CM

## 2022-12-16 DIAGNOSIS — J20.9 ACUTE BRONCHITIS, UNSPECIFIED ORGANISM: Primary | ICD-10-CM

## 2022-12-16 DIAGNOSIS — D84.821 IMMUNODEFICIENCY DUE TO DRUGS (CODE): ICD-10-CM

## 2022-12-16 PROCEDURE — 4010F ACE/ARB THERAPY RXD/TAKEN: CPT | Mod: CPTII,S$GLB,, | Performed by: NURSE PRACTITIONER

## 2022-12-16 PROCEDURE — 1159F MED LIST DOCD IN RCRD: CPT | Mod: CPTII,S$GLB,, | Performed by: NURSE PRACTITIONER

## 2022-12-16 PROCEDURE — 4010F PR ACE/ARB THEARPY RXD/TAKEN: ICD-10-PCS | Mod: CPTII,S$GLB,, | Performed by: NURSE PRACTITIONER

## 2022-12-16 PROCEDURE — 1160F PR REVIEW ALL MEDS BY PRESCRIBER/CLIN PHARMACIST DOCUMENTED: ICD-10-PCS | Mod: CPTII,S$GLB,, | Performed by: NURSE PRACTITIONER

## 2022-12-16 PROCEDURE — 99214 OFFICE O/P EST MOD 30 MIN: CPT | Mod: S$GLB,,, | Performed by: NURSE PRACTITIONER

## 2022-12-16 PROCEDURE — 1159F PR MEDICATION LIST DOCUMENTED IN MEDICAL RECORD: ICD-10-PCS | Mod: CPTII,S$GLB,, | Performed by: NURSE PRACTITIONER

## 2022-12-16 PROCEDURE — 1125F AMNT PAIN NOTED PAIN PRSNT: CPT | Mod: CPTII,S$GLB,, | Performed by: NURSE PRACTITIONER

## 2022-12-16 PROCEDURE — 99214 PR OFFICE/OUTPT VISIT, EST, LEVL IV, 30-39 MIN: ICD-10-PCS | Mod: S$GLB,,, | Performed by: NURSE PRACTITIONER

## 2022-12-16 PROCEDURE — 3078F DIAST BP <80 MM HG: CPT | Mod: CPTII,S$GLB,, | Performed by: NURSE PRACTITIONER

## 2022-12-16 PROCEDURE — 99999 PR PBB SHADOW E&M-EST. PATIENT-LVL V: ICD-10-PCS | Mod: PBBFAC,,, | Performed by: NURSE PRACTITIONER

## 2022-12-16 PROCEDURE — 3074F SYST BP LT 130 MM HG: CPT | Mod: CPTII,S$GLB,, | Performed by: NURSE PRACTITIONER

## 2022-12-16 PROCEDURE — 3078F PR MOST RECENT DIASTOLIC BLOOD PRESSURE < 80 MM HG: ICD-10-PCS | Mod: CPTII,S$GLB,, | Performed by: NURSE PRACTITIONER

## 2022-12-16 PROCEDURE — 3008F PR BODY MASS INDEX (BMI) DOCUMENTED: ICD-10-PCS | Mod: CPTII,S$GLB,, | Performed by: NURSE PRACTITIONER

## 2022-12-16 PROCEDURE — 1125F PR PAIN SEVERITY QUANTIFIED, PAIN PRESENT: ICD-10-PCS | Mod: CPTII,S$GLB,, | Performed by: NURSE PRACTITIONER

## 2022-12-16 PROCEDURE — 99999 PR PBB SHADOW E&M-EST. PATIENT-LVL V: CPT | Mod: PBBFAC,,, | Performed by: NURSE PRACTITIONER

## 2022-12-16 PROCEDURE — 3074F PR MOST RECENT SYSTOLIC BLOOD PRESSURE < 130 MM HG: ICD-10-PCS | Mod: CPTII,S$GLB,, | Performed by: NURSE PRACTITIONER

## 2022-12-16 PROCEDURE — 1160F RVW MEDS BY RX/DR IN RCRD: CPT | Mod: CPTII,S$GLB,, | Performed by: NURSE PRACTITIONER

## 2022-12-16 PROCEDURE — 3008F BODY MASS INDEX DOCD: CPT | Mod: CPTII,S$GLB,, | Performed by: NURSE PRACTITIONER

## 2022-12-16 RX ORDER — FLUCONAZOLE 150 MG/1
150 TABLET ORAL DAILY
Qty: 2 TABLET | Refills: 0 | Status: SHIPPED | OUTPATIENT
Start: 2022-12-16 | End: 2022-12-17

## 2022-12-16 RX ORDER — PROMETHAZINE HYDROCHLORIDE AND DEXTROMETHORPHAN HYDROBROMIDE 6.25; 15 MG/5ML; MG/5ML
5 SYRUP ORAL NIGHTLY
Qty: 240 ML | Refills: 0 | Status: SHIPPED | OUTPATIENT
Start: 2022-12-16 | End: 2022-12-26

## 2022-12-16 RX ORDER — PREDNISONE 20 MG/1
20 TABLET ORAL DAILY
Qty: 5 TABLET | Refills: 0 | Status: SHIPPED | OUTPATIENT
Start: 2022-12-16 | End: 2022-12-21

## 2022-12-16 RX ORDER — AMOXICILLIN AND CLAVULANATE POTASSIUM 875; 125 MG/1; MG/1
1 TABLET, FILM COATED ORAL 2 TIMES DAILY
Qty: 20 TABLET | Refills: 0 | Status: SHIPPED | OUTPATIENT
Start: 2022-12-16 | End: 2022-12-26

## 2022-12-16 NOTE — PROGRESS NOTES
Chief Complaint  Chief Complaint   Patient presents with    Follow-up     Lung flare up          HPI     HPI  Caitlin Ahmadi is a 67 y.o. female with medical diagnoses as listed in the medical history and problem list that presents for Acute Bronchitis.  This patient is new to me.     Acute Bronchitis: Visited [facility] After hours a day ago for SOB and productive cough. Reports a Hx of hypoxia with interstitial lung disease. Hospitalized approx 6 months ago for acute pneumonia infection. Symptoms today are continued SOB on exertion, chest congestion productive cough, costochondritis and post nasal drip. Reports low grade fever last night. Treated with a Rocephin injection and solu-medrol injection. Chest Xray was unremarkable. Uses Breo Ellipta as prescribed. Has a Hx of Rheumatoid Arthritis. Has paused Methotrexate due to current Respiratory infection.       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    Cataract     Encounter for blood transfusion     Hypertension     PONV (postoperative nausea and vomiting)     Seropositive rheumatoid arthritis 2022       PAST SURGICAL HISTORY:  Past Surgical History:   Procedure Laterality Date    ADENOIDECTOMY      CATARACT EXTRACTION W/  INTRAOCULAR LENS IMPLANT Left 2020    CATARACT EXTRACTION W/  INTRAOCULAR LENS IMPLANT      CHOLECYSTECTOMY      COLONOSCOPY N/A 10/13/2021    Procedure: COLONOSCOPY;  Surgeon: Elissa Yoon MD;  Location: [facility];  Service: Endoscopy;  Laterality: N/A;    HERNIA REPAIR      HYSTERECTOMY      TONSILLECTOMY      TOTAL KNEE ARTHROPLASTY Bilateral        SOCIAL HISTORY:  Social History     Socioeconomic History    Marital status:    Tobacco Use    Smoking status: Former     Packs/day: 1.00     Years: 20.00     Pack years: 20.00     Types: Cigarettes     Start date: 1970     Quit date: 1990     Years since quittin.5     Smokeless tobacco: Never   Substance and Sexual Activity    Alcohol use: Yes     Alcohol/week: 5.0 standard drinks     Types: 5 Glasses of wine per week    Drug use: Never    Sexual activity: Yes     Partners: Male       FAMILY HISTORY:  Family History   Problem Relation Age of Onset    No Known Problems Mother     Alzheimer's disease Father        ALLERGIES AND MEDICATIONS: updated and reviewed.  Review of patient's allergies indicates:   Allergen Reactions    Pneumococcal vaccine Swelling    Hydrocodone-acetaminophen Nausea Only and Nausea And Vomiting    Meloxicam Rash    Sulfa (sulfonamide antibiotics) Rash     Current Outpatient Medications   Medication Sig Dispense Refill    albuterol (PROVENTIL) 2.5 mg /3 mL (0.083 %) nebulizer solution Take 3 mLs (2.5 mg total) by nebulization every 4 to 6 hours as needed for Wheezing or Shortness of Breath. 360 mL 11    albuterol (PROVENTIL/VENTOLIN HFA) 90 mcg/actuation inhaler Inhale 2 puffs into the lungs every 6 (six) hours as needed for Wheezing. Rescue 6.7 g 11    amitriptyline (ELAVIL) 100 MG tablet TAKE 3 TABLETS(300 MG) BY MOUTH EVERY EVENING 270 tablet 2    busPIRone (BUSPAR) 10 MG tablet Take 1 tablet (10 mg total) by mouth 2 (two) times daily. 180 tablet 0    fluticasone furoate-vilanteroL (BREO ELLIPTA) 100-25 mcg/dose diskus inhaler Inhale 1 puff into the lungs once daily. Controller.Wash out mouth after use 60 each 11    fluticasone propionate (FLONASE) 50 mcg/actuation nasal spray 2 sprays (100 mcg total) by Each Nostril route once daily. 16 g 11    folic acid (FOLVITE) 1 MG tablet TAKE 1 TABLET(1 MG) BY MOUTH EVERY DAY 30 tablet 4    ibuprofen (ADVIL,MOTRIN) 200 MG tablet Take 600 mg by mouth every 6 (six) hours as needed.      losartan (COZAAR) 25 MG tablet TAKE 1 TABLET(25 MG) BY MOUTH EVERY DAY 90 tablet 1    methotrexate 2.5 MG Tab TAKE 6 TABLETS(15 MG) BY MOUTH EVERY 7 DAYS 24 tablet 3    amoxicillin-clavulanate 875-125mg (AUGMENTIN) 875-125 mg per  "tablet Take 1 tablet by mouth 2 (two) times daily. for 10 days 20 tablet 0    fluconazole (DIFLUCAN) 150 MG Tab Take 1 tablet (150 mg total) by mouth once daily. for 1 day 2 tablet 0    predniSONE (DELTASONE) 20 MG tablet Take 1 tablet (20 mg total) by mouth once daily. for 5 days 5 tablet 0    promethazine-dextromethorphan (PROMETHAZINE-DM) 6.25-15 mg/5 mL Syrp Take 5 mLs by mouth every evening. for 10 days 240 mL 0     No current facility-administered medications for this visit.           Exam     ROS  Review of Systems   Constitutional:  Positive for fatigue. Negative for appetite change, chills and fever.   HENT:  Positive for congestion and postnasal drip. Negative for ear pain, rhinorrhea, sinus pressure, sneezing and sore throat.    Respiratory:  Positive for cough and shortness of breath.    Cardiovascular:  Negative for chest pain and palpitations.   Gastrointestinal:  Negative for abdominal pain, constipation, diarrhea, nausea and vomiting.   Genitourinary:  Negative for dysuria.   Musculoskeletal:  Negative for arthralgias.   Neurological:  Negative for headaches.   Psychiatric/Behavioral:  Negative for sleep disturbance.          Physical Exam  Vitals:    12/16/22 0808   BP: 120/76   BP Location: Left arm   Patient Position: Sitting   BP Method: Medium (Manual)   Pulse: 110   Resp: 16   Temp: 97.9 °F (36.6 °C)   TempSrc: Tympanic   SpO2: 98%   Weight: 79.6 kg (175 lb 7.8 oz)   Height: 5' 8" (1.727 m)    Body mass index is 26.68 kg/m².  Weight: 79.6 kg (175 lb 7.8 oz)   Height: 5' 8" (172.7 cm)   Physical Exam  Constitutional:       General: She is not in acute distress.     Appearance: Normal appearance.   HENT:      Head: Normocephalic and atraumatic.      Right Ear: External ear normal.      Left Ear: External ear normal.      Nose: Nose normal.      Mouth/Throat:      Pharynx: Posterior oropharyngeal erythema present.   Eyes:      Pupils: Pupils are equal, round, and reactive to light. "   Cardiovascular:      Rate and Rhythm: Normal rate and regular rhythm.   Pulmonary:      Effort: Pulmonary effort is normal. No respiratory distress.      Breath sounds: Examination of the right-upper field reveals rhonchi. Examination of the left-upper field reveals rhonchi. Examination of the right-middle field reveals rhonchi. Examination of the left-middle field reveals rhonchi. Rhonchi present. No wheezing.   Abdominal:      General: Bowel sounds are normal.      Palpations: Abdomen is soft.   Musculoskeletal:      Cervical back: Neck supple.   Lymphadenopathy:      Cervical: No cervical adenopathy.   Skin:     General: Skin is warm and dry.   Neurological:      Mental Status: She is alert and oriented to person, place, and time.   Psychiatric:         Mood and Affect: Mood normal.           Health Maintenance         Date Due Completion Date    Shingles Vaccine (1 of 2) Never done ---    COVID-19 Vaccine (3 - Booster for Pfizer series) 05/29/2021 4/3/2021    Mammogram 07/10/2021 7/10/2020    DEXA Scan 07/10/2023 7/10/2020    Colorectal Cancer Screening 10/13/2024 10/13/2021    Lipid Panel 09/26/2027 9/26/2022    TETANUS VACCINE 05/12/2031 5/12/2021              Assessment & Plan     Assessment & Plan  Problem List Items Addressed This Visit          Pulmonary    ILD (interstitial lung disease)  -We discussed effective administration of Prednisone, adverse effects and side effects with education given for reinforcement    Overview     As per pulmonary         Relevant Medications    predniSONE (DELTASONE) 20 MG tablet    Other Relevant Orders    Ambulatory referral/consult to Pulmonology       Cardiac/Vascular    Primary hypertension  -The current medical regimen is effective;  continue present plan and medications.     Overview     Controlled. Cont current meds.               Other    Immunodeficiency due to drugs (CODE)  -Stable; Monitor  - Resume Methotrexate post treatment for Acute Bronchitis     Other  Visit Diagnoses       Acute bronchitis, unspecified organism    -  Primary  -We discussed effective administration of Augmentin, Diflucan, Prednisone and Promethazine-DM, adverse effects and side effects with education given for reinforcement  - Diflucan for ABX induced Candida    Relevant Medications    amoxicillin-clavulanate 875-125mg (AUGMENTIN) 875-125 mg per tablet    fluconazole (DIFLUCAN) 150 MG Tab    predniSONE (DELTASONE) 20 MG tablet    promethazine-dextromethorphan (PROMETHAZINE-DM) 6.25-15 mg/5 mL Syrp    Other Relevant Orders    Ambulatory referral/consult to Pulmonology              [facility] Maintenance reviewed: Deferred per patient    Follow-up: 1 month with PCP    30+ minutes of total time spent on the encounter, which includes face to face time and non-face to face time preparing to see the patient (eg, review of tests), Obtaining and/or reviewing separately obtained history, documenting clinical information in the electronic or other health record, independently interpreting results (not separately reported) and communicating results to the patient/family/caregiver, or Care coordination (not separately reported).

## 2022-12-16 NOTE — PATIENT INSTRUCTIONS
-Increase water intake, Pedialyte and Gatorade  -Add B Vitamins   -Add Vitamin C and Zinc to diet

## 2022-12-20 ENCOUNTER — PATIENT MESSAGE (OUTPATIENT)
Dept: OPHTHALMOLOGY | Facility: CLINIC | Age: 67
End: 2022-12-20
Payer: MEDICARE

## 2023-01-06 ENCOUNTER — OFFICE VISIT (OUTPATIENT)
Dept: PULMONOLOGY | Facility: CLINIC | Age: 68
End: 2023-01-06
Payer: MEDICARE

## 2023-01-06 ENCOUNTER — CLINICAL SUPPORT (OUTPATIENT)
Dept: PULMONOLOGY | Facility: CLINIC | Age: 68
End: 2023-01-06
Payer: MEDICARE

## 2023-01-06 VITALS
SYSTOLIC BLOOD PRESSURE: 142 MMHG | BODY MASS INDEX: 26.13 KG/M2 | RESPIRATION RATE: 20 BRPM | HEIGHT: 68 IN | WEIGHT: 172.38 LBS | HEART RATE: 105 BPM | DIASTOLIC BLOOD PRESSURE: 70 MMHG | OXYGEN SATURATION: 98 %

## 2023-01-06 DIAGNOSIS — M05.9 SEROPOSITIVE RHEUMATOID ARTHRITIS: ICD-10-CM

## 2023-01-06 DIAGNOSIS — J84.9 ILD (INTERSTITIAL LUNG DISEASE): ICD-10-CM

## 2023-01-06 DIAGNOSIS — R06.02 SOB (SHORTNESS OF BREATH): ICD-10-CM

## 2023-01-06 DIAGNOSIS — J45.31 MILD PERSISTENT ASTHMA WITH ACUTE EXACERBATION: Primary | ICD-10-CM

## 2023-01-06 DIAGNOSIS — F33.41 RECURRENT MAJOR DEPRESSIVE DISORDER, IN PARTIAL REMISSION: ICD-10-CM

## 2023-01-06 DIAGNOSIS — I10 PRIMARY HYPERTENSION: ICD-10-CM

## 2023-01-06 PROCEDURE — 3077F PR MOST RECENT SYSTOLIC BLOOD PRESSURE >= 140 MM HG: ICD-10-PCS | Mod: CPTII,S$GLB,, | Performed by: PHYSICIAN ASSISTANT

## 2023-01-06 PROCEDURE — 1101F PR PT FALLS ASSESS DOC 0-1 FALLS W/OUT INJ PAST YR: ICD-10-PCS | Mod: CPTII,S$GLB,, | Performed by: PHYSICIAN ASSISTANT

## 2023-01-06 PROCEDURE — 95012 PR NITRIC OXIDE EXPIRED GAS DETERMINATION: ICD-10-PCS | Mod: S$GLB,,, | Performed by: INTERNAL MEDICINE

## 2023-01-06 PROCEDURE — 3078F DIAST BP <80 MM HG: CPT | Mod: CPTII,S$GLB,, | Performed by: PHYSICIAN ASSISTANT

## 2023-01-06 PROCEDURE — 99999 PR PBB SHADOW E&M-EST. PATIENT-LVL IV: ICD-10-PCS | Mod: PBBFAC,,, | Performed by: PHYSICIAN ASSISTANT

## 2023-01-06 PROCEDURE — 95012 NITRIC OXIDE EXP GAS DETER: CPT | Mod: S$GLB,,, | Performed by: INTERNAL MEDICINE

## 2023-01-06 PROCEDURE — 1160F RVW MEDS BY RX/DR IN RCRD: CPT | Mod: CPTII,S$GLB,, | Performed by: PHYSICIAN ASSISTANT

## 2023-01-06 PROCEDURE — 3288F PR FALLS RISK ASSESSMENT DOCUMENTED: ICD-10-PCS | Mod: CPTII,S$GLB,, | Performed by: PHYSICIAN ASSISTANT

## 2023-01-06 PROCEDURE — 99214 OFFICE O/P EST MOD 30 MIN: CPT | Mod: S$GLB,,, | Performed by: PHYSICIAN ASSISTANT

## 2023-01-06 PROCEDURE — 1160F PR REVIEW ALL MEDS BY PRESCRIBER/CLIN PHARMACIST DOCUMENTED: ICD-10-PCS | Mod: CPTII,S$GLB,, | Performed by: PHYSICIAN ASSISTANT

## 2023-01-06 PROCEDURE — 1159F PR MEDICATION LIST DOCUMENTED IN MEDICAL RECORD: ICD-10-PCS | Mod: CPTII,S$GLB,, | Performed by: PHYSICIAN ASSISTANT

## 2023-01-06 PROCEDURE — 1101F PT FALLS ASSESS-DOCD LE1/YR: CPT | Mod: CPTII,S$GLB,, | Performed by: PHYSICIAN ASSISTANT

## 2023-01-06 PROCEDURE — 99214 PR OFFICE/OUTPT VISIT, EST, LEVL IV, 30-39 MIN: ICD-10-PCS | Mod: S$GLB,,, | Performed by: PHYSICIAN ASSISTANT

## 2023-01-06 PROCEDURE — 3008F PR BODY MASS INDEX (BMI) DOCUMENTED: ICD-10-PCS | Mod: CPTII,S$GLB,, | Performed by: PHYSICIAN ASSISTANT

## 2023-01-06 PROCEDURE — 3008F BODY MASS INDEX DOCD: CPT | Mod: CPTII,S$GLB,, | Performed by: PHYSICIAN ASSISTANT

## 2023-01-06 PROCEDURE — 3288F FALL RISK ASSESSMENT DOCD: CPT | Mod: CPTII,S$GLB,, | Performed by: PHYSICIAN ASSISTANT

## 2023-01-06 PROCEDURE — 3077F SYST BP >= 140 MM HG: CPT | Mod: CPTII,S$GLB,, | Performed by: PHYSICIAN ASSISTANT

## 2023-01-06 PROCEDURE — 3078F PR MOST RECENT DIASTOLIC BLOOD PRESSURE < 80 MM HG: ICD-10-PCS | Mod: CPTII,S$GLB,, | Performed by: PHYSICIAN ASSISTANT

## 2023-01-06 PROCEDURE — 1159F MED LIST DOCD IN RCRD: CPT | Mod: CPTII,S$GLB,, | Performed by: PHYSICIAN ASSISTANT

## 2023-01-06 PROCEDURE — 99999 PR PBB SHADOW E&M-EST. PATIENT-LVL IV: CPT | Mod: PBBFAC,,, | Performed by: PHYSICIAN ASSISTANT

## 2023-01-06 RX ORDER — AZITHROMYCIN 250 MG/1
TABLET, FILM COATED ORAL
Qty: 6 TABLET | Refills: 0 | Status: SHIPPED | OUTPATIENT
Start: 2023-01-06 | End: 2023-01-10 | Stop reason: SDUPTHER

## 2023-01-06 RX ORDER — BUDESONIDE 0.5 MG/2ML
0.5 INHALANT ORAL 2 TIMES DAILY
Qty: 120 ML | Refills: 5 | Status: SHIPPED | OUTPATIENT
Start: 2023-01-06 | End: 2024-02-01

## 2023-01-06 RX ORDER — METHYLPREDNISOLONE 4 MG/1
TABLET ORAL
Qty: 1 EACH | Refills: 0 | Status: SHIPPED | OUTPATIENT
Start: 2023-01-06 | End: 2023-01-12 | Stop reason: ALTCHOICE

## 2023-01-06 NOTE — PROCEDURES
Clinical Guide to Interpretation or FeNO Levels :    FeNO  (ppb) LOW INTERMEDIATE HIGH   ADULT VALUES < 25 25-50          > 50   Th2-driven Inflammation Unlikely Likely Significant     Patients FeNO level at this visit : __23__ (ppb)    Interpretation of FeNO measurement in adults:  [x] FENO is less than 25 ppb implies non eosinophilic airway inflammation or the absence of airway inflammation.    Comment: Low FENO (<25 ppb) in adult asthmatics with persistent symptoms suggests other etiologies for these symptoms and a lower likelihood of benefit from adding or increasing inhaled glucocorticoids.    [] FENO between 25 and 50 ppb in adults should be interpreted cautiously with reference to the clinical situation (eg, symptomatic, on or off therapy, current smoking).    [] FENO greater than 50 ppb in adults  suggests eosinophilic airway inflammation   Comment: High FENO (>50 ppb) in adult asthmatics even with atypical symptoms suggests glucocorticoid responsiveness. High FENO (>50 ppb) can help identify poor adherence or uncontrolled inflammation in asthma patients with otherwise seemingly controlled asthma.    Discussion:  A FENO less than 25 ppb in adults and less than 20 ppb in children younger than 12 years of age implies noneosinophilic airway inflammation or the absence of airway inflammation.  A FENO greater than 50 ppb in adults or greater than 35 ppb in children suggests eosinophilic airway inflammation.   Values of FENO between 25 and 50 ppb in adults (20 to 35 ppb in children) should be interpreted cautiously with reference to the clinical situation (eg, symptomatic, on or off therapy, current smoking).  A rising FENO with a greater than 20 percent change and more than 25 ppb (20 ppb in children) from a previously stable level suggests increasing eosinophilic airway inflammation, but there are wide inter-individual differences.  A decrease in FENO greater than 20 percent for values over 50 ppb or more than  10 ppb for values less than 50 ppb may be clinically important.  ?FENO in other respiratory diseases - Several other diseases are associated with altered levels of exhaled NO: low levels of FENO have been noted in cystic fibrosis, current smoking, pulmonary hypertension, hypothermia, primary ciliary dyskinesia, and bronchopulmonary dysplasia. Elevated FENO has been noted in atopy, nonasthmatic eosinophilic bronchitis, COPD exacerbations, noncystic fibrosis bronchiectasis, and viral upper respiratory infections.    REFERENCE:  ATS Board of Directors, December 2004, and by the ERS Executive Committee, June 2004. ATS/ERS Recommendations for Standardized Procedures for the Online and Offline Measurement of Exhaled Lower Respiratory Nitric Oxide and Nasal Nitric Oxide. Guideline 2005

## 2023-01-06 NOTE — PROGRESS NOTES
Subjective:       Patient ID: Caitlin Ahmadi is a 67 y.o. female.    Chief Complaint: Shortness of Breath      66yo female here for acute care visit  History of asthma on BREO daily, albuterol as needed  Last exacerbation 9/2022  Saw PCP 3 weeks ago for bronchitis, took Augmentin, prednisone, cough syrup  Feels like congestion has improved but still with persistent cough and SOB   SOB worse on exertion, relived with nebulizer  Having trouble using inhalers BREO and albuterol due to difficulty getting full breath in    Takes methotrexate for RA  Scheduled 3/2022 with Dr. Gerber for PFT      Immunization History   Administered Date(s) Administered    COVID-19, MRNA, LN-S, PF (Pfizer) (Purple Cap) 03/13/2021, 04/03/2021    Influenza (FLUAD) - Quadrivalent - Adjuvanted - PF *Preferred* (65+) 10/08/2020, 09/30/2021, 11/01/2022    Influenza - High Dose - PF (65 years and older) 11/27/2019    Influenza - Quadrivalent - PF *Preferred* (6 months and older) 09/26/2018    Pneumococcal Conjugate - 13 Valent 05/07/2019    Pneumococcal Polysaccharide - 23 Valent 06/17/2020    Tdap 05/12/2021      Tobacco Use: Medium Risk    Smoking Tobacco Use: Former    Smokeless Tobacco Use: Never    Passive Exposure: Not on file      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 360 mL 11    albuterol (PROVENTIL/VENTOLIN HFA) 90 mcg/actuation inhaler Inhale 2 puffs into the lungs every 6 (six) hours as needed for Wheezing. Rescue 6.7 g 11    amitriptyline (ELAVIL) 100 MG tablet TAKE 3  "TABLETS(300 MG) BY MOUTH EVERY EVENING 270 tablet 2    busPIRone (BUSPAR) 10 MG tablet Take 1 tablet (10 mg total) by mouth 2 (two) times daily. 180 tablet 0    fluticasone furoate-vilanteroL (BREO ELLIPTA) 100-25 mcg/dose diskus inhaler Inhale 1 puff into the lungs once daily. Controller.Wash out mouth after use 60 each 11    fluticasone propionate (FLONASE) 50 mcg/actuation nasal spray 2 sprays (100 mcg total) by Each Nostril route once daily. 16 g 11    folic acid (FOLVITE) 1 MG tablet TAKE 1 TABLET(1 MG) BY MOUTH EVERY DAY 30 tablet 4    ibuprofen (ADVIL,MOTRIN) 200 MG tablet Take 600 mg by mouth every 6 (six) hours as needed.      losartan (COZAAR) 25 MG tablet TAKE 1 TABLET(25 MG) BY MOUTH EVERY DAY 90 tablet 1    methotrexate 2.5 MG Tab TAKE 6 TABLETS(15 MG) BY MOUTH EVERY 7 DAYS 24 tablet 3     No current facility-administered medications on file prior to visit.        Review of Systems   Constitutional:  Negative for fever, weight loss, appetite change, fatigue and weakness.   HENT:  Negative for postnasal drip, rhinorrhea, sinus pressure, trouble swallowing and congestion.    Respiratory:  Positive for cough, shortness of breath, wheezing and dyspnea on extertion. Negative for sputum production, choking and chest tightness.    Cardiovascular:  Negative for chest pain and leg swelling.   Musculoskeletal:  Negative for arthralgias, gait problem and joint swelling.   Gastrointestinal:  Negative for nausea, vomiting and abdominal pain.   Neurological:  Negative for dizziness, weakness and headaches.   All other systems reviewed and are negative.    Objective:       Vitals:    01/06/23 1521   BP: (!) 142/70   Pulse: 105   Resp: 20   SpO2: 98%   Weight: 78.2 kg (172 lb 6.4 oz)   Height: 5' 8" (1.727 m)       Physical Exam   Constitutional: She is oriented to person, place, and time. She appears well-developed and well-nourished. No distress.   HENT:   Head: Normocephalic.   Nose: Nose normal.   Mouth/Throat: " Oropharynx is clear and moist.   Cardiovascular: Normal rate and regular rhythm.   Pulmonary/Chest: Effort normal. No respiratory distress. She has no wheezes. She has no rhonchi. She has no rales.   Musculoskeletal:         General: No edema.      Cervical back: Normal range of motion and neck supple.   Lymphadenopathy: No supraclavicular adenopathy is present.     She has no cervical adenopathy.   Neurological: She is alert and oriented to person, place, and time. Gait normal.   Skin: Skin is warm and dry.   Psychiatric:   anxious   Vitals reviewed.  Personal Diagnostic Review    X-Ray Chest PA And Lateral  Narrative: EXAMINATION:  XR CHEST PA AND LATERAL    CLINICAL HISTORY:  Pneumonia, unspecified organism    TECHNIQUE:  PA and lateral views of the chest were performed.    COMPARISON:  2020    FINDINGS:  Cardiac silhouette and mediastinal contours are normal.  Lungs are clear.  Osseous structures are intact.  Impression: No acute cardiopulmonary process.    Electronically signed by: Dangelo Irene MD  Date:    2022  Time:    11:26          No flowsheet data found.      Assessment/Plan:       Problem List Items Addressed This Visit          Psychiatric    Recurrent major depressive disorder, in partial remission     Stable, F/u regularly with PCP              Pulmonary    ILD (interstitial lung disease)     PFT 3/2022 scheduled         SOB (shortness of breath)    Relevant Orders    Fraction of  Nitric Oxide (Completed)    Mild persistent asthma with acute exacerbation - Primary     Lungs clear on exam  Patient reports breathing feels a little better after exam  zpack and medrol pack  pulmicort BID if cannot do BREO for easier administration  Follow up as scheduled 3/2022  Report to ER for any worsening symptoms         Relevant Medications    azithromycin (Z-VENTURA) 250 MG tablet    budesonide (PULMICORT) 0.5 mg/2 mL nebulizer solution    methylPREDNISolone (MEDROL DOSEPACK) 4 mg tablet        Cardiac/Vascular    Primary hypertension     F/u regularly with PCP              Immunology/Multi System    Seropositive rheumatoid arthritis     methotrexate   Followed by rheumatology            Follow up for follow up as scheduled 3/2022.    Discussed diagnosis, its evaluation, treatment and usual course. All questions answered.    Patient verbalized understanding of plan and left in no acute distress    Thank you for the courtesy of participating in the care of this patient    Lynette Roman PA-C  [facility]

## 2023-01-06 NOTE — ASSESSMENT & PLAN NOTE
Lungs clear on exam  Patient reports breathing feels a little better after exam  zpack and medrol pack  pulmicort BID if cannot do BREO for easier administration  Follow up as scheduled 3/2022  Report to ER for any worsening symptoms

## 2023-01-12 ENCOUNTER — HOSPITAL ENCOUNTER (OUTPATIENT)
Dept: RADIOLOGY | Facility: HOSPITAL | Age: 68
Discharge: HOME | End: 2023-01-12
Attending: INTERNAL MEDICINE
Payer: MEDICARE

## 2023-01-12 ENCOUNTER — OFFICE VISIT (OUTPATIENT)
Dept: INTERNAL MEDICINE | Facility: CLINIC | Age: 68
End: 2023-01-12
Payer: MEDICARE

## 2023-01-12 VITALS
BODY MASS INDEX: 26.6 KG/M2 | OXYGEN SATURATION: 95 % | WEIGHT: 175.5 LBS | DIASTOLIC BLOOD PRESSURE: 62 MMHG | TEMPERATURE: 98 F | SYSTOLIC BLOOD PRESSURE: 128 MMHG | HEART RATE: 96 BPM | HEIGHT: 68 IN

## 2023-01-12 VITALS — BODY MASS INDEX: 26.21 KG/M2 | HEIGHT: 68 IN

## 2023-01-12 DIAGNOSIS — Z12.31 ENCOUNTER FOR SCREENING MAMMOGRAM FOR BREAST CANCER: ICD-10-CM

## 2023-01-12 DIAGNOSIS — R05.9 COUGH, UNSPECIFIED TYPE: ICD-10-CM

## 2023-01-12 DIAGNOSIS — F41.9 ANXIETY: Primary | ICD-10-CM

## 2023-01-12 PROCEDURE — 77067 SCR MAMMO BI INCL CAD: CPT | Mod: TC

## 2023-01-12 PROCEDURE — 3288F PR FALLS RISK ASSESSMENT DOCUMENTED: ICD-10-PCS | Mod: CPTII,S$GLB,, | Performed by: INTERNAL MEDICINE

## 2023-01-12 PROCEDURE — 1159F MED LIST DOCD IN RCRD: CPT | Mod: CPTII,S$GLB,, | Performed by: INTERNAL MEDICINE

## 2023-01-12 PROCEDURE — 3008F BODY MASS INDEX DOCD: CPT | Mod: CPTII,S$GLB,, | Performed by: INTERNAL MEDICINE

## 2023-01-12 PROCEDURE — 3078F PR MOST RECENT DIASTOLIC BLOOD PRESSURE < 80 MM HG: ICD-10-PCS | Mod: CPTII,S$GLB,, | Performed by: INTERNAL MEDICINE

## 2023-01-12 PROCEDURE — 77063 BREAST TOMOSYNTHESIS BI: CPT | Mod: 26,,, | Performed by: RADIOLOGY

## 2023-01-12 PROCEDURE — 1126F AMNT PAIN NOTED NONE PRSNT: CPT | Mod: CPTII,S$GLB,, | Performed by: INTERNAL MEDICINE

## 2023-01-12 PROCEDURE — 99999 PR PBB SHADOW E&M-EST. PATIENT-LVL III: CPT | Mod: PBBFAC,,, | Performed by: INTERNAL MEDICINE

## 2023-01-12 PROCEDURE — 99999 PR PBB SHADOW E&M-EST. PATIENT-LVL III: ICD-10-PCS | Mod: PBBFAC,,, | Performed by: INTERNAL MEDICINE

## 2023-01-12 PROCEDURE — 77063 MAMMO DIGITAL SCREENING BILAT WITH TOMO: ICD-10-PCS | Mod: 26,,, | Performed by: RADIOLOGY

## 2023-01-12 PROCEDURE — 99214 PR OFFICE/OUTPT VISIT, EST, LEVL IV, 30-39 MIN: ICD-10-PCS | Mod: S$GLB,,, | Performed by: INTERNAL MEDICINE

## 2023-01-12 PROCEDURE — 1126F PR PAIN SEVERITY QUANTIFIED, NO PAIN PRESENT: ICD-10-PCS | Mod: CPTII,S$GLB,, | Performed by: INTERNAL MEDICINE

## 2023-01-12 PROCEDURE — 3074F SYST BP LT 130 MM HG: CPT | Mod: CPTII,S$GLB,, | Performed by: INTERNAL MEDICINE

## 2023-01-12 PROCEDURE — 3288F FALL RISK ASSESSMENT DOCD: CPT | Mod: CPTII,S$GLB,, | Performed by: INTERNAL MEDICINE

## 2023-01-12 PROCEDURE — 1101F PR PT FALLS ASSESS DOC 0-1 FALLS W/OUT INJ PAST YR: ICD-10-PCS | Mod: CPTII,S$GLB,, | Performed by: INTERNAL MEDICINE

## 2023-01-12 PROCEDURE — 3074F PR MOST RECENT SYSTOLIC BLOOD PRESSURE < 130 MM HG: ICD-10-PCS | Mod: CPTII,S$GLB,, | Performed by: INTERNAL MEDICINE

## 2023-01-12 PROCEDURE — 3008F PR BODY MASS INDEX (BMI) DOCUMENTED: ICD-10-PCS | Mod: CPTII,S$GLB,, | Performed by: INTERNAL MEDICINE

## 2023-01-12 PROCEDURE — 77067 MAMMO DIGITAL SCREENING BILAT WITH TOMO: ICD-10-PCS | Mod: 26,,, | Performed by: RADIOLOGY

## 2023-01-12 PROCEDURE — 99214 OFFICE O/P EST MOD 30 MIN: CPT | Mod: S$GLB,,, | Performed by: INTERNAL MEDICINE

## 2023-01-12 PROCEDURE — 77067 SCR MAMMO BI INCL CAD: CPT | Mod: 26,,, | Performed by: RADIOLOGY

## 2023-01-12 PROCEDURE — 3078F DIAST BP <80 MM HG: CPT | Mod: CPTII,S$GLB,, | Performed by: INTERNAL MEDICINE

## 2023-01-12 PROCEDURE — 1159F PR MEDICATION LIST DOCUMENTED IN MEDICAL RECORD: ICD-10-PCS | Mod: CPTII,S$GLB,, | Performed by: INTERNAL MEDICINE

## 2023-01-12 PROCEDURE — 1101F PT FALLS ASSESS-DOCD LE1/YR: CPT | Mod: CPTII,S$GLB,, | Performed by: INTERNAL MEDICINE

## 2023-01-12 RX ORDER — BUSPIRONE HYDROCHLORIDE 10 MG/1
10 TABLET ORAL 2 TIMES DAILY
Qty: 180 TABLET | Refills: 1 | Status: SHIPPED | OUTPATIENT
Start: 2023-01-12 | End: 2023-08-25 | Stop reason: SDUPTHER

## 2023-01-12 RX ORDER — BENZONATATE 200 MG/1
200 CAPSULE ORAL 3 TIMES DAILY PRN
Qty: 30 CAPSULE | Refills: 0 | Status: SHIPPED | OUTPATIENT
Start: 2023-01-12 | End: 2023-01-22

## 2023-01-12 NOTE — PROGRESS NOTES
"Subjective:      Patient ID: Caitlin Ahmadi is a 67 y.o. female.    Chief Complaint: Follow-up    Follow-up  Pertinent negatives include no abdominal pain, chest pain, chills, coughing, fever or rash.     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 Likes buspar. Works well. No SEs unless mixes with etoh. Has dc'd alcohol.     Had recent uri/bronchitis symptoms.   Completed zpack this. Continue with increased dyspnea and cough from baseline. Continues very slow improvement.     Review of Systems   Constitutional:  Negative for chills and fever.   Respiratory:  Negative for cough.    Cardiovascular:  Negative for chest pain.   Gastrointestinal:  Negative for abdominal pain.   Skin:  Negative for rash and wound.   Objective:   /62 (BP Location: Right arm, Patient Position: Sitting, BP Method: Large (Manual))   Pulse 96   Temp 98.3 °F (36.8 °C) (Tympanic)   Ht 5' 8" (1.727 m)   Wt 79.6 kg (175 lb 7.8 oz)   SpO2 95%   BMI 26.68 kg/m²     Physical Exam  Constitutional:       General: She is awake. She is not in acute distress.     Appearance: Normal appearance. She is well-developed. She is not ill-appearing.   HENT:      Head: Normocephalic and atraumatic.   Eyes:      Conjunctiva/sclera: Conjunctivae normal.   Cardiovascular:      Rate and Rhythm: Normal rate.   Pulmonary:      Effort: Pulmonary effort is " normal.      Breath sounds: Rales (faint LLL) present.   Musculoskeletal:      Cervical back: Normal range of motion.   Skin:     General: Skin is warm and dry.   Neurological:      Mental Status: She is alert. Mental status is at baseline.   Psychiatric:         Mood and Affect: Mood normal.         Behavior: Behavior normal. Behavior is cooperative.         Thought Content: Thought content normal.         Judgment: Judgment normal.       Lab Results   Component Value Date    WBC 6.09 09/26/2022    WBC 6.09 09/26/2022    HGB 12.1 09/26/2022    HGB 12.1 09/26/2022    HGB 12.5 07/19/2022    HCT 36.8 (L) 09/26/2022    HCT 36.8 (L) 09/26/2022    MCV 92 09/26/2022    MCV 92 09/26/2022    MCV 91 07/19/2022     09/26/2022     09/26/2022    CHOL 241 (H) 09/26/2022    TRIG 277 (H) 09/26/2022    HDL 50 09/26/2022    LDLCALC 135.6 09/26/2022    LDLCALC 149.0 07/06/2020    ALT 36 09/26/2022    ALT 36 09/26/2022    AST 21 09/26/2022    AST 21 09/26/2022     09/26/2022     09/26/2022    K 4.5 09/26/2022    K 4.5 09/26/2022     09/26/2022     09/26/2022    CO2 25 09/26/2022    CO2 25 09/26/2022    BUN 13 09/26/2022    BUN 13 09/26/2022    CREATININE 0.7 09/26/2022    CREATININE 0.7 09/26/2022    CREATININE 0.8 07/19/2022    EGFRNORACEVR >60 09/26/2022    EGFRNORACEVR >60 09/26/2022    TSH 2.218 09/26/2022    TSH 2.397 07/06/2020     (H) 09/26/2022     (H) 09/26/2022    BNP 19 08/18/2020          The 10-year ASCVD risk score (Mario LE, et al., 2019) is: 11.3%    Values used to calculate the score:      Age: 67 years      Sex: Female      Is Non- : No      Diabetic: No      Tobacco smoker: No      Systolic Blood Pressure: 128 mmHg      Is BP treated: Yes      HDL Cholesterol: 50 mg/dL      Total Cholesterol: 241 mg/dL     Assessment:     1. Anxiety    2. Cough, unspecified type      Plan:   1. Anxiety  -     busPIRone (BUSPAR) 10 MG tablet; Take 1 tablet  (10 mg total) by mouth 2 (two) times daily.  Dispense: 180 tablet; Refill: 1    2. Cough, unspecified type  -     benzonatate (TESSALON) 200 MG capsule; Take 1 capsule (200 mg total) by mouth 3 (three) times daily as needed for Cough.  Dispense: 30 capsule; Refill: 0        There are no Patient Instructions on file for this visit.    Future Appointments   Date Time Provider Department Center   2/27/2023 11:30 AM Jeni Looney PA-C [facility] RHE[facility]   3/13/2023  1:30 PM Del Boyce OD [facility] OPHTHAL [facility]   3/15/2023  9:15 AM PULMONARY LAB 2, [facility]   3/15/2023 10:20 AM Ab Gerber MD [facility] PULMSVC [facility]   7/13/2023 10:00 AM Amol Steve MD [facility] [place]       Lab Frequency Next Occurrence   Urinalysis, Reflex to Urine Culture Urine, Clean Catch Once 03/29/2022   Complete PFT with bronchodilator Once 09/14/2022   Ambulatory referral/consult to Pulmonology Once 12/23/2022   Protein electrophoresis, serum     Immunofixation Electrophoresis     CBC Auto Differential     C-Reactive Protein     Comprehensive Metabolic Panel     Sedimentation rate     CBC Auto Differential     Comprehensive Metabolic Panel     Sedimentation rate     C-Reactive Protein         Follow up if symptoms worsen or fail to improve.

## 2023-01-17 ENCOUNTER — PATIENT MESSAGE (OUTPATIENT)
Dept: RHEUMATOLOGY | Facility: CLINIC | Age: 68
End: 2023-01-17
Payer: MEDICARE

## 2023-02-23 ENCOUNTER — LAB VISIT (OUTPATIENT)
Dept: LAB | Facility: HOSPITAL | Age: 68
End: 2023-02-23
Payer: MEDICARE

## 2023-02-23 DIAGNOSIS — Z79.899 HIGH RISK MEDICATION USE: ICD-10-CM

## 2023-02-23 DIAGNOSIS — M05.9 SEROPOSITIVE RHEUMATOID ARTHRITIS: ICD-10-CM

## 2023-02-23 DIAGNOSIS — Z71.89 COUNSELING ON HEALTH PROMOTION AND DISEASE PREVENTION: ICD-10-CM

## 2023-02-23 LAB
ALBUMIN SERPL BCP-MCNC: 3.7 G/DL (ref 3.5–5.2)
ALP SERPL-CCNC: 81 U/L (ref 55–135)
ALT SERPL W/O P-5'-P-CCNC: 43 U/L (ref 10–44)
ANION GAP SERPL CALC-SCNC: 10 MMOL/L (ref 8–16)
AST SERPL-CCNC: 26 U/L (ref 10–40)
BASOPHILS # BLD AUTO: 0.06 K/UL (ref 0–0.2)
BASOPHILS NFR BLD: 1.1 % (ref 0–1.9)
BILIRUB SERPL-MCNC: 0.3 MG/DL (ref 0.1–1)
BUN SERPL-MCNC: 14 MG/DL (ref 8–23)
CALCIUM SERPL-MCNC: 9.1 MG/DL (ref 8.7–10.5)
CHLORIDE SERPL-SCNC: 106 MMOL/L (ref 95–110)
CO2 SERPL-SCNC: 23 MMOL/L (ref 23–29)
CREAT SERPL-MCNC: 0.8 MG/DL (ref 0.5–1.4)
CRP SERPL-MCNC: 3.2 MG/L (ref 0–8.2)
DIFFERENTIAL METHOD: ABNORMAL
EOSINOPHIL # BLD AUTO: 0.3 K/UL (ref 0–0.5)
EOSINOPHIL NFR BLD: 4.6 % (ref 0–8)
ERYTHROCYTE [DISTWIDTH] IN BLOOD BY AUTOMATED COUNT: 13.6 % (ref 11.5–14.5)
ERYTHROCYTE [SEDIMENTATION RATE] IN BLOOD BY PHOTOMETRIC METHOD: 7 MM/HR (ref 0–36)
EST. GFR  (NO RACE VARIABLE): >60 ML/MIN/1.73 M^2
GLUCOSE SERPL-MCNC: 114 MG/DL (ref 70–110)
HCT VFR BLD AUTO: 36.9 % (ref 37–48.5)
HGB BLD-MCNC: 12 G/DL (ref 12–16)
IMM GRANULOCYTES # BLD AUTO: 0.03 K/UL (ref 0–0.04)
IMM GRANULOCYTES NFR BLD AUTO: 0.6 % (ref 0–0.5)
LYMPHOCYTES # BLD AUTO: 2.2 K/UL (ref 1–4.8)
LYMPHOCYTES NFR BLD: 40.8 % (ref 18–48)
MCH RBC QN AUTO: 30.4 PG (ref 27–31)
MCHC RBC AUTO-ENTMCNC: 32.5 G/DL (ref 32–36)
MCV RBC AUTO: 93 FL (ref 82–98)
MONOCYTES # BLD AUTO: 0.7 K/UL (ref 0.3–1)
MONOCYTES NFR BLD: 12.2 % (ref 4–15)
NEUTROPHILS # BLD AUTO: 2.2 K/UL (ref 1.8–7.7)
NEUTROPHILS NFR BLD: 40.7 % (ref 38–73)
NRBC BLD-RTO: 0 /100 WBC
PLATELET # BLD AUTO: 216 K/UL (ref 150–450)
PMV BLD AUTO: 10.5 FL (ref 9.2–12.9)
POTASSIUM SERPL-SCNC: 3.9 MMOL/L (ref 3.5–5.1)
PROT SERPL-MCNC: 7 G/DL (ref 6–8.4)
RBC # BLD AUTO: 3.95 M/UL (ref 4–5.4)
SODIUM SERPL-SCNC: 139 MMOL/L (ref 136–145)
WBC # BLD AUTO: 5.42 K/UL (ref 3.9–12.7)

## 2023-02-23 PROCEDURE — 85025 COMPLETE CBC W/AUTO DIFF WBC: CPT

## 2023-02-23 PROCEDURE — 36415 COLL VENOUS BLD VENIPUNCTURE: CPT

## 2023-02-23 PROCEDURE — 85652 RBC SED RATE AUTOMATED: CPT

## 2023-02-23 PROCEDURE — 80053 COMPREHEN METABOLIC PANEL: CPT

## 2023-02-23 PROCEDURE — 86140 C-REACTIVE PROTEIN: CPT

## 2023-03-09 ENCOUNTER — TELEPHONE (OUTPATIENT)
Dept: PULMONOLOGY | Facility: CLINIC | Age: 68
End: 2023-03-09
Payer: MEDICARE

## 2023-03-13 ENCOUNTER — OFFICE VISIT (OUTPATIENT)
Dept: OPHTHALMOLOGY | Facility: CLINIC | Age: 68
End: 2023-03-13
Payer: MEDICARE

## 2023-03-13 DIAGNOSIS — H04.129 DRY EYE: ICD-10-CM

## 2023-03-13 DIAGNOSIS — H52.4 MYOPIA WITH ASTIGMATISM AND PRESBYOPIA, BILATERAL: ICD-10-CM

## 2023-03-13 DIAGNOSIS — Z96.1 PSEUDOPHAKIA OF BOTH EYES: Primary | ICD-10-CM

## 2023-03-13 DIAGNOSIS — H52.13 MYOPIA WITH ASTIGMATISM AND PRESBYOPIA, BILATERAL: ICD-10-CM

## 2023-03-13 DIAGNOSIS — H52.203 MYOPIA WITH ASTIGMATISM AND PRESBYOPIA, BILATERAL: ICD-10-CM

## 2023-03-13 PROCEDURE — 99999 PR PBB SHADOW E&M-EST. PATIENT-LVL II: CPT | Mod: PBBFAC,,, | Performed by: OPTOMETRIST

## 2023-03-13 PROCEDURE — 92015 PR REFRACTION: ICD-10-PCS | Mod: S$GLB,,, | Performed by: OPTOMETRIST

## 2023-03-13 PROCEDURE — 99999 PR PBB SHADOW E&M-EST. PATIENT-LVL II: ICD-10-PCS | Mod: PBBFAC,,, | Performed by: OPTOMETRIST

## 2023-03-13 PROCEDURE — 1160F RVW MEDS BY RX/DR IN RCRD: CPT | Mod: CPTII,S$GLB,, | Performed by: OPTOMETRIST

## 2023-03-13 PROCEDURE — 1159F PR MEDICATION LIST DOCUMENTED IN MEDICAL RECORD: ICD-10-PCS | Mod: CPTII,S$GLB,, | Performed by: OPTOMETRIST

## 2023-03-13 PROCEDURE — 1159F MED LIST DOCD IN RCRD: CPT | Mod: CPTII,S$GLB,, | Performed by: OPTOMETRIST

## 2023-03-13 PROCEDURE — 92014 COMPRE OPH EXAM EST PT 1/>: CPT | Mod: S$GLB,,, | Performed by: OPTOMETRIST

## 2023-03-13 PROCEDURE — 92015 DETERMINE REFRACTIVE STATE: CPT | Mod: S$GLB,,, | Performed by: OPTOMETRIST

## 2023-03-13 PROCEDURE — 1160F PR REVIEW ALL MEDS BY PRESCRIBER/CLIN PHARMACIST DOCUMENTED: ICD-10-PCS | Mod: CPTII,S$GLB,, | Performed by: OPTOMETRIST

## 2023-03-13 PROCEDURE — 92014 PR EYE EXAM, EST PATIENT,COMPREHESV: ICD-10-PCS | Mod: S$GLB,,, | Performed by: OPTOMETRIST

## 2023-03-13 NOTE — PROGRESS NOTES
HPI     Annual Exam            Comments: [facility] is doing well, no changes. Using AT's for dry eyes. Needing   new Contact Rx.  Vision changes since last eye exam?: no changes    Any eye pain today: No    Other ocular symptoms: sometimes not often    Interested in contact lens fitting today? Yes                    Last edited by Ike Goff on 3/13/2023  1:28 PM.            Assessment /Plan     For exam results, see Encounter Report.    Pseudophakia of both eyes  Stable OU  Monitor 12 months    Dry eye  Continue current regimen    Myopia with astigmatism and presbyopia, bilateral  Contact Lens Prescription (3/13/2023)          Brand Base Curve Diameter Sphere Cylinder Axis    Right Acuvue 1-Day Moist for Astigmatism  8.50 14.3 +2.50 -0.75 090    Left no lens            Expiration Date: 3/13/2024    Replacement: Daily    Wearing Schedule: Daily Wear              RTC 1 yr for dilated eye exam or PRN if any problems.   Discussed above and answered questions.

## 2023-03-16 ENCOUNTER — PATIENT MESSAGE (OUTPATIENT)
Dept: OPTOMETRY | Facility: CLINIC | Age: 68
End: 2023-03-16
Payer: MEDICARE

## 2023-04-12 ENCOUNTER — TELEPHONE (OUTPATIENT)
Dept: PULMONOLOGY | Facility: CLINIC | Age: 68
End: 2023-04-12
Payer: MEDICARE

## 2023-04-20 ENCOUNTER — OFFICE VISIT (OUTPATIENT)
Dept: PULMONOLOGY | Facility: CLINIC | Age: 68
End: 2023-04-20
Payer: MEDICARE

## 2023-04-20 ENCOUNTER — CLINICAL SUPPORT (OUTPATIENT)
Dept: PULMONOLOGY | Facility: CLINIC | Age: 68
End: 2023-04-20
Payer: MEDICARE

## 2023-04-20 VITALS
BODY MASS INDEX: 25.76 KG/M2 | HEART RATE: 95 BPM | HEIGHT: 68 IN | SYSTOLIC BLOOD PRESSURE: 138 MMHG | WEIGHT: 170 LBS | RESPIRATION RATE: 16 BRPM | DIASTOLIC BLOOD PRESSURE: 86 MMHG | OXYGEN SATURATION: 99 %

## 2023-04-20 DIAGNOSIS — J20.9 ACUTE BRONCHITIS, UNSPECIFIED ORGANISM: ICD-10-CM

## 2023-04-20 DIAGNOSIS — J84.9 ILD (INTERSTITIAL LUNG DISEASE): ICD-10-CM

## 2023-04-20 DIAGNOSIS — J45.21 MILD INTERMITTENT ASTHMATIC BRONCHITIS WITH ACUTE EXACERBATION: ICD-10-CM

## 2023-04-20 DIAGNOSIS — K21.9 GERD WITHOUT ESOPHAGITIS: Primary | ICD-10-CM

## 2023-04-20 PROCEDURE — 3288F FALL RISK ASSESSMENT DOCD: CPT | Mod: CPTII,S$GLB,, | Performed by: INTERNAL MEDICINE

## 2023-04-20 PROCEDURE — 4010F ACE/ARB THERAPY RXD/TAKEN: CPT | Mod: CPTII,S$GLB,, | Performed by: INTERNAL MEDICINE

## 2023-04-20 PROCEDURE — 4010F PR ACE/ARB THEARPY RXD/TAKEN: ICD-10-PCS | Mod: CPTII,S$GLB,, | Performed by: INTERNAL MEDICINE

## 2023-04-20 PROCEDURE — 3008F BODY MASS INDEX DOCD: CPT | Mod: CPTII,S$GLB,, | Performed by: INTERNAL MEDICINE

## 2023-04-20 PROCEDURE — 1126F AMNT PAIN NOTED NONE PRSNT: CPT | Mod: CPTII,S$GLB,, | Performed by: INTERNAL MEDICINE

## 2023-04-20 PROCEDURE — 1101F PT FALLS ASSESS-DOCD LE1/YR: CPT | Mod: CPTII,S$GLB,, | Performed by: INTERNAL MEDICINE

## 2023-04-20 PROCEDURE — 3288F PR FALLS RISK ASSESSMENT DOCUMENTED: ICD-10-PCS | Mod: CPTII,S$GLB,, | Performed by: INTERNAL MEDICINE

## 2023-04-20 PROCEDURE — 1160F PR REVIEW ALL MEDS BY PRESCRIBER/CLIN PHARMACIST DOCUMENTED: ICD-10-PCS | Mod: CPTII,S$GLB,, | Performed by: INTERNAL MEDICINE

## 2023-04-20 PROCEDURE — 3075F SYST BP GE 130 - 139MM HG: CPT | Mod: CPTII,S$GLB,, | Performed by: INTERNAL MEDICINE

## 2023-04-20 PROCEDURE — 1101F PR PT FALLS ASSESS DOC 0-1 FALLS W/OUT INJ PAST YR: ICD-10-PCS | Mod: CPTII,S$GLB,, | Performed by: INTERNAL MEDICINE

## 2023-04-20 PROCEDURE — 99214 PR OFFICE/OUTPT VISIT, EST, LEVL IV, 30-39 MIN: ICD-10-PCS | Mod: S$GLB,,, | Performed by: INTERNAL MEDICINE

## 2023-04-20 PROCEDURE — 3079F PR MOST RECENT DIASTOLIC BLOOD PRESSURE 80-89 MM HG: ICD-10-PCS | Mod: CPTII,S$GLB,, | Performed by: INTERNAL MEDICINE

## 2023-04-20 PROCEDURE — 99999 PR PBB SHADOW E&M-EST. PATIENT-LVL IV: CPT | Mod: PBBFAC,,, | Performed by: INTERNAL MEDICINE

## 2023-04-20 PROCEDURE — 1160F RVW MEDS BY RX/DR IN RCRD: CPT | Mod: CPTII,S$GLB,, | Performed by: INTERNAL MEDICINE

## 2023-04-20 PROCEDURE — 3079F DIAST BP 80-89 MM HG: CPT | Mod: CPTII,S$GLB,, | Performed by: INTERNAL MEDICINE

## 2023-04-20 PROCEDURE — 1159F MED LIST DOCD IN RCRD: CPT | Mod: CPTII,S$GLB,, | Performed by: INTERNAL MEDICINE

## 2023-04-20 PROCEDURE — 99999 PR PBB SHADOW E&M-EST. PATIENT-LVL IV: ICD-10-PCS | Mod: PBBFAC,,, | Performed by: INTERNAL MEDICINE

## 2023-04-20 PROCEDURE — 99214 OFFICE O/P EST MOD 30 MIN: CPT | Mod: S$GLB,,, | Performed by: INTERNAL MEDICINE

## 2023-04-20 PROCEDURE — 1159F PR MEDICATION LIST DOCUMENTED IN MEDICAL RECORD: ICD-10-PCS | Mod: CPTII,S$GLB,, | Performed by: INTERNAL MEDICINE

## 2023-04-20 PROCEDURE — 3075F PR MOST RECENT SYSTOLIC BLOOD PRESS GE 130-139MM HG: ICD-10-PCS | Mod: CPTII,S$GLB,, | Performed by: INTERNAL MEDICINE

## 2023-04-20 PROCEDURE — 3008F PR BODY MASS INDEX (BMI) DOCUMENTED: ICD-10-PCS | Mod: CPTII,S$GLB,, | Performed by: INTERNAL MEDICINE

## 2023-04-20 PROCEDURE — 1126F PR PAIN SEVERITY QUANTIFIED, NO PAIN PRESENT: ICD-10-PCS | Mod: CPTII,S$GLB,, | Performed by: INTERNAL MEDICINE

## 2023-04-20 RX ORDER — PANTOPRAZOLE SODIUM 40 MG/1
40 TABLET, DELAYED RELEASE ORAL DAILY
Qty: 90 TABLET | Refills: 3 | Status: SHIPPED | OUTPATIENT
Start: 2023-04-20 | End: 2023-10-04 | Stop reason: SDUPTHER

## 2023-04-20 NOTE — PROGRESS NOTES
Patient arrived to scheduled pft. Pt states she is not feeling well. Testing will be rescheduled. Dr. Coelho and nurse made aware. Patient had scheduled appt with Dr. Coelho to follow.

## 2023-04-21 NOTE — PROGRESS NOTES
Subjective:      Patient ID: Caitlin Ahmadi is a 68 y.o. female.    Chief Complaint: Interstitial Lung Disease    HPI    67 yo female with a history of RA, long history of asthma well controlled on Breo QD and PRN albuterol. Had a CT of the chest one year ago showing small focus of lingular atelectasis. For some reason she was told she had interstitial lung disease but cant remember by who. Main complaint is cough with occasional sputum, worse at night and first laying down. No dyspnea, fever, chills, chest pain or hemoptysis. Was supposed to have PFTs done today but felt light headed and declined to do them. PFTs one year ago were essentially normal and FeNO in January was < 25. NO other complaints at this time.    Review of Systems as per HPI otherwise negative    Objective:     Physical Exam   Constitutional: She is oriented to person, place, and time. She appears well-developed. No distress.   HENT:   Head: Normocephalic.   Cardiovascular: Normal rate and regular rhythm.   No murmur heard.  Pulmonary/Chest: Normal expansion, effort normal and breath sounds normal.   Musculoskeletal:         General: No edema.      Cervical back: Neck supple.   Neurological: She is alert and oriented to person, place, and time.   Nursing note and vitals reviewed.       Assessment:     1. GERD without esophagitis    2. Acute bronchitis, unspecified organism    3. ILD (interstitial lung disease)          Plan:     Symptoms most consistent with GERD, especially nocturnal  Add protonix QAM  Add pepcid QHS if symptoms persist  NO evidence for active asthma at this time  PFTs normal 1 year ago, no indication to repeat at this time  No CT evidence for interstitial lung disease  Reassured patient and discussed above with her in detail  RTC 6 months sooner if needed

## 2023-05-04 ENCOUNTER — TELEPHONE (OUTPATIENT)
Dept: RHEUMATOLOGY | Facility: CLINIC | Age: 68
End: 2023-05-04
Payer: MEDICARE

## 2023-05-04 NOTE — TELEPHONE ENCOUNTER
Spke to pt regarding up coming rheum appt. Informed pt location has changed appt is now at [place].     Pt vu

## 2023-05-08 ENCOUNTER — LAB VISIT (OUTPATIENT)
Dept: LAB | Facility: HOSPITAL | Age: 68
End: 2023-05-08
Payer: MEDICARE

## 2023-05-08 ENCOUNTER — OFFICE VISIT (OUTPATIENT)
Dept: RHEUMATOLOGY | Facility: CLINIC | Age: 68
End: 2023-05-08
Payer: MEDICARE

## 2023-05-08 ENCOUNTER — PATIENT MESSAGE (OUTPATIENT)
Dept: RHEUMATOLOGY | Facility: CLINIC | Age: 68
End: 2023-05-08

## 2023-05-08 VITALS
DIASTOLIC BLOOD PRESSURE: 74 MMHG | RESPIRATION RATE: 18 BRPM | HEIGHT: 68 IN | SYSTOLIC BLOOD PRESSURE: 136 MMHG | HEART RATE: 98 BPM | BODY MASS INDEX: 26.03 KG/M2 | WEIGHT: 171.75 LBS

## 2023-05-08 DIAGNOSIS — M72.2 PLANTAR FASCIITIS OF RIGHT FOOT: ICD-10-CM

## 2023-05-08 DIAGNOSIS — R21 RASH: ICD-10-CM

## 2023-05-08 DIAGNOSIS — L40.9 PSORIASIS: ICD-10-CM

## 2023-05-08 DIAGNOSIS — Z71.89 COUNSELING ON HEALTH PROMOTION AND DISEASE PREVENTION: ICD-10-CM

## 2023-05-08 DIAGNOSIS — M05.9 SEROPOSITIVE RHEUMATOID ARTHRITIS: ICD-10-CM

## 2023-05-08 DIAGNOSIS — Z79.60 LONG-TERM USE OF IMMUNOSUPPRESSANT MEDICATION: ICD-10-CM

## 2023-05-08 DIAGNOSIS — Z79.899 HIGH RISK MEDICATION USE: ICD-10-CM

## 2023-05-08 DIAGNOSIS — F33.41 RECURRENT MAJOR DEPRESSIVE DISORDER, IN PARTIAL REMISSION: ICD-10-CM

## 2023-05-08 DIAGNOSIS — M05.9 SEROPOSITIVE RHEUMATOID ARTHRITIS: Primary | ICD-10-CM

## 2023-05-08 LAB
ALBUMIN SERPL BCP-MCNC: 4.1 G/DL (ref 3.5–5.2)
ALP SERPL-CCNC: 84 U/L (ref 55–135)
ALT SERPL W/O P-5'-P-CCNC: 53 U/L (ref 10–44)
ANION GAP SERPL CALC-SCNC: 10 MMOL/L (ref 8–16)
AST SERPL-CCNC: 35 U/L (ref 10–40)
BASOPHILS # BLD AUTO: 0.07 K/UL (ref 0–0.2)
BASOPHILS NFR BLD: 1.1 % (ref 0–1.9)
BILIRUB SERPL-MCNC: 0.3 MG/DL (ref 0.1–1)
BUN SERPL-MCNC: 13 MG/DL (ref 8–23)
CALCIUM SERPL-MCNC: 9.5 MG/DL (ref 8.7–10.5)
CHLORIDE SERPL-SCNC: 106 MMOL/L (ref 95–110)
CO2 SERPL-SCNC: 23 MMOL/L (ref 23–29)
CREAT SERPL-MCNC: 0.8 MG/DL (ref 0.5–1.4)
CRP SERPL-MCNC: 3 MG/L (ref 0–8.2)
DIFFERENTIAL METHOD: NORMAL
EOSINOPHIL # BLD AUTO: 0.2 K/UL (ref 0–0.5)
EOSINOPHIL NFR BLD: 2.6 % (ref 0–8)
ERYTHROCYTE [DISTWIDTH] IN BLOOD BY AUTOMATED COUNT: 14.1 % (ref 11.5–14.5)
ERYTHROCYTE [SEDIMENTATION RATE] IN BLOOD BY WESTERGREN METHOD: 12 MM/HR (ref 0–20)
EST. GFR  (NO RACE VARIABLE): >60 ML/MIN/1.73 M^2
GLUCOSE SERPL-MCNC: 131 MG/DL (ref 70–110)
HCT VFR BLD AUTO: 39.6 % (ref 37–48.5)
HGB BLD-MCNC: 13.3 G/DL (ref 12–16)
IMM GRANULOCYTES # BLD AUTO: 0.03 K/UL (ref 0–0.04)
IMM GRANULOCYTES NFR BLD AUTO: 0.5 % (ref 0–0.5)
LYMPHOCYTES # BLD AUTO: 1.8 K/UL (ref 1–4.8)
LYMPHOCYTES NFR BLD: 27.4 % (ref 18–48)
MCH RBC QN AUTO: 29.7 PG (ref 27–31)
MCHC RBC AUTO-ENTMCNC: 33.6 G/DL (ref 32–36)
MCV RBC AUTO: 88 FL (ref 82–98)
MONOCYTES # BLD AUTO: 0.6 K/UL (ref 0.3–1)
MONOCYTES NFR BLD: 9.6 % (ref 4–15)
NEUTROPHILS # BLD AUTO: 3.9 K/UL (ref 1.8–7.7)
NEUTROPHILS NFR BLD: 58.8 % (ref 38–73)
NRBC BLD-RTO: 0 /100 WBC
PLATELET # BLD AUTO: 245 K/UL (ref 150–450)
PMV BLD AUTO: 10.2 FL (ref 9.2–12.9)
POTASSIUM SERPL-SCNC: 4.2 MMOL/L (ref 3.5–5.1)
PROT SERPL-MCNC: 7.4 G/DL (ref 6–8.4)
RBC # BLD AUTO: 4.48 M/UL (ref 4–5.4)
SODIUM SERPL-SCNC: 139 MMOL/L (ref 136–145)
WBC # BLD AUTO: 6.57 K/UL (ref 3.9–12.7)

## 2023-05-08 PROCEDURE — 36415 COLL VENOUS BLD VENIPUNCTURE: CPT

## 2023-05-08 PROCEDURE — 3075F SYST BP GE 130 - 139MM HG: CPT | Mod: CPTII,S$GLB,,

## 2023-05-08 PROCEDURE — 86431 RHEUMATOID FACTOR QUANT: CPT

## 2023-05-08 PROCEDURE — 3008F PR BODY MASS INDEX (BMI) DOCUMENTED: ICD-10-PCS | Mod: CPTII,S$GLB,,

## 2023-05-08 PROCEDURE — 3075F PR MOST RECENT SYSTOLIC BLOOD PRESS GE 130-139MM HG: ICD-10-PCS | Mod: CPTII,S$GLB,,

## 2023-05-08 PROCEDURE — 1159F MED LIST DOCD IN RCRD: CPT | Mod: CPTII,S$GLB,,

## 2023-05-08 PROCEDURE — 1160F PR REVIEW ALL MEDS BY PRESCRIBER/CLIN PHARMACIST DOCUMENTED: ICD-10-PCS | Mod: CPTII,S$GLB,,

## 2023-05-08 PROCEDURE — 80053 COMPREHEN METABOLIC PANEL: CPT

## 2023-05-08 PROCEDURE — 1101F PR PT FALLS ASSESS DOC 0-1 FALLS W/OUT INJ PAST YR: ICD-10-PCS | Mod: CPTII,S$GLB,,

## 2023-05-08 PROCEDURE — 3288F FALL RISK ASSESSMENT DOCD: CPT | Mod: CPTII,S$GLB,,

## 2023-05-08 PROCEDURE — 99999 PR PBB SHADOW E&M-EST. PATIENT-LVL V: CPT | Mod: PBBFAC,,,

## 2023-05-08 PROCEDURE — 99499 UNLISTED E&M SERVICE: CPT | Mod: S$GLB,,,

## 2023-05-08 PROCEDURE — 86140 C-REACTIVE PROTEIN: CPT

## 2023-05-08 PROCEDURE — 1126F PR PAIN SEVERITY QUANTIFIED, NO PAIN PRESENT: ICD-10-PCS | Mod: CPTII,S$GLB,,

## 2023-05-08 PROCEDURE — 99499 RISK ADDL DX/OHS AUDIT: ICD-10-PCS | Mod: S$GLB,,,

## 2023-05-08 PROCEDURE — 3078F DIAST BP <80 MM HG: CPT | Mod: CPTII,S$GLB,,

## 2023-05-08 PROCEDURE — 1159F PR MEDICATION LIST DOCUMENTED IN MEDICAL RECORD: ICD-10-PCS | Mod: CPTII,S$GLB,,

## 2023-05-08 PROCEDURE — 99999 PR PBB SHADOW E&M-EST. PATIENT-LVL V: ICD-10-PCS | Mod: PBBFAC,,,

## 2023-05-08 PROCEDURE — 3288F PR FALLS RISK ASSESSMENT DOCUMENTED: ICD-10-PCS | Mod: CPTII,S$GLB,,

## 2023-05-08 PROCEDURE — 3078F PR MOST RECENT DIASTOLIC BLOOD PRESSURE < 80 MM HG: ICD-10-PCS | Mod: CPTII,S$GLB,,

## 2023-05-08 PROCEDURE — 4010F PR ACE/ARB THEARPY RXD/TAKEN: ICD-10-PCS | Mod: CPTII,S$GLB,,

## 2023-05-08 PROCEDURE — 99215 PR OFFICE/OUTPT VISIT, EST, LEVL V, 40-54 MIN: ICD-10-PCS | Mod: S$GLB,,,

## 2023-05-08 PROCEDURE — 4010F ACE/ARB THERAPY RXD/TAKEN: CPT | Mod: CPTII,S$GLB,,

## 2023-05-08 PROCEDURE — 99215 OFFICE O/P EST HI 40 MIN: CPT | Mod: S$GLB,,,

## 2023-05-08 PROCEDURE — 85651 RBC SED RATE NONAUTOMATED: CPT

## 2023-05-08 PROCEDURE — 1126F AMNT PAIN NOTED NONE PRSNT: CPT | Mod: CPTII,S$GLB,,

## 2023-05-08 PROCEDURE — 85025 COMPLETE CBC W/AUTO DIFF WBC: CPT

## 2023-05-08 PROCEDURE — 3008F BODY MASS INDEX DOCD: CPT | Mod: CPTII,S$GLB,,

## 2023-05-08 PROCEDURE — 1101F PT FALLS ASSESS-DOCD LE1/YR: CPT | Mod: CPTII,S$GLB,,

## 2023-05-08 PROCEDURE — 1160F RVW MEDS BY RX/DR IN RCRD: CPT | Mod: CPTII,S$GLB,,

## 2023-05-08 RX ORDER — CALCIPOTRIENE 50 UG/G
CREAM TOPICAL 2 TIMES DAILY
Qty: 60 G | Refills: 0 | Status: SHIPPED | OUTPATIENT
Start: 2023-05-08 | End: 2024-02-27

## 2023-05-08 RX ORDER — AMITRIPTYLINE HYDROCHLORIDE 100 MG/1
TABLET ORAL
Qty: 270 TABLET | Refills: 2 | Status: SHIPPED | OUTPATIENT
Start: 2023-05-08 | End: 2024-02-04

## 2023-05-08 RX ORDER — METHOTREXATE 2.5 MG/1
TABLET ORAL
Qty: 24 TABLET | Refills: 3 | Status: SHIPPED | OUTPATIENT
Start: 2023-05-08 | End: 2023-06-06 | Stop reason: SDUPTHER

## 2023-05-08 RX ORDER — CELECOXIB 100 MG/1
100 CAPSULE ORAL 2 TIMES DAILY PRN
Qty: 60 CAPSULE | Refills: 1 | Status: SHIPPED | OUTPATIENT
Start: 2023-05-08 | End: 2023-05-30

## 2023-05-08 NOTE — TELEPHONE ENCOUNTER
No care due was identified.  [facility] Embedded Care Due Messages. Reference number: [id number].   5/08/2023 9:22:57 AM CDT

## 2023-05-08 NOTE — TELEPHONE ENCOUNTER
Refill Decision Note   Caitlin Ahmadi  is requesting a refill authorization.  Brief Assessment and Rationale for Refill:  Approve     Medication Therapy Plan:         Comments:     Note composed:6:30 PM 05/08/2023

## 2023-05-08 NOTE — PROGRESS NOTES
RHEUMATOLOGY OUTPATIENT CLINIC NOTE    05/08/2023    Subjective:       Patient ID: Caitlin Ahmadi is a 68 y.o. female.    Chief Complaint: Rheumatoid Arthritis        HPI   Caitlin Ahmadi is a 68 y.o. pleasant female here for rheumatology follow up for seropositive rheumatoid arthritis.      From a joint standpoint, she is doing well on methotrexate 15 mg/wk and daily folic acid.   Denies any pain today.  One small area of rash on her right knee which has been there for months.  Not improved with triamcinolone topical cream.       No prolonged morning stiffness, no swelling, no erythema, no increased warmth to any joints.  Occasional pain in her bilateral 1st CMCs .  No dactylitis, no enthesitis, no tenosynovitis.  Rheumatologic review of systems negative otherwise.     Physical exam   No obvious synovitis, no erythema, no increased warmth to any joints toni UE/LE.   Full fist formation bilateral hands.  No obvious dactylitis, no tenosynovitis.   No obvious synovitis.    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    Cataract     Encounter for blood transfusion     Hypertension     PONV (postoperative nausea and vomiting)     Seropositive rheumatoid arthritis 2/17/2022     Past Surgical History:   Procedure Laterality Date    ADENOIDECTOMY      CATARACT EXTRACTION W/  INTRAOCULAR LENS IMPLANT Left 12/09/2020    CATARACT EXTRACTION W/  INTRAOCULAR LENS IMPLANT      CHOLECYSTECTOMY      COLONOSCOPY N/A 10/13/2021    Procedure: COLONOSCOPY;  Surgeon: Elissa Yoon MD;  Location: [facility];  Service: Endoscopy;  Laterality: N/A;    HERNIA REPAIR      HYSTERECTOMY      TONSILLECTOMY      TOTAL KNEE ARTHROPLASTY Bilateral      Family History   Problem Relation Age of Onset    No Known Problems Mother     Alzheimer's disease Father      Social History  "    Socioeconomic History    Marital status:    Tobacco Use    Smoking status: Former     Packs/day: 1.00     Years: 20.00     Pack years: 20.00     Types: Cigarettes     Start date: 1970     Quit date: 1990     Years since quittin.9    Smokeless tobacco: Never   Substance and Sexual Activity    Alcohol use: Yes     Alcohol/week: 5.0 standard drinks     Types: 5 Glasses of wine per week    Drug use: Never    Sexual activity: Yes     Partners: Male     Review of patient's allergies indicates:   Allergen Reactions    Pneumococcal vaccine Swelling    Hydrocodone-acetaminophen Nausea Only and Nausea And Vomiting    Meloxicam Rash    Sulfa (sulfonamide antibiotics) Rash           Objective:   /74 (BP Location: Left arm, Patient Position: Sitting, BP Method: Medium (Automatic))   Pulse 98   Resp 18   Ht 5' 8" (1.727 m)   Wt 77.9 kg (171 lb 11.8 oz)   BMI 26.11 kg/m²   Immunization History   Administered Date(s) Administered    COVID-19, MRNA, LN-S, PF (Pfizer) (Purple Cap) 2021, 2021    Influenza (FLUAD) - Quadrivalent - Adjuvanted - PF *Preferred* (65+) 10/08/2020, 2021, 2022    Influenza - High Dose - PF (65 years and older) 2019    Influenza - Quadrivalent - PF *Preferred* (6 months and older) 2018    Pneumococcal Conjugate - 13 Valent 2019    Pneumococcal Polysaccharide - 23 Valent 2020    Tdap 2021            No results found for this or any previous visit (from the past 672 hour(s)).         Lab Results   Component Value Date    TBGOLDPLUS Negative 2022      Lab Results   Component Value Date    HEPAIGM Negative 2020    HEPBIGM Negative 2020    HEPBCAB Negative 2022    HEPCAB Negative 2020        Assessment:       1. Seropositive rheumatoid arthritis    2. Long-term use of immunosuppressant medication    3. High risk medication use    4. Counseling on health promotion and disease prevention    5. " Rash    6. Psoriasis              Impression:     Seropositive rheumatoid arthritis  From a joint standpoint, doing well on 15 mg methotrexate once weekly.  Continue daily folic acid.  If worsening fatigue with methotrexate, discussed Mucinex DM at time of administration and 8-12 hours later.  MATT positive 1:80, panel normal.  She does report  oraldryness which she feels is associated with albuterol inhaler.  Dryness improves when she is not using inhaler.  She uses mints and lozenges occasionally, does not care for the taste of Biotene.      Rash/Psoriasis  Psoriasiform rash on right knee.  No other rash, no dactylitis, no enthesitis, no tenosynovitis.  Denies prior history of psoriasis.  Has tried triamcinolone topical cream without much improvement     Plantar fasciitis   Right heel pain.  Recently went to urgent Care, x-rays she says were without obvious abnormality.  Has been doing exercises, had improvement with Toradol 10 mg every 6 hours as needed.    Drug therapy requiring intensive monitoring for toxicity  - High Risk Medication Monitoring encounter  -No current medication related issues, no evidence of toxicity  -I ordered lab results, reviewed and interpreted results as well as discussed findings with the patient     Immunosuppressed Status  - Compromised immune system secondary to autoimmune disease and use of immunosuppressive drugs.   - Monitor carefully for infections and toxicities- Currently denies issues with recurrent infections      - Advised to get immediate medical care if any infection.   - Also advised strict adherence to age appropriate vaccinations and cancer screenings with PCP.  - Recommend keeping Up to date on all recommended Vaccinations raya. ShingRx, Pneumovax, Prevnar, yearly flu vaccine      Counseling on health promotion and disease prevention  Over 10 minutes spent regarding below topics:  - Nutrition and exercise counseling.  - Medication counseling provided.      Osteoarthritis  Osteoarthritis bilateral 1st CMC joints.  Chronic deformities bilateral pink ease.  Reviewed hand x-rays and discussed with patient.    Plan:          Safety labs today.  Patient would also like to trend rheumatoid factor.  We did discuss rheumatoid factor, indications for monitoring.  Continue methotrexate 15 mg weekly  Continue daily folic acid.    Hold immunosuppressant for any infection.    Can trial topical Voltaren bilateral 1st CMC joints 3-4 times daily as needed.    For rash,  trial topical Dovonex.  If continue/worsens, could consider advancing therapy to Humira or another medication that would cover both rheumatoid arthritis and psoriasis/ psoriatic arthritis.    For plantar fasciitis, continue daily stretches and exercises.    Trial Celebrex 100 mg q.12 hours as needed for joint pain/plantar fasciitis   Supportive footwear     Continue Biotene, can try other mouth washes for dry mouth, continue lozenges, staying hydrated for oral dryness.    Safety labs today, RF  Follow up 3-4 months with reg4, hand foot xrays    Jeni Looney PA-C  [facility]  Rheumatology       40 minutes of total time spent on the encounter, which includes face to face time and non-face to face time preparing to see the patient (eg, review of tests), Obtaining and/or reviewing separately obtained history, Documenting clinical information in the electronic or other health record, Independently interpreting results (not separately reported) and communicating results to the patient/family/caregiver, or Care coordination (not separately reported).

## 2023-05-09 ENCOUNTER — PATIENT MESSAGE (OUTPATIENT)
Dept: RHEUMATOLOGY | Facility: CLINIC | Age: 68
End: 2023-05-09
Payer: MEDICARE

## 2023-05-09 DIAGNOSIS — F33.41 RECURRENT MAJOR DEPRESSIVE DISORDER, IN PARTIAL REMISSION: ICD-10-CM

## 2023-05-09 LAB — RHEUMATOID FACT SERPL-ACNC: 218 IU/ML (ref 0–15)

## 2023-05-09 RX ORDER — AMITRIPTYLINE HYDROCHLORIDE 100 MG/1
TABLET ORAL
Qty: 270 TABLET | Refills: 2 | OUTPATIENT
Start: 2023-05-09

## 2023-05-09 NOTE — TELEPHONE ENCOUNTER
No care due was identified.  [facility] Embedded Care Due Messages. Reference number: [id number].   5/09/2023 3:15:10 AM CDT

## 2023-05-09 NOTE — TELEPHONE ENCOUNTER
Refill Decision Note   Caitlin Ahmadi  is requesting a refill authorization.  Brief Assessment and Rationale for Refill:  Quick Discontinue     Medication Therapy Plan:  Receipt confirmed by pharmacy (5/8/2023  6:30 PM CDT)      Comments:     Note composed:5:52 AM 05/09/2023             Appointments     Last Visit   1/12/2023 Amol Steve MD   Next Visit   6/6/2023 Amol Steve MD

## 2023-05-10 DIAGNOSIS — F33.41 RECURRENT MAJOR DEPRESSIVE DISORDER, IN PARTIAL REMISSION: ICD-10-CM

## 2023-05-10 RX ORDER — AMITRIPTYLINE HYDROCHLORIDE 100 MG/1
TABLET ORAL
Qty: 270 TABLET | Refills: 2 | Status: CANCELLED | OUTPATIENT
Start: 2023-05-10

## 2023-05-11 NOTE — TELEPHONE ENCOUNTER
No care due was identified.  [facility] Embedded Care Due Messages. Reference number: [id number].   5/10/2023 7:28:59 PM CDT

## 2023-05-30 ENCOUNTER — PATIENT MESSAGE (OUTPATIENT)
Dept: RHEUMATOLOGY | Facility: CLINIC | Age: 68
End: 2023-05-30
Payer: MEDICARE

## 2023-05-31 RX ORDER — KETOROLAC TROMETHAMINE 10 MG/1
10 TABLET, FILM COATED ORAL EVERY 6 HOURS PRN
Qty: 30 TABLET | Refills: 0 | Status: SHIPPED | OUTPATIENT
Start: 2023-05-31 | End: 2023-07-31 | Stop reason: SDUPTHER

## 2023-06-06 DIAGNOSIS — M05.9 SEROPOSITIVE RHEUMATOID ARTHRITIS: ICD-10-CM

## 2023-06-06 RX ORDER — METHOTREXATE 2.5 MG/1
TABLET ORAL
Qty: 24 TABLET | Refills: 3 | Status: SHIPPED | OUTPATIENT
Start: 2023-06-06 | End: 2023-08-28

## 2023-06-23 ENCOUNTER — HOSPITAL ENCOUNTER (OUTPATIENT)
Dept: RADIOLOGY | Facility: HOSPITAL | Age: 68
Discharge: HOME | End: 2023-06-23
Payer: MEDICARE

## 2023-06-23 DIAGNOSIS — M05.9 SEROPOSITIVE RHEUMATOID ARTHRITIS: ICD-10-CM

## 2023-06-23 PROCEDURE — 73130 XR HAND COMPLETE 3 VIEWS BILATERAL: ICD-10-PCS | Mod: 26,50,, | Performed by: STUDENT IN AN ORGANIZED HEALTH CARE EDUCATION/TRAINING PROGRAM

## 2023-06-23 PROCEDURE — 73130 X-RAY EXAM OF HAND: CPT | Mod: TC,50

## 2023-06-23 PROCEDURE — 73630 XR FOOT COMPLETE 3 VIEW BILATERAL: ICD-10-PCS | Mod: 26,50,, | Performed by: STUDENT IN AN ORGANIZED HEALTH CARE EDUCATION/TRAINING PROGRAM

## 2023-06-23 PROCEDURE — 73130 X-RAY EXAM OF HAND: CPT | Mod: 26,50,, | Performed by: STUDENT IN AN ORGANIZED HEALTH CARE EDUCATION/TRAINING PROGRAM

## 2023-06-23 PROCEDURE — 73630 X-RAY EXAM OF FOOT: CPT | Mod: 26,50,, | Performed by: STUDENT IN AN ORGANIZED HEALTH CARE EDUCATION/TRAINING PROGRAM

## 2023-06-23 PROCEDURE — 73630 X-RAY EXAM OF FOOT: CPT | Mod: TC,50

## 2023-07-07 ENCOUNTER — PATIENT OUTREACH (OUTPATIENT)
Dept: ADMINISTRATIVE | Facility: CLINIC | Age: 68
End: 2023-07-07
Payer: MEDICARE

## 2023-07-07 ENCOUNTER — EXTERNAL HOSPITAL ADMISSION (OUTPATIENT)
Dept: ADMINISTRATIVE | Facility: CLINIC | Age: 68
End: 2023-07-07
Payer: MEDICARE

## 2023-07-07 ENCOUNTER — PATIENT MESSAGE (OUTPATIENT)
Dept: INFECTIOUS DISEASES | Facility: CLINIC | Age: 68
End: 2023-07-07
Payer: MEDICARE

## 2023-07-07 NOTE — PROGRESS NOTES
C3 nurse spoke with Caitlin Ahmadi for a TCC post hospital discharge follow up call. The patient has a scheduled HOSFU appointment with Dr. Cisneros ([facility]) on 7/12/23, and Amol Steev MD (PCP) 7/20/23 @ 2:00PM.     Patient is currently taking Xyvox BID x5 days, and Norco 5-325mg Q 6hr PRN pain.

## 2023-07-20 ENCOUNTER — OFFICE VISIT (OUTPATIENT)
Dept: INTERNAL MEDICINE | Facility: CLINIC | Age: 68
End: 2023-07-20
Payer: MEDICARE

## 2023-07-20 VITALS
BODY MASS INDEX: 26.3 KG/M2 | HEIGHT: 68 IN | HEART RATE: 98 BPM | DIASTOLIC BLOOD PRESSURE: 70 MMHG | OXYGEN SATURATION: 98 % | WEIGHT: 173.5 LBS | TEMPERATURE: 98 F | SYSTOLIC BLOOD PRESSURE: 136 MMHG

## 2023-07-20 DIAGNOSIS — L08.9 FINGER INFECTION: ICD-10-CM

## 2023-07-20 DIAGNOSIS — Z09 FOLLOW-UP EXAM: Primary | ICD-10-CM

## 2023-07-20 PROCEDURE — 1125F AMNT PAIN NOTED PAIN PRSNT: CPT | Mod: CPTII,S$GLB,, | Performed by: NURSE PRACTITIONER

## 2023-07-20 PROCEDURE — 1159F MED LIST DOCD IN RCRD: CPT | Mod: CPTII,S$GLB,, | Performed by: NURSE PRACTITIONER

## 2023-07-20 PROCEDURE — 4010F ACE/ARB THERAPY RXD/TAKEN: CPT | Mod: CPTII,S$GLB,, | Performed by: NURSE PRACTITIONER

## 2023-07-20 PROCEDURE — 3075F SYST BP GE 130 - 139MM HG: CPT | Mod: CPTII,S$GLB,, | Performed by: NURSE PRACTITIONER

## 2023-07-20 PROCEDURE — 99214 PR OFFICE/OUTPT VISIT, EST, LEVL IV, 30-39 MIN: ICD-10-PCS | Mod: S$GLB,,, | Performed by: NURSE PRACTITIONER

## 2023-07-20 PROCEDURE — 4010F PR ACE/ARB THEARPY RXD/TAKEN: ICD-10-PCS | Mod: CPTII,S$GLB,, | Performed by: NURSE PRACTITIONER

## 2023-07-20 PROCEDURE — 3078F PR MOST RECENT DIASTOLIC BLOOD PRESSURE < 80 MM HG: ICD-10-PCS | Mod: CPTII,S$GLB,, | Performed by: NURSE PRACTITIONER

## 2023-07-20 PROCEDURE — 1125F PR PAIN SEVERITY QUANTIFIED, PAIN PRESENT: ICD-10-PCS | Mod: CPTII,S$GLB,, | Performed by: NURSE PRACTITIONER

## 2023-07-20 PROCEDURE — 1101F PT FALLS ASSESS-DOCD LE1/YR: CPT | Mod: CPTII,S$GLB,, | Performed by: NURSE PRACTITIONER

## 2023-07-20 PROCEDURE — 99999 PR PBB SHADOW E&M-EST. PATIENT-LVL IV: CPT | Mod: PBBFAC,,, | Performed by: NURSE PRACTITIONER

## 2023-07-20 PROCEDURE — 99999 PR PBB SHADOW E&M-EST. PATIENT-LVL IV: ICD-10-PCS | Mod: PBBFAC,,, | Performed by: NURSE PRACTITIONER

## 2023-07-20 PROCEDURE — 99214 OFFICE O/P EST MOD 30 MIN: CPT | Mod: S$GLB,,, | Performed by: NURSE PRACTITIONER

## 2023-07-20 PROCEDURE — 3075F PR MOST RECENT SYSTOLIC BLOOD PRESS GE 130-139MM HG: ICD-10-PCS | Mod: CPTII,S$GLB,, | Performed by: NURSE PRACTITIONER

## 2023-07-20 PROCEDURE — 3008F PR BODY MASS INDEX (BMI) DOCUMENTED: ICD-10-PCS | Mod: CPTII,S$GLB,, | Performed by: NURSE PRACTITIONER

## 2023-07-20 PROCEDURE — 1101F PR PT FALLS ASSESS DOC 0-1 FALLS W/OUT INJ PAST YR: ICD-10-PCS | Mod: CPTII,S$GLB,, | Performed by: NURSE PRACTITIONER

## 2023-07-20 PROCEDURE — 3288F PR FALLS RISK ASSESSMENT DOCUMENTED: ICD-10-PCS | Mod: CPTII,S$GLB,, | Performed by: NURSE PRACTITIONER

## 2023-07-20 PROCEDURE — 3008F BODY MASS INDEX DOCD: CPT | Mod: CPTII,S$GLB,, | Performed by: NURSE PRACTITIONER

## 2023-07-20 PROCEDURE — 1159F PR MEDICATION LIST DOCUMENTED IN MEDICAL RECORD: ICD-10-PCS | Mod: CPTII,S$GLB,, | Performed by: NURSE PRACTITIONER

## 2023-07-20 PROCEDURE — 3288F FALL RISK ASSESSMENT DOCD: CPT | Mod: CPTII,S$GLB,, | Performed by: NURSE PRACTITIONER

## 2023-07-20 PROCEDURE — 3078F DIAST BP <80 MM HG: CPT | Mod: CPTII,S$GLB,, | Performed by: NURSE PRACTITIONER

## 2023-07-20 RX ORDER — CLINDAMYCIN HYDROCHLORIDE 300 MG/1
300 CAPSULE ORAL 3 TIMES DAILY
Qty: 30 CAPSULE | Refills: 0 | Status: SHIPPED | OUTPATIENT
Start: 2023-07-20 | End: 2024-01-11

## 2023-07-20 RX ORDER — TRAMADOL HYDROCHLORIDE 50 MG/1
50 TABLET ORAL EVERY 6 HOURS PRN
Qty: 24 TABLET | Refills: 0 | Status: SHIPPED | OUTPATIENT
Start: 2023-07-20 | End: 2024-01-11

## 2023-07-20 NOTE — PROGRESS NOTES
Subjective:       Patient ID: Caitlin Ahmadi is a 68 y.o. female.    Chief Complaint: Follow-up (Discharged from [facility] on 6th for bacteria infection on right thumb)    HPI    Pt went to urgent care - given doxy- for R thumb cellulitis. Went to [facility] ER-given IV antibiotics/ I and D done. Culture grew nothing. Zyvox rx given (completed) - saw [facility] - now throbbing/getting red again - saw Dr. 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    Cataract     Encounter for blood transfusion     Hypertension     PONV (postoperative nausea and vomiting)     Seropositive rheumatoid arthritis 2022     Past Surgical History:   Procedure Laterality Date    ADENOIDECTOMY      CATARACT EXTRACTION W/  INTRAOCULAR LENS IMPLANT Left 2020    CATARACT EXTRACTION W/  INTRAOCULAR LENS IMPLANT      CHOLECYSTECTOMY      COLONOSCOPY N/A 10/13/2021    Procedure: COLONOSCOPY;  Surgeon: Elissa Yoon MD;  Location: [facility];  Service: Endoscopy;  Laterality: N/A;    HERNIA REPAIR      HYSTERECTOMY      TONSILLECTOMY      TOTAL KNEE ARTHROPLASTY Bilateral      Social History     Socioeconomic History    Marital status:    Tobacco Use    Smoking status: Former     Current packs/day: 0.00     Average packs/day: 1 pack/day for 20.0 years (20.0 ttl pk-yrs)     Types: Cigarettes     Start date: 1970     Quit date: 1990     Years since quittin.1    Smokeless tobacco: Never   Substance and Sexual Activity    Alcohol use: Yes     Alcohol/week: 5.0 standard drinks of alcohol     Types: 5 Glasses of wine per week    Drug use: Never    Sexual activity: Yes     Partners: Male     Review of patient's allergies indicates:   Allergen Reactions    Pneumococcal vaccine Swelling    Celebrex [celecoxib] Itching and Rash    Hydrocodone-acetaminophen Nausea Only and Nausea And Vomiting    Meloxicam Rash    Sulfa (sulfonamide  antibiotics) Rash     Current Outpatient Medications   Medication Sig    albuterol (PROVENTIL) 2.5 mg /3 mL (0.083 %) nebulizer solution Take 3 mLs (2.5 mg total) by nebulization every 4 to 6 hours as needed for Wheezing or Shortness of Breath.    albuterol (PROVENTIL/VENTOLIN HFA) 90 mcg/actuation inhaler Inhale 2 puffs into the lungs every 6 (six) hours as needed for Wheezing. Rescue    amitriptyline (ELAVIL) 100 MG tablet TAKE 3 TABLETS(300 MG) BY MOUTH EVERY EVENING    budesonide (PULMICORT) 0.5 mg/2 mL nebulizer solution Take 2 mLs (0.5 mg total) by nebulization 2 (two) times daily. Controller    busPIRone (BUSPAR) 10 MG tablet Take 1 tablet (10 mg total) by mouth 2 (two) times daily.    calcipotriene (DOVONOX) 0.005 % cream Apply topically 2 (two) times daily.    fluticasone furoate-vilanteroL (BREO ELLIPTA) 100-25 mcg/dose diskus inhaler Inhale 1 puff into the lungs once daily. Controller.Wash out mouth after use    fluticasone propionate (FLONASE) 50 mcg/actuation nasal spray SHAKE LIQUID AND USE 2 SPRAYS(100 MCG) IN EACH NOSTRIL EVERY DAY    folic acid (FOLVITE) 1 MG tablet TAKE 1 TABLET(1 MG) BY MOUTH EVERY DAY    ibuprofen (ADVIL,MOTRIN) 200 MG tablet Take 600 mg by mouth every 6 (six) hours as needed.    ketorolac (TORADOL) 10 mg tablet Take 1 tablet (10 mg total) by mouth every 6 (six) hours as needed for Pain.    methotrexate 2.5 MG Tab TAKE 6 TABLETS(15 MG) BY MOUTH EVERY 7 DAYS    pantoprazole (PROTONIX) 40 MG tablet Take 1 tablet (40 mg total) by mouth once daily.    clindamycin (CLEOCIN) 300 MG capsule Take 1 capsule (300 mg total) by mouth 3 (three) times daily.    losartan (COZAAR) 25 MG tablet TAKE 1 TABLET(25 MG) BY MOUTH EVERY DAY (Patient not taking: Reported on 7/20/2023)    traMADoL (ULTRAM) 50 mg tablet Take 1 tablet (50 mg total) by mouth every 6 (six) hours as needed for Pain.     No current facility-administered medications for this visit.           Review of Systems   Constitutional:   Negative for activity change, appetite change, chills, diaphoresis, fatigue, fever and unexpected weight change.   HENT:  Negative for congestion, ear pain, postnasal drip, rhinorrhea, sinus pressure, sinus pain, sneezing, sore throat, tinnitus, trouble swallowing and voice change.    Eyes:  Negative for photophobia, pain and visual disturbance.   Respiratory:  Negative for cough, chest tightness, shortness of breath and wheezing.    Cardiovascular:  Negative for chest pain, palpitations and leg swelling.   Gastrointestinal:  Negative for abdominal distention, abdominal pain, constipation, diarrhea, nausea and vomiting.   Genitourinary:  Negative for decreased urine volume, difficulty urinating, dysuria, flank pain, frequency, hematuria and urgency.   Musculoskeletal:  Positive for joint swelling and myalgias. Negative for arthralgias, back pain, neck pain and neck stiffness.   Skin:  Positive for color change.   Allergic/Immunologic: Negative for immunocompromised state.   Neurological:  Negative for dizziness, tremors, seizures, syncope, facial asymmetry, speech difficulty, weakness, light-headedness, numbness and headaches.   Hematological:  Negative for adenopathy. Does not bruise/bleed easily.   Psychiatric/Behavioral:  Negative for confusion and sleep disturbance.        Objective:      Physical Exam  Vitals reviewed.   Musculoskeletal:      Comments: R thumb cellulitis noted. Swelling, erythema, warmth noted   Neurological:      Mental Status: She is alert and oriented to person, place, and time.         Assessment:     Vitals:    07/20/23 1417   BP: 136/70   Pulse: 98   Temp: 97.9 °F (36.6 °C)         1. Follow-up exam    2. Finger infection        Plan:   Follow-up exam    Finger infection    Other orders  -     clindamycin (CLEOCIN) 300 MG capsule; Take 1 capsule (300 mg total) by mouth 3 (three) times daily.  Dispense: 30 capsule; Refill: 0  -     traMADoL (ULTRAM) 50 mg tablet; Take 1 tablet (50 mg  total) by mouth every 6 (six) hours as needed for Pain.  Dispense: 24 tablet; Refill: 0      Pt advised to call her ortho MD and notify him that infection is returning  ER precautions discussed

## 2023-07-31 RX ORDER — KETOROLAC TROMETHAMINE 10 MG/1
10 TABLET, FILM COATED ORAL EVERY 6 HOURS PRN
Qty: 30 TABLET | Refills: 0 | Status: SHIPPED | OUTPATIENT
Start: 2023-07-31 | End: 2023-09-04 | Stop reason: SDUPTHER

## 2023-08-25 DIAGNOSIS — F41.9 ANXIETY: ICD-10-CM

## 2023-08-26 RX ORDER — BUSPIRONE HYDROCHLORIDE 10 MG/1
10 TABLET ORAL 2 TIMES DAILY
Qty: 180 TABLET | Refills: 0 | Status: SHIPPED | OUTPATIENT
Start: 2023-08-26 | End: 2023-12-26

## 2023-08-28 ENCOUNTER — LAB VISIT (OUTPATIENT)
Dept: LAB | Facility: HOSPITAL | Age: 68
End: 2023-08-28
Payer: MEDICARE

## 2023-08-28 ENCOUNTER — OFFICE VISIT (OUTPATIENT)
Dept: RHEUMATOLOGY | Facility: CLINIC | Age: 68
End: 2023-08-28
Payer: MEDICARE

## 2023-08-28 VITALS
DIASTOLIC BLOOD PRESSURE: 68 MMHG | WEIGHT: 170 LBS | HEART RATE: 91 BPM | BODY MASS INDEX: 25.76 KG/M2 | HEIGHT: 68 IN | SYSTOLIC BLOOD PRESSURE: 128 MMHG | RESPIRATION RATE: 16 BRPM

## 2023-08-28 DIAGNOSIS — M05.9 SEROPOSITIVE RHEUMATOID ARTHRITIS: Primary | ICD-10-CM

## 2023-08-28 DIAGNOSIS — Z71.89 COUNSELING ON HEALTH PROMOTION AND DISEASE PREVENTION: ICD-10-CM

## 2023-08-28 DIAGNOSIS — Z79.60 LONG-TERM USE OF IMMUNOSUPPRESSANT MEDICATION: ICD-10-CM

## 2023-08-28 DIAGNOSIS — M15.9 PRIMARY OSTEOARTHRITIS INVOLVING MULTIPLE JOINTS: ICD-10-CM

## 2023-08-28 DIAGNOSIS — Z79.899 HIGH RISK MEDICATION USE: ICD-10-CM

## 2023-08-28 DIAGNOSIS — R21 RASH: ICD-10-CM

## 2023-08-28 DIAGNOSIS — M05.9 SEROPOSITIVE RHEUMATOID ARTHRITIS: ICD-10-CM

## 2023-08-28 LAB
ALBUMIN SERPL BCP-MCNC: 3.9 G/DL (ref 3.5–5.2)
ALP SERPL-CCNC: 86 U/L (ref 55–135)
ALT SERPL W/O P-5'-P-CCNC: 47 U/L (ref 10–44)
ANION GAP SERPL CALC-SCNC: 9 MMOL/L (ref 8–16)
AST SERPL-CCNC: 32 U/L (ref 10–40)
BASOPHILS # BLD AUTO: 0.06 K/UL (ref 0–0.2)
BASOPHILS NFR BLD: 1 % (ref 0–1.9)
BILIRUB SERPL-MCNC: 0.4 MG/DL (ref 0.1–1)
BUN SERPL-MCNC: 12 MG/DL (ref 8–23)
CALCIUM SERPL-MCNC: 9.3 MG/DL (ref 8.7–10.5)
CHLORIDE SERPL-SCNC: 107 MMOL/L (ref 95–110)
CO2 SERPL-SCNC: 24 MMOL/L (ref 23–29)
CREAT SERPL-MCNC: 0.9 MG/DL (ref 0.5–1.4)
CRP SERPL-MCNC: 2.6 MG/L (ref 0–8.2)
DIFFERENTIAL METHOD: NORMAL
EOSINOPHIL # BLD AUTO: 0.2 K/UL (ref 0–0.5)
EOSINOPHIL NFR BLD: 2.6 % (ref 0–8)
ERYTHROCYTE [DISTWIDTH] IN BLOOD BY AUTOMATED COUNT: 13.5 % (ref 11.5–14.5)
ERYTHROCYTE [SEDIMENTATION RATE] IN BLOOD BY WESTERGREN METHOD: 11 MM/HR (ref 0–20)
EST. GFR  (NO RACE VARIABLE): >60 ML/MIN/1.73 M^2
GLUCOSE SERPL-MCNC: 130 MG/DL (ref 70–110)
HCT VFR BLD AUTO: 37.8 % (ref 37–48.5)
HGB BLD-MCNC: 12.4 G/DL (ref 12–16)
IMM GRANULOCYTES # BLD AUTO: 0.03 K/UL (ref 0–0.04)
IMM GRANULOCYTES NFR BLD AUTO: 0.5 % (ref 0–0.5)
LYMPHOCYTES # BLD AUTO: 2.2 K/UL (ref 1–4.8)
LYMPHOCYTES NFR BLD: 36 % (ref 18–48)
MCH RBC QN AUTO: 29.3 PG (ref 27–31)
MCHC RBC AUTO-ENTMCNC: 32.8 G/DL (ref 32–36)
MCV RBC AUTO: 89 FL (ref 82–98)
MONOCYTES # BLD AUTO: 0.7 K/UL (ref 0.3–1)
MONOCYTES NFR BLD: 11.6 % (ref 4–15)
NEUTROPHILS # BLD AUTO: 3 K/UL (ref 1.8–7.7)
NEUTROPHILS NFR BLD: 48.3 % (ref 38–73)
NRBC BLD-RTO: 0 /100 WBC
PLATELET # BLD AUTO: 224 K/UL (ref 150–450)
PMV BLD AUTO: 10.5 FL (ref 9.2–12.9)
POTASSIUM SERPL-SCNC: 3.9 MMOL/L (ref 3.5–5.1)
PROT SERPL-MCNC: 7.8 G/DL (ref 6–8.4)
RBC # BLD AUTO: 4.23 M/UL (ref 4–5.4)
SODIUM SERPL-SCNC: 140 MMOL/L (ref 136–145)
WBC # BLD AUTO: 6.19 K/UL (ref 3.9–12.7)

## 2023-08-28 PROCEDURE — 3074F SYST BP LT 130 MM HG: CPT | Mod: CPTII,S$GLB,,

## 2023-08-28 PROCEDURE — 1160F PR REVIEW ALL MEDS BY PRESCRIBER/CLIN PHARMACIST DOCUMENTED: ICD-10-PCS | Mod: CPTII,S$GLB,,

## 2023-08-28 PROCEDURE — 99214 PR OFFICE/OUTPT VISIT, EST, LEVL IV, 30-39 MIN: ICD-10-PCS | Mod: S$GLB,,,

## 2023-08-28 PROCEDURE — 85651 RBC SED RATE NONAUTOMATED: CPT

## 2023-08-28 PROCEDURE — 1126F PR PAIN SEVERITY QUANTIFIED, NO PAIN PRESENT: ICD-10-PCS | Mod: CPTII,S$GLB,,

## 2023-08-28 PROCEDURE — 4010F PR ACE/ARB THEARPY RXD/TAKEN: ICD-10-PCS | Mod: CPTII,S$GLB,,

## 2023-08-28 PROCEDURE — 36415 COLL VENOUS BLD VENIPUNCTURE: CPT

## 2023-08-28 PROCEDURE — 3008F PR BODY MASS INDEX (BMI) DOCUMENTED: ICD-10-PCS | Mod: CPTII,S$GLB,,

## 2023-08-28 PROCEDURE — 1159F PR MEDICATION LIST DOCUMENTED IN MEDICAL RECORD: ICD-10-PCS | Mod: CPTII,S$GLB,,

## 2023-08-28 PROCEDURE — 1159F MED LIST DOCD IN RCRD: CPT | Mod: CPTII,S$GLB,,

## 2023-08-28 PROCEDURE — 1160F RVW MEDS BY RX/DR IN RCRD: CPT | Mod: CPTII,S$GLB,,

## 2023-08-28 PROCEDURE — 85025 COMPLETE CBC W/AUTO DIFF WBC: CPT

## 2023-08-28 PROCEDURE — 99999 PR PBB SHADOW E&M-EST. PATIENT-LVL V: ICD-10-PCS | Mod: PBBFAC,,,

## 2023-08-28 PROCEDURE — 3074F PR MOST RECENT SYSTOLIC BLOOD PRESSURE < 130 MM HG: ICD-10-PCS | Mod: CPTII,S$GLB,,

## 2023-08-28 PROCEDURE — 4010F ACE/ARB THERAPY RXD/TAKEN: CPT | Mod: CPTII,S$GLB,,

## 2023-08-28 PROCEDURE — 1126F AMNT PAIN NOTED NONE PRSNT: CPT | Mod: CPTII,S$GLB,,

## 2023-08-28 PROCEDURE — 3078F PR MOST RECENT DIASTOLIC BLOOD PRESSURE < 80 MM HG: ICD-10-PCS | Mod: CPTII,S$GLB,,

## 2023-08-28 PROCEDURE — 3078F DIAST BP <80 MM HG: CPT | Mod: CPTII,S$GLB,,

## 2023-08-28 PROCEDURE — 3008F BODY MASS INDEX DOCD: CPT | Mod: CPTII,S$GLB,,

## 2023-08-28 PROCEDURE — 86140 C-REACTIVE PROTEIN: CPT

## 2023-08-28 PROCEDURE — 80053 COMPREHEN METABOLIC PANEL: CPT

## 2023-08-28 PROCEDURE — 99999 PR PBB SHADOW E&M-EST. PATIENT-LVL V: CPT | Mod: PBBFAC,,,

## 2023-08-28 PROCEDURE — 99214 OFFICE O/P EST MOD 30 MIN: CPT | Mod: S$GLB,,,

## 2023-08-28 RX ORDER — HYDROXYCHLOROQUINE SULFATE 200 MG/1
200 TABLET, FILM COATED ORAL DAILY
Qty: 90 TABLET | Refills: 1 | Status: SHIPPED | OUTPATIENT
Start: 2023-08-28 | End: 2023-12-07

## 2023-08-28 NOTE — PROGRESS NOTES
"         RHEUMATOLOGY OUTPATIENT CLINIC NOTE    08/28/2023    Subjective:       Patient ID: Caitlin Ahmadi is a 68 y.o. female.    Chief Complaint: Rheumatoid Arthritis        HPI   Caitlin Ahmadi is a 68 y.o. pleasant female here for rheumatology follow up for seropositive rheumatoid arthritis.        Since last appointment she had cellulitis infection in her right thumb.  She discontinued methotrexate and has been off of it for about 2 months.  Denies any worsening joint symptoms since being off of it.    She does have lateral hip pain.  Still with knee pain.  Has partial replacement, interested in returning to her previous external Orthopedics.  Takes Toradol for hips and knees at night.    One small area of rash on her right knee which has been there for months.  Not improved with triamcinolone topical cream       No prolonged morning stiffness, no swelling, no erythema, no increased warmth to any joints. No dactylitis, no enthesitis, no tenosynovitis.  Rheumatologic review of systems negative otherwise.     Physical exam   No obvious synovitis, no erythema, no increased warmth to any joints toni UE/LE.   Full fist formation bilateral hands.  No obvious dactylitis, no tenosynovitis.   No obvious synovitis.    Small psoriasiform rash on right knee       Photo of rash on right knee taken on 05/08/2023      Objective:   /68   Pulse 91   Resp 16   Ht 5' 8" (1.727 m)   Wt 77.1 kg (169 lb 15.6 oz)   BMI 25.84 kg/m²        No results found for this or any previous visit (from the past 672 hour(s)).         Lab Results   Component Value Date    TBGOLDPLUS Negative 03/25/2022      Lab Results   Component Value Date    HEPAIGM Negative 08/28/2020    HEPBIGM Negative 08/28/2020    HEPBCAB Negative 03/23/2022    HEPCAB Negative 08/28/2020        Assessment:       1. Seropositive rheumatoid arthritis    2. Primary osteoarthritis involving multiple joints    3. Long-term use of immunosuppressant medication    4. High " risk medication use    5. Rash    6. Counseling on health promotion and disease prevention                Impression:     Seropositive rheumatoid arthritis  +RF, - CCP  From a joint standpoint, was doing well on 15 mg methotrexate once weekly.  Continue daily folic acid.   MATT positive 1:80, panel normal.  She does report  oral dryness which she feels is associated with albuterol inhaler.  Dryness improves when she is not using inhaler.  She uses mints and lozenges occasionally, does not care for the taste of Biotene.    Now off of methotrexate due to recent cellulitis infection.  Joints have been stable for the 2 months.  We discussed rheumatoid arthritis, medications, importance of preventing radiographic progression.  Most recent hand and foot x-rays without obvious erosions.  We discussed resuming methotrexate versus Plaquenil.  Patient would like to resume Plaquenil.    Rash/Psoriasis  Psoriasiform rash on right knee.  No other rash, no dactylitis, no enthesitis, no tenosynovitis.  Denies prior history of psoriasis.  Has tried triamcinolone topical cream without much improvement     Osteoarthritis bilateral knees:   Prior surgery.  Mechanical pattern pain improved with NSAIDs.      Trochanteric bursitis:   Clinically symptomatic bursitis.      Plantar fasciitis   Right heel pain.  Recently went to urgent Care, x-rays she says were without obvious abnormality.  Has been doing exercises, had improvement with Toradol 10 mg every 6 hours as needed.    Drug therapy requiring intensive monitoring for toxicity  - High Risk Medication Monitoring encounter  -No current medication related issues, no evidence of toxicity  -I ordered lab results, reviewed and interpreted results as well as discussed findings with the patient     Immunosuppressed Status  - Compromised immune system secondary to autoimmune disease and use of immunosuppressive drugs.   - Monitor carefully for infections and toxicities- Currently denies issues  with recurrent infections      - Advised to get immediate medical care if any infection.   - Also advised strict adherence to age appropriate vaccinations and cancer screenings with PCP.  - Recommend keeping Up to date on all recommended Vaccinations raya. ShingRx, Pneumovax, Prevnar, yearly flu vaccine      Counseling on health promotion and disease prevention  Over 10 minutes spent regarding below topics:  - Nutrition and exercise counseling.  - Medication counseling provided.     Osteoarthritis  Osteoarthritis bilateral 1st CMC joints.  Chronic deformities bilateral pink ease.  Reviewed hand x-rays and discussed with patient.      Plan:          RA  Medication  For now okay to stay off of mtx  If any worsening joint pain, swelling stiffness resume 15 mg weekly  Start hydroxychloroquine 200 mg daily  Monitor for worsening psoriasiform rash  Safety labs today.       OA  Can trial topical Voltaren bilateral 1st CMC joints 3-4 times daily as needed.  Ortho referral for knee pain  Knee strengthening exercises provided for patient  Bursitis   Conservative therapy recommendations given patient.  Daily stretches and exercises, topical Voltaren and heat  If worsening pain, consider injection  For rash    Continue topical Dovonex.  If continue/worsens, could consider advancing therapy to Humira or another medication that would cover both rheumatoid arthritis and psoriasis/ psoriatic arthritis.  Monitor for worsening on hydroxychloroquine     For plantar fasciitis, continue daily stretches and exercises.    Stretches  Supportive footwear     Sicca  Continue Biotene, can try other mouth washes for dry mouth, continue lozenges, staying hydrated for oral dryness.    Safety labs today  Follow up 3-4 months with reg4  Repeat hand and foot x-rays 08/2024, especially if she does resume methotrexate to monitor for progression and erosion    Jeni Looney PA-C  [facility]  Rheumatology       30  minutes of total time spent on the encounter, which includes face to face time and non-face to face time preparing to see the patient (eg, review of tests), Obtaining and/or reviewing separately obtained history, Documenting clinical information in the electronic or other health record, Independently interpreting results (not separately reported) and communicating results to the patient/family/caregiver, or Care coordination (not separately reported).    Disclaimer: This note was prepared using voice recognition system and is likely to have sound alike errors and is not proof read.  Please call me with any questions

## 2023-08-28 NOTE — PATIENT INSTRUCTIONS
For lateral hip pain/bursitis:  Apply ice to the hip 10-15 minutes at a time few times a day  Minimize stair climbing, walking up hills  Avoid hip adduction across the midline  Sit with hips positioned higher than knees; avoid crossing legs while sitting  Stand with equal weightbearing through lower limbs  Avoid side-lying to reduce compressive tendon load   Apply topical Voltaren/diclofenac to affected hip 3-4 times daily as needed

## 2023-08-31 ENCOUNTER — TELEPHONE (OUTPATIENT)
Dept: ORTHOPEDICS | Facility: CLINIC | Age: 68
End: 2023-08-31
Payer: MEDICARE

## 2023-08-31 NOTE — TELEPHONE ENCOUNTER
Contacted patient and scheduled from referral.  Patient verbalized understanding of appt time, date and location.

## 2023-09-05 RX ORDER — KETOROLAC TROMETHAMINE 10 MG/1
10 TABLET, FILM COATED ORAL EVERY 6 HOURS PRN
Qty: 30 TABLET | Refills: 0 | Status: SHIPPED | OUTPATIENT
Start: 2023-09-05 | End: 2023-10-06 | Stop reason: SDUPTHER

## 2023-09-23 DIAGNOSIS — J45.21 MILD INTERMITTENT ASTHMATIC BRONCHITIS WITH ACUTE EXACERBATION: ICD-10-CM

## 2023-09-25 RX ORDER — FLUTICASONE FUROATE AND VILANTEROL TRIFENATATE 100; 25 UG/1; UG/1
POWDER RESPIRATORY (INHALATION)
Qty: 60 EACH | Refills: 11 | Status: SHIPPED | OUTPATIENT
Start: 2023-09-25 | End: 2023-10-16

## 2023-10-04 ENCOUNTER — OFFICE VISIT (OUTPATIENT)
Dept: PULMONOLOGY | Facility: CLINIC | Age: 68
End: 2023-10-04
Payer: MEDICARE

## 2023-10-04 VITALS
SYSTOLIC BLOOD PRESSURE: 140 MMHG | RESPIRATION RATE: 18 BRPM | HEART RATE: 95 BPM | WEIGHT: 172.38 LBS | HEIGHT: 68 IN | DIASTOLIC BLOOD PRESSURE: 72 MMHG | OXYGEN SATURATION: 98 % | BODY MASS INDEX: 26.13 KG/M2

## 2023-10-04 DIAGNOSIS — R06.02 SOB (SHORTNESS OF BREATH): ICD-10-CM

## 2023-10-04 DIAGNOSIS — J45.31 MILD PERSISTENT ASTHMA WITH ACUTE EXACERBATION: Primary | ICD-10-CM

## 2023-10-04 DIAGNOSIS — K21.9 GERD WITHOUT ESOPHAGITIS: ICD-10-CM

## 2023-10-04 DIAGNOSIS — J47.9 ADULT BRONCHIECTASIS: Chronic | ICD-10-CM

## 2023-10-04 PROCEDURE — 4010F ACE/ARB THERAPY RXD/TAKEN: CPT | Mod: CPTII,S$GLB,, | Performed by: INTERNAL MEDICINE

## 2023-10-04 PROCEDURE — 1160F PR REVIEW ALL MEDS BY PRESCRIBER/CLIN PHARMACIST DOCUMENTED: ICD-10-PCS | Mod: CPTII,S$GLB,, | Performed by: INTERNAL MEDICINE

## 2023-10-04 PROCEDURE — 3077F PR MOST RECENT SYSTOLIC BLOOD PRESSURE >= 140 MM HG: ICD-10-PCS | Mod: CPTII,S$GLB,, | Performed by: INTERNAL MEDICINE

## 2023-10-04 PROCEDURE — 3078F PR MOST RECENT DIASTOLIC BLOOD PRESSURE < 80 MM HG: ICD-10-PCS | Mod: CPTII,S$GLB,, | Performed by: INTERNAL MEDICINE

## 2023-10-04 PROCEDURE — 1159F MED LIST DOCD IN RCRD: CPT | Mod: CPTII,S$GLB,, | Performed by: INTERNAL MEDICINE

## 2023-10-04 PROCEDURE — 3008F BODY MASS INDEX DOCD: CPT | Mod: CPTII,S$GLB,, | Performed by: INTERNAL MEDICINE

## 2023-10-04 PROCEDURE — 99213 PR OFFICE/OUTPT VISIT, EST, LEVL III, 20-29 MIN: ICD-10-PCS | Mod: S$GLB,,, | Performed by: INTERNAL MEDICINE

## 2023-10-04 PROCEDURE — 99213 OFFICE O/P EST LOW 20 MIN: CPT | Mod: S$GLB,,, | Performed by: INTERNAL MEDICINE

## 2023-10-04 PROCEDURE — 3288F PR FALLS RISK ASSESSMENT DOCUMENTED: ICD-10-PCS | Mod: CPTII,S$GLB,, | Performed by: INTERNAL MEDICINE

## 2023-10-04 PROCEDURE — 3288F FALL RISK ASSESSMENT DOCD: CPT | Mod: CPTII,S$GLB,, | Performed by: INTERNAL MEDICINE

## 2023-10-04 PROCEDURE — 1159F PR MEDICATION LIST DOCUMENTED IN MEDICAL RECORD: ICD-10-PCS | Mod: CPTII,S$GLB,, | Performed by: INTERNAL MEDICINE

## 2023-10-04 PROCEDURE — 99999 PR PBB SHADOW E&M-EST. PATIENT-LVL IV: CPT | Mod: PBBFAC,,, | Performed by: INTERNAL MEDICINE

## 2023-10-04 PROCEDURE — 1126F PR PAIN SEVERITY QUANTIFIED, NO PAIN PRESENT: ICD-10-PCS | Mod: CPTII,S$GLB,, | Performed by: INTERNAL MEDICINE

## 2023-10-04 PROCEDURE — 1101F PT FALLS ASSESS-DOCD LE1/YR: CPT | Mod: CPTII,S$GLB,, | Performed by: INTERNAL MEDICINE

## 2023-10-04 PROCEDURE — 3008F PR BODY MASS INDEX (BMI) DOCUMENTED: ICD-10-PCS | Mod: CPTII,S$GLB,, | Performed by: INTERNAL MEDICINE

## 2023-10-04 PROCEDURE — 1160F RVW MEDS BY RX/DR IN RCRD: CPT | Mod: CPTII,S$GLB,, | Performed by: INTERNAL MEDICINE

## 2023-10-04 PROCEDURE — 1101F PR PT FALLS ASSESS DOC 0-1 FALLS W/OUT INJ PAST YR: ICD-10-PCS | Mod: CPTII,S$GLB,, | Performed by: INTERNAL MEDICINE

## 2023-10-04 PROCEDURE — 3077F SYST BP >= 140 MM HG: CPT | Mod: CPTII,S$GLB,, | Performed by: INTERNAL MEDICINE

## 2023-10-04 PROCEDURE — 1126F AMNT PAIN NOTED NONE PRSNT: CPT | Mod: CPTII,S$GLB,, | Performed by: INTERNAL MEDICINE

## 2023-10-04 PROCEDURE — 99999 PR PBB SHADOW E&M-EST. PATIENT-LVL IV: ICD-10-PCS | Mod: PBBFAC,,, | Performed by: INTERNAL MEDICINE

## 2023-10-04 PROCEDURE — 4010F PR ACE/ARB THEARPY RXD/TAKEN: ICD-10-PCS | Mod: CPTII,S$GLB,, | Performed by: INTERNAL MEDICINE

## 2023-10-04 PROCEDURE — 3078F DIAST BP <80 MM HG: CPT | Mod: CPTII,S$GLB,, | Performed by: INTERNAL MEDICINE

## 2023-10-04 RX ORDER — PANTOPRAZOLE SODIUM 40 MG/1
40 TABLET, DELAYED RELEASE ORAL DAILY
Qty: 90 TABLET | Refills: 3 | Status: SHIPPED | OUTPATIENT
Start: 2023-10-04

## 2023-10-04 NOTE — PROGRESS NOTES
Subjective:      Patient ID: Caitlin Ahmadi is a 68 y.o. female.    Chief Complaint: Shortness of Breath    Shortness of Breath        Seen previously with the followin yo female with a history of RA, long history of asthma well controlled on Breo QD and PRN albuterol. Had a CT of the chest one year ago showing small focus of lingular atelectasis. For some reason she was told she had interstitial lung disease but cant remember by who. Main complaint is cough with occasional sputum, worse at night and first laying down. No dyspnea, fever, chills, chest pain or hemoptysis. Was supposed to have PFTs done today but felt light headed and declined to do them. PFTs one year ago were essentially normal and FeNO in January was < 25. NO other complaints at this time.    2023:  Cough has significantly improved with the addition of proton pump inhibitor but she still has daily to every other day difficult to expectorate thick green mucus.  Upon my review of her prior CT imaging do note that she has mild diffuse cylindrical bronchiectasis.  Otherwise dyspnea and asthma symptoms are mostly stable.  No fevers, chills, unintentional weight loss or chest pain.    Review of Systems   Respiratory:  Positive for shortness of breath.     as per HPI otherwise negative    Objective:     Physical Exam   Constitutional: She is oriented to person, place, and time. She appears well-developed. No distress.   HENT:   Head: Normocephalic.   Cardiovascular: Normal rate and regular rhythm.   Pulmonary/Chest: Normal expansion and symmetric chest wall expansion. She has decreased breath sounds. She has no wheezes. She has rales.   Musculoskeletal:      Cervical back: Neck supple.   Neurological: She is alert and oriented to person, place, and time.   Psychiatric: She has a normal mood and affect.   Nursing note and vitals reviewed.          10/4/2023     1:39 PM 2023     9:17 AM 2023     2:17 PM 2023    11:32 AM 2023  "   11:05 AM 1/12/2023    10:43 AM 1/12/2023     9:33 AM   Pulmonary Function Tests   SpO2 98 %  98 %  99 % 95 %    Height 5' 8" (1.727 m) 5' 8" (1.727 m) 5' 8" (1.727 m) 5' 8" (1.727 m) 5' 8" (1.727 m) 5' 8" (1.727 m) 5' 8" (1.727 m)   Weight 78.2 kg (172 lb 6.4 oz) 77.1 kg (169 lb 15.6 oz) 78.7 kg (173 lb 8 oz) 77.9 kg (171 lb 11.8 oz) 77.1 kg (170 lb) 79.6 kg (175 lb 7.8 oz)    BMI (Calculated) 26.2 25.9 26.4 26.1 25.9 26.7         Assessment:     1. Mild persistent asthma with acute exacerbation    2. GERD without esophagitis    3. Adult bronchiectasis    4. SOB (shortness of breath)        Plan:     Given the nature and frequency of her current sputum production, it is likely that this represents symptomatic bronchiectasis  I do not suspect acute infection at this time  We will add 3% saline nebulized b.i.d.  I gave her instructions/recipe sheet on how to prepare and store this at home and instructions on use  Otherwise continue medications as current  Refilled Protonix prescription today  Return in 3 months for symptom review, sooner if  Needed  Discussed in detail with the patient who voiced understanding and agreement with this plan     "

## 2023-10-06 RX ORDER — KETOROLAC TROMETHAMINE 10 MG/1
10 TABLET, FILM COATED ORAL EVERY 6 HOURS PRN
Qty: 30 TABLET | Refills: 0 | Status: SHIPPED | OUTPATIENT
Start: 2023-10-06 | End: 2024-01-11

## 2023-10-06 NOTE — TELEPHONE ENCOUNTER
Nutrition Assessment    Type and Reason for Visit: Reassess    Malnutrition Assessment:  · Malnutrition Status: No malnutrition    Nutrition Diagnosis:   · Problem: Increased nutrient needs  · Etiology: Increased demand for energy/nutrients due to    Signs and symptoms: Presence of wounds    Nutrition Assessment:  · Subjective Assessment: Appetite remains good, no needs. Nutrition Risk Level   Risk Level: Low    Nutrition Intervention  Food and/or Delivery: Continue current diet  Nutrition Education/Counseling/Coordination of Care:  Continued Inpatient Monitoring    Patient assessed for nutrition risk. Deemed to be at low risk at this time. Will continue to follow patient.       Electronically signed by Sola Menjivar MS, RD, LD on 1/30/18 at 3:36 PM    Contact Number: [phone] Please see the attached refill request.

## 2023-10-13 DIAGNOSIS — J45.21 MILD INTERMITTENT ASTHMATIC BRONCHITIS WITH ACUTE EXACERBATION: ICD-10-CM

## 2023-10-16 DIAGNOSIS — J45.21 MILD INTERMITTENT ASTHMATIC BRONCHITIS WITH ACUTE EXACERBATION: ICD-10-CM

## 2023-10-16 RX ORDER — FLUTICASONE FUROATE AND VILANTEROL 100; 25 UG/1; UG/1
1 POWDER RESPIRATORY (INHALATION) DAILY
Qty: 60 EACH | Refills: 11 | Status: SHIPPED | OUTPATIENT
Start: 2023-10-16

## 2023-10-16 RX ORDER — FLUTICASONE FUROATE AND VILANTEROL 100; 25 UG/1; UG/1
POWDER RESPIRATORY (INHALATION)
Qty: 60 EACH | Refills: 11 | Status: SHIPPED | OUTPATIENT
Start: 2023-10-16

## 2023-11-22 RX ORDER — KETOROLAC TROMETHAMINE 10 MG/1
10 TABLET, FILM COATED ORAL EVERY 6 HOURS PRN
Qty: 30 TABLET | Refills: 0 | OUTPATIENT
Start: 2023-11-22

## 2023-11-24 ENCOUNTER — LAB VISIT (OUTPATIENT)
Dept: LAB | Facility: HOSPITAL | Age: 68
End: 2023-11-24
Payer: MEDICARE

## 2023-11-24 DIAGNOSIS — M05.9 SEROPOSITIVE RHEUMATOID ARTHRITIS: ICD-10-CM

## 2023-11-24 LAB
ALBUMIN SERPL BCP-MCNC: 3.7 G/DL (ref 3.5–5.2)
ALP SERPL-CCNC: 100 U/L (ref 55–135)
ALT SERPL W/O P-5'-P-CCNC: 39 U/L (ref 10–44)
ANION GAP SERPL CALC-SCNC: 9 MMOL/L (ref 8–16)
AST SERPL-CCNC: 28 U/L (ref 10–40)
BASOPHILS # BLD AUTO: 0.05 K/UL (ref 0–0.2)
BASOPHILS NFR BLD: 1 % (ref 0–1.9)
BILIRUB SERPL-MCNC: 0.4 MG/DL (ref 0.1–1)
BUN SERPL-MCNC: 12 MG/DL (ref 8–23)
CALCIUM SERPL-MCNC: 9.4 MG/DL (ref 8.7–10.5)
CHLORIDE SERPL-SCNC: 107 MMOL/L (ref 95–110)
CO2 SERPL-SCNC: 25 MMOL/L (ref 23–29)
CREAT SERPL-MCNC: 0.8 MG/DL (ref 0.5–1.4)
CRP SERPL-MCNC: 3.9 MG/L (ref 0–8.2)
DIFFERENTIAL METHOD: ABNORMAL
EOSINOPHIL # BLD AUTO: 0.2 K/UL (ref 0–0.5)
EOSINOPHIL NFR BLD: 3.5 % (ref 0–8)
ERYTHROCYTE [DISTWIDTH] IN BLOOD BY AUTOMATED COUNT: 14.3 % (ref 11.5–14.5)
ERYTHROCYTE [SEDIMENTATION RATE] IN BLOOD BY PHOTOMETRIC METHOD: 6 MM/HR (ref 0–36)
EST. GFR  (NO RACE VARIABLE): >60 ML/MIN/1.73 M^2
GLUCOSE SERPL-MCNC: 138 MG/DL (ref 70–110)
HCT VFR BLD AUTO: 36.4 % (ref 37–48.5)
HGB BLD-MCNC: 12.4 G/DL (ref 12–16)
IMM GRANULOCYTES # BLD AUTO: 0.02 K/UL (ref 0–0.04)
IMM GRANULOCYTES NFR BLD AUTO: 0.4 % (ref 0–0.5)
LYMPHOCYTES # BLD AUTO: 1.6 K/UL (ref 1–4.8)
LYMPHOCYTES NFR BLD: 33.1 % (ref 18–48)
MCH RBC QN AUTO: 30.1 PG (ref 27–31)
MCHC RBC AUTO-ENTMCNC: 34.1 G/DL (ref 32–36)
MCV RBC AUTO: 88 FL (ref 82–98)
MONOCYTES # BLD AUTO: 0.5 K/UL (ref 0.3–1)
MONOCYTES NFR BLD: 10.4 % (ref 4–15)
NEUTROPHILS # BLD AUTO: 2.5 K/UL (ref 1.8–7.7)
NEUTROPHILS NFR BLD: 51.6 % (ref 38–73)
NRBC BLD-RTO: 0 /100 WBC
PLATELET # BLD AUTO: 213 K/UL (ref 150–450)
PMV BLD AUTO: 9.7 FL (ref 9.2–12.9)
POTASSIUM SERPL-SCNC: 4.1 MMOL/L (ref 3.5–5.1)
PROT SERPL-MCNC: 7.3 G/DL (ref 6–8.4)
RBC # BLD AUTO: 4.12 M/UL (ref 4–5.4)
SODIUM SERPL-SCNC: 141 MMOL/L (ref 136–145)
WBC # BLD AUTO: 4.8 K/UL (ref 3.9–12.7)

## 2023-11-24 PROCEDURE — 36415 COLL VENOUS BLD VENIPUNCTURE: CPT

## 2023-11-24 PROCEDURE — 80053 COMPREHEN METABOLIC PANEL: CPT

## 2023-11-24 PROCEDURE — 85025 COMPLETE CBC W/AUTO DIFF WBC: CPT

## 2023-11-24 PROCEDURE — 85652 RBC SED RATE AUTOMATED: CPT

## 2023-11-24 PROCEDURE — 86140 C-REACTIVE PROTEIN: CPT

## 2023-11-28 RX ORDER — KETOROLAC TROMETHAMINE 10 MG/1
10 TABLET, FILM COATED ORAL EVERY 6 HOURS PRN
Qty: 30 TABLET | Refills: 0 | OUTPATIENT
Start: 2023-11-28

## 2023-12-07 ENCOUNTER — OFFICE VISIT (OUTPATIENT)
Dept: RHEUMATOLOGY | Facility: CLINIC | Age: 68
End: 2023-12-07
Payer: MEDICARE

## 2023-12-07 VITALS
HEART RATE: 94 BPM | DIASTOLIC BLOOD PRESSURE: 72 MMHG | BODY MASS INDEX: 26.13 KG/M2 | WEIGHT: 172.38 LBS | HEIGHT: 68 IN | SYSTOLIC BLOOD PRESSURE: 111 MMHG

## 2023-12-07 DIAGNOSIS — J47.9 ADULT BRONCHIECTASIS: Chronic | ICD-10-CM

## 2023-12-07 DIAGNOSIS — L40.9 PSORIASIS: ICD-10-CM

## 2023-12-07 DIAGNOSIS — M81.0 AGE-RELATED OSTEOPOROSIS WITHOUT CURRENT PATHOLOGICAL FRACTURE: ICD-10-CM

## 2023-12-07 DIAGNOSIS — E55.9 VITAMIN D INSUFFICIENCY: ICD-10-CM

## 2023-12-07 DIAGNOSIS — M85.89 OSTEOPENIA OF MULTIPLE SITES: ICD-10-CM

## 2023-12-07 DIAGNOSIS — J45.31 MILD PERSISTENT ASTHMA WITH ACUTE EXACERBATION: ICD-10-CM

## 2023-12-07 DIAGNOSIS — M05.9 SEROPOSITIVE RHEUMATOID ARTHRITIS: Primary | ICD-10-CM

## 2023-12-07 DIAGNOSIS — D84.821 IMMUNODEFICIENCY DUE TO DRUGS (CODE): ICD-10-CM

## 2023-12-07 DIAGNOSIS — Z51.81 MEDICATION MONITORING ENCOUNTER: ICD-10-CM

## 2023-12-07 PROBLEM — J84.9 ILD (INTERSTITIAL LUNG DISEASE): Status: RESOLVED | Noted: 2021-02-08 | Resolved: 2023-12-07

## 2023-12-07 PROCEDURE — 1159F PR MEDICATION LIST DOCUMENTED IN MEDICAL RECORD: ICD-10-PCS | Mod: CPTII,S$GLB,, | Performed by: INTERNAL MEDICINE

## 2023-12-07 PROCEDURE — 4010F PR ACE/ARB THEARPY RXD/TAKEN: ICD-10-PCS | Mod: CPTII,S$GLB,, | Performed by: INTERNAL MEDICINE

## 2023-12-07 PROCEDURE — 4010F ACE/ARB THERAPY RXD/TAKEN: CPT | Mod: CPTII,S$GLB,, | Performed by: INTERNAL MEDICINE

## 2023-12-07 PROCEDURE — 3288F FALL RISK ASSESSMENT DOCD: CPT | Mod: CPTII,S$GLB,, | Performed by: INTERNAL MEDICINE

## 2023-12-07 PROCEDURE — 3008F BODY MASS INDEX DOCD: CPT | Mod: CPTII,S$GLB,, | Performed by: INTERNAL MEDICINE

## 2023-12-07 PROCEDURE — 3078F DIAST BP <80 MM HG: CPT | Mod: CPTII,S$GLB,, | Performed by: INTERNAL MEDICINE

## 2023-12-07 PROCEDURE — 99999 PR PBB SHADOW E&M-EST. PATIENT-LVL III: ICD-10-PCS | Mod: PBBFAC,,, | Performed by: INTERNAL MEDICINE

## 2023-12-07 PROCEDURE — 1100F PTFALLS ASSESS-DOCD GE2>/YR: CPT | Mod: CPTII,S$GLB,, | Performed by: INTERNAL MEDICINE

## 2023-12-07 PROCEDURE — 3078F PR MOST RECENT DIASTOLIC BLOOD PRESSURE < 80 MM HG: ICD-10-PCS | Mod: CPTII,S$GLB,, | Performed by: INTERNAL MEDICINE

## 2023-12-07 PROCEDURE — 3288F PR FALLS RISK ASSESSMENT DOCUMENTED: ICD-10-PCS | Mod: CPTII,S$GLB,, | Performed by: INTERNAL MEDICINE

## 2023-12-07 PROCEDURE — 99999 PR PBB SHADOW E&M-EST. PATIENT-LVL III: CPT | Mod: PBBFAC,,, | Performed by: INTERNAL MEDICINE

## 2023-12-07 PROCEDURE — 99215 PR OFFICE/OUTPT VISIT, EST, LEVL V, 40-54 MIN: ICD-10-PCS | Mod: S$GLB,,, | Performed by: INTERNAL MEDICINE

## 2023-12-07 PROCEDURE — 99215 OFFICE O/P EST HI 40 MIN: CPT | Mod: S$GLB,,, | Performed by: INTERNAL MEDICINE

## 2023-12-07 PROCEDURE — 1100F PR PT FALLS ASSESS DOC 2+ FALLS/FALL W/INJURY/YR: ICD-10-PCS | Mod: CPTII,S$GLB,, | Performed by: INTERNAL MEDICINE

## 2023-12-07 PROCEDURE — 3008F PR BODY MASS INDEX (BMI) DOCUMENTED: ICD-10-PCS | Mod: CPTII,S$GLB,, | Performed by: INTERNAL MEDICINE

## 2023-12-07 PROCEDURE — 3074F SYST BP LT 130 MM HG: CPT | Mod: CPTII,S$GLB,, | Performed by: INTERNAL MEDICINE

## 2023-12-07 PROCEDURE — 1125F AMNT PAIN NOTED PAIN PRSNT: CPT | Mod: CPTII,S$GLB,, | Performed by: INTERNAL MEDICINE

## 2023-12-07 PROCEDURE — 1159F MED LIST DOCD IN RCRD: CPT | Mod: CPTII,S$GLB,, | Performed by: INTERNAL MEDICINE

## 2023-12-07 PROCEDURE — 1125F PR PAIN SEVERITY QUANTIFIED, PAIN PRESENT: ICD-10-PCS | Mod: CPTII,S$GLB,, | Performed by: INTERNAL MEDICINE

## 2023-12-07 PROCEDURE — 3074F PR MOST RECENT SYSTOLIC BLOOD PRESSURE < 130 MM HG: ICD-10-PCS | Mod: CPTII,S$GLB,, | Performed by: INTERNAL MEDICINE

## 2023-12-07 RX ORDER — METHOTREXATE 2.5 MG/1
20 TABLET ORAL
Qty: 96 TABLET | Refills: 1 | Status: SHIPPED | OUTPATIENT
Start: 2023-12-07 | End: 2024-06-04

## 2023-12-07 NOTE — PROGRESS NOTES
RHEUMATOLOGY OUTPATIENT CLINIC NOTE    12/7/2023    Attending Rheumatologist: Akhil Hernadez  Primary Care Provider/Physician Requesting Consultation: Amol Steve MD   Chief Complaint/Reason For Consultation:  Rheumatoid Arthritis and Osteoarthritis      Subjective:     Caitlin Ahmadi is a 68 y.o. White female with SPRA for f/u    Adequately response to MTX, currently 15mg per week w/ daily FA.     Review of Systems   Constitutional:  Negative for fever.   Eyes:  Negative for photophobia and pain.   Respiratory:  Negative for cough and shortness of breath ([place], stable).    Cardiovascular:  Negative for chest pain.   Gastrointestinal:  Positive for constipation. Negative for blood in stool, diarrhea and melena.        No hx of diverticulitis   Genitourinary:  Negative for dysuria and hematuria.   Musculoskeletal:  Negative for joint pain.   Skin:  Positive for rash.        Psoriasiform rash.  Denies RP   Neurological:  Negative for focal weakness and weakness.   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    Cataract     Encounter for blood transfusion     Hypertension     PONV (postoperative nausea and vomiting)     Seropositive rheumatoid arthritis 2/17/2022     Past Surgical History:   Procedure Laterality Date    ADENOIDECTOMY      CATARACT EXTRACTION W/  INTRAOCULAR LENS IMPLANT Left 12/09/2020    CATARACT EXTRACTION W/  INTRAOCULAR LENS IMPLANT      CHOLECYSTECTOMY      COLONOSCOPY N/A 10/13/2021    Procedure: COLONOSCOPY;  Surgeon: Elissa Yoon MD;  Location: [facility];  Service: Endoscopy;  Laterality: N/A;    HERNIA REPAIR      HYSTERECTOMY      TONSILLECTOMY      TOTAL KNEE ARTHROPLASTY Bilateral      Family History   Problem Relation Age of Onset    No Known Problems Mother     Alzheimer's  disease Father      Social History     Tobacco Use   Smoking Status Former    Current packs/day: 0.00    Average packs/day: 1 pack/day for 20.0 years (20.0 ttl pk-yrs)    Types: Cigarettes    Start date: 1970    Quit date: 1990    Years since quittin.4   Smokeless Tobacco Never       Current Outpatient Medications:     albuterol (PROVENTIL/VENTOLIN HFA) 90 mcg/actuation inhaler, Inhale 2 puffs into the lungs every 6 (six) hours as needed for Wheezing. Rescue, Disp: 6.7 g, Rfl: 11    amitriptyline (ELAVIL) 100 MG tablet, TAKE 3 TABLETS(300 MG) BY MOUTH EVERY EVENING, Disp: 270 tablet, Rfl: 2    budesonide (PULMICORT) 0.5 mg/2 mL nebulizer solution, Take 2 mLs (0.5 mg total) by nebulization 2 (two) times daily. Controller, Disp: 120 mL, Rfl: 5    busPIRone (BUSPAR) 10 MG tablet, Take 1 tablet (10 mg total) by mouth 2 (two) times daily., Disp: 180 tablet, Rfl: 0    calcipotriene (DOVONOX) 0.005 % cream, Apply topically 2 (two) times daily., Disp: 60 g, Rfl: 0    fluticasone furoate-vilanteroL (BREO ELLIPTA) 100-25 mcg/dose diskus inhaler, Inhale 1 puff into the lungs once daily. Controller.Wash out mouth after use, Disp: 60 each, Rfl: 11    fluticasone furoate-vilanteroL (BREO) 100-25 mcg/dose diskus inhaler, INHALE 1 PUFF INTO THE LUNGS EVERY DAY. WASH MOUTH AFTER USE, Disp: 60 each, Rfl: 11    fluticasone propionate (FLONASE) 50 mcg/actuation nasal spray, SHAKE LIQUID AND USE 2 SPRAYS(100 MCG) IN EACH NOSTRIL EVERY DAY, Disp: 16 g, Rfl: 11    hydrOXYchloroQUINE (PLAQUENIL) 200 mg tablet, Take 1 tablet (200 mg total) by mouth once daily., Disp: 90 tablet, Rfl: 1    ibuprofen (ADVIL,MOTRIN) 200 MG tablet, Take 600 mg by mouth every 6 (six) hours as needed., Disp: , Rfl:     ketorolac (TORADOL) 10 mg tablet, Take 1 tablet (10 mg total) by mouth every 6 (six) hours as needed for Pain., Disp: 30 tablet, Rfl: 0    losartan (COZAAR) 25 MG tablet, TAKE 1 TABLET(25 MG) BY MOUTH EVERY DAY, Disp: 90 tablet, Rfl:  3    methotrexate (TREXALL) 15 MG tablet, Take 15 mg by mouth., Disp: , Rfl:     pantoprazole (PROTONIX) 40 MG tablet, Take 1 tablet (40 mg total) by mouth once daily., Disp: 90 tablet, Rfl: 3    traMADoL (ULTRAM) 50 mg tablet, Take 1 tablet (50 mg total) by mouth every 6 (six) hours as needed for Pain., Disp: 24 tablet, Rfl: 0    clindamycin (CLEOCIN) 300 MG capsule, Take 1 capsule (300 mg total) by mouth 3 (three) times daily. (Patient not taking: Reported on 12/7/2023), Disp: 30 capsule, Rfl: 0     Objective:     Vitals:    12/07/23 1402   BP: 111/72   Pulse: 94     Physical Exam   Eyes: Conjunctivae are normal.   Pulmonary/Chest: Effort normal. No respiratory distress.   Musculoskeletal:         General: Deformity present. No swelling or tenderness. Normal range of motion.   Neurological: She displays no weakness.   Skin: Rash noted.       Reviewed available old and all outside pertinent medical records available.    All lab results personally reviewed and interpreted by me.       ASSESSMENT      Encounter Diagnoses   Name Primary?    Seropositive rheumatoid arthritis Yes    Psoriasis     Vitamin D insufficiency     Age-related osteoporosis without current pathological fracture     Mild persistent asthma with acute exacerbation     Adult bronchiectasis     Osteopenia of multiple sites     Immunodeficiency due to drugs (CODE)     Medication monitoring encounter       PLAN     CDAI; remission.  Great results w/ MTX, currently 15mg per week.  Psoriasiform rash knee extensor Rt, recurrence during dry months.  No dactylitis, squeeze tenderness, nail pitting, or periungual erythema.  Labs w/o concerning cytopenias.  Liver and kidney function stable.  DJD changes on XR, no marginal erosive changes reported.  CT chest w/ stable subsegmental atelectasis versus scarring in the lingula , no ILD reported otherwise.  Suggest increase MTX to 20mg split dose same day of the week, hoping to help with active psoriasiform rash  (Refers poor efficacy to Dovonex).  C/w daily 1mg FA (patient obtaining trough less expensive MV OTC) trough M Refers poor efficacy to Dovonex.  Repeat labs and DXA close to f/u.    45 minutes of total time spent on the encounter, which includes face to face time and non-face to face time preparing to see the patient (eg, review of tests), Obtaining and/or reviewing separately obtained history, Documenting clinical information in the electronic or other health record, Independently interpreting results (not separately reported) and communicating results to the patient/family/caregiver, or Care coordination (not separately reported).     Akhil Hernadez M.D.

## 2023-12-22 DIAGNOSIS — F41.9 ANXIETY: ICD-10-CM

## 2023-12-22 NOTE — TELEPHONE ENCOUNTER
Care Due:                  Date            Visit Type   Department     Provider  --------------------------------------------------------------------------------                                MYCHART                              FOLLOWUP/OF  [facility] INTERNAL  Last Visit: 01-      [facility] VISIT   MEDICINE       Amol Steve  Next Visit: None Scheduled  None         None Found                                                            Last  Test          Frequency    Reason                     Performed    Due Date  --------------------------------------------------------------------------------    Office Visit  12 months..  busPIRone................  01- 01-    [facility] Embedded Care Due Messages. Reference number: [id number].   12/22/2023 4:58:48 PM CST   Fever and back pain Fever and back pain Fever and back pain Fever and back pain Fever and back pain Fever and back pain Fever and back pain Fever and back pain Fever and back pain Fever and back pain Fever and back pain Fever and back pain Fever and back pain Fever and back pain Fever and back pain Fever and back pain Fever and back pain Fever and back pain Fever and back pain Fever and back pain Fever and back pain Fever and back pain Fever and back pain Fever and back pain Fever and back pain Fever and back pain Fever and back pain Fever and back pain Fever and back pain Fever and back pain Fever and back pain Fever and back pain Fever and back pain Fever and back pain Fever and back pain Fever and back pain Fever and back pain Fever and back pain Fever and back pain Fever and back pain Fever and back pain Fever and back pain Fever and back pain Fever and back pain Fever and back pain Fever and back pain Fever and back pain Fever and back pain Fever and back pain Fever and back pain Fever and back pain Fever and back pain Fever and back pain Fever and back pain Fever and back pain Fever and back pain Fever and back pain Fever and back pain Fever and back pain Fever and back pain Fever and back pain Fever and back pain Fever and back pain Fever and back pain Fever and back pain Fever and back pain Fever and back pain Fever and back pain Fever and back pain Fever and back pain Fever and back pain Fever and back pain Fever and back pain Fever and back pain Fever and back pain Fever and back pain Fever and back pain Fever and back pain Fever and back pain Fever and back pain Fever and back pain Fever and back pain Fever and back pain Fever and back pain Fever and back pain Fever and back pain

## 2023-12-26 DIAGNOSIS — F41.9 ANXIETY: ICD-10-CM

## 2023-12-26 RX ORDER — BUSPIRONE HYDROCHLORIDE 10 MG/1
10 TABLET ORAL 2 TIMES DAILY
Qty: 180 TABLET | Refills: 0 | Status: CANCELLED | OUTPATIENT
Start: 2023-12-26

## 2023-12-26 RX ORDER — BUSPIRONE HYDROCHLORIDE 10 MG/1
10 TABLET ORAL 2 TIMES DAILY
Qty: 180 TABLET | Refills: 0 | Status: SHIPPED | OUTPATIENT
Start: 2023-12-26

## 2023-12-26 NOTE — TELEPHONE ENCOUNTER
----- Message from Nata Motley sent at 12/26/2023  4:29 PM CST -----  Type:  RX Refill Request    Who Called: pt  Refill or New Rx:refill  RX Name and Strength:buspirone  How is the patient currently taking it? (ex. 1XDay):  Is this a 30 day or 90 day RX:90  Preferred Pharmacy with phone number:[facility]  Local or Mail Order:local  Ordering Provider:  Would the patient rather a call back or a response via [facility]? call  Best Call Back Number:[phone]  Additional Information:

## 2023-12-26 NOTE — TELEPHONE ENCOUNTER
No care due was identified.  [facility] Embedded Care Due Messages. Reference number: [id number].   12/26/2023 10:47:58 AM CST

## 2024-01-04 DIAGNOSIS — M25.561 PAIN IN BOTH KNEES, UNSPECIFIED CHRONICITY: Primary | ICD-10-CM

## 2024-01-04 DIAGNOSIS — M25.562 PAIN IN BOTH KNEES, UNSPECIFIED CHRONICITY: Primary | ICD-10-CM

## 2024-01-11 ENCOUNTER — OFFICE VISIT (OUTPATIENT)
Dept: ORTHOPEDICS | Facility: CLINIC | Age: 69
End: 2024-01-11
Payer: MEDICARE

## 2024-01-11 ENCOUNTER — HOSPITAL ENCOUNTER (OUTPATIENT)
Dept: RADIOLOGY | Facility: HOSPITAL | Age: 69
Discharge: HOME | End: 2024-01-11
Attending: ORTHOPAEDIC SURGERY
Payer: MEDICARE

## 2024-01-11 VITALS — HEIGHT: 68 IN | WEIGHT: 176.5 LBS | BODY MASS INDEX: 26.75 KG/M2

## 2024-01-11 DIAGNOSIS — M54.50 LOW BACK PAIN, UNSPECIFIED BACK PAIN LATERALITY, UNSPECIFIED CHRONICITY, UNSPECIFIED WHETHER SCIATICA PRESENT: ICD-10-CM

## 2024-01-11 DIAGNOSIS — M15.9 PRIMARY OSTEOARTHRITIS INVOLVING MULTIPLE JOINTS: ICD-10-CM

## 2024-01-11 DIAGNOSIS — M25.561 CHRONIC PAIN OF RIGHT KNEE: ICD-10-CM

## 2024-01-11 DIAGNOSIS — M70.62 GREATER TROCHANTERIC BURSITIS OF BOTH HIPS: Primary | ICD-10-CM

## 2024-01-11 DIAGNOSIS — M25.552 BILATERAL HIP PAIN: Primary | ICD-10-CM

## 2024-01-11 DIAGNOSIS — M25.551 BILATERAL HIP PAIN: Primary | ICD-10-CM

## 2024-01-11 DIAGNOSIS — Z96.652 STATUS POST LEFT PARTIAL KNEE REPLACEMENT: ICD-10-CM

## 2024-01-11 DIAGNOSIS — Z96.651 STATUS POST RIGHT PARTIAL KNEE REPLACEMENT: ICD-10-CM

## 2024-01-11 DIAGNOSIS — M25.562 PAIN IN BOTH KNEES, UNSPECIFIED CHRONICITY: ICD-10-CM

## 2024-01-11 DIAGNOSIS — G89.29 CHRONIC PAIN OF RIGHT KNEE: ICD-10-CM

## 2024-01-11 DIAGNOSIS — M25.552 BILATERAL HIP PAIN: ICD-10-CM

## 2024-01-11 DIAGNOSIS — M25.561 PAIN IN BOTH KNEES, UNSPECIFIED CHRONICITY: ICD-10-CM

## 2024-01-11 DIAGNOSIS — M25.551 BILATERAL HIP PAIN: ICD-10-CM

## 2024-01-11 DIAGNOSIS — M70.61 GREATER TROCHANTERIC BURSITIS OF BOTH HIPS: Primary | ICD-10-CM

## 2024-01-11 PROBLEM — D84.9 IMMUNOCOMPROMISED STATE: Status: ACTIVE | Noted: 2023-07-02

## 2024-01-11 PROBLEM — F12.929: Status: ACTIVE | Noted: 2019-11-08

## 2024-01-11 PROBLEM — L03.011 CELLULITIS OF FINGER OF RIGHT HAND: Status: ACTIVE | Noted: 2023-07-02

## 2024-01-11 PROBLEM — S02.2XXA FRACTURE OF NASAL BONE: Status: ACTIVE | Noted: 2023-11-04

## 2024-01-11 PROBLEM — C18.1 MALIGNANT NEOPLASM OF APPENDIX: Status: ACTIVE | Noted: 2024-01-11

## 2024-01-11 PROBLEM — R11.2 NAUSEA AND VOMITING: Status: ACTIVE | Noted: 2019-11-08

## 2024-01-11 PROBLEM — I38 HEART VALVE DISEASE: Status: ACTIVE | Noted: 2024-01-11

## 2024-01-11 LAB
APPEARANCE FLD: NORMAL
BODY FLD TYPE: NORMAL
COLOR FLD: NORMAL
LYMPHOCYTES NFR FLD MANUAL: 9 %
MONOS+MACROS NFR FLD MANUAL: 88 %
NEUTROPHILS NFR FLD MANUAL: 3 %
WBC # FLD: 1151 /CU MM

## 2024-01-11 PROCEDURE — 87075 CULTR BACTERIA EXCEPT BLOOD: CPT | Performed by: ORTHOPAEDIC SURGERY

## 2024-01-11 PROCEDURE — 87116 MYCOBACTERIA CULTURE: CPT | Performed by: ORTHOPAEDIC SURGERY

## 2024-01-11 PROCEDURE — 87205 SMEAR GRAM STAIN: CPT | Performed by: ORTHOPAEDIC SURGERY

## 2024-01-11 PROCEDURE — 73521 X-RAY EXAM HIPS BI 2 VIEWS: CPT | Mod: TC

## 2024-01-11 PROCEDURE — 20610 DRAIN/INJ JOINT/BURSA W/O US: CPT | Mod: 50,S$GLB,, | Performed by: ORTHOPAEDIC SURGERY

## 2024-01-11 PROCEDURE — 89060 EXAM SYNOVIAL FLUID CRYSTALS: CPT | Performed by: ORTHOPAEDIC SURGERY

## 2024-01-11 PROCEDURE — 73564 X-RAY EXAM KNEE 4 OR MORE: CPT | Mod: TC,50

## 2024-01-11 PROCEDURE — 73564 X-RAY EXAM KNEE 4 OR MORE: CPT | Mod: 26,50,, | Performed by: RADIOLOGY

## 2024-01-11 PROCEDURE — 89051 BODY FLUID CELL COUNT: CPT | Performed by: ORTHOPAEDIC SURGERY

## 2024-01-11 PROCEDURE — 87070 CULTURE OTHR SPECIMN AEROBIC: CPT | Performed by: ORTHOPAEDIC SURGERY

## 2024-01-11 PROCEDURE — 20610 DRAIN/INJ JOINT/BURSA W/O US: CPT | Mod: 51,XS,RT,S$GLB | Performed by: ORTHOPAEDIC SURGERY

## 2024-01-11 PROCEDURE — 87102 FUNGUS ISOLATION CULTURE: CPT | Performed by: ORTHOPAEDIC SURGERY

## 2024-01-11 PROCEDURE — 99999 PR PBB SHADOW E&M-EST. PATIENT-LVL III: CPT | Mod: PBBFAC,,, | Performed by: ORTHOPAEDIC SURGERY

## 2024-01-11 PROCEDURE — 87206 SMEAR FLUORESCENT/ACID STAI: CPT | Performed by: ORTHOPAEDIC SURGERY

## 2024-01-11 PROCEDURE — 99204 OFFICE O/P NEW MOD 45 MIN: CPT | Mod: 25,S$GLB,, | Performed by: ORTHOPAEDIC SURGERY

## 2024-01-11 PROCEDURE — 73521 X-RAY EXAM HIPS BI 2 VIEWS: CPT | Mod: 26,,, | Performed by: RADIOLOGY

## 2024-01-11 RX ORDER — METHYLPREDNISOLONE ACETATE 80 MG/ML
80 INJECTION, SUSPENSION INTRA-ARTICULAR; INTRALESIONAL; INTRAMUSCULAR; SOFT TISSUE
Status: DISCONTINUED | OUTPATIENT
Start: 2024-01-11 | End: 2024-01-11 | Stop reason: HOSPADM

## 2024-01-11 RX ADMIN — METHYLPREDNISOLONE ACETATE 80 MG: 80 INJECTION, SUSPENSION INTRA-ARTICULAR; INTRALESIONAL; INTRAMUSCULAR; SOFT TISSUE at 03:01

## 2024-01-11 NOTE — PROCEDURES
Large Joint Aspiration/Injection: R knee    Date/Time: 1/11/2024 3:00 PM    Performed by: Jalen Rangel MD  Authorized by: Jalen Rangel MD    Consent Done?:  Yes (Verbal)  Indications:  Pain  Site marked: the procedure site was marked    Timeout: prior to procedure the correct patient, procedure, and site was verified      Details:  Needle Size:  18 G  Ultrasonic Guidance for needle placement?: No    Approach:  Anterolateral  Location:  Knee  Site:  R knee  Aspirate amount (mL):  5  Aspirate:  Serous  Lab: fluid sent for laboratory analysis    Patient tolerance:  Patient tolerated the procedure well with no immediate complications     Fluid was sent for cell count cultures and crystals

## 2024-01-11 NOTE — PROGRESS NOTES
Subjective:     Patient ID: Caitlin Ahmadi is a 68 y.o. female.    Chief Complaint: Pain of the Right Knee, Pain of the Left Knee, Pain of the Left Hip, and Pain of the Right Hip    HPI:  Bilateral knee pain with the right worse than the left   Bilateral hip pains   Low back pain   Patient states in 2017 had right partial knee replacement by Dr. Wheeler, in 2018 she had left partial knee replacement done by Dr. randhawa at [facility].  She said she did well with both and all of a sudden the right knee became severely painful over the last 3 months.  She has been having pain on and off with it and swelling.  She made her appointment today.  In the meantime she was having pain in her hips for 6 months and no history of trauma she can not sleep on the right side she sleeps on the left.  She has been seen by Rheumatology and primary care and other providers and no thing was offered to her.  She tried Motrin mixed with Tylenol 2 tablets of each may taken twice a day seems to ease things up a little bit.  She was not placed on other anti-inflammatories by prescription.  She has not taking steroid pills because she is on methotrexate.  Patient gives history of infection occurring into her thumb was on IV antibiotics and was given tramadol for the pain and not having any medications at this time for pain.  I 1 point she said she is having back pain then she said it has not hurting her as much.  There is no radicular symptoms type going below the knee.  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    Cataract     Encounter for blood transfusion     Hypertension     PONV (postoperative nausea and vomiting)     Seropositive rheumatoid arthritis 2/17/2022     Past Surgical History:   Procedure  Laterality Date    ADENOIDECTOMY      CATARACT EXTRACTION W/  INTRAOCULAR LENS IMPLANT Left 2020    CATARACT EXTRACTION W/  INTRAOCULAR LENS IMPLANT      CHOLECYSTECTOMY      COLONOSCOPY N/A 10/13/2021    Procedure: COLONOSCOPY;  Surgeon: Elissa Yoon MD;  Location: [facility];  Service: Endoscopy;  Laterality: N/A;    HERNIA REPAIR      HYSTERECTOMY      TONSILLECTOMY      TOTAL KNEE ARTHROPLASTY Bilateral      Family History   Problem Relation Age of Onset    No Known Problems Mother     Alzheimer's disease Father      Social History     Socioeconomic History    Marital status:    Tobacco Use    Smoking status: Former     Current packs/day: 0.00     Average packs/day: 1 pack/day for 20.0 years (20.0 ttl pk-yrs)     Types: Cigarettes     Start date: 1970     Quit date: 1990     Years since quittin.5    Smokeless tobacco: Never   Substance and Sexual Activity    Alcohol use: Yes     Alcohol/week: 5.0 standard drinks of alcohol     Types: 5 Glasses of wine per week    Drug use: Never    Sexual activity: Yes     Partners: Male     Medication List with Changes/Refills   Current Medications    ALBUTEROL (PROVENTIL/VENTOLIN HFA) 90 MCG/ACTUATION INHALER    Inhale 2 puffs into the lungs every 6 (six) hours as needed for Wheezing. Rescue    AMITRIPTYLINE (ELAVIL) 100 MG TABLET    TAKE 3 TABLETS(300 MG) BY MOUTH EVERY EVENING    BUDESONIDE (PULMICORT) 0.5 MG/2 ML NEBULIZER SOLUTION    Take 2 mLs (0.5 mg total) by nebulization 2 (two) times daily. Controller    BUSPIRONE (BUSPAR) 10 MG TABLET    TAKE 1 TABLET(10 MG) BY MOUTH TWICE DAILY    CALCIPOTRIENE (DOVONOX) 0.005 % CREAM    Apply topically 2 (two) times daily.    FLUTICASONE FUROATE-VILANTEROL (BREO ELLIPTA) 100-25 MCG/DOSE DISKUS INHALER    Inhale 1 puff into the lungs once daily. Controller.Wash out mouth after use    FLUTICASONE FUROATE-VILANTEROL (BREO) 100-25 MCG/DOSE DISKUS INHALER    INHALE 1 PUFF INTO THE LUNGS EVERY  DAY. WASH MOUTH AFTER USE    FLUTICASONE PROPIONATE (FLONASE) 50 MCG/ACTUATION NASAL SPRAY    SHAKE LIQUID AND USE 2 SPRAYS(100 MCG) IN EACH NOSTRIL EVERY DAY    IBUPROFEN (ADVIL,MOTRIN) 200 MG TABLET    Take 600 mg by mouth every 6 (six) hours as needed.    LOSARTAN (COZAAR) 25 MG TABLET    TAKE 1 TABLET(25 MG) BY MOUTH EVERY DAY    METHOTREXATE 2.5 MG TAB    Take 8 tablets (20 mg total) by mouth every 7 days.    PANTOPRAZOLE (PROTONIX) 40 MG TABLET    Take 1 tablet (40 mg total) by mouth once daily.   Discontinued Medications    CLINDAMYCIN (CLEOCIN) 300 MG CAPSULE    Take 1 capsule (300 mg total) by mouth 3 (three) times daily.    KETOROLAC (TORADOL) 10 MG TABLET    Take 1 tablet (10 mg total) by mouth every 6 (six) hours as needed for Pain.    TRAMADOL (ULTRAM) 50 MG TABLET    Take 1 tablet (50 mg total) by mouth every 6 (six) hours as needed for Pain.     Review of patient's allergies indicates:   Allergen Reactions    Pneumococcal vaccine Swelling    Celebrex [celecoxib] Itching and Rash    Hydrocodone-acetaminophen Nausea Only and Nausea And Vomiting    Meloxicam Rash    Sulfa (sulfonamide antibiotics) Rash     Review of Systems   Constitutional: Negative for decreased appetite.   HENT:  Negative for tinnitus.    Eyes:  Negative for double vision.   Cardiovascular:  Negative for chest pain.   Respiratory:  Negative for wheezing.    Hematologic/Lymphatic: Negative for bleeding problem.   Skin:  Negative for dry skin.   Musculoskeletal:  Positive for arthritis, back pain, joint pain, muscle weakness and myalgias. Negative for gout, neck pain and stiffness.   Gastrointestinal:  Negative for abdominal pain.   Genitourinary:  Negative for bladder incontinence.   Neurological:  Negative for numbness, paresthesias and sensory change.   Psychiatric/Behavioral:  Negative for altered mental status.        Objective:   Body mass index is 26.83 kg/m².  There were no vitals filed for this visit.        General    Constitutional: She is oriented to person, place, and time. She appears well-developed.   HENT:   Head: Atraumatic.   Eyes: EOM are normal.   Pulmonary/Chest: Effort normal.   Neurological: She is alert and oriented to person, place, and time.   Psychiatric: Judgment normal.           Ambulating without any assistive devices   Pelvis is level   Negative straight leg raising bilaterally   Bilateral hips passive motion no pain in the groin and excellent range of motion   Severe pain to palpation over the greater trochanters with the right worse than the left with radiation down the lateral thigh.  Hip flexors, abductors and adductors were slightly weak at 5-/5   Right knee with a incision or you knee replacement the scar healed well.  There is an area highly suspicious of psoriasis approximately a cm and a half by cm and half just a medial to the surgical incision.  There is swelling and pain over the medial aspect of the knee joint and over the medial tibial flare.  She does have almost full motion no defect in the patella or quadriceps tendon.  Collaterals are stable.  Negative anterior drawer.  .  Possible patellofemoral crepitus to range of motion and compression  The left knee with anterior surgical scar healed well.  She has 0-130 degrees of flexion stable to varus valgus stressing.  There is no swelling in the knee joint.  There is no tenderness to palpation there is some crepitus to compression on the patella and slight tenderness at that point.  Negative anterior drawer  Calves are soft nontender  Ankle motion intact   Skin is warm to touch    Relevant imaging results reviewed and interpreted by me, discussed with the patient and / or family today     X-ray 01/11/2024 of the pelvis and the hips showing excellent joint space.  There is small osteophyte over the greater trochanters consistent with bursitis of the hips.  The bone look osteopenic   X-ray 1/11/24 bilateral knees showing left knee  medial partial knee replacement still in good alignment.  There is some mild patellofemoral arthritic changes and small marginal osteophyte.  Right knee looks like the femoral component of the partial knee replacement medially is loose there is radiolucent line on the lateral view like a when she will type of movement of the femoral component and does not look where it is supposed to probably rotated due to loosening.  There is also radiolucent line on the tibial component on the lateral view posteriorly.  Indicative of loosened partial knee replacement  Assessment:     Encounter Diagnoses   Name Primary?    Primary osteoarthritis involving multiple joints     Greater trochanteric bursitis of both hips Yes    Status post right partial knee replacement     Status post left partial knee replacement     Chronic pain of right knee     Low back pain, unspecified back pain laterality, unspecified chronicity, unspecified whether sciatica present         Plan:   Greater trochanteric bursitis of both hips  -     Large Joint Aspiration/Injection: bilateral greater trochanteric bursa    Primary osteoarthritis involving multiple joints  -     Ambulatory referral/consult to Orthopedics    Status post right partial knee replacement  -     CULTURE, ANAEROBE  -     Aerobic culture  -     CULTURE, FUNGUS  -     AFB CULTURE & SMEAR  -     WBC & Diff,Body Fluid Joint Fluid, Right Knee  -     Body fluid crystal Joint Fluid, Right Knee  -     GRAM STAIN  -     CBC Auto Differential; Future; Expected date: 01/11/2024  -     Sedimentation rate; Future; Expected date: 01/11/2024  -     C-reactive protein; Future; Expected date: 01/11/2024    Status post left partial knee replacement  -     CBC Auto Differential; Future; Expected date: 01/11/2024  -     Sedimentation rate; Future; Expected date: 01/11/2024  -     C-reactive protein; Future; Expected date: 01/11/2024    Chronic pain of right knee    Low back pain, unspecified back pain  laterality, unspecified chronicity, unspecified whether sciatica present    Other orders  -     Cancel: Large Joint Aspiration/Injection  -     Large Joint Aspiration/Injection: R knee         There are no Patient Instructions on file for this visit.  Patient has a failed right partial knee replacement which was performed 2017.  As far as the left knee partial knee replacement still functioning okay and not having any pain.  Patient is having pain in her hips it has been for 6 months now and nobody offered her anything from all the physicians have been seeing her.  She has chronic low back pain but it has not severe.  She has not been through physical therapy.  She had been taking on her own to Motrin mixed with 1 Tylenol twice a day which helps a little bit to function.  There was no injections given in the hips no anti-inflammatories by mouth.  She does take methotrexate for rheumatoid arthritis.  Her x-rays of the hips look fairly well maintained and I think he she has bursitis/trochanteric bursitis of both hips that she would benefit from hip injections  She has a failed right partial knee replacement that will require revision to total knee.  I would like the right knee to be aspirated to rule out infection versus aseptic loosening.  Right knee aspirated from the anterolateral aspect after prepping with alcohol and ChloraPrep twice each using 18 gauge needle aspirated 5 cc of serosanguineous fluid that was sent to the lab for cell count cultures and crystals  Warned her about the side effects of steroid injection that was given into both hips Depo-Medrol 80 mg mixed with 5 cc 1% lidocaine performed 01/11/2024 into the greater trochanters of both hips.  She might feel her blood pressure goes up from the injection in might make her hungry and increase pulse and she might have a flush and feels hot and warm for few days   I will discuss in 2 weeks the results of the aspiration and if she wants to proceed with  total knee revision since all of her pain is on the medial side of the knee    Patient is immunocompromised on methotrexate which carries a little bit longer healing process      Disclaimer: This note was prepared using a voice recognition system and is likely to have sound alike errors within the text.

## 2024-01-11 NOTE — PROCEDURES
Large Joint Aspiration/Injection: bilateral greater trochanteric bursa    Date/Time: 1/11/2024 3:00 PM    Performed by: Jalen Rangel MD  Authorized by: Jalen Rangel MD    Consent Done?:  Yes (Verbal)  Indications:  Pain  Site marked: the procedure site was marked    Timeout: prior to procedure the correct patient, procedure, and site was verified      Local anesthesia used?: Yes    Local anesthetic:  Lidocaine 1% without epinephrine  Ultrasonic Guidance for needle placement?: No    Approach:  Lateral  Location:  Hip  Laterality:  Bilateral  Site:  Bilateral greater trochanteric bursa  Medications (Right):  80 mg methylPREDNISolone acetate 80 mg/mL  Medications (Left):  80 mg methylPREDNISolone acetate 80 mg/mL  Patient tolerance:  Patient tolerated the procedure well with no immediate complications

## 2024-01-12 LAB
BODY FLD TYPE: NORMAL
CRYSTALS FLD MICRO: NEGATIVE
GRAM STN SPEC: NORMAL
GRAM STN SPEC: NORMAL
PATH INTERP FLD-IMP: NORMAL

## 2024-01-15 LAB — BACTERIA SPEC AEROBE CULT: NO GROWTH

## 2024-01-19 LAB — BACTERIA SPEC ANAEROBE CULT: NORMAL

## 2024-01-26 ENCOUNTER — OFFICE VISIT (OUTPATIENT)
Dept: ORTHOPEDICS | Facility: CLINIC | Age: 69
End: 2024-01-26
Payer: MEDICARE

## 2024-01-26 DIAGNOSIS — Z96.651 STATUS POST RIGHT PARTIAL KNEE REPLACEMENT: ICD-10-CM

## 2024-01-26 DIAGNOSIS — M70.61 GREATER TROCHANTERIC BURSITIS OF BOTH HIPS: ICD-10-CM

## 2024-01-26 DIAGNOSIS — T84.022D DISPLACEMENT OF INTERNAL RIGHT KNEE PROSTHESIS, SUBSEQUENT ENCOUNTER: Primary | ICD-10-CM

## 2024-01-26 DIAGNOSIS — Z00.00 ROUTINE ADULT HEALTH MAINTENANCE: ICD-10-CM

## 2024-01-26 DIAGNOSIS — Z76.89 ENCOUNTER TO ESTABLISH CARE: Primary | ICD-10-CM

## 2024-01-26 DIAGNOSIS — G89.29 CHRONIC PAIN OF RIGHT KNEE: ICD-10-CM

## 2024-01-26 DIAGNOSIS — M54.50 LOW BACK PAIN, UNSPECIFIED BACK PAIN LATERALITY, UNSPECIFIED CHRONICITY, UNSPECIFIED WHETHER SCIATICA PRESENT: ICD-10-CM

## 2024-01-26 DIAGNOSIS — M70.62 GREATER TROCHANTERIC BURSITIS OF BOTH HIPS: ICD-10-CM

## 2024-01-26 DIAGNOSIS — M25.561 CHRONIC PAIN OF RIGHT KNEE: ICD-10-CM

## 2024-01-26 DIAGNOSIS — Z96.652 STATUS POST LEFT PARTIAL KNEE REPLACEMENT: ICD-10-CM

## 2024-01-26 PROCEDURE — 99214 OFFICE O/P EST MOD 30 MIN: CPT | Mod: S$GLB,,, | Performed by: ORTHOPAEDIC SURGERY

## 2024-01-26 PROCEDURE — 99999 PR PBB SHADOW E&M-EST. PATIENT-LVL II: CPT | Mod: PBBFAC,,, | Performed by: ORTHOPAEDIC SURGERY

## 2024-01-26 RX ORDER — IBUPROFEN 600 MG/1
600 TABLET ORAL 2 TIMES DAILY PRN
Qty: 60 TABLET | Refills: 1 | Status: SHIPPED | OUTPATIENT
Start: 2024-01-26

## 2024-01-26 RX ORDER — GABAPENTIN 300 MG/1
300 CAPSULE ORAL NIGHTLY
Qty: 30 CAPSULE | Refills: 11 | Status: SHIPPED | OUTPATIENT
Start: 2024-01-26 | End: 2024-06-06 | Stop reason: ALTCHOICE

## 2024-01-26 NOTE — PATIENT INSTRUCTIONS
Your workup for infection completely negative at this time in the right knee   On x-ray looks like the partial knee replacement is coming loose from the bone specially the 1 on the femur  You are having quite a bit of pain and discomfort  Will proceed with right total knee revision.  Procedure is roughly 2 hours done under general anesthetic.  You might have to stay overnight and go home the next day.  Occasionally we get people doing really well in recovery room that they can go home the same day.  You need to get cardiac clearance prior to surgery.  There is a class that is mandatory in the hospital that you have to attend.  You will meet the home health agency, the home physical therapy the preop nurse and they go over your labs make sure everything is okay.  Risks involved and having surgery  Procedure, common risks and benefits,alternatives discussed in details.All questions answered.Patient expressed understanding.Patient instructed to call for any questions that could arise in the future.    Most common Risks:  Infection/less than 2%  Leg-length discrepancy    Neuro-vascular injury ( resulting in loss of motor and sensory functions) almost everybody would be numb on the outside of the knee.  There is very slight chance of developing what we call a footdrop that means you can not bring the foot up but you can bring it down and this is usually a stretch on the nerve.  80% of foot drops recuperate within 6 months to a year.  There is a slight chance of injuring the popliteal vessels during surgery  Pain  Blood clot    Loss of motion/we heal with scar tissue your job is to do extensive physical therapy and work through the pain so you do not scar down  Fracture of bone  Failure of procedure to achieve its intended purpose/total knee replacement is 80% successful in decreasing pain and increasing function  Failure of hardware/they should last at least 15 years for you that is the average  Non-union or mal-union of  bone  Malalignment  Death/you need to see the heart doctor prior to surgery    Patient instructions for joint replacement    Before surgery  1.  Shower with Hibiclens soap/antibacterial for 3 days prior to surgery to decrease risk of infection  2..  Stop all blood thinners/aspirin, Coumadin, warfarin, Plavix, Eliquis, Xarelto etc 7 days prior to surgery.  You need to stop all anti-inflammatories 7 days prior to surgery as well as all vitamins except vitamin-D.  All natural products that you are taking including turmeric or omega-3 or fish oil etcetera all has to stop 7 days prior to surgery  3.  No eating or drinking after midnight the night before surgery.  We would like you to drink a bottle of Gatorade or Powerade or Pedialyte the night before surgery prior to midnight so you do not get dehydrated waiting for the surgery  4.  Take Tylenol 650 mg the night prior to surgery      After surgery at home  1.  Take Tylenol 650 mg 3 times a day for 14 days then as needed for mild pain  2.  Take gabapentin 300 mg nightly for 6 weeks  3.  Take ibuprofen 600 mg maximum 3 times a day but minimum 2 times a day.  We will give you Nexium to take while taking ibuprofen/or Protonix  4.  Must take aspirin 81 mg twice a day for 6 weeks unless you are on other blood thinners/Plavix, Eliquis, Xarelto, Coumadin etc  5.  Must wear compressive stockings for 6 weeks minimum to decrease the risk of blood clot and swelling  6.  Hydrocodone/Norco or oxycodone/Percocet will be prescribed to take every 6 hr as needed for breakthrough pain  7.  May apply ice on the knee to help with decreasing pain  8.  Keep wound dry for 2 weeks until stitches/staples removed than you will be allowed to shower in 24 hr and get the wound wet.  No soaking of the wound in the tub or swimming for 4 weeks after surgery  9.  No driving for 4 weeks unless specified by physician  10.  Avoid touching the wound or surrounding area /at least 2 inches on each side of the  surgical incision until staples are removed/stitches   11.  May change the surgical dressing if extremely bloody or has drainage on it. May clean the wound with peroxide or Betadine and apply sterile dressing and Ace wrap over it  12.  Leave hospital dressing on for 3 days then may change by applying sterile 4 x 4 and Ace wrap after cleaning with Betadine or peroxide.  May leave this dressing change to home health nurses    Right total knee revision

## 2024-01-26 NOTE — PROGRESS NOTES
Subjective:     Patient ID: Caitlin Ahmadi is a 68 y.o. female.    Chief Complaint: Post-op Evaluation of the Right Knee  01/11/2024  HPI:  Bilateral knee pain with the right worse than the left   Bilateral hip pains   Low back pain   Patient states in 2017 had right partial knee replacement by Dr. Wheeler, in 2018 she had left partial knee replacement done by Dr. randhawa at \[facility]\"" system.  She said she did well with both and all of a sudden the right knee became severely painful over the last 3 months.  She has been having pain on and off with it and swelling.  She made her appointment today.  In the meantime she was having pain in her hips for 6 months and no history of trauma she can not sleep on the right side she sleeps on the left.  She has been seen by Rheumatology and primary care and other providers and no thing was offered to her.  She tried Motrin mixed with Tylenol 2 tablets of each may taken twice a day seems to ease things up a little bit.  She was not placed on other anti-inflammatories by prescription.  She has not taking steroid pills because she is on methotrexate.  Patient gives history of infection occurring into her thumb was on IV antibiotics and was given tramadol for the pain and not having any medications at this time for pain.  I 1 point she said she is having back pain then she said it has not hurting her as much.  There is no radicular symptoms type going below the knee.  She did receive an injection of the right knee awhile back by Rheumatology over the pes anserinus bursa on the medial tibial flare according to the patient      1/26/24    Right knee severe pain.  On x-ray looks like it is loose and malrotated.  Workup for infection has been negative.  Sed rate, CRP, crystals, WBC, knee aspirate cultures have been negative also.  I went over does labs with the patient.  I think at this time she needs to have right knee revised.  Spent a long time discussing the revision and the pros and cons  of surgery in details and allow her to ask questions.  Went over the instructions.  She needs to be seen by Cardiology.  She can not take Mobic or Celebrex  She can take ibuprofen with Tylenol   No fever no chills no shortness of breath or difficulty with chewing swallowing loss of bowel bladder control  She does complain of numbness on the outside of her knee as well in her foot.  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    Cataract     Encounter for blood transfusion     Hypertension     PONV (postoperative nausea and vomiting)     Seropositive rheumatoid arthritis 2022     Past Surgical History:   Procedure Laterality Date    ADENOIDECTOMY      CATARACT EXTRACTION W/  INTRAOCULAR LENS IMPLANT Left 2020    CATARACT EXTRACTION W/  INTRAOCULAR LENS IMPLANT      CHOLECYSTECTOMY      COLONOSCOPY N/A 10/13/2021    Procedure: COLONOSCOPY;  Surgeon: Elissa Yoon MD;  [facility]: [facility];  Service: Endoscopy;  Laterality: N/A;    HERNIA REPAIR      HYSTERECTOMY      TONSILLECTOMY      TOTAL KNEE ARTHROPLASTY Bilateral      Family History   Problem Relation Age of Onset    No Known Problems Mother     Alzheimer's disease Father      Social History     Socioeconomic History    Marital status:    Tobacco Use    Smoking status: Former     Current packs/day: 0.00     Average packs/day: 1 pack/day for 20.0 years (20.0 ttl pk-yrs)     Types: Cigarettes     Start date: 1970     Quit date: 1990     Years since quittin.6    Smokeless tobacco: Never   Substance and Sexual Activity    Alcohol use: Yes     Alcohol/week: 5.0 standard drinks of alcohol     Types: 5 Glasses of wine per week    Drug use: Never    Sexual activity: Yes     Partners: Male     Medication List with Changes/Refills   Current  Medications    ALBUTEROL (PROVENTIL/VENTOLIN HFA) 90 MCG/ACTUATION INHALER    Inhale 2 puffs into the lungs every 6 (six) hours as needed for Wheezing. Rescue    AMITRIPTYLINE (ELAVIL) 100 MG TABLET    TAKE 3 TABLETS(300 MG) BY MOUTH EVERY EVENING    BUDESONIDE (PULMICORT) 0.5 MG/2 ML NEBULIZER SOLUTION    Take 2 mLs (0.5 mg total) by nebulization 2 (two) times daily. Controller    BUSPIRONE (BUSPAR) 10 MG TABLET    TAKE 1 TABLET(10 MG) BY MOUTH TWICE DAILY    CALCIPOTRIENE (DOVONOX) 0.005 % CREAM    Apply topically 2 (two) times daily.    FLUTICASONE FUROATE-VILANTEROL (BREO ELLIPTA) 100-25 MCG/DOSE DISKUS INHALER    Inhale 1 puff into the lungs once daily. Controller.Wash out mouth after use    FLUTICASONE FUROATE-VILANTEROL (BREO) 100-25 MCG/DOSE DISKUS INHALER    INHALE 1 PUFF INTO THE LUNGS EVERY DAY. WASH MOUTH AFTER USE    FLUTICASONE PROPIONATE (FLONASE) 50 MCG/ACTUATION NASAL SPRAY    SHAKE LIQUID AND USE 2 SPRAYS(100 MCG) IN EACH NOSTRIL EVERY DAY    IBUPROFEN (ADVIL,MOTRIN) 200 MG TABLET    Take 600 mg by mouth every 6 (six) hours as needed.    LOSARTAN (COZAAR) 25 MG TABLET    TAKE 1 TABLET(25 MG) BY MOUTH EVERY DAY    METHOTREXATE 2.5 MG TAB    Take 8 tablets (20 mg total) by mouth every 7 days.    PANTOPRAZOLE (PROTONIX) 40 MG TABLET    Take 1 tablet (40 mg total) by mouth once daily.     Review of patient's allergies indicates:   Allergen Reactions    Pneumococcal vaccine Swelling    Celebrex [celecoxib] Itching and Rash    Hydrocodone-acetaminophen Nausea Only and Nausea And Vomiting    Meloxicam Rash    Sulfa (sulfonamide antibiotics) Rash     Review of Systems   Constitutional: Negative for decreased appetite.   HENT:  Negative for tinnitus.    Eyes:  Negative for double vision.   Cardiovascular:  Negative for chest pain.   Respiratory:  Negative for wheezing.    Hematologic/Lymphatic: Negative for bleeding problem.   Skin:  Negative for dry skin.   Musculoskeletal:  Positive for arthritis, back  pain, joint pain, muscle weakness and myalgias. Negative for gout, neck pain and stiffness.   Gastrointestinal:  Negative for abdominal pain.   Genitourinary:  Negative for bladder incontinence.   Neurological:  Negative for numbness, paresthesias and sensory change.   Psychiatric/Behavioral:  Negative for altered mental status.        Objective:   There is no height or weight on file to calculate BMI.  There were no vitals filed for this visit.       General    Constitutional: She is oriented to person, place, and time. She appears well-developed.   HENT:   Head: Atraumatic.   Eyes: EOM are normal.   Pulmonary/Chest: Effort normal.   Neurological: She is alert and oriented to person, place, and time.   Psychiatric: Judgment normal.           Ambulating without any assistive devices   Pelvis is level   Negative straight leg raising bilaterally   Bilateral hips passive motion no pain in the groin and excellent range of motion   Severe pain to palpation over the greater trochanters with the right worse than the left with radiation down the lateral thigh.  Hip flexors, abductors and adductors were slightly weak at 5-/5   Right knee with a incision or you knee replacement the scar healed well.  There is an area highly suspicious of psoriasis approximately a cm and a half by cm and half just a medial to the surgical incision.  There is swelling and pain over the medial aspect of the knee joint and over the medial tibial flare.  She does have almost full motion no defect in the patella or quadriceps tendon.  Collaterals are stable.  Negative anterior drawer.  .  Possible patellofemoral crepitus to range of motion and compression  The left knee with anterior surgical scar healed well.  She has 0-130 degrees of flexion stable to varus valgus stressing.  There is no swelling in the knee joint.  There is no tenderness to palpation there is some crepitus to compression on the patella and slight tenderness at that point.   Negative anterior drawer  Calves are soft nontender  Ankle motion intact   Skin is warm to touch    Relevant imaging results reviewed and interpreted by me, discussed with the patient and / or family today     X-ray 01/11/2024 of the pelvis and the hips showing excellent joint space.  There is small osteophyte over the greater trochanters consistent with bursitis of the hips.  The bone look osteopenic   X-ray 1/11/24 bilateral knees showing left knee medial partial knee replacement still in good alignment.  There is some mild patellofemoral arthritic changes and small marginal osteophyte.  Right knee looks like the femoral component of the partial knee replacement medially is loose there is radiolucent line on the lateral view like a when she will type of movement of the femoral component and does not look where it is supposed to probably rotated due to loosening.  There is also radiolucent line on the tibial component on the lateral view posteriorly.  Indicative of loosened partial knee replacement  Assessment:     Encounter Diagnoses   Name Primary?    Displacement of internal right knee prosthesis, subsequent encounter Yes    Status post right partial knee replacement     Status post left partial knee replacement     Chronic pain of right knee     Greater trochanteric bursitis of both hips     Low back pain, unspecified back pain laterality, unspecified chronicity, unspecified whether sciatica present         Plan:   Displacement of internal right knee prosthesis, subsequent encounter    Status post right partial knee replacement    Status post left partial knee replacement    Chronic pain of right knee    Greater trochanteric bursitis of both hips    Low back pain, unspecified back pain laterality, unspecified chronicity, unspecified whether sciatica present         Patient Instructions   Your workup for infection completely negative at this time in the right knee   On x-ray looks like the partial knee  replacement is coming loose from the bone specially the 1 on the femur  You are having quite a bit of pain and discomfort  Will proceed with right total knee revision.  Procedure is roughly 2 hours done under general anesthetic.  You might have to stay overnight and go home the next day.  Occasionally we get people doing really well in recovery room that they can go home the same day.  You need to get cardiac clearance prior to surgery.  There is a class that is mandatory in the hospital that you have to attend.  You will meet the home health agency, the home physical therapy the preop nurse and they go over your labs make sure everything is okay.  Risks involved and having surgery  Procedure, common risks and benefits,alternatives discussed in details.All questions answered.Patient expressed understanding.Patient instructed to call for any questions that could arise in the future.    Most common Risks:  Infection/less than 2%  Leg-length discrepancy    Neuro-vascular injury ( resulting in loss of motor and sensory functions) almost everybody would be numb on the outside of the knee.  There is very slight chance of developing what we call a footdrop that means you can not bring the foot up but you can bring it down and this is usually a stretch on the nerve.  80% of foot drops recuperate within 6 months to a year.  There is a slight chance of injuring the popliteal vessels during surgery  Pain  Blood clot    Loss of motion/we heal with scar tissue your job is to do extensive physical therapy and work through the pain so you do not scar down  Fracture of bone  Failure of procedure to achieve its intended purpose/total knee replacement is 80% successful in decreasing pain and increasing function  Failure of hardware/they should last at least 15 years for you that is the average  Non-union or mal-union of bone  Malalignment  Death/you need to see the heart doctor prior to surgery    Patient instructions for joint  replacement    Before surgery  1.  Shower with Hibiclens soap/antibacterial for 3 days prior to surgery to decrease risk of infection  2..  Stop all blood thinners/aspirin, Coumadin, warfarin, Plavix, Eliquis, Xarelto etc 7 days prior to surgery.  You need to stop all anti-inflammatories 7 days prior to surgery as well as all vitamins except vitamin-D.  All natural products that you are taking including turmeric or omega-3 or fish oil etcetera all has to stop 7 days prior to surgery  3.  No eating or drinking after midnight the night before surgery.  We would like you to drink a bottle of Gatorade or Powerade or Pedialyte the night before surgery prior to midnight so you do not get dehydrated waiting for the surgery  4.  Take Tylenol 650 mg the night prior to surgery      After surgery at home  1.  Take Tylenol 650 mg 3 times a day for 14 days then as needed for mild pain  2.  Take gabapentin 300 mg nightly for 6 weeks  3.  Take ibuprofen 600 mg maximum 3 times a day but minimum 2 times a day.  We will give you Nexium to take while taking ibuprofen/or Protonix  4.  Must take aspirin 81 mg twice a day for 6 weeks unless you are on other blood thinners/Plavix, Eliquis, Xarelto, Coumadin etc  5.  Must wear compressive stockings for 6 weeks minimum to decrease the risk of blood clot and swelling  6.  Hydrocodone/Norco or oxycodone/Percocet will be prescribed to take every 6 hr as needed for breakthrough pain  7.  May apply ice on the knee to help with decreasing pain  8.  Keep wound dry for 2 weeks until stitches/staples removed than you will be allowed to shower in 24 hr and get the wound wet.  No soaking of the wound in the tub or swimming for 4 weeks after surgery  9.  No driving for 4 weeks unless specified by physician  10.  Avoid touching the wound or surrounding area /at least 2 inches on each side of the surgical incision until staples are removed/stitches   11.  May change the surgical dressing if extremely  bloody or has drainage on it. May clean the wound with peroxide or Betadine and apply sterile dressing and Ace wrap over it  12.  Leave hospital dressing on for 3 days then may change by applying sterile 4 x 4 and Ace wrap after cleaning with Betadine or peroxide.  May leave this dressing change to home health nurses    Right total knee revision  Patient has a failed right partial knee replacement which was performed 2017.  As far as the left knee partial knee replacement still functioning okay and not having any pain.  Patient is having pain in her hips it has been for 6 months now and nobody offered her anything from all the physicians have been seeing her.  She has chronic low back pain but it has not severe.  She has not been through physical therapy.  She had been taking on her own to Motrin mixed with 1 Tylenol twice a day which helps a little bit to function.  There was no injections given in the hips no anti-inflammatories by mouth.  She does take methotrexate for rheumatoid arthritis.  Her x-rays of the hips look fairly well maintained and I think he she has bursitis/trochanteric bursitis of both hips that she would benefit from hip injections  She has a failed right partial knee replacement that will require revision to total knee.  I would like the right knee to be aspirated to rule out infection versus aseptic loosening.  Right knee aspirated from the anterolateral aspect after prepping with alcohol and ChloraPrep twice each using 18 gauge needle aspirated 5 cc of serosanguineous fluid that was sent to the lab for cell count cultures and crystals  Warned her about the side effects of steroid injection that was given into both hips Depo-Medrol 80 mg mixed with 5 cc 1% lidocaine performed 01/11/2024 into the greater trochanters of both hips.  She might feel her blood pressure goes up from the injection in might make her hungry and increase pulse and she might have a flush and feels hot and warm for few  days   I will discuss in 2 weeks the results of the aspiration and if she wants to proceed with total knee revision since all of her pain is on the medial side of the knee    Patient is immunocompromised on methotrexate which carries a little bit longer healing process      Disclaimer: This note was prepared using a voice recognition system and is likely to have sound alike errors within the text.

## 2024-02-01 ENCOUNTER — PATIENT MESSAGE (OUTPATIENT)
Dept: ORTHOPEDICS | Facility: CLINIC | Age: 69
End: 2024-02-01
Payer: MEDICARE

## 2024-02-01 ENCOUNTER — OFFICE VISIT (OUTPATIENT)
Dept: CARDIOLOGY | Facility: CLINIC | Age: 69
End: 2024-02-01
Payer: MEDICARE

## 2024-02-01 ENCOUNTER — HOSPITAL ENCOUNTER (OUTPATIENT)
Dept: CARDIOLOGY | Facility: HOSPITAL | Age: 69
Discharge: HOME | End: 2024-02-01
Attending: INTERNAL MEDICINE
Payer: MEDICARE

## 2024-02-01 VITALS
BODY MASS INDEX: 25.53 KG/M2 | OXYGEN SATURATION: 97 % | HEIGHT: 68 IN | HEART RATE: 79 BPM | DIASTOLIC BLOOD PRESSURE: 65 MMHG | SYSTOLIC BLOOD PRESSURE: 111 MMHG | WEIGHT: 168.44 LBS

## 2024-02-01 DIAGNOSIS — Z76.89 ENCOUNTER TO ESTABLISH CARE: ICD-10-CM

## 2024-02-01 DIAGNOSIS — Z01.810 PREOP CARDIOVASCULAR EXAM: ICD-10-CM

## 2024-02-01 DIAGNOSIS — Z00.00 ROUTINE ADULT HEALTH MAINTENANCE: ICD-10-CM

## 2024-02-01 DIAGNOSIS — I70.0 AORTIC ATHEROSCLEROSIS: Primary | ICD-10-CM

## 2024-02-01 DIAGNOSIS — F41.9 ANXIETY: ICD-10-CM

## 2024-02-01 DIAGNOSIS — M05.9 SEROPOSITIVE RHEUMATOID ARTHRITIS: ICD-10-CM

## 2024-02-01 PROCEDURE — 99999 PR PBB SHADOW E&M-EST. PATIENT-LVL IV: CPT | Mod: PBBFAC,,, | Performed by: INTERNAL MEDICINE

## 2024-02-01 PROCEDURE — 99204 OFFICE O/P NEW MOD 45 MIN: CPT | Mod: S$GLB,,, | Performed by: INTERNAL MEDICINE

## 2024-02-01 PROCEDURE — 93005 ELECTROCARDIOGRAM TRACING: CPT

## 2024-02-01 PROCEDURE — 93010 ELECTROCARDIOGRAM REPORT: CPT | Mod: ,,, | Performed by: INTERNAL MEDICINE

## 2024-02-01 NOTE — PROGRESS NOTES
Subjective:   Patient ID:  Caitlin Ahmadi is a 68 y.o. female who presents for evaluation of No chief complaint on file.      HPI  67 yo female, with history of rheumatoid arthritis, not diabetic, no history of stroke, symptoms are controlled with methotrexate.    Follows with rheumatology as well.    She is here today for preop assessment and evaluation prior to going for a knee surgery.  She had prior knee arthroplasty in the past.    Now going for a total knee replacement.    She states that she runs all her friends she takes her and grandchild to school.  Denies any limitations.  She states that she may have sometimes dyspnea on exertion with NYHA 1.  However no lower extremity swelling, palpitations, syncope or presyncope.    No orthopnea or PND.       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;  Service: Endoscopy;  Laterality: N/A;    HERNIA REPAIR      HYSTERECTOMY      TONSILLECTOMY      TOTAL KNEE ARTHROPLASTY Bilateral        Social History     Tobacco Use    Smoking status: Former     Current packs/day: 0.00     Average packs/day: 1 pack/day for 20.0 years (20.0 ttl pk-yrs)     Types: Cigarettes     Start date: 1970     Quit date: 1990     Years since quittin.6    Smokeless tobacco: Never   Substance Use Topics    Alcohol use: Yes     Alcohol/week: 5.0 standard drinks of alcohol     Types: 5 Glasses of wine per week    Drug  use: Never       Family History   Problem Relation Age of Onset    No Known Problems Mother     Alzheimer's disease Father        Review of Systems   Cardiovascular:  Negative for chest pain, dyspnea on exertion, palpitations and syncope.   Genitourinary: Negative.    Neurological: Negative.        Current Outpatient Medications on File Prior to Visit   Medication Sig    albuterol (PROVENTIL/VENTOLIN HFA) 90 mcg/actuation inhaler Inhale 2 puffs into the lungs every 6 (six) hours as needed for Wheezing. Rescue    amitriptyline (ELAVIL) 100 MG tablet TAKE 3 TABLETS(300 MG) BY MOUTH EVERY EVENING    budesonide (PULMICORT) 0.5 mg/2 mL nebulizer solution Take 2 mLs (0.5 mg total) by nebulization 2 (two) times daily. Controller    busPIRone (BUSPAR) 10 MG tablet TAKE 1 TABLET(10 MG) BY MOUTH TWICE DAILY    fluticasone furoate-vilanteroL (BREO ELLIPTA) 100-25 mcg/dose diskus inhaler Inhale 1 puff into the lungs once daily. Controller.Wash out mouth after use    fluticasone furoate-vilanteroL (BREO) 100-25 mcg/dose diskus inhaler INHALE 1 PUFF INTO THE LUNGS EVERY DAY. WASH MOUTH AFTER USE    fluticasone propionate (FLONASE) 50 mcg/actuation nasal spray SHAKE LIQUID AND USE 2 SPRAYS(100 MCG) IN EACH NOSTRIL EVERY DAY    gabapentin (NEURONTIN) 300 MG capsule Take 1 capsule (300 mg total) by mouth every evening.    ibuprofen (ADVIL,MOTRIN) 200 MG tablet Take 600 mg by mouth every 6 (six) hours as needed.    ibuprofen (ADVIL,MOTRIN) 600 MG tablet Take 1 tablet (600 mg total) by mouth 2 (two) times daily as needed for Pain.    pantoprazole (PROTONIX) 40 MG tablet Take 1 tablet (40 mg total) by mouth once daily.    calcipotriene (DOVONOX) 0.005 % cream Apply topically 2 (two) times daily. (Patient not taking: Reported on 1/26/2024)    losartan (COZAAR) 25 MG tablet TAKE 1 TABLET(25 MG) BY MOUTH EVERY DAY (Patient not taking: Reported on 1/11/2024)    methotrexate 2.5 MG Tab Take 8 tablets (20 mg total) by mouth every 7 days.  (Patient not taking: Reported on 1/26/2024)     No current facility-administered medications on file prior to visit.       Objective:   Objective:  Wt Readings from Last 3 Encounters:   02/01/24 76.4 kg (168 lb 6.9 oz)   01/11/24 80 kg (176 lb 7.7 oz)   12/07/23 78.2 kg (172 lb 6.4 oz)     Temp Readings from Last 3 Encounters:   07/20/23 97.9 °F (36.6 °C) (Tympanic)   01/12/23 98.3 °F (36.8 °C) (Tympanic)   12/16/22 97.9 °F (36.6 °C) (Tympanic)     BP Readings from Last 3 Encounters:   02/01/24 111/65   12/07/23 111/72   10/04/23 (!) 140/72     Pulse Readings from Last 3 Encounters:   02/01/24 79   12/07/23 94   10/04/23 95       Physical Exam  Vitals reviewed.   Constitutional:       Appearance: She is well-developed.   Neck:      Vascular: No carotid bruit.   Cardiovascular:      Rate and Rhythm: Normal rate and regular rhythm.      Pulses: Intact distal pulses.      Heart sounds: Normal heart sounds. No murmur heard.  Pulmonary:      Breath sounds: Normal breath sounds.   Neurological:      Mental Status: She is oriented to person, place, and time.         Lab Results   Component Value Date    CHOL 241 (H) 09/26/2022    CHOL 247 (H) 07/06/2020     Lab Results   Component Value Date    HDL 50 09/26/2022    HDL 59 07/06/2020     Lab Results   Component Value Date    LDLCALC 135.6 09/26/2022    LDLCALC 149.0 07/06/2020     Lab Results   Component Value Date    TRIG 277 (H) 09/26/2022    TRIG 195 (H) 07/06/2020     Lab Results   Component Value Date    CHOLHDL 20.7 09/26/2022    CHOLHDL 23.9 07/06/2020       Chemistry        Component Value Date/Time     11/24/2023 0816    K 4.1 11/24/2023 0816     11/24/2023 0816    CO2 25 11/24/2023 0816    BUN 12 11/24/2023 0816    CREATININE 0.8 11/24/2023 0816     (H) 11/24/2023 0816        Component Value Date/Time    CALCIUM 9.4 11/24/2023 0816    ALKPHOS 100 11/24/2023 0816    AST 28 11/24/2023 0816    ALT 39 11/24/2023 0816    BILITOT 0.4 11/24/2023 0816  "   ESTGFRAFRICA >60 07/19/2022 1438    EGFRNONAA >60 07/19/2022 1438          Lab Results   Component Value Date    TSH 2.218 09/26/2022     No results found for: "INR", "PROTIME"  Lab Results   Component Value Date    WBC 7.34 01/11/2024    HGB 12.3 01/11/2024    HCT 37.0 01/11/2024    MCV 91 01/11/2024     01/11/2024     BNP  @LABRCNTIP(BNP,BNPTRIAGEBLO)@  CrCl cannot be calculated (Patient's most recent lab result is older than the maximum 7 days allowed.).     Imaging:  ======    No results found for this or any previous visit.    No results found for this or any previous visit.    Results for orders placed during the hospital encounter of 07/12/22    X-Ray Chest PA And Lateral    Narrative  EXAMINATION:  XR CHEST PA AND LATERAL    CLINICAL HISTORY:  Pneumonia, unspecified organism    TECHNIQUE:  PA and lateral views of the chest were performed.    COMPARISON:  12/18/2020    FINDINGS:  Cardiac silhouette and mediastinal contours are normal.  Lungs are clear.  Osseous structures are intact.    Impression  No acute cardiopulmonary process.      Electronically signed by: Dangelo Irene MD  Date:    07/12/2022  Time:    11:26    No results found for this or any previous visit.    No valid procedures specified.    No results found for this or any previous visit.      No results found for this or any previous visit.      Results for orders placed during the hospital encounter of 12/18/20    Echo Color Flow Doppler? Yes    Interpretation Summary  · With left ventricular concentric remodeling and normal systolic function. The estimated ejection fraction is 60%.  · Grade I left ventricular diastolic dysfunction.      Diagnostic Results:  ECG: Reviewed  Sinus rhythm with first-degree AV block otherwise normal.      The 10-year ASCVD risk score (Mario LE, et al., 2019) is: 9.4%    Values used to calculate the score:      Age: 68 years      Sex: Female      Is Non- : No      Diabetic: No   "    Tobacco smoker: No      Systolic Blood Pressure: 111 mmHg      Is BP treated: Yes      HDL Cholesterol: 50 mg/dL      Total Cholesterol: 241 mg/dL        Assessment and Plan:   Aortic atherosclerosis    Preop cardiovascular exam    Seropositive rheumatoid arthritis    Anxiety        Reviewed all tests and above medical conditions with patient in detail and formulated treatment plan.  Risk factor modification discussed.   Cardiac low salt diet discussed.  Maintaining healthy weight and weight loss goals were discussed in clinic.  Lifestyle changes regarding her hyperlipidemia discussed today.    EKGs without dynamic ischemia, normal cardiac exam, METS more than 4.  Okay to undergo her knee surgery.  Stable from cardiac standpoint.      Follow up in  6 months

## 2024-02-02 ENCOUNTER — PATIENT MESSAGE (OUTPATIENT)
Dept: ORTHOPEDICS | Facility: CLINIC | Age: 69
End: 2024-02-02
Payer: MEDICARE

## 2024-02-03 DIAGNOSIS — F33.41 RECURRENT MAJOR DEPRESSIVE DISORDER, IN PARTIAL REMISSION: ICD-10-CM

## 2024-02-03 NOTE — TELEPHONE ENCOUNTER
No care due was identified.  Health [facility] Embedded Care Due Messages. Reference number: [id number].   2/03/2024 9:48:07 AM CST

## 2024-02-04 RX ORDER — AMITRIPTYLINE HYDROCHLORIDE 100 MG/1
TABLET ORAL
Qty: 270 TABLET | Refills: 0 | Status: SHIPPED | OUTPATIENT
Start: 2024-02-04 | End: 2024-05-14

## 2024-02-04 NOTE — TELEPHONE ENCOUNTER
Refill Decision Note   Caitlin Ahmadi  is requesting a refill authorization.  Brief Assessment and Rationale for Refill:  Approve     Medication Therapy Plan:  Alert overridden per protocol      Alert overridden per protocol: Yes   Comments:     Note composed:7:20 AM 02/04/2024

## 2024-02-05 DIAGNOSIS — Z01.818 PREOPERATIVE EXAMINATION: ICD-10-CM

## 2024-02-05 DIAGNOSIS — Z01.818 PREOP TESTING: ICD-10-CM

## 2024-02-05 DIAGNOSIS — T84.022D DISPLACEMENT OF INTERNAL RIGHT KNEE PROSTHESIS, SUBSEQUENT ENCOUNTER: Primary | ICD-10-CM

## 2024-02-05 DIAGNOSIS — Z96.651 STATUS POST RIGHT PARTIAL KNEE REPLACEMENT: ICD-10-CM

## 2024-02-12 DIAGNOSIS — R06.02 SOBOE (SHORTNESS OF BREATH ON EXERTION): ICD-10-CM

## 2024-02-12 DIAGNOSIS — R05.3 CHRONIC COUGH: ICD-10-CM

## 2024-02-12 LAB — FUNGUS SPEC CULT: NORMAL

## 2024-02-13 DIAGNOSIS — J30.89 SEASONAL ALLERGIC RHINITIS DUE TO OTHER ALLERGIC TRIGGER: ICD-10-CM

## 2024-02-15 RX ORDER — FLUTICASONE PROPIONATE 50 MCG
SPRAY, SUSPENSION (ML) NASAL
Qty: 16 G | Refills: 11 | Status: SHIPPED | OUTPATIENT
Start: 2024-02-15

## 2024-02-15 RX ORDER — ALBUTEROL SULFATE 90 UG/1
2 AEROSOL, METERED RESPIRATORY (INHALATION) EVERY 6 HOURS PRN
Qty: 6.7 G | Refills: 11 | Status: SHIPPED | OUTPATIENT
Start: 2024-02-15

## 2024-02-27 ENCOUNTER — LAB VISIT (OUTPATIENT)
Dept: LAB | Facility: HOSPITAL | Age: 69
End: 2024-02-27
Attending: NURSE PRACTITIONER
Payer: MEDICARE

## 2024-02-27 ENCOUNTER — OFFICE VISIT (OUTPATIENT)
Dept: INTERNAL MEDICINE | Facility: CLINIC | Age: 69
End: 2024-02-27
Payer: MEDICARE

## 2024-02-27 VITALS
HEART RATE: 83 BPM | TEMPERATURE: 97 F | DIASTOLIC BLOOD PRESSURE: 71 MMHG | OXYGEN SATURATION: 97 % | SYSTOLIC BLOOD PRESSURE: 136 MMHG

## 2024-02-27 DIAGNOSIS — Z01.818 PREOPERATIVE EXAMINATION: ICD-10-CM

## 2024-02-27 DIAGNOSIS — Z01.818 PREOP TESTING: ICD-10-CM

## 2024-02-27 DIAGNOSIS — F33.41 RECURRENT MAJOR DEPRESSIVE DISORDER, IN PARTIAL REMISSION: ICD-10-CM

## 2024-02-27 DIAGNOSIS — K21.9 GASTROESOPHAGEAL REFLUX DISEASE WITHOUT ESOPHAGITIS: ICD-10-CM

## 2024-02-27 DIAGNOSIS — J47.9 ADULT BRONCHIECTASIS: Primary | Chronic | ICD-10-CM

## 2024-02-27 DIAGNOSIS — M17.0 PRIMARY OSTEOARTHRITIS OF BOTH KNEES: ICD-10-CM

## 2024-02-27 DIAGNOSIS — I10 PRIMARY HYPERTENSION: ICD-10-CM

## 2024-02-27 DIAGNOSIS — M05.9 SEROPOSITIVE RHEUMATOID ARTHRITIS: ICD-10-CM

## 2024-02-27 LAB
ALBUMIN SERPL BCP-MCNC: 4.1 G/DL (ref 3.5–5.2)
ALP SERPL-CCNC: 87 U/L (ref 55–135)
ALT SERPL W/O P-5'-P-CCNC: 31 U/L (ref 10–44)
ANION GAP SERPL CALC-SCNC: 8 MMOL/L (ref 8–16)
AST SERPL-CCNC: 24 U/L (ref 10–40)
BASOPHILS # BLD AUTO: 0.07 K/UL (ref 0–0.2)
BASOPHILS NFR BLD: 1.1 % (ref 0–1.9)
BILIRUB SERPL-MCNC: 0.2 MG/DL (ref 0.1–1)
BUN SERPL-MCNC: 12 MG/DL (ref 8–23)
CALCIUM SERPL-MCNC: 9.4 MG/DL (ref 8.7–10.5)
CHLORIDE SERPL-SCNC: 107 MMOL/L (ref 95–110)
CO2 SERPL-SCNC: 25 MMOL/L (ref 23–29)
CREAT SERPL-MCNC: 0.8 MG/DL (ref 0.5–1.4)
DIFFERENTIAL METHOD BLD: ABNORMAL
EOSINOPHIL # BLD AUTO: 0.2 K/UL (ref 0–0.5)
EOSINOPHIL NFR BLD: 2.8 % (ref 0–8)
ERYTHROCYTE [DISTWIDTH] IN BLOOD BY AUTOMATED COUNT: 13.2 % (ref 11.5–14.5)
EST. GFR  (NO RACE VARIABLE): >60 ML/MIN/1.73 M^2
GLUCOSE SERPL-MCNC: 107 MG/DL (ref 70–110)
HCT VFR BLD AUTO: 36.9 % (ref 37–48.5)
HGB BLD-MCNC: 12.5 G/DL (ref 12–16)
IMM GRANULOCYTES # BLD AUTO: 0.08 K/UL (ref 0–0.04)
IMM GRANULOCYTES NFR BLD AUTO: 1.2 % (ref 0–0.5)
LYMPHOCYTES # BLD AUTO: 2.2 K/UL (ref 1–4.8)
LYMPHOCYTES NFR BLD: 33.5 % (ref 18–48)
MCH RBC QN AUTO: 30.3 PG (ref 27–31)
MCHC RBC AUTO-ENTMCNC: 33.9 G/DL (ref 32–36)
MCV RBC AUTO: 89 FL (ref 82–98)
MONOCYTES # BLD AUTO: 0.7 K/UL (ref 0.3–1)
MONOCYTES NFR BLD: 10 % (ref 4–15)
NEUTROPHILS # BLD AUTO: 3.3 K/UL (ref 1.8–7.7)
NEUTROPHILS NFR BLD: 51.4 % (ref 38–73)
NRBC BLD-RTO: 0 /100 WBC
PLATELET # BLD AUTO: 210 K/UL (ref 150–450)
PMV BLD AUTO: 10.1 FL (ref 9.2–12.9)
POTASSIUM SERPL-SCNC: 4.2 MMOL/L (ref 3.5–5.1)
PROT SERPL-MCNC: 7.5 G/DL (ref 6–8.4)
RBC # BLD AUTO: 4.13 M/UL (ref 4–5.4)
SODIUM SERPL-SCNC: 140 MMOL/L (ref 136–145)
WBC # BLD AUTO: 6.47 K/UL (ref 3.9–12.7)

## 2024-02-27 PROCEDURE — 85025 COMPLETE CBC W/AUTO DIFF WBC: CPT | Performed by: NURSE PRACTITIONER

## 2024-02-27 PROCEDURE — 99999 PR PBB SHADOW E&M-EST. PATIENT-LVL II: CPT | Mod: PBBFAC,,,

## 2024-02-27 PROCEDURE — 36415 COLL VENOUS BLD VENIPUNCTURE: CPT | Performed by: NURSE PRACTITIONER

## 2024-02-27 PROCEDURE — 80053 COMPREHEN METABOLIC PANEL: CPT | Performed by: NURSE PRACTITIONER

## 2024-02-27 PROCEDURE — 81003 URINALYSIS AUTO W/O SCOPE: CPT | Performed by: NURSE PRACTITIONER

## 2024-02-27 NOTE — DISCHARGE INSTRUCTIONS
Pre op instructions reviewed with patient during Clinic Visit with Provider, verbalized understanding.    To confirm, Surgery is scheduled on 3/12/24. We will call you late afternoon the business day prior to surgery with your arrival time.    *Please report to the [facility] (1st Floor) located off of [facility] (2nd Entrance/Building on the left, in front of the flag pole).  Address: [address] [place] [address] [place]. [address]    Your Type & Screen appointment is scheduled on 3/11/24 @ the [facility].  Please DO NOT remove the red arm band applied.      INSTRUCTIONS IMPORTANT!!!  DO NOT Eat, Drink, or Smoke after 12 midnight unless instructed otherwise by your Surgeon. OK to brush teeth, no gum, candy or mints!    MORNING OF SURGERY, drink small sip of water with the following medications instructed by Pre-Admit Provider:  -Buspar  -Pantoprazole    - TAKE TYLENOL 650 MG. WITH YOUR EVENING MEAL, NIGHT BEFORE SURGERY  - DRINK 1 BOTTLE OF GATORADE OR POWERADE PRIOR TO MIDNIGHT, EVENING BEFORE SURGERY     *Patients should HOLD all vitamins, herbal supplements, weight loss medication, aspirin products & NSAIDS 7 days prior to surgery, as these can thin the blood. Ok to take Tylenol.    ____  Avoid Alcoholic beverages 3 days prior to surgery, as it can thin the blood.  ____  NO Acrylic/fake nails or nail polish worn day of surgery (specifically hand/arm & foot surgeries).  ____  NO powder, lotions, deodorants, oils or cream on body.  ____  Remove all jewelry, piercings, & foreign objects prior to arrival and leave at home.  ____  Remove Dentures, Hearing Aids & Contact Lens prior to surgery.  ____  Bring photo ID and insurance information to hospital (Leave Valuables at Home).  ____  If going home the same day, arrange for a ride home. You will not be able to drive for 24 hrs if Anesthesia was used.   ____  Females (ages 11-60): may need to give a urine sample the morning of surgery; please see Pre op Nurse  prior to using the restroom.  ____  Wear clean, loose fitting clothing to allow for dressings/ bandages.    Bathing Instructions:       -Do not shave your face or body the day before or the day of surgery.              -Do not shave your body for three days before surgery            -Shower & Rinse your body as usual with anti-bacterial Soap, (Dial), for three days before surgery                - on the evening prior to surgery and the morning of surgery, apply one packet of Hibiclens soap to the surgical site.               -Wash the site gently for 5 full minutes. Do Not scrub your skin too hard.               -Rinse your body thoroughly.                -Pat yourself day with a clean, soft towel.               -Do not use lotion, cream, powder or deodorant               -Dress in clean clothes     [facility] Visitor/Ride Policy:  Only 2 adults allowed in pre op/recovery area during your procedure. You MUST HAVE A RIDE HOME from a responsible adult that you know and trust. Medical Transport, Uber or Lyft can ONLY be used if patient has a responsible adult to accompany them during ride home.    Discharge Instructions: You will receive Post-op/Discharge instructions by your Discharge Nurse prior to going home.   *Prevention of surgical site infections:   -Keep incisions clean and dry.   -Do not soak/submerge incisions in water until completely healed.   -Do not apply lotions, powders, creams, or deodorants to site.   -Always make sure hands are cleaned with antibacterial soap/ alcohol-based  prior to touching the surgical site.        *Signs and symptoms of Infection Before or After Surgery:               !!!If you experience any fever, chills, nausea/ vomiting, foul odor/ excessive drainage from surgical site, flu-like symptoms, new wounds or cuts, PLEASE CALL THE SURGEON OFFICE at [phone] or SEND MESSAGE THROUGH [facility]!!!     *If you are running late day of surgery, please call the Surgery Dept @  [phone].    *Billing question, please call  [phone]     Thank you,  -[facility] Pre Admit Dept.  [phone]   or [phone]  M-F 7:30 am-4:00 pm (Closed Major Holidays)    Additional Tests Scheduled Today:  LABS (1ST Floor) Check in at the !

## 2024-02-27 NOTE — PROGRESS NOTES
Preoperative History and Physical      Chief Complaint: Preoperative evaluation     History of Present Illness:      Caitlin Ahmadi is a 68 y.o. female with a history of RA, HTN, depression, anxiety and asthma who presents to the office today for a preoperative consultation at the request of Dr. Rangel who plans on performing right knee revision on .     Functional Status:      The patient is able to climb a flight of stairs. The patient is able to ambulate without difficulty. The patient's functional status is affected by the surgical problem. The patient's functional status is not affected by shortness of breath, chest pain, dyspnea on exertion and fatigue.    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    Cataract     Encounter for blood transfusion     Hypertension     PONV (postoperative nausea and vomiting)     Seropositive rheumatoid arthritis 2022        Past Surgical History:      Past Surgical History:   Procedure Laterality Date    ADENOIDECTOMY      CATARACT EXTRACTION W/  INTRAOCULAR LENS IMPLANT Left 2020    CATARACT EXTRACTION W/  INTRAOCULAR LENS IMPLANT      CHOLECYSTECTOMY      COLONOSCOPY N/A 10/13/2021    Procedure: COLONOSCOPY;  Surgeon: Elissa Yoon MD;  Location: [facility];  Service: Endoscopy;  Laterality: N/A;    HERNIA REPAIR      HYSTERECTOMY      TONSILLECTOMY      TOTAL KNEE ARTHROPLASTY Bilateral         Social History:      Social History     Socioeconomic History    Marital status:    Tobacco Use    Smoking status: Former     Current packs/day: 0.00     Average packs/day: 1 pack/day for 20.0 years (20.0 ttl pk-yrs)     Types: Cigarettes     Start date: 1970     Quit date: 1990     Years since quittin.7    Smokeless tobacco: Never   Substance and  Sexual Activity    Alcohol use: Yes     Alcohol/week: 5.0 standard drinks of alcohol     Types: 5 Glasses of wine per week    Drug use: Never    Sexual activity: Yes     Partners: Male        Family History:      Family History   Problem Relation Age of Onset    No Known Problems Mother     Alzheimer's disease Father        Allergies:      Review of patient's allergies indicates:   Allergen Reactions    Pneumococcal vaccine Swelling    Celebrex [celecoxib] Itching and Rash    Hydrocodone-acetaminophen Nausea Only and Nausea And Vomiting    Meloxicam Rash    Sulfa (sulfonamide antibiotics) Rash       Medications:      Current Outpatient Medications   Medication Sig    albuterol (PROVENTIL/VENTOLIN HFA) 90 mcg/actuation inhaler INHALE 2 PUFFS INTO THE LUNGS EVERY 6 HOURS AS NEEDED FOR WHEEZING    amitriptyline (ELAVIL) 100 MG tablet TAKE 3 TABLETS(300 MG) BY MOUTH EVERY EVENING    budesonide (PULMICORT) 0.5 mg/2 mL nebulizer solution Take 2 mLs (0.5 mg total) by nebulization 2 (two) times daily. Controller    busPIRone (BUSPAR) 10 MG tablet TAKE 1 TABLET(10 MG) BY MOUTH TWICE DAILY    calcipotriene (DOVONOX) 0.005 % cream Apply topically 2 (two) times daily. (Patient not taking: Reported on 1/26/2024)    fluticasone furoate-vilanteroL (BREO ELLIPTA) 100-25 mcg/dose diskus inhaler Inhale 1 puff into the lungs once daily. Controller.Wash out mouth after use    fluticasone furoate-vilanteroL (BREO) 100-25 mcg/dose diskus inhaler INHALE 1 PUFF INTO THE LUNGS EVERY DAY. WASH MOUTH AFTER USE    fluticasone propionate (FLONASE) 50 mcg/actuation nasal spray SHAKE LIQUID AND USE 2 SPRAYS(100 MCG) IN EACH NOSTRIL EVERY DAY    gabapentin (NEURONTIN) 300 MG capsule Take 1 capsule (300 mg total) by mouth every evening.    ibuprofen (ADVIL,MOTRIN) 200 MG tablet Take 600 mg by mouth every 6 (six) hours as needed.    ibuprofen (ADVIL,MOTRIN) 600 MG tablet Take 1 tablet (600 mg total) by mouth 2 (two) times daily as needed for Pain.     losartan (COZAAR) 25 MG tablet TAKE 1 TABLET(25 MG) BY MOUTH EVERY DAY (Patient not taking: Reported on 1/11/2024)    methotrexate 2.5 MG Tab Take 8 tablets (20 mg total) by mouth every 7 days. (Patient not taking: Reported on 1/26/2024)    pantoprazole (PROTONIX) 40 MG tablet Take 1 tablet (40 mg total) by mouth once daily.     No current facility-administered medications for this visit.       Vitals:      Vitals:    02/27/24 1225   BP: 136/71   Pulse: 83   Temp: 97.2 °F (36.2 °C)       Review of Systems:        Constitutional: Negative for fever, chills, weight loss, malaise/fatigue and diaphoresis.   HENT: Negative for hearing loss, ear pain, nosebleeds, congestion, sore throat, neck pain, tinnitus and ear discharge.    Eyes: Negative for blurred vision, double vision, photophobia, pain, discharge and redness.   Respiratory: Negative for cough, hemoptysis, sputum production, shortness of breath, wheezing and stridor.    Cardiovascular: Negative for chest pain, palpitations, orthopnea, claudication, leg swelling and PND.   Gastrointestinal: Negative for heartburn, nausea, vomiting, abdominal pain, diarrhea, constipation, blood in stool and melena.   Genitourinary: Negative for dysuria, urgency, frequency, hematuria and flank pain.   Musculoskeletal: +right knee pain Negative for myalgias, back pain, joint pain and falls.   Skin: Negative for itching and rash.   Neurological: Negative for dizziness, tingling, tremors, sensory change, speech change, focal weakness, seizures, loss of consciousness, weakness and headaches.   Endo/Heme/Allergies: Negative for environmental allergies and polydipsia. Does not bruise/bleed easily.   Psychiatric/Behavioral: + anxiety/nervous Negative for depression, suicidal ideas, hallucinations, memory loss and substance abuse. All 14 systems reviewed and negative except as noted above.    Physical Exam:      Constitutional: Appears well-developed, well-nourished and in no acute  distress.  Patient is oriented to person, place, and time.   Head: Normocephalic and atraumatic. Mucous membranes moist.  Neck: Neck supple no mass.   Cardiovascular: Normal rate and regular rhythm.  S1 S2 appreciated by ascultation.  Pulmonary/Chest: Effort normal and clear to auscultation bilaterally. No respiratory distress.   Abdomen: Soft. Non-tender and non-distended. Bowel sounds are normal.   Neurological: Patient is alert and oriented to person, place and time. Moves all extremities.  Skin: Warm and dry. No lesions.  Extremities: No clubbing, cyanosis or edema.    Laboratory data:      Reviewed and noted in plan where applicable. Please see chart for full laboratory data.      Lab Results   Component Value Date    WBC 6.47 02/27/2024    HGB 12.5 02/27/2024    HCT 36.9 (L) 02/27/2024    MCV 89 02/27/2024     02/27/2024       Sodium   Date Value Ref Range Status   02/27/2024 140 136 - 145 mmol/L Final     Chloride   Date Value Ref Range Status   02/27/2024 107 95 - 110 mmol/L Final     CO2   Date Value Ref Range Status   02/27/2024 25 23 - 29 mmol/L Final     Glucose   Date Value Ref Range Status   02/27/2024 107 70 - 110 mg/dL Final     BUN   Date Value Ref Range Status   02/27/2024 12 8 - 23 mg/dL Final     Creatinine   Date Value Ref Range Status   02/27/2024 0.8 0.5 - 1.4 mg/dL Final     Calcium   Date Value Ref Range Status   02/27/2024 9.4 8.7 - 10.5 mg/dL Final     Total Protein   Date Value Ref Range Status   02/27/2024 7.5 6.0 - 8.4 g/dL Final     Albumin   Date Value Ref Range Status   02/27/2024 4.1 3.5 - 5.2 g/dL Final     Total Bilirubin   Date Value Ref Range Status   02/27/2024 0.2 0.1 - 1.0 mg/dL Final     Comment:     For infants and newborns, interpretation of results should be based  on gestational age, weight and in agreement with clinical  observations.    Premature Infant recommended reference ranges:  Up to 24 hours.............<8.0 mg/dL  Up to 48 hours............<12.0  mg/dL  3-5 days..................<15.0 mg/dL  6-29 days.................<15.0 mg/dL       Alkaline Phosphatase   Date Value Ref Range Status   02/27/2024 87 55 - 135 U/L Final     AST   Date Value Ref Range Status   02/27/2024 24 10 - 40 U/L Final     ALT   Date Value Ref Range Status   02/27/2024 31 10 - 44 U/L Final     Anion Gap   Date Value Ref Range Status   02/27/2024 8 8 - 16 mmol/L Final     eGFR   Date Value Ref Range Status   02/27/2024 >60 >60 mL/min/1.73 m^2 Final           Predictors of intubation difficulty:       Morbid obesity? no   Anatomically abnormal facies? no   Prominent incisors? no   Receding mandible? no   Short, thick neck? no   Neck range of motion: normal   Dentition: No chipped, loose, or missing teeth.  Based on the Modified Mallampati, patient is a mallampati score: II (hard and soft palate, upper portion of tonsils anduvula visible)    Cardiographics:      ECG: Sinus rhythm with 1st degree A-V block     Echocardiogram: 2020  With left ventricular concentric remodeling and normal systolic function. The estimated ejection fraction is 60%.  Grade I left ventricular diastolic dysfunction.    Imaging:      Chest x-ray: not indicated    Assessment and Plan:        Primary osteoarthritis of both knees  - follows with Dr. Rangel, planning on R knee revision 3/12    Known risk factors for perioperative complications: RA, asthma, HTN  Difficulty with intubation is not anticipated.    Cardiac Risk Estimation: Based on the Revised Cardiac Risk index, patient is a Class I risk with a 3.9% risk of a major cardiac event in a moderate risk procedure.    Cardiology Eval, Dr. Wymanah EKGs without dynamic ischemia, normal cardiac exam, METS more than 4.  Okay to undergo her knee surgery.  Stable from cardiac standpoint.       1.) Preoperative workup as follows: ECG, hemoglobin, hematocrit, electrolytes, creatinine, glucose, UA and cardiology eval.  2.) Change in medication regimen before surgery:  hold ASA/NSAIDS/OTC vitamins/supplements 7 days prior to sx   3.) Prophylaxis for cardiac events with perioperative beta-blockers: not indicated.  4.) Invasive hemodynamic monitoring perioperatively: at the discretion of anesthesiologist.  5.) Deep vein thrombosis prophylaxis postoperatively: intermittent pneumatic compression boots and regimen to be chosen by surgical team.  6.) Current medications which may produce withdrawal symptoms if withheld perioperatively: none  7.) Other measures: Postoperative hypertension management with IV hydralazine until able to take oral medications.  Postoperative incentive spirometry to prevent pneumonia.    Seropositive rheumatoid arthritis  - follows with Dr. Hernadez  - methotrexate currently on hold     Adult bronchiectasis  Asthma  - per chart review, PFT approx 1 year prior essentially normal and FeNO < 25   - follows with Dr. Coelho   - albuterol as needed  - no recent hospitalization for asthma exacerbation     Primary hypertension  - no longer on antihypertensives, bp has been controled     Recurrent major depressive disorder, in partial remission  - buspar bid, may take am of surgery  - elavil nightly     Gastroesophageal reflux disease without esophagitis  - continue home PPI am of surgery           Electronically signed by Roseann Lozano MSN, FNP-C on 2/27/2024 at 10:26 AM.

## 2024-02-27 NOTE — ASSESSMENT & PLAN NOTE
PFTs one year ago were essentially normal and FeNO in January was < 25   - follows with Dr. Coelho   - albuterol as needed  - no recent hospitalization for asthma exacerbation

## 2024-02-27 NOTE — PRE ADMISSION SCREENING
Pre op instructions reviewed with patient during Clinic Visit with Provider, verbalized understanding.    To confirm, Surgery is scheduled on 3/12/24. We will call you late afternoon the business day prior to surgery with your arrival time.    *Please report to the [facility] Lobby (1st Floor) located off of [facility] (2nd Entrance/Building on the left, in front of the flag pole).  Address: [address] [place] [address] [place] [id number]    Your Type & Screen appointment is scheduled on 3/11/24 @ the [facility].  Please DO NOT remove the red arm band applied.      INSTRUCTIONS IMPORTANT!!!  DO NOT Eat, Drink, or Smoke after 12 midnight unless instructed otherwise by your Surgeon. OK to brush teeth, no gum, candy or mints!    MORNING OF SURGERY, drink small sip of water with the following medications instructed by Pre-Admit Provider:  -Buspar  -Pantoprazole    - TAKE TYLENOL 650 MG. WITH YOUR EVENING MEAL, NIGHT BEFORE SURGERY  - DRINK 1 BOTTLE OF GATORADE OR POWERADE PRIOR TO MIDNIGHT, EVENING BEFORE SURGERY     *Patients should HOLD all vitamins, herbal supplements, weight loss medication, aspirin products & NSAIDS 7 days prior to surgery, as these can thin the blood. Ok to take Tylenol.    ____  Avoid Alcoholic beverages 3 days prior to surgery, as it can thin the blood.  ____  NO Acrylic/fake nails or nail polish worn day of surgery (specifically hand/arm & foot surgeries).  ____  NO powder, lotions, deodorants, oils or cream on body.  ____  Remove all jewelry, piercings, & foreign objects prior to arrival and leave at home.  ____  Remove Dentures, Hearing Aids & Contact Lens prior to surgery.  ____  Bring photo ID and insurance information to hospital (Leave Valuables at Home).  ____  If going home the same day, arrange for a ride home. You will not be able to drive for 24 hrs if Anesthesia was used.   ____  Females (ages 11-60): may need to give a urine sample the morning of surgery; please see Pre op Nurse  prior to using the restroom.  ____  Wear clean, loose fitting clothing to allow for dressings/ bandages.    Bathing Instructions:       -Do not shave your face or body the day before or the day of surgery.              -Do not shave your body for three days before surgery            -Shower & Rinse your body as usual with anti-bacterial Soap, (Dial), for three days before surgery                - on the evening prior to surgery and the morning of surgery, apply one packet of Hibiclens soap to the surgical site.               -Wash the site gently for 5 full minutes. Do Not scrub your skin too hard.               -Rinse your body thoroughly.                -Pat yourself day with a clean, soft towel.               -Do not use lotion, cream, powder or deodorant               -Dress in clean clothes     [facility] Visitor/Ride Policy:  Only 2 adults allowed in pre op/recovery area during your procedure. You MUST HAVE A RIDE HOME from a responsible adult that you know and trust. Medical Transport, Uber or Lyft can ONLY be used if patient has a responsible adult to accompany them during ride home.    Discharge Instructions: You will receive Post-op/Discharge instructions by your Discharge Nurse prior to going home.   *Prevention of surgical site infections:   -Keep incisions clean and dry.   -Do not soak/submerge incisions in water until completely healed.   -Do not apply lotions, powders, creams, or deodorants to site.   -Always make sure hands are cleaned with antibacterial soap/ alcohol-based  prior to touching the surgical site.        *Signs and symptoms of Infection Before or After Surgery:               !!!If you experience any fever, chills, nausea/ vomiting, foul odor/ excessive drainage from surgical site, flu-like symptoms, new wounds or cuts, PLEASE CALL THE SURGEON OFFICE at [phone] or SEND MESSAGE THROUGH [employer]!!!     *If you are running late day of surgery, please call the Surgery Dept @  [phone].    *Billing question, please call  [phone]     Thank you,  -[facility] Pre Admit Dept.  [phone]-[place]   or [phone]  M-F 7:30 am-4:00 pm (Closed Major Holidays)    Additional Tests Scheduled Today:  LABS (1ST Floor) Check in at the !

## 2024-02-27 NOTE — ASSESSMENT & PLAN NOTE
- follows with Dr. Rangel, planning on R knee revision 3/12    Known risk factors for perioperative complications: RA, asthma, HTN  Difficulty with intubation is not anticipated.    Cardiac Risk Estimation: Based on the Revised Cardiac Risk index, patient is a Class I risk with a 3.9% risk of a major cardiac event in a moderate risk procedure.    Cardiology Eval, Dr. Wymanah EKGs without dynamic ischemia, normal cardiac exam, METS more than 4.  Okay to undergo her knee surgery.  Stable from cardiac standpoint.       1.) Preoperative workup as follows: ECG, hemoglobin, hematocrit, electrolytes, creatinine, glucose, UA and cardiology eval.  2.) Change in medication regimen before surgery: hold ASA/NSAIDS/OTC vitamins/supplements 7 days prior to sx   3.) Prophylaxis for cardiac events with perioperative beta-blockers: not indicated.  4.) Invasive hemodynamic monitoring perioperatively: at the discretion of anesthesiologist.  5.) Deep vein thrombosis prophylaxis postoperatively: intermittent pneumatic compression boots and regimen to be chosen by surgical team.  6.) Current medications which may produce withdrawal symptoms if withheld perioperatively: none  7.) Other measures: Postoperative hypertension management with IV hydralazine until able to take oral medications.  Postoperative incentive spirometry to prevent pneumonia.

## 2024-02-28 DIAGNOSIS — Z12.31 OTHER SCREENING MAMMOGRAM: ICD-10-CM

## 2024-02-28 LAB
BILIRUB UR QL STRIP: NEGATIVE
CLARITY UR REFRACT.AUTO: CLEAR
COLOR UR AUTO: COLORLESS
GLUCOSE UR QL STRIP: NEGATIVE
HGB UR QL STRIP: NEGATIVE
KETONES UR QL STRIP: NEGATIVE
LEUKOCYTE ESTERASE UR QL STRIP: NEGATIVE
NITRITE UR QL STRIP: NEGATIVE
PH UR STRIP: 7 [PH] (ref 5–8)
PROT UR QL STRIP: NEGATIVE
SP GR UR STRIP: 1 (ref 1–1.03)
URN SPEC COLLECT METH UR: ABNORMAL

## 2024-03-01 LAB
ACID FAST MOD KINY STN SPEC: NORMAL
MYCOBACTERIUM SPEC QL CULT: NORMAL

## 2024-03-05 ENCOUNTER — OFFICE VISIT (OUTPATIENT)
Dept: OPHTHALMOLOGY | Facility: CLINIC | Age: 69
End: 2024-03-05
Payer: MEDICARE

## 2024-03-05 DIAGNOSIS — H52.4 MYOPIA WITH ASTIGMATISM AND PRESBYOPIA, BILATERAL: ICD-10-CM

## 2024-03-05 DIAGNOSIS — Z96.1 PSEUDOPHAKIA OF BOTH EYES: Primary | ICD-10-CM

## 2024-03-05 DIAGNOSIS — H52.203 MYOPIA WITH ASTIGMATISM AND PRESBYOPIA, BILATERAL: ICD-10-CM

## 2024-03-05 DIAGNOSIS — H52.13 MYOPIA WITH ASTIGMATISM AND PRESBYOPIA, BILATERAL: ICD-10-CM

## 2024-03-05 DIAGNOSIS — H26.492 PCO (POSTERIOR CAPSULAR OPACIFICATION), LEFT: ICD-10-CM

## 2024-03-05 PROCEDURE — 92014 COMPRE OPH EXAM EST PT 1/>: CPT | Mod: S$GLB,,, | Performed by: OPTOMETRIST

## 2024-03-05 PROCEDURE — 99999 PR PBB SHADOW E&M-EST. PATIENT-LVL III: CPT | Mod: PBBFAC,,, | Performed by: OPTOMETRIST

## 2024-03-05 PROCEDURE — 92015 DETERMINE REFRACTIVE STATE: CPT | Mod: S$GLB,,, | Performed by: OPTOMETRIST

## 2024-03-06 ENCOUNTER — PATIENT MESSAGE (OUTPATIENT)
Dept: OPTOMETRY | Facility: CLINIC | Age: 69
End: 2024-03-06
Payer: MEDICARE

## 2024-03-11 ENCOUNTER — TELEPHONE (OUTPATIENT)
Dept: PREADMISSION TESTING | Facility: HOSPITAL | Age: 69
End: 2024-03-11
Payer: MEDICARE

## 2024-03-11 ENCOUNTER — ANESTHESIA EVENT (OUTPATIENT)
Dept: SURGERY | Facility: HOSPITAL | Age: 69
DRG: 468 | End: 2024-03-11
Payer: MEDICARE

## 2024-03-11 NOTE — ANESTHESIA PREPROCEDURE EVALUATION
03/11/2024  Caitlin Ahmadi is a 69 y.o., female.    Patient Active Problem List   Diagnosis    Recurrent major depressive disorder, in partial remission    Osteopenia of multiple sites    Anxiety    Seropositive rheumatoid arthritis    Primary hypertension    Immunodeficiency due to drugs (CODE)    SOB (shortness of breath)    Mild persistent asthma with acute exacerbation    Adult bronchiectasis    Cellulitis of finger of right hand    Cannabis intoxication    Fracture of nasal bone    Hyperlipidemia    Immunocompromised state    Heart valve disease    Malignant neoplasm of appendix    Nausea and vomiting    Aortic atherosclerosis    Primary osteoarthritis of both knees    Gastroesophageal reflux disease without esophagitis     Past Surgical History:   Procedure Laterality Date    ADENOIDECTOMY      CATARACT EXTRACTION W/  INTRAOCULAR LENS IMPLANT Left 12/09/2020    CATARACT EXTRACTION W/  INTRAOCULAR LENS IMPLANT      CHOLECYSTECTOMY      COLONOSCOPY N/A 10/13/2021    Procedure: COLONOSCOPY;  Surgeon: Elissa Yoon MD;  Location: [facility];  Service: Endoscopy;  Laterality: N/A;    HERNIA REPAIR      HYSTERECTOMY      TONSILLECTOMY      TOTAL KNEE ARTHROPLASTY Bilateral        Pre-op Assessment    I have reviewed the Patient Summary Reports.    I have reviewed the NPO Status.   I have reviewed the Medications.     Review of Systems  Anesthesia Hx:  No problems with previous Anesthesia              Personal Hx of Anesthesia complications, Post-Operative Nausea/Vomiting, in the past, but not with recent anesthetics / prophylaxis                    Social:  Former Smoker       Hematology/Oncology:  Hematology Normal                                     Cardiovascular:                hyperlipidemia   ECG has been reviewed. She states she is no longer on htn meds.                         Pulmonary:     Asthma asymptomatic and mild                   Renal/:  Renal/ Normal                 Hepatic/GI:     GERD             Musculoskeletal:     Seropositive rheumatoid arthritis            Neurological:  Neurology Normal                                      Endocrine:  Endocrine Normal            Psych:    depression                Physical Exam  General: Well nourished    Airway:  Mallampati: II   Mouth Opening: Normal  TM Distance: Normal  Neck ROM: Normal ROM    Dental:  Intact        Anesthesia Plan  Type of Anesthesia, risks & benefits discussed:    Anesthesia Type: Gen ETT  Intra-op Monitoring Plan: Standard ASA Monitors  Post Op Pain Control Plan: multimodal analgesia  Induction:  IV  Airway Plan: , Post-Induction  Informed Consent: Informed consent signed with the Patient and all parties understand the risks and agree with anesthesia plan.  All questions answered.   ASA Score: 2    Ready For Surgery From Anesthesia Perspective.     .      Chemistry        Component Value Date/Time     02/27/2024 1307    K 4.2 02/27/2024 1307     02/27/2024 1307    CO2 25 02/27/2024 1307    BUN 12 02/27/2024 1307    CREATININE 0.8 02/27/2024 1307     02/27/2024 1307        Component Value Date/Time    CALCIUM 9.4 02/27/2024 1307    ALKPHOS 87 02/27/2024 1307    AST 24 02/27/2024 1307    ALT 31 02/27/2024 1307    BILITOT 0.2 02/27/2024 1307    ESTGFRAFRICA >60 07/19/2022 1438    EGFRNONAA >60 07/19/2022 1438        Lab Results   Component Value Date    WBC 6.47 02/27/2024    HGB 12.5 02/27/2024    HCT 36.9 (L) 02/27/2024    MCV 89 02/27/2024     02/27/2024       Sinus rhythm with 1st degree A-V block   Low voltage QRS   Cannot rule out Anterior infarct ,age undetermined   Abnormal ECG   No previous ECGs available   Confirmed by LOLY BARNHART MD ([id number]) on 2/1/2024 11:09:12 PM       Echo 12/18/20:  With left ventricular concentric remodeling and normal systolic function. The estimated ejection fraction is  60%.  Grade I left ventricular diastolic dysfunction.

## 2024-03-11 NOTE — TELEPHONE ENCOUNTER
Called and spoke with Pt about the following:     Please arrive to [facility] ([facility]) at 6:00 am on 3/12/24 for your scheduled procedure.  Address: [address] [place] [address] [place] [address] (2nd Building on left, 1st Floor Lobby)  >>>NO eating or drinking after midnight unless instructed otherwise by your Surgeon<<<

## 2024-03-12 ENCOUNTER — ANESTHESIA (OUTPATIENT)
Dept: SURGERY | Facility: HOSPITAL | Age: 69
DRG: 468 | End: 2024-03-12
Payer: MEDICARE

## 2024-03-12 ENCOUNTER — HOSPITAL ENCOUNTER (INPATIENT)
Facility: HOSPITAL | Age: 69
LOS: 1 days | Discharge: HOME HEALTH | DRG: 468 | End: 2024-03-13
Attending: ORTHOPAEDIC SURGERY | Admitting: ORTHOPAEDIC SURGERY
Payer: MEDICARE

## 2024-03-12 DIAGNOSIS — Z01.818 PREOP TESTING: ICD-10-CM

## 2024-03-12 DIAGNOSIS — T84.012D FAILED TOTAL RIGHT KNEE REPLACEMENT, SUBSEQUENT ENCOUNTER: Primary | ICD-10-CM

## 2024-03-12 PROBLEM — Z96.659 TOTAL KNEE REPLACEMENT STATUS: Status: ACTIVE | Noted: 2024-03-12

## 2024-03-12 PROBLEM — Z96.651 S/P REVISION OF TOTAL KNEE, RIGHT: Status: ACTIVE | Noted: 2024-03-12

## 2024-03-12 LAB
GRAM STN SPEC: NORMAL
GRAM STN SPEC: NORMAL
HCT VFR BLD AUTO: 35 % (ref 37–48.5)
HGB BLD-MCNC: 11.6 G/DL (ref 12–16)

## 2024-03-12 PROCEDURE — 27000221 HC OXYGEN, UP TO 24 HOURS

## 2024-03-12 PROCEDURE — 97161 PT EVAL LOW COMPLEX 20 MIN: CPT

## 2024-03-12 PROCEDURE — 25000003 PHARM REV CODE 250: Performed by: ORTHOPAEDIC SURGERY

## 2024-03-12 PROCEDURE — 88300 SURGICAL PATH GROSS: CPT | Mod: 26,,, | Performed by: PATHOLOGY

## 2024-03-12 PROCEDURE — 87206 SMEAR FLUORESCENT/ACID STAI: CPT | Performed by: ORTHOPAEDIC SURGERY

## 2024-03-12 PROCEDURE — 94799 UNLISTED PULMONARY SVC/PX: CPT | Mod: XB

## 2024-03-12 PROCEDURE — 0SPC0JZ REMOVAL OF SYNTHETIC SUBSTITUTE FROM RIGHT KNEE JOINT, OPEN APPROACH: ICD-10-PCS | Performed by: ORTHOPAEDIC SURGERY

## 2024-03-12 PROCEDURE — 37000008 HC ANESTHESIA 1ST 15 MINUTES: Performed by: ORTHOPAEDIC SURGERY

## 2024-03-12 PROCEDURE — 25000003 PHARM REV CODE 250

## 2024-03-12 PROCEDURE — 63600175 PHARM REV CODE 636 W HCPCS

## 2024-03-12 PROCEDURE — 36000711: Performed by: ORTHOPAEDIC SURGERY

## 2024-03-12 PROCEDURE — 37000009 HC ANESTHESIA EA ADD 15 MINS: Performed by: ORTHOPAEDIC SURGERY

## 2024-03-12 PROCEDURE — 27487 REVISE/REPLACE KNEE JOINT: CPT | Mod: AS,RT,, | Performed by: PHYSICIAN ASSISTANT

## 2024-03-12 PROCEDURE — 25000242 PHARM REV CODE 250 ALT 637 W/ HCPCS: Performed by: ORTHOPAEDIC SURGERY

## 2024-03-12 PROCEDURE — 97166 OT EVAL MOD COMPLEX 45 MIN: CPT

## 2024-03-12 PROCEDURE — 63600175 PHARM REV CODE 636 W HCPCS: Performed by: ORTHOPAEDIC SURGERY

## 2024-03-12 PROCEDURE — 27201423 OPTIME MED/SURG SUP & DEVICES STERILE SUPPLY: Performed by: ORTHOPAEDIC SURGERY

## 2024-03-12 PROCEDURE — 97530 THERAPEUTIC ACTIVITIES: CPT

## 2024-03-12 PROCEDURE — 85014 HEMATOCRIT: CPT | Performed by: ORTHOPAEDIC SURGERY

## 2024-03-12 PROCEDURE — 88300 SURGICAL PATH GROSS: CPT | Performed by: PATHOLOGY

## 2024-03-12 PROCEDURE — 85018 HEMOGLOBIN: CPT | Performed by: ORTHOPAEDIC SURGERY

## 2024-03-12 PROCEDURE — 11000001 HC ACUTE MED/SURG PRIVATE ROOM

## 2024-03-12 PROCEDURE — 71000033 HC RECOVERY, INTIAL HOUR: Performed by: ORTHOPAEDIC SURGERY

## 2024-03-12 PROCEDURE — 99900035 HC TECH TIME PER 15 MIN (STAT)

## 2024-03-12 PROCEDURE — 87102 FUNGUS ISOLATION CULTURE: CPT | Performed by: ORTHOPAEDIC SURGERY

## 2024-03-12 PROCEDURE — 27487 REVISE/REPLACE KNEE JOINT: CPT | Mod: RT,,, | Performed by: ORTHOPAEDIC SURGERY

## 2024-03-12 PROCEDURE — 36000710: Performed by: ORTHOPAEDIC SURGERY

## 2024-03-12 PROCEDURE — 87116 MYCOBACTERIA CULTURE: CPT | Performed by: ORTHOPAEDIC SURGERY

## 2024-03-12 PROCEDURE — 87070 CULTURE OTHR SPECIMN AEROBIC: CPT | Performed by: ORTHOPAEDIC SURGERY

## 2024-03-12 PROCEDURE — 87205 SMEAR GRAM STAIN: CPT | Performed by: ORTHOPAEDIC SURGERY

## 2024-03-12 PROCEDURE — 94761 N-INVAS EAR/PLS OXIMETRY MLT: CPT

## 2024-03-12 PROCEDURE — 71000039 HC RECOVERY, EACH ADD'L HOUR: Performed by: ORTHOPAEDIC SURGERY

## 2024-03-12 PROCEDURE — 88307 TISSUE EXAM BY PATHOLOGIST: CPT | Mod: 26,,, | Performed by: PATHOLOGY

## 2024-03-12 PROCEDURE — C1776 JOINT DEVICE (IMPLANTABLE): HCPCS | Performed by: ORTHOPAEDIC SURGERY

## 2024-03-12 PROCEDURE — 63600175 PHARM REV CODE 636 W HCPCS: Performed by: ANESTHESIOLOGY

## 2024-03-12 PROCEDURE — C1713 ANCHOR/SCREW BN/BN,TIS/BN: HCPCS | Performed by: ORTHOPAEDIC SURGERY

## 2024-03-12 PROCEDURE — 0SRC0J9 REPLACEMENT OF RIGHT KNEE JOINT WITH SYNTHETIC SUBSTITUTE, CEMENTED, OPEN APPROACH: ICD-10-PCS | Performed by: ORTHOPAEDIC SURGERY

## 2024-03-12 PROCEDURE — 87075 CULTR BACTERIA EXCEPT BLOOD: CPT | Performed by: ORTHOPAEDIC SURGERY

## 2024-03-12 PROCEDURE — 88305 TISSUE EXAM BY PATHOLOGIST: CPT | Performed by: PATHOLOGY

## 2024-03-12 PROCEDURE — 94640 AIRWAY INHALATION TREATMENT: CPT

## 2024-03-12 PROCEDURE — 0SBC0ZZ EXCISION OF RIGHT KNEE JOINT, OPEN APPROACH: ICD-10-PCS | Performed by: ORTHOPAEDIC SURGERY

## 2024-03-12 DEVICE — SIMPLEX® HV WITH GENTAMICIN IS A FAST-SETTING ACRYLIC RESIN WITH ADDITION OF GENTAMICIN SULFATE FOR USE IN BONE SURGERY. MIXING THE TWO SEPARATE STERILE COMPONENTS PRODUCES A DUCTILE BONE CEMENT WHICH, AFTER HARDENING, FIXES THE IMPLANT AND TRANSFERS STRESSES PRODUCED DURING MOVEMENT EVENLY TO THE BONE. SIMPLEX® HV WITH GENTAMICINN CEMENT POWDER ALSO CONTAINS INSOLUBLE ZIRCONIUM DIOXIDE AS AN X-RAY CONTRAST MEDIUM. SIMPLEX® HV WITH GENTAMICIN DOES NOT EMIT A SIGNAL AND DOES NOT POSE A SAFETY RISK IN A MAGNETIC RESONANCE ENVIRONMENT.
Type: IMPLANTABLE DEVICE | Site: KNEE | Status: FUNCTIONAL
Brand: SIMPLEX HV WITH GENTAMICIN

## 2024-03-12 DEVICE — XPE PATELLAR, ON SET, 3 PEGS, MEDIUM,Ïˆ 32 MM
Type: IMPLANTABLE DEVICE | Site: KNEE | Status: FUNCTIONAL
Brand: XPE PATELLAR, ON SET, 3 PEGS

## 2024-03-12 DEVICE — STRAIGHT STEM, PSA TYPE, Ø14X75MM
Type: IMPLANTABLE DEVICE | Site: KNEE | Status: FUNCTIONAL
Brand: STRAIGHT STEM, PSA TYPE

## 2024-03-12 DEVICE — XPE TIBIAL INSERT, PS PLUS, #2, 13MM
Type: IMPLANTABLE DEVICE | Site: KNEE | Status: FUNCTIONAL
Brand: U2 TOTAL KNEE SYSTEM

## 2024-03-12 DEVICE — U2 TIBIAL BASEPLATE, CMA, #2
Type: IMPLANTABLE DEVICE | Site: KNEE | Status: FUNCTIONAL
Brand: U2 TIBIAL BASEPLATE, CMA

## 2024-03-12 DEVICE — U2 FEMORAL COMPONENT, PS, #3, RIGHT
Type: IMPLANTABLE DEVICE | Site: KNEE | Status: FUNCTIONAL
Brand: U2 FEMORAL COMPONENT, PS

## 2024-03-12 RX ORDER — SODIUM CHLORIDE 9 MG/ML
INJECTION, SOLUTION INTRAVENOUS CONTINUOUS
Status: DISCONTINUED | OUTPATIENT
Start: 2024-03-12 | End: 2024-03-12

## 2024-03-12 RX ORDER — METHOTREXATE 2.5 MG/1
20 TABLET ORAL
Status: DISCONTINUED | OUTPATIENT
Start: 2024-03-12 | End: 2024-03-13 | Stop reason: HOSPADM

## 2024-03-12 RX ORDER — CEFAZOLIN SODIUM 2 G/50ML
2 SOLUTION INTRAVENOUS
Status: COMPLETED | OUTPATIENT
Start: 2024-03-12 | End: 2024-03-12

## 2024-03-12 RX ORDER — DEXAMETHASONE SODIUM PHOSPHATE 4 MG/ML
8 INJECTION, SOLUTION INTRA-ARTICULAR; INTRALESIONAL; INTRAMUSCULAR; INTRAVENOUS; SOFT TISSUE ONCE
Status: COMPLETED | OUTPATIENT
Start: 2024-03-13 | End: 2024-03-13

## 2024-03-12 RX ORDER — CHLORHEXIDINE GLUCONATE ORAL RINSE 1.2 MG/ML
10 SOLUTION DENTAL 2 TIMES DAILY
Status: DISCONTINUED | OUTPATIENT
Start: 2024-03-12 | End: 2024-03-13 | Stop reason: HOSPADM

## 2024-03-12 RX ORDER — FLUTICASONE PROPIONATE 50 MCG
1 SPRAY, SUSPENSION (ML) NASAL DAILY
Status: DISCONTINUED | OUTPATIENT
Start: 2024-03-13 | End: 2024-03-13 | Stop reason: HOSPADM

## 2024-03-12 RX ORDER — BUDESONIDE 0.5 MG/2ML
0.5 INHALANT ORAL EVERY 12 HOURS
Status: DISCONTINUED | OUTPATIENT
Start: 2024-03-12 | End: 2024-03-13

## 2024-03-12 RX ORDER — LOSARTAN POTASSIUM 25 MG/1
25 TABLET ORAL DAILY
Status: DISCONTINUED | OUTPATIENT
Start: 2024-03-13 | End: 2024-03-12

## 2024-03-12 RX ORDER — DEXAMETHASONE SODIUM PHOSPHATE 4 MG/ML
8 INJECTION, SOLUTION INTRA-ARTICULAR; INTRALESIONAL; INTRAMUSCULAR; INTRAVENOUS; SOFT TISSUE
Status: DISCONTINUED | OUTPATIENT
Start: 2024-03-12 | End: 2024-03-12

## 2024-03-12 RX ORDER — FLUTICASONE FUROATE AND VILANTEROL 100; 25 UG/1; UG/1
1 POWDER RESPIRATORY (INHALATION) DAILY
Status: DISCONTINUED | OUTPATIENT
Start: 2024-03-12 | End: 2024-03-12 | Stop reason: CLARIF

## 2024-03-12 RX ORDER — SUCCINYLCHOLINE CHLORIDE 20 MG/ML
INJECTION INTRAMUSCULAR; INTRAVENOUS
Status: DISCONTINUED | OUTPATIENT
Start: 2024-03-12 | End: 2024-03-12

## 2024-03-12 RX ORDER — ONDANSETRON HYDROCHLORIDE 2 MG/ML
INJECTION, SOLUTION INTRAVENOUS
Status: DISCONTINUED | OUTPATIENT
Start: 2024-03-12 | End: 2024-03-12

## 2024-03-12 RX ORDER — ROCURONIUM BROMIDE 10 MG/ML
INJECTION, SOLUTION INTRAVENOUS
Status: DISCONTINUED | OUTPATIENT
Start: 2024-03-12 | End: 2024-03-12

## 2024-03-12 RX ORDER — ARFORMOTEROL TARTRATE 15 UG/2ML
15 SOLUTION RESPIRATORY (INHALATION) 2 TIMES DAILY
Status: DISCONTINUED | OUTPATIENT
Start: 2024-03-12 | End: 2024-03-13

## 2024-03-12 RX ORDER — PANTOPRAZOLE SODIUM 40 MG/1
40 TABLET, DELAYED RELEASE ORAL DAILY
Status: DISCONTINUED | OUTPATIENT
Start: 2024-03-13 | End: 2024-03-13 | Stop reason: HOSPADM

## 2024-03-12 RX ORDER — TRANEXAMIC ACID 10 MG/ML
1000 INJECTION, SOLUTION INTRAVENOUS
Status: COMPLETED | OUTPATIENT
Start: 2024-03-12 | End: 2024-03-12

## 2024-03-12 RX ORDER — DOCUSATE SODIUM 100 MG/1
100 CAPSULE, LIQUID FILLED ORAL 2 TIMES DAILY
Status: DISCONTINUED | OUTPATIENT
Start: 2024-03-12 | End: 2024-03-13 | Stop reason: HOSPADM

## 2024-03-12 RX ORDER — SODIUM CHLORIDE 9 MG/ML
INJECTION, SOLUTION INTRAVENOUS CONTINUOUS
Status: DISCONTINUED | OUTPATIENT
Start: 2024-03-12 | End: 2024-03-13 | Stop reason: HOSPADM

## 2024-03-12 RX ORDER — ALBUTEROL SULFATE 90 UG/1
2 AEROSOL, METERED RESPIRATORY (INHALATION) EVERY 6 HOURS PRN
Status: DISCONTINUED | OUTPATIENT
Start: 2024-03-12 | End: 2024-03-12 | Stop reason: CLARIF

## 2024-03-12 RX ORDER — AMITRIPTYLINE HYDROCHLORIDE 50 MG/1
100 TABLET, FILM COATED ORAL NIGHTLY
Status: DISCONTINUED | OUTPATIENT
Start: 2024-03-12 | End: 2024-03-13 | Stop reason: HOSPADM

## 2024-03-12 RX ORDER — CHLORHEXIDINE GLUCONATE ORAL RINSE 1.2 MG/ML
10 SOLUTION DENTAL
Status: DISCONTINUED | OUTPATIENT
Start: 2024-03-12 | End: 2024-03-12

## 2024-03-12 RX ORDER — CEFAZOLIN SODIUM 2 G/50ML
2 SOLUTION INTRAVENOUS
Status: DISCONTINUED | OUTPATIENT
Start: 2024-03-12 | End: 2024-03-12 | Stop reason: SDUPTHER

## 2024-03-12 RX ORDER — ROPIVACAINE/EPI/CLONIDINE/KET 2.46-0.005
SYRINGE (ML) INJECTION
Status: DISCONTINUED | OUTPATIENT
Start: 2024-03-12 | End: 2024-03-12 | Stop reason: HOSPADM

## 2024-03-12 RX ORDER — GABAPENTIN 300 MG/1
300 CAPSULE ORAL NIGHTLY
Status: DISCONTINUED | OUTPATIENT
Start: 2024-03-12 | End: 2024-03-13 | Stop reason: HOSPADM

## 2024-03-12 RX ORDER — PROPOFOL 10 MG/ML
VIAL (ML) INTRAVENOUS
Status: DISCONTINUED | OUTPATIENT
Start: 2024-03-12 | End: 2024-03-12

## 2024-03-12 RX ORDER — ONDANSETRON 8 MG/1
8 TABLET, ORALLY DISINTEGRATING ORAL EVERY 8 HOURS PRN
Status: DISCONTINUED | OUTPATIENT
Start: 2024-03-12 | End: 2024-03-13 | Stop reason: HOSPADM

## 2024-03-12 RX ORDER — MIDAZOLAM HYDROCHLORIDE 1 MG/ML
INJECTION INTRAMUSCULAR; INTRAVENOUS
Status: DISCONTINUED | OUTPATIENT
Start: 2024-03-12 | End: 2024-03-12

## 2024-03-12 RX ORDER — HYDRALAZINE HYDROCHLORIDE 20 MG/ML
10 INJECTION INTRAMUSCULAR; INTRAVENOUS EVERY 8 HOURS PRN
Status: DISCONTINUED | OUTPATIENT
Start: 2024-03-12 | End: 2024-03-13 | Stop reason: HOSPADM

## 2024-03-12 RX ORDER — ONDANSETRON HYDROCHLORIDE 2 MG/ML
4 INJECTION, SOLUTION INTRAVENOUS DAILY PRN
Status: DISCONTINUED | OUTPATIENT
Start: 2024-03-12 | End: 2024-03-12 | Stop reason: HOSPADM

## 2024-03-12 RX ORDER — MORPHINE SULFATE 4 MG/ML
2 INJECTION, SOLUTION INTRAMUSCULAR; INTRAVENOUS EVERY 4 HOURS PRN
Status: DISCONTINUED | OUTPATIENT
Start: 2024-03-12 | End: 2024-03-13 | Stop reason: HOSPADM

## 2024-03-12 RX ORDER — ALBUTEROL SULFATE 0.83 MG/ML
2.5 SOLUTION RESPIRATORY (INHALATION) EVERY 6 HOURS PRN
Status: DISCONTINUED | OUTPATIENT
Start: 2024-03-12 | End: 2024-03-13 | Stop reason: HOSPADM

## 2024-03-12 RX ORDER — FENTANYL CITRATE 50 UG/ML
INJECTION, SOLUTION INTRAMUSCULAR; INTRAVENOUS
Status: DISCONTINUED | OUTPATIENT
Start: 2024-03-12 | End: 2024-03-12

## 2024-03-12 RX ORDER — IBUPROFEN 600 MG/1
600 TABLET ORAL EVERY 8 HOURS PRN
Status: DISCONTINUED | OUTPATIENT
Start: 2024-03-12 | End: 2024-03-13 | Stop reason: HOSPADM

## 2024-03-12 RX ORDER — OXYCODONE HYDROCHLORIDE 5 MG/1
10 TABLET ORAL
Status: DISCONTINUED | OUTPATIENT
Start: 2024-03-12 | End: 2024-03-13 | Stop reason: HOSPADM

## 2024-03-12 RX ORDER — BUSPIRONE HYDROCHLORIDE 10 MG/1
10 TABLET ORAL 2 TIMES DAILY
Status: DISCONTINUED | OUTPATIENT
Start: 2024-03-12 | End: 2024-03-13 | Stop reason: HOSPADM

## 2024-03-12 RX ORDER — LIDOCAINE HYDROCHLORIDE 20 MG/ML
INJECTION INTRAVENOUS
Status: DISCONTINUED | OUTPATIENT
Start: 2024-03-12 | End: 2024-03-12

## 2024-03-12 RX ORDER — GABAPENTIN 300 MG/1
300 CAPSULE ORAL 3 TIMES DAILY
Status: DISCONTINUED | OUTPATIENT
Start: 2024-03-12 | End: 2024-03-12 | Stop reason: ALTCHOICE

## 2024-03-12 RX ORDER — BISACODYL 10 MG/1
10 SUPPOSITORY RECTAL DAILY PRN
Status: DISCONTINUED | OUTPATIENT
Start: 2024-03-12 | End: 2024-03-13 | Stop reason: HOSPADM

## 2024-03-12 RX ORDER — ONDANSETRON HYDROCHLORIDE 2 MG/ML
4 INJECTION, SOLUTION INTRAVENOUS EVERY 12 HOURS PRN
Status: DISCONTINUED | OUTPATIENT
Start: 2024-03-12 | End: 2024-03-13 | Stop reason: HOSPADM

## 2024-03-12 RX ORDER — DEXAMETHASONE SODIUM PHOSPHATE 4 MG/ML
INJECTION, SOLUTION INTRA-ARTICULAR; INTRALESIONAL; INTRAMUSCULAR; INTRAVENOUS; SOFT TISSUE
Status: DISCONTINUED | OUTPATIENT
Start: 2024-03-12 | End: 2024-03-12

## 2024-03-12 RX ORDER — ACETAMINOPHEN 325 MG/1
650 TABLET ORAL EVERY 8 HOURS PRN
Status: DISCONTINUED | OUTPATIENT
Start: 2024-03-12 | End: 2024-03-12 | Stop reason: SDUPTHER

## 2024-03-12 RX ORDER — HYDROMORPHONE HYDROCHLORIDE 2 MG/ML
0.2 INJECTION, SOLUTION INTRAMUSCULAR; INTRAVENOUS; SUBCUTANEOUS EVERY 5 MIN PRN
Status: DISCONTINUED | OUTPATIENT
Start: 2024-03-12 | End: 2024-03-12 | Stop reason: HOSPADM

## 2024-03-12 RX ORDER — ACETAMINOPHEN 325 MG/1
650 TABLET ORAL EVERY 8 HOURS PRN
Status: DISCONTINUED | OUTPATIENT
Start: 2024-03-12 | End: 2024-03-13 | Stop reason: HOSPADM

## 2024-03-12 RX ORDER — OXYCODONE HYDROCHLORIDE 5 MG/1
5 TABLET ORAL
Status: DISCONTINUED | OUTPATIENT
Start: 2024-03-12 | End: 2024-03-13 | Stop reason: HOSPADM

## 2024-03-12 RX ADMIN — ROCURONIUM BROMIDE 10 MG: 10 SOLUTION INTRAVENOUS at 08:03

## 2024-03-12 RX ADMIN — HYDROMORPHONE HYDROCHLORIDE 0.2 MG: 2 INJECTION INTRAMUSCULAR; INTRAVENOUS; SUBCUTANEOUS at 11:03

## 2024-03-12 RX ADMIN — SODIUM CHLORIDE: 9 INJECTION, SOLUTION INTRAVENOUS at 11:03

## 2024-03-12 RX ADMIN — SODIUM CHLORIDE, SODIUM LACTATE, POTASSIUM CHLORIDE, AND CALCIUM CHLORIDE: .6; .31; .03; .02 INJECTION, SOLUTION INTRAVENOUS at 07:03

## 2024-03-12 RX ADMIN — FENTANYL CITRATE 25 MCG: 50 INJECTION, SOLUTION INTRAMUSCULAR; INTRAVENOUS at 08:03

## 2024-03-12 RX ADMIN — OXYCODONE HYDROCHLORIDE 5 MG: 5 TABLET ORAL at 11:03

## 2024-03-12 RX ADMIN — LIDOCAINE HYDROCHLORIDE 100 MG: 20 INJECTION INTRAVENOUS at 07:03

## 2024-03-12 RX ADMIN — DOCUSATE SODIUM 100 MG: 100 CAPSULE, LIQUID FILLED ORAL at 10:03

## 2024-03-12 RX ADMIN — PROPOFOL 100 MG: 10 INJECTION, EMULSION INTRAVENOUS at 07:03

## 2024-03-12 RX ADMIN — FENTANYL CITRATE 50 MCG: 50 INJECTION, SOLUTION INTRAMUSCULAR; INTRAVENOUS at 07:03

## 2024-03-12 RX ADMIN — BUDESONIDE 0.5 MG: 0.5 INHALANT RESPIRATORY (INHALATION) at 07:03

## 2024-03-12 RX ADMIN — IBUPROFEN 600 MG: 200 TABLET, FILM COATED ORAL at 10:03

## 2024-03-12 RX ADMIN — SUGAMMADEX 200 MG: 100 INJECTION, SOLUTION INTRAVENOUS at 09:03

## 2024-03-12 RX ADMIN — ONDANSETRON 4 MG: 2 INJECTION INTRAMUSCULAR; INTRAVENOUS at 09:03

## 2024-03-12 RX ADMIN — ONDANSETRON 8 MG: 8 TABLET, ORALLY DISINTEGRATING ORAL at 09:03

## 2024-03-12 RX ADMIN — CHLORHEXIDINE GLUCONATE 0.12% ORAL RINSE 10 ML: 1.2 LIQUID ORAL at 08:03

## 2024-03-12 RX ADMIN — TRANEXAMIC ACID 1000 MG: 10 INJECTION, SOLUTION INTRAVENOUS at 09:03

## 2024-03-12 RX ADMIN — ROCURONIUM BROMIDE 15 MG: 10 SOLUTION INTRAVENOUS at 07:03

## 2024-03-12 RX ADMIN — ARFORMOTEROL TARTRATE 15 MCG: 15 SOLUTION RESPIRATORY (INHALATION) at 07:03

## 2024-03-12 RX ADMIN — MIDAZOLAM HYDROCHLORIDE 2 MG: 1 INJECTION, SOLUTION INTRAMUSCULAR; INTRAVENOUS at 07:03

## 2024-03-12 RX ADMIN — HYDROMORPHONE HYDROCHLORIDE 0.2 MG: 2 INJECTION INTRAMUSCULAR; INTRAVENOUS; SUBCUTANEOUS at 10:03

## 2024-03-12 RX ADMIN — CEFAZOLIN 2 G: 2 INJECTION, POWDER, FOR SOLUTION INTRAMUSCULAR; INTRAVENOUS at 02:03

## 2024-03-12 RX ADMIN — SUCCINYLCHOLINE CHLORIDE 100 MG: 20 INJECTION, SOLUTION INTRAMUSCULAR; INTRAVENOUS; PARENTERAL at 07:03

## 2024-03-12 RX ADMIN — MORPHINE SULFATE 2 MG: 4 INJECTION INTRAVENOUS at 07:03

## 2024-03-12 RX ADMIN — ONDANSETRON 4 MG: 2 INJECTION INTRAMUSCULAR; INTRAVENOUS at 04:03

## 2024-03-12 RX ADMIN — CEFAZOLIN SODIUM 2 G: 2 SOLUTION INTRAVENOUS at 07:03

## 2024-03-12 RX ADMIN — CHLORHEXIDINE GLUCONATE 0.12% ORAL RINSE 10 ML: 1.2 LIQUID ORAL at 07:03

## 2024-03-12 RX ADMIN — ROCURONIUM BROMIDE 5 MG: 10 SOLUTION INTRAVENOUS at 07:03

## 2024-03-12 RX ADMIN — DEXAMETHASONE SODIUM PHOSPHATE 8 MG: 4 INJECTION, SOLUTION INTRA-ARTICULAR; INTRALESIONAL; INTRAMUSCULAR; INTRAVENOUS; SOFT TISSUE at 07:03

## 2024-03-12 RX ADMIN — AMITRIPTYLINE HYDROCHLORIDE 100 MG: 50 TABLET, FILM COATED ORAL at 08:03

## 2024-03-12 RX ADMIN — TRANEXAMIC ACID 1000 MG: 10 INJECTION, SOLUTION INTRAVENOUS at 07:03

## 2024-03-12 RX ADMIN — OXYCODONE HYDROCHLORIDE 10 MG: 5 TABLET ORAL at 05:03

## 2024-03-12 RX ADMIN — DOCUSATE SODIUM 100 MG: 100 CAPSULE, LIQUID FILLED ORAL at 08:03

## 2024-03-12 RX ADMIN — ACETAMINOPHEN 650 MG: 325 TABLET ORAL at 07:03

## 2024-03-12 RX ADMIN — METHOTREXATE 20 MG: 2.5 TABLET ORAL at 12:03

## 2024-03-12 RX ADMIN — PROPOFOL 40 MG: 10 INJECTION, EMULSION INTRAVENOUS at 08:03

## 2024-03-12 NOTE — H&P
Subjective:     Patient ID: Caitlin Ahmadi is a 68 y.o. female.    Chief Complaint: Post-op Evaluation of the Right Knee  01/11/2024  HPI:  Bilateral knee pain with the right worse than the left   Bilateral hip pains   Low back pain   Patient states in 2017 had right partial knee replacement by Dr. Wheeler, in 2018 she had left partial knee replacement done by Dr. randhawa at [facility].  She said she did well with both and all of a sudden the right knee became severely painful over the last 3 months.  She has been having pain on and off with it and swelling.  She made her appointment today.  In the meantime she was having pain in her hips for 6 months and no history of trauma she can not sleep on the right side she sleeps on the left.  She has been seen by Rheumatology and primary care and other providers and no thing was offered to her.  She tried Motrin mixed with Tylenol 2 tablets of each may taken twice a day seems to ease things up a little bit.  She was not placed on other anti-inflammatories by prescription.  She has not taking steroid pills because she is on methotrexate.  Patient gives history of infection occurring into her thumb was on IV antibiotics and was given tramadol for the pain and not having any medications at this time for pain.  I 1 point she said she is having back pain then she said it has not hurting her as much.  There is no radicular symptoms type going below the knee.  She did receive an injection of the right knee awhile back by Rheumatology over the pes anserinus bursa on the medial tibial flare according to the patient      1/26/24    Right knee severe pain.  On x-ray looks like it is loose and malrotated.  Workup for infection has been negative.  Sed rate, CRP, crystals, WBC, knee aspirate cultures have been negative also.  I went over does labs with the patient.  I think at this time she needs to have right knee revised.  Spent a long time discussing the revision and the pros and cons  of surgery in details and allow her to ask questions.  Went over the instructions.  She needs to be seen by Cardiology.  She can not take Mobic or Celebrex  She can take ibuprofen with Tylenol   No fever no chills no shortness of breath or difficulty with chewing swallowing loss of bowel bladder control  She does complain of numbness on the outside of her knee as well in her foot.  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    Cataract     Encounter for blood transfusion     Hypertension     PONV (postoperative nausea and vomiting)     Seropositive rheumatoid arthritis 2022     Past Surgical History:   Procedure Laterality Date    ADENOIDECTOMY      CATARACT EXTRACTION W/  INTRAOCULAR LENS IMPLANT Left 2020    CATARACT EXTRACTION W/  INTRAOCULAR LENS IMPLANT      CHOLECYSTECTOMY      COLONOSCOPY N/A 10/13/2021    Procedure: COLONOSCOPY;  Surgeon: Elissa Yoon MD;  Location: [facility];  Service: Endoscopy;  Laterality: N/A;    HERNIA REPAIR      HYSTERECTOMY      TONSILLECTOMY      TOTAL KNEE ARTHROPLASTY Bilateral      Family History   Problem Relation Age of Onset    No Known Problems Mother     Alzheimer's disease Father      Social History     Socioeconomic History    Marital status:    Tobacco Use    Smoking status: Former     Current packs/day: 0.00     Average packs/day: 1 pack/day for 20.0 years (20.0 ttl pk-yrs)     Types: Cigarettes     Start date: 1970     Quit date: 1990     Years since quittin.6    Smokeless tobacco: Never   Substance and Sexual Activity    Alcohol use: Yes     Alcohol/week: 5.0 standard drinks of alcohol     Types: 5 Glasses of wine per week    Drug use: Never    Sexual activity: Yes     Partners: Male     Medication List with Changes/Refills   Current  Medications    ALBUTEROL (PROVENTIL/VENTOLIN HFA) 90 MCG/ACTUATION INHALER    Inhale 2 puffs into the lungs every 6 (six) hours as needed for Wheezing. Rescue    AMITRIPTYLINE (ELAVIL) 100 MG TABLET    TAKE 3 TABLETS(300 MG) BY MOUTH EVERY EVENING    BUDESONIDE (PULMICORT) 0.5 MG/2 ML NEBULIZER SOLUTION    Take 2 mLs (0.5 mg total) by nebulization 2 (two) times daily. Controller    BUSPIRONE (BUSPAR) 10 MG TABLET    TAKE 1 TABLET(10 MG) BY MOUTH TWICE DAILY    CALCIPOTRIENE (DOVONOX) 0.005 % CREAM    Apply topically 2 (two) times daily.    FLUTICASONE FUROATE-VILANTEROL (BREO ELLIPTA) 100-25 MCG/DOSE DISKUS INHALER    Inhale 1 puff into the lungs once daily. Controller.Wash out mouth after use    FLUTICASONE FUROATE-VILANTEROL (BREO) 100-25 MCG/DOSE DISKUS INHALER    INHALE 1 PUFF INTO THE LUNGS EVERY DAY. WASH MOUTH AFTER USE    FLUTICASONE PROPIONATE (FLONASE) 50 MCG/ACTUATION NASAL SPRAY    SHAKE LIQUID AND USE 2 SPRAYS(100 MCG) IN EACH NOSTRIL EVERY DAY    IBUPROFEN (ADVIL,MOTRIN) 200 MG TABLET    Take 600 mg by mouth every 6 (six) hours as needed.    LOSARTAN (COZAAR) 25 MG TABLET    TAKE 1 TABLET(25 MG) BY MOUTH EVERY DAY    METHOTREXATE 2.5 MG TAB    Take 8 tablets (20 mg total) by mouth every 7 days.    PANTOPRAZOLE (PROTONIX) 40 MG TABLET    Take 1 tablet (40 mg total) by mouth once daily.     Review of patient's allergies indicates:   Allergen Reactions    Pneumococcal vaccine Swelling    Celebrex [celecoxib] Itching and Rash    Hydrocodone-acetaminophen Nausea Only and Nausea And Vomiting    Meloxicam Rash    Sulfa (sulfonamide antibiotics) Rash     Review of Systems   Constitutional: Negative for decreased appetite.   HENT:  Negative for tinnitus.    Eyes:  Negative for double vision.   Cardiovascular:  Negative for chest pain.   Respiratory:  Negative for wheezing.    Hematologic/Lymphatic: Negative for bleeding problem.   Skin:  Negative for dry skin.   Musculoskeletal:  Positive for arthritis, back  pain, joint pain, muscle weakness and myalgias. Negative for gout, neck pain and stiffness.   Gastrointestinal:  Negative for abdominal pain.   Genitourinary:  Negative for bladder incontinence.   Neurological:  Negative for numbness, paresthesias and sensory change.   Psychiatric/Behavioral:  Negative for altered mental status.        Objective:   There is no height or weight on file to calculate BMI.  There were no vitals filed for this visit.       General    Constitutional: She is oriented to person, place, and time. She appears well-developed.   HENT:   Head: Atraumatic.   Eyes: EOM are normal.   Pulmonary/Chest: Effort normal.   Neurological: She is alert and oriented to person, place, and time.   Psychiatric: Judgment normal.           Ambulating without any assistive devices   Pelvis is level   Negative straight leg raising bilaterally   Bilateral hips passive motion no pain in the groin and excellent range of motion   Severe pain to palpation over the greater trochanters with the right worse than the left with radiation down the lateral thigh.  Hip flexors, abductors and adductors were slightly weak at 5-/5   Right knee with a incision or you knee replacement the scar healed well.  There is an area highly suspicious of psoriasis approximately a cm and a half by cm and half just a medial to the surgical incision.  There is swelling and pain over the medial aspect of the knee joint and over the medial tibial flare.  She does have almost full motion no defect in the patella or quadriceps tendon.  Collaterals are stable.  Negative anterior drawer.  .  Possible patellofemoral crepitus to range of motion and compression  The left knee with anterior surgical scar healed well.  She has 0-130 degrees of flexion stable to varus valgus stressing.  There is no swelling in the knee joint.  There is no tenderness to palpation there is some crepitus to compression on the patella and slight tenderness at that point.   Negative anterior drawer  Calves are soft nontender  Ankle motion intact   Skin is warm to touch    Relevant imaging results reviewed and interpreted by me, discussed with the patient and / or family today     X-ray 01/11/2024 of the pelvis and the hips showing excellent joint space.  There is small osteophyte over the greater trochanters consistent with bursitis of the hips.  The bone look osteopenic   X-ray 1/11/24 bilateral knees showing left knee medial partial knee replacement still in good alignment.  There is some mild patellofemoral arthritic changes and small marginal osteophyte.  Right knee looks like the femoral component of the partial knee replacement medially is loose there is radiolucent line on the lateral view like a when she will type of movement of the femoral component and does not look where it is supposed to probably rotated due to loosening.  There is also radiolucent line on the tibial component on the lateral view posteriorly.  Indicative of loosened partial knee replacement  Assessment:     Encounter Diagnoses   Name Primary?    Displacement of internal right knee prosthesis, subsequent encounter Yes    Status post right partial knee replacement     Status post left partial knee replacement     Chronic pain of right knee     Greater trochanteric bursitis of both hips     Low back pain, unspecified back pain laterality, unspecified chronicity, unspecified whether sciatica present         Plan:   Displacement of internal right knee prosthesis, subsequent encounter    Status post right partial knee replacement    Status post left partial knee replacement    Chronic pain of right knee    Greater trochanteric bursitis of both hips    Low back pain, unspecified back pain laterality, unspecified chronicity, unspecified whether sciatica present         Patient Instructions   Your workup for infection completely negative at this time in the right knee   On x-ray looks like the partial knee  replacement is coming loose from the bone specially the 1 on the femur  You are having quite a bit of pain and discomfort  Will proceed with right total knee revision.  Procedure is roughly 2 hours done under general anesthetic.  You might have to stay overnight and go home the next day.  Occasionally we get people doing really well in recovery room that they can go home the same day.  You need to get cardiac clearance prior to surgery.  There is a class that is mandatory in the hospital that you have to attend.  You will meet the home health agency, the home physical therapy the preop nurse and they go over your labs make sure everything is okay.  Risks involved and having surgery  Procedure, common risks and benefits,alternatives discussed in details.All questions answered.Patient expressed understanding.Patient instructed to call for any questions that could arise in the future.    Most common Risks:  Infection/less than 2%  Leg-length discrepancy    Neuro-vascular injury ( resulting in loss of motor and sensory functions) almost everybody would be numb on the outside of the knee.  There is very slight chance of developing what we call a footdrop that means you can not bring the foot up but you can bring it down and this is usually a stretch on the nerve.  80% of foot drops recuperate within 6 months to a year.  There is a slight chance of injuring the popliteal vessels during surgery  Pain  Blood clot    Loss of motion/we heal with scar tissue your job is to do extensive physical therapy and work through the pain so you do not scar down  Fracture of bone  Failure of procedure to achieve its intended purpose/total knee replacement is 80% successful in decreasing pain and increasing function  Failure of hardware/they should last at least 15 years for you that is the average  Non-union or mal-union of bone  Malalignment  Death/you need to see the heart doctor prior to surgery    Patient instructions for joint  replacement    Before surgery  1.  Shower with Hibiclens soap/antibacterial for 3 days prior to surgery to decrease risk of infection  2..  Stop all blood thinners/aspirin, Coumadin, warfarin, Plavix, Eliquis, Xarelto etc 7 days prior to surgery.  You need to stop all anti-inflammatories 7 days prior to surgery as well as all vitamins except vitamin-D.  All natural products that you are taking including turmeric or omega-3 or fish oil etcetera all has to stop 7 days prior to surgery  3.  No eating or drinking after midnight the night before surgery.  We would like you to drink a bottle of Gatorade or Powerade or Pedialyte the night before surgery prior to midnight so you do not get dehydrated waiting for the surgery  4.  Take Tylenol 650 mg the night prior to surgery      After surgery at home  1.  Take Tylenol 650 mg 3 times a day for 14 days then as needed for mild pain  2.  Take gabapentin 300 mg nightly for 6 weeks  3.  Take ibuprofen 600 mg maximum 3 times a day but minimum 2 times a day.  We will give you Nexium to take while taking ibuprofen/or Protonix  4.  Must take aspirin 81 mg twice a day for 6 weeks unless you are on other blood thinners/Plavix, Eliquis, Xarelto, Coumadin etc  5.  Must wear compressive stockings for 6 weeks minimum to decrease the risk of blood clot and swelling  6.  Hydrocodone/Norco or oxycodone/Percocet will be prescribed to take every 6 hr as needed for breakthrough pain  7.  May apply ice on the knee to help with decreasing pain  8.  Keep wound dry for 2 weeks until stitches/staples removed than you will be allowed to shower in 24 hr and get the wound wet.  No soaking of the wound in the tub or swimming for 4 weeks after surgery  9.  No driving for 4 weeks unless specified by physician  10.  Avoid touching the wound or surrounding area /at least 2 inches on each side of the surgical incision until staples are removed/stitches   11.  May change the surgical dressing if extremely  bloody or has drainage on it. May clean the wound with peroxide or Betadine and apply sterile dressing and Ace wrap over it  12.  Leave hospital dressing on for 3 days then may change by applying sterile 4 x 4 and Ace wrap after cleaning with Betadine or peroxide.  May leave this dressing change to home health nurses    Right total knee revision      Previous discussion  Patient has a failed right partial knee replacement which was performed 2017.  As far as the left knee partial knee replacement still functioning okay and not having any pain.  Patient is having pain in her hips it has been for 6 months now and nobody offered her anything from all the physicians have been seeing her.  She has chronic low back pain but it has not severe.  She has not been through physical therapy.  She had been taking on her own to Motrin mixed with 1 Tylenol twice a day which helps a little bit to function.  There was no injections given in the hips no anti-inflammatories by mouth.  She does take methotrexate for rheumatoid arthritis.  Her x-rays of the hips look fairly well maintained and I think he she has bursitis/trochanteric bursitis of both hips that she would benefit from hip injections  She has a failed right partial knee replacement that will require revision to total knee.  I would like the right knee to be aspirated to rule out infection versus aseptic loosening.  Right knee aspirated from the anterolateral aspect after prepping with alcohol and ChloraPrep twice each using 18 gauge needle aspirated 5 cc of serosanguineous fluid that was sent to the lab for cell count cultures and crystals  Warned her about the side effects of steroid injection that was given into both hips Depo-Medrol 80 mg mixed with 5 cc 1% lidocaine performed 01/11/2024 into the greater trochanters of both hips.  She might feel her blood pressure goes up from the injection in might make her hungry and increase pulse and she might have a flush and feels  hot and warm for few days   I will discuss in 2 weeks the results of the aspiration and if she wants to proceed with total knee revision since all of her pain is on the medial side of the knee    Patient is immunocompromised on methotrexate which carries a little bit longer healing process      Disclaimer: This note was prepared using a voice recognition system and is likely to have sound alike errors within the text.

## 2024-03-12 NOTE — PLAN OF CARE
OT nathaly completed. Recommends low intensity therapy.  SBA for bed mobility. CGA for t/fs and ambulation 10ft with RW.

## 2024-03-12 NOTE — OP NOTE
[facility] ([facility]  Orthopedic Surgery  Operative Note    SUMMARY     Date of Procedure: 3/12/2024     Procedure: Procedure(s) (LRB):  REVISION, ARTHROPLASTY, KNEE (Right)     (femur, tibia, patella)  Surgeon(s) and Role:     * Jalen Rangel MD - Primary     * Marisel Craig PA - Assisting        Pre-Operative Diagnosis: Displacement of internal right knee prosthesis, subsequent encounter [T84.022D]  Status post right partial knee replacement [Z96.651]    Post-Operative Diagnosis: Post-Op Diagnosis Codes:     * Displacement of internal right knee prosthesis, subsequent encounter [T84.022D]     * Status post right partial knee replacement [Z96.651]  Patellofemoral and lateral joint advanced arthrosis  Failed femoral and tibial components/loose  Anesthesia: General  Tourniquet not used  Significant Surgical Tasks Conducted by the Assistant(s), if Applicable:  Needed assistance to hold multiple retractors as well as the extremity and maneuver the extremity to provide visualization to protect the neurovascular structures in order to be able to do surgery safely and efficiently    Complications: none     Estimated Blood Loss (EBL):  200 mL           Implants:  [employer] primary femoral component size 3., tibial base plate size 2.  With 14 by 75 tibial stem, 32 dome patella, 13 + mm tibial insert +, gentamicin cement by [place]     Specimens:  Prosthesis sent to pathology, cultures taken, synovium           Condition: Good    Disposition: PACU - hemodynamically stable.    Attestation: I performed the procedure.    Description:  Patient had unicompartmental bilateral knee replacement at another institution.  Presents with severe pain in the right knee.  Workup in the office was negative for infection.  Highly suspicious of femoral component being loose on the right knee.  Discussed revision.  We could see if we can use primary components if not we will use revision components.  Discussed the pros and  cons with the patient.  After administering anesthesia patient right leg was washed with soaking alcohol twice and ChloraPrep twice and draped usual sterile fashion.  Time-out was taken.  Patient received her antibiotics, Decadron, TXA.  We marked our incision lateral to the previous incision since the patient had questionable area on the incision which could be psoriasis.  We moved around 2-1/2-3 cm lateral to the old incision.  We marked it 2 fingers above the superior pole of the patella to 3 fingers below.  Midline incision was made and carried down subcutaneous tissues.  Full-thickness skin flaps raised medially and partially laterally.  Medial parapatellar incision was made and experienced a lot of fluid.  Cultures were taken.  We end up down to the medial tibial flare.  Subperiosteally elevate tissues of the medial tibial flare to the posterior medial corner.  Patient had quite a bit debris in the synovial lining which turned gray/metallosis.  We did complete synovectomy superiorly and medial lateral gutters.  Send that is specimen to the pathologist.  At that point we did patella fat pad resection.  The patella seems to have severe arthritis complete loss of cartilage in the lateral facet as well as had severe arthritis of the medial femoral condyle.  The prosthesis on the medial side the femoral component was moving with our fingers which we had to take out.  At that point hyperflexed the knee and pushed the patella laterally after we resurfaced it freehand technique used 32 dome patella trial on.  Proceeded at that point using a quarter-inch drill bit open the canal into the femur.  We opened it into the tibia also.  Canal finer inserted into the tibia as well as into the femur.  Suctioned both canals.  Intramedullary alignment garth placed into the femur and pinned our distal cutting block at 5 degree valgus cut taking 9 mm off the distal end.  Took the garth out cut the distal femur.  Sized it at that time  to be size 3.  We placed around 8 mm wedge in the posterior medial condyle after we took that femoral trial component out and then we marked our rotation.  Pinned our cutting block in place for size 3. Used Abran wing to see how the cut is going to be in if we needed to do more external rotation unless.  We did the medial epicondyle lateral epicondylar line and confirmed the position.  Proceeded cutting the anterior condyle posterior condyle chamfer cuts box cut.  Pulse lavaged copiously.  It was no bony spurs posteriorly.  Remove the medial and lateral menisci out.  Pulse lavaged and put our trial and fit well took the trial out.  Proceeded preparing the tibia.  Inserted our intramedullary alignment garth into the tibia.  Pinned our cutting block taking 8 mm off the lateral side.  Took the garth out and cut the tibia and with the cut the tibial component came out without difficulty and it was loose also..  Irrigated after we did the cut took the bone out the base of the tibia.  Measured it size 2..  Marked our rotation on the tibial base plate and punched our keel and drilled for a stem.  We curetted the medial side and took all cement out of the tibia medial base plate as well as on the femur on the medial condyle.  Pulse lavaged copiously.  Proceeded doing our trials.  After we finished trialing we proceeded injecting RE CK the posterior capsule medial and lateral collaterals the patella tendon quadriceps tendon/periarticular.  Pulse lavaged copiously the knee joint hyperflexed the.  Prepared our components on the back table.  Proceeded cementing it using gentamicin cement..  Once the cement had hardened we did another trial with a 11 mm which gave us much more extension but much more looser in flexion.  We did the 13 gave us 0 extension and much tighter in flexion at 90°.  We decided the 13 mm +which gave us a little wider post and gave us excellent stability.  Took all excess cement we can see out pulse lavaged  copiously knee joint.  Closed the knee joint using 1. Vicryl figure of 8 the quadriceps mechanism all the way down to the medial tibial flare and tibial tubercle.  Pulse lavaged subcutaneous tissues closed that with Vicryl and the skin with a staple gun.  Zelalem dressing applied.

## 2024-03-12 NOTE — TRANSFER OF CARE
"Anesthesia Transfer of Care Note    Patient: Caitlin Ahmadi    Procedure(s) Performed: Procedure(s) (LRB):  REVISION, ARTHROPLASTY, KNEE (Right)    Patient location: PACU    Anesthesia Type: general    Transport from OR: Transported from OR on room air with adequate spontaneous ventilation    Post pain: adequate analgesia    Post assessment: no apparent anesthetic complications and tolerated procedure well    Post vital signs: stable    Level of consciousness: responds to stimulation and sedated    Nausea/Vomiting: no nausea/vomiting    Complications: none    Transfer of care protocol was followedComments: Report given to PACU RN at bedside. Hand off tool used. RN given opportunity to ask questions or clarify concerns. No Concerns verbalized. RN was asked if ready to assume care of patient. RN verbally confirmed. Pt. left in stable condition. SV. Vital Signs Return to Near Baseline. No s/s of distress noted.       Last vitals: Visit Vitals  BP (!) 127/58 (BP Location: Right arm, Patient Position: Lying)   Pulse 98   Temp 37.1 °C (98.8 °F) (Temporal)   Resp 20   Ht 5' 8" (1.727 m)   Wt 75.5 kg (166 lb 7.2 oz)   SpO2 100%   Breastfeeding No   BMI 25.31 kg/m²     "

## 2024-03-12 NOTE — HPI
The patient is a 69-year-old woman with a past medical history of aortic atherosclerosis, seropositive rheumatoid arthritis, CAD and essential hypertension who was admitted to orthopedic surgery service for elective right total knee revision after failing outpatient conservative measures. Patient underwent surgery earlier today, tolerated it well. Hospital medicine consulted for perioperative management of comorbid conditions. Perioperative VS reviewed, grossly within acceptable parameters.  Routine labs drawn at preoperative clearance encounter on February 27th reviewed- well within acceptable parameters. Came to bedside after being rolled from PACU, but patient still notably somnolent from anesthesia. Family friend at bedside. All questions and concerns were addressed.

## 2024-03-12 NOTE — PLAN OF CARE
PT EVAL complete. Required SBA for bed mobility, ambulated 10ft CGA using RW. Recommending low intensity therapy upon d/c.

## 2024-03-12 NOTE — ASSESSMENT & PLAN NOTE
--orthopedic surgery primary  --pain/antiemetics/bowel regimen ordered  --PT/OT consult for post acute therapy and DME  --fall precautions, neurovascular checks   --perioperative IV abx for prophylactic coverage  --activity per Orthopedic surgery  --DVT ppx: Eliquis

## 2024-03-12 NOTE — SUBJECTIVE & OBJECTIVE
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    Cataract     Encounter for blood transfusion     Hypertension     PONV (postoperative nausea and vomiting)     Seropositive rheumatoid arthritis 2/17/2022       Past Surgical History:   Procedure Laterality Date    ADENOIDECTOMY      CATARACT EXTRACTION W/  INTRAOCULAR LENS IMPLANT Left 12/09/2020    CATARACT EXTRACTION W/  INTRAOCULAR LENS IMPLANT      CHOLECYSTECTOMY      COLONOSCOPY N/A 10/13/2021    Procedure: COLONOSCOPY;  Surgeon: Elissa Yoon MD;  Location: [facility];  Service: Endoscopy;  Laterality: N/A;    HERNIA REPAIR      HYSTERECTOMY      TONSILLECTOMY      TOTAL KNEE ARTHROPLASTY Bilateral        Review of patient's allergies indicates:   Allergen Reactions    Pneumococcal vaccine Swelling    Celebrex [celecoxib] Itching and Rash    Hydrocodone-acetaminophen Nausea Only and Nausea And Vomiting    Meloxicam Rash    Sulfa (sulfonamide antibiotics) Rash       No current facility-administered medications on file prior to encounter.     Current Outpatient Medications on File Prior to Encounter   Medication Sig    amitriptyline (ELAVIL) 100 MG tablet TAKE 3 TABLETS(300 MG) BY MOUTH EVERY EVENING    busPIRone (BUSPAR) 10 MG tablet TAKE 1 TABLET(10 MG) BY MOUTH TWICE DAILY    gabapentin (NEURONTIN) 300 MG capsule Take 1 capsule (300 mg total) by mouth every evening.    ibuprofen (ADVIL,MOTRIN) 200 MG tablet Take 600 mg by mouth every 6 (six) hours as needed.    ibuprofen (ADVIL,MOTRIN) 600 MG tablet Take 1 tablet (600 mg total) by mouth 2 (two) times daily as needed for Pain.    pantoprazole (PROTONIX) 40 MG tablet Take 1 tablet (40 mg total) by mouth once daily.    budesonide (PULMICORT) 0.5 mg/2 mL nebulizer solution Take 2 mLs (0.5 mg total) by nebulization 2 (two) times daily. Controller    fluticasone furoate-vilanteroL (BREO ELLIPTA) 100-25 mcg/dose diskus inhaler  Inhale 1 puff into the lungs once daily. Controller.Wash out mouth after use    fluticasone furoate-vilanteroL (BREO) 100-25 mcg/dose diskus inhaler INHALE 1 PUFF INTO THE LUNGS EVERY DAY. WASH MOUTH AFTER USE    losartan (COZAAR) 25 MG tablet TAKE 1 TABLET(25 MG) BY MOUTH EVERY DAY    methotrexate 2.5 MG Tab Take 8 tablets (20 mg total) by mouth every 7 days.     Family History       Problem Relation (Age of Onset)    Alzheimer's disease Father    No Known Problems Mother          Tobacco Use    Smoking status: Former     Current packs/day: 0.00     Average packs/day: 1 pack/day for 20.0 years (20.0 ttl pk-yrs)     Types: Cigarettes     Start date: 1970     Quit date: 1990     Years since quittin.7    Smokeless tobacco: Never   Substance and Sexual Activity    Alcohol use: Yes     Alcohol/week: 5.0 standard drinks of alcohol     Types: 5 Glasses of wine per week     Comment: no longer drinkning    Drug use: Never    Sexual activity: Yes     Partners: Male     Review of Systems   Unable to perform ROS: Other (sedated)     Objective:     Vital Signs (Most Recent):  Temp: 97.8 °F (36.6 °C) (24 1123)  Pulse: 105 (24 1255)  Resp: 16 (24 1255)  BP: 138/68 (24 1123)  SpO2: 97 % (24 1255) Vital Signs (24h Range):  Temp:  [97.8 °F (36.6 °C)-98.8 °F (37.1 °C)] 97.8 °F (36.6 °C)  Pulse:  [] 105  Resp:  [16-20] 16  SpO2:  [94 %-100 %] 97 %  BP: (121-148)/(58-68) 138/68     Weight: 75.5 kg (166 lb 7.2 oz)  Body mass index is 25.31 kg/m².     Physical Exam  Vitals reviewed.   Constitutional:       General: She is not in acute distress.     Appearance: Normal appearance. She is normal weight. She is not ill-appearing or toxic-appearing.   HENT:      Head: Normocephalic and atraumatic.      Nose: Nose normal. No congestion or rhinorrhea.   Eyes:      Pupils: Pupils are equal, round, and reactive to light.   Cardiovascular:      Rate and Rhythm: Normal rate and regular rhythm.       Pulses: Normal pulses.      Heart sounds: No murmur heard.  Pulmonary:      Effort: Pulmonary effort is normal. No respiratory distress.      Breath sounds: Normal breath sounds. No wheezing.   Abdominal:      General: Abdomen is flat. Bowel sounds are normal. There is no distension.      Palpations: Abdomen is soft.      Tenderness: There is no abdominal tenderness. There is no guarding or rebound.   Musculoskeletal:         General: No swelling or tenderness.      Cervical back: Normal range of motion and neck supple. No rigidity.      Right knee: Deformity present. Decreased range of motion.   Skin:     General: Skin is warm and dry.      Capillary Refill: Capillary refill takes less than 2 seconds.      Coloration: Skin is not pale.   Neurological:      General: No focal deficit present.      Comments: Somnolent from anesthesia   Psychiatric:         Mood and Affect: Mood normal.         Behavior: Behavior normal.         Thought Content: Thought content normal.              CRANIAL NERVES     CN III, IV, VI   Pupils are equal, round, and reactive to light.       Significant Labs: All pertinent labs within the past 24 hours have been reviewed.    Significant Imaging: I have reviewed all pertinent imaging results/findings within the past 24 hours.

## 2024-03-12 NOTE — ANESTHESIA PROCEDURE NOTES
Intubation    Date/Time: 3/12/2024 7:37 AM    Performed by: Alma Delia Garcias CRNA  Authorized by: Tez Stuart II, MD    Intubation:     Induction:  Intravenous    Intubated:  Postinduction    Mask Ventilation:  Easy mask    Attempts:  1    Attempted By:  CRNA and student    Method of Intubation:  Direct    Blade:  Mart 2    Laryngeal View Grade: Grade IIb - only the arytenoids and epiglottis seen      Difficult Airway Encountered?: No      Complications:  None    Airway Device:  Oral endotracheal tube    Airway Device Size:  7.0    Style/Cuff Inflation:  Cuffed    Inflation Amount (mL):  10    Tube secured:  22    Secured at:  The teeth    Placement Verified By:  Capnometry    Complicating Factors:  None    Findings Post-Intubation:  BS equal bilateral and atraumatic/condition of teeth unchanged

## 2024-03-12 NOTE — H&P
[facility] Medicine  History & Physical    Patient Name: Caitlin Ahmadi  MRN: [id number]  Patient Class: IP- Surgery Admit  Admission Date: 3/12/2024  Attending Physician: Jalen Rangel MD   Primary Care Provider: Amol Steve MD         Patient information was obtained from patient, past medical records, and ER records.     Subjective:     Principal Problem:S/P revision of total knee, right    Chief Complaint: No chief complaint on file.       HPI: The patient is a 69-year-old woman with a past medical history of aortic atherosclerosis, seropositive rheumatoid arthritis, CAD and essential hypertension who was admitted to orthopedic surgery service for elective right total knee revision after failing outpatient conservative measures. Patient underwent surgery earlier today, tolerated it well. Hospital medicine consulted for perioperative management of comorbid conditions. Perioperative VS reviewed, grossly within acceptable parameters.  Routine labs drawn at preoperative clearance encounter on February 27th reviewed- well within acceptable parameters. Came to bedside after being rolled from PACU, but patient still notably somnolent from anesthesia. Family friend at bedside. All questions and concerns were addressed.      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    Cataract     Encounter for blood transfusion     Hypertension     PONV (postoperative nausea and vomiting)     Seropositive rheumatoid arthritis 2/17/2022       Past Surgical History:   Procedure Laterality Date    ADENOIDECTOMY      CATARACT EXTRACTION W/  INTRAOCULAR LENS IMPLANT Left 12/09/2020    CATARACT EXTRACTION W/  INTRAOCULAR LENS IMPLANT      CHOLECYSTECTOMY      COLONOSCOPY N/A 10/13/2021    Procedure: COLONOSCOPY;  Surgeon: Elissa Yoon MD;  Location: [facility];  Service: Endoscopy;  Laterality: N/A;    HERNIA  REPAIR      HYSTERECTOMY      TONSILLECTOMY      TOTAL KNEE ARTHROPLASTY Bilateral        Review of patient's allergies indicates:   Allergen Reactions    Pneumococcal vaccine Swelling    Celebrex [celecoxib] Itching and Rash    Hydrocodone-acetaminophen Nausea Only and Nausea And Vomiting    Meloxicam Rash    Sulfa (sulfonamide antibiotics) Rash       No current facility-administered medications on file prior to encounter.     Current Outpatient Medications on File Prior to Encounter   Medication Sig    amitriptyline (ELAVIL) 100 MG tablet TAKE 3 TABLETS(300 MG) BY MOUTH EVERY EVENING    busPIRone (BUSPAR) 10 MG tablet TAKE 1 TABLET(10 MG) BY MOUTH TWICE DAILY    gabapentin (NEURONTIN) 300 MG capsule Take 1 capsule (300 mg total) by mouth every evening.    ibuprofen (ADVIL,MOTRIN) 200 MG tablet Take 600 mg by mouth every 6 (six) hours as needed.    ibuprofen (ADVIL,MOTRIN) 600 MG tablet Take 1 tablet (600 mg total) by mouth 2 (two) times daily as needed for Pain.    pantoprazole (PROTONIX) 40 MG tablet Take 1 tablet (40 mg total) by mouth once daily.    budesonide (PULMICORT) 0.5 mg/2 mL nebulizer solution Take 2 mLs (0.5 mg total) by nebulization 2 (two) times daily. Controller    fluticasone furoate-vilanteroL (BREO ELLIPTA) 100-25 mcg/dose diskus inhaler Inhale 1 puff into the lungs once daily. Controller.Wash out mouth after use    fluticasone furoate-vilanteroL (BREO) 100-25 mcg/dose diskus inhaler INHALE 1 PUFF INTO THE LUNGS EVERY DAY. WASH MOUTH AFTER USE    losartan (COZAAR) 25 MG tablet TAKE 1 TABLET(25 MG) BY MOUTH EVERY DAY    methotrexate 2.5 MG Tab Take 8 tablets (20 mg total) by mouth every 7 days.     Family History       Problem Relation (Age of Onset)    Alzheimer's disease Father    No Known Problems Mother          Tobacco Use    Smoking status: Former     Current packs/day: 0.00     Average packs/day: 1 pack/day for 20.0 years (20.0 ttl pk-yrs)     Types: Cigarettes     Start date: 6/17/1970      Quit date: 1990     Years since quittin.7    Smokeless tobacco: Never   Substance and Sexual Activity    Alcohol use: Yes     Alcohol/week: 5.0 standard drinks of alcohol     Types: 5 Glasses of wine per week     Comment: no longer drinkning    Drug use: Never    Sexual activity: Yes     Partners: Male     Review of Systems   Unable to perform ROS: Other (sedated)     Objective:     Vital Signs (Most Recent):  Temp: 97.8 °F (36.6 °C) (24 1123)  Pulse: 105 (24 1255)  Resp: 16 (24 1255)  BP: 138/68 (24 1123)  SpO2: 97 % (24 1255) Vital Signs (24h Range):  Temp:  [97.8 °F (36.6 °C)-98.8 °F (37.1 °C)] 97.8 °F (36.6 °C)  Pulse:  [] 105  Resp:  [16-20] 16  SpO2:  [94 %-100 %] 97 %  BP: (121-148)/(58-68) 138/68     Weight: 75.5 kg (166 lb 7.2 oz)  Body mass index is 25.31 kg/m².     Physical Exam  Vitals reviewed.   Constitutional:       General: She is not in acute distress.     Appearance: Normal appearance. She is normal weight. She is not ill-appearing or toxic-appearing.   HENT:      Head: Normocephalic and atraumatic.      Nose: Nose normal. No congestion or rhinorrhea.   Eyes:      Pupils: Pupils are equal, round, and reactive to light.   Cardiovascular:      Rate and Rhythm: Normal rate and regular rhythm.      Pulses: Normal pulses.      Heart sounds: No murmur heard.  Pulmonary:      Effort: Pulmonary effort is normal. No respiratory distress.      Breath sounds: Normal breath sounds. No wheezing.   Abdominal:      General: Abdomen is flat. Bowel sounds are normal. There is no distension.      Palpations: Abdomen is soft.      Tenderness: There is no abdominal tenderness. There is no guarding or rebound.   Musculoskeletal:         General: No swelling or tenderness.      Cervical back: Normal range of motion and neck supple. No rigidity.      Right knee: Deformity present. Decreased range of motion.   Skin:     General: Skin is warm and dry.      Capillary Refill:  Capillary refill takes less than 2 seconds.      Coloration: Skin is not pale.   Neurological:      General: No focal deficit present.      Comments: Somnolent from anesthesia   Psychiatric:         Mood and Affect: Mood normal.         Behavior: Behavior normal.         Thought Content: Thought content normal.              CRANIAL NERVES     CN III, IV, VI   Pupils are equal, round, and reactive to light.       Significant Labs: All pertinent labs within the past 24 hours have been reviewed.    Significant Imaging: I have reviewed all pertinent imaging results/findings within the past 24 hours.  Assessment/Plan:     * S/P revision of total knee, right  --orthopedic surgery primary  --pain/antiemetics/bowel regimen ordered  --PT/OT consult for post acute therapy and DME  --fall precautions, neurovascular checks   --perioperative IV abx for prophylactic coverage  --activity per Orthopedic surgery  --DVT ppx: Eliquis        Gastroesophageal reflux disease without esophagitis  --stable  --continue home PPI therapy      Primary hypertension  --home medications include: losartan  --hold in the immediate postoperative period for concern of hypotension  --PRN IV hydralazine 10mg Q6H for sBP > 175 mmHg  --Q4HVS      Seropositive rheumatoid arthritis  --agree with restarting home medications perioperatively  --monitor clinically        VTE Risk Mitigation (From admission, onward)           Ordered     apixaban tablet 2.5 mg  2 times daily         03/12/24 1008     Bilateral Foot Compression Devices  Until discontinued        Comments: Or SCDs    03/12/24 1008     IP VTE LOW RISK PATIENT  Once         03/12/24 1008     Place SAHIL hose  Until discontinued         03/12/24 1008     Place sequential compression device  Until discontinued         03/12/24 1008     Place SAHIL hose  Until discontinued         03/12/24 0709     Place sequential compression device  Until discontinued         03/12/24 0709                                     Dm Gu MD  Department of Hospital Medicine  '[facility]

## 2024-03-12 NOTE — ASSESSMENT & PLAN NOTE
--home medications include: losartan  --hold in the immediate postoperative period for concern of hypotension  --PRN IV hydralazine 10mg Q6H for sBP > 175 mmHg  --Q4HVS

## 2024-03-12 NOTE — PLAN OF CARE
[facility] - Med Surg  Discharge Assessment    Primary Care Provider: Amol Steve MD     Discharge Assessment (most recent)       BRIEF DISCHARGE ASSESSMENT - 03/12/24 1430          Discharge Planning    Assessment Type Discharge Planning Brief Assessment     Resource/Environmental Concerns none     Support Systems Spouse/significant other;Friends/neighbors     Equipment Currently Used at Home walker, rolling     Current Living Arrangements home     Patient/Family Anticipates Transition to home with family;home with help/services     Patient/Family Anticipated Services at Transition complementary therapies;home health care     DME Needed Upon Discharge  none     Discharge Plan A Home Health                   Spoke with patient and friend, Jennifer, at bedside for brief assessment.   Help at home after discharge: significant otherAdis   Transportation: personal vehicle (Jennifer will transport home)    DME needed: none - pt rcvd RW 2/26  Discharge plan:  Home Health - orders sent to [facility] for nursing/PT.

## 2024-03-12 NOTE — PT/OT/SLP EVAL
Occupational Therapy   Evaluation    Name: Caitlin Ahmadi  MRN: [id number]  Admitting Diagnosis: S/P revision of total knee, right  Recent Surgery: Procedure(s) (LRB):  REVISION, ARTHROPLASTY, KNEE (Right) Day of Surgery    Recommendations:     Discharge Recommendations: Low Intensity Therapy  Discharge Equipment Recommendations:  to be determined by next level of care  Barriers to discharge:  None    Assessment:     Caitlin Ahmadi is a 69 y.o. female with a medical diagnosis of S/P revision of total knee, right.  She presents with the following performance deficits affecting function: weakness, impaired endurance, impaired self care skills, impaired functional mobility, gait instability, impaired balance, decreased coordination, decreased lower extremity function, decreased safety awareness, pain, orthopedic precautions.      Rehab Prognosis: Good; patient would benefit from acute skilled OT services to address these deficits and reach maximum level of function.       Plan:     Patient to be seen 2 x/week to address the above listed problems via self-care/home management, therapeutic activities, therapeutic exercises  Plan of Care Expires: 03/26/24  Plan of Care Reviewed with: patient    Subjective     Chief Complaint: R knee pain  Patient/Family Comments/goals: improve strength and mobility, return home    Occupational Profile:  Living Environment: lives with significant other in a 1 story house with no steps to enter. Walk-in shower. Pt's friend also checks in on pt.  Previous level of function: Pt (I) with ADLs and functional mobility  Roles and Routines: drives and is retired   Equipment Used at Home: walker, rolling, grab bar (pt has but does not use)  Assistance upon Discharge: family/friend    Pain/Comfort:  Pain Rating 1: 5/10  Location - Side 1: Right  Location - Orientation 1: generalized  Location 1: knee  Pain Addressed 1: Reposition, Distraction  Pain Rating Post-Intervention 1:  5/10    Objective:     Communicated with: Nurse and epic chart review prior to session.  Patient found HOB elevated with peripheral IV, oxygen upon OT entry to room.    General Precautions: Standard, fall  Orthopedic Precautions: RLE weight bearing as tolerated  Braces: N/A  Respiratory Status: Nasal cannula, flow 2 L/min    Occupational Performance:    Bed Mobility:    Patient completed Rolling/Turning to Right with stand by assistance  Patient completed Scooting/Bridging with stand by assistance  Patient completed Supine to Sit with stand by assistance  Patient completed Sit to Supine with stand by assistance    Functional Mobility/Transfers:  Patient completed Sit <> Stand Transfer with contact guard assistance  with  rolling walker and verbal cueing for hand placement with RW.   Functional Mobility: Patient completed x10ft functional mobility, as well as side steps to R side, with CGA and RW to increase dynamic standing balance and activity tolerance needed for ADL completion.  Stand pivot t/f to EOB with CGA and RW.    Activities of Daily Living:  Upper Body Dressing: minimum assistance jaylon gown around back  Lower Body Dressing: minimum assistance doff/jaylon socks, difficulty with RLE    Cognitive/Visual Perceptual:  Cognitive/Psychosocial Skills:     -       Oriented to: Person, Place, Time, and Situation   -       Follows Commands/attention:Follows multistep  commands  -       Communication: clear/fluent  -       Memory: No Deficits noted  -       Safety awareness/insight to disability: impaired     Physical Exam:  Sensation:    -       Intact  Upper Extremity Range of Motion:     -       Right Upper Extremity: WNL  -       Left Upper Extremity: WNL  Upper Extremity Strength:    -       Right Upper Extremity: 4/5 grossly  -       Left Upper Extremity: 4/5 grossly   Strength:    -       Right Upper Extremity: WFL  -       Left Upper Extremity: WFL    AMPAC 6 Click ADL:  AMPAC Total Score: 20    Treatment &  Education:  Patient educated on role of OT in acute setting and benefits of participation. Educated on techniques to use to increase independence and decrease fall risk with functional transfers. Educated on importance of OOB activity and calling for A to transfer and meet needs. Encouraged completion of B UE AROM therex throughout the day to tolerance to increase functional strength and activity tolerance. Educated patient on importance of increased tolerance to upright position and direct impact on CV endurance and strength. Patient encouraged to sit up in chair for a minimum of 2 consecutive hours per day. Patient stated understanding and in agreement with POC.     Patient left HOB elevated with all lines intact, call button in reach, and friend present    GOALS:   Multidisciplinary Problems       Occupational Therapy Goals          Problem: Occupational Therapy    Goal Priority Disciplines Outcome Interventions   Occupational Therapy Goal     OT, PT/OT     Description: Goals to be met by: 3/26/24     Patient will increase functional independence with ADLs by performing:    LE Dressing with Set-up Assistance.  Grooming while standing at sink with Modified [place].  Toileting from toilet with Modified [place] for hygiene and clothing management.   Toilet transfer to toilet with Modified [place] with RW.  Upper extremity exercise program x15 reps per handout, with independence.                         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    Cataract     Encounter for blood transfusion     Hypertension     PONV (postoperative nausea and vomiting)     Seropositive rheumatoid arthritis 2/17/2022         Past Surgical History:   Procedure Laterality Date    ADENOIDECTOMY      CATARACT EXTRACTION W/  INTRAOCULAR LENS IMPLANT Left 12/09/2020    CATARACT EXTRACTION W/  INTRAOCULAR LENS IMPLANT       CHOLECYSTECTOMY      COLONOSCOPY N/A 10/13/2021    Procedure: COLONOSCOPY;  Surgeon: Elissa Yoon MD;  [facility]: [facility];  Service: Endoscopy;  Laterality: N/A;    HERNIA REPAIR      HYSTERECTOMY      TONSILLECTOMY      TOTAL KNEE ARTHROPLASTY Bilateral        Time Tracking:     OT Date of Treatment: 03/12/24  OT Start Time: 1330  OT Stop Time: 1355  OT Total Time (min): 25 min    Billable Minutes:Evaluation 15  Therapeutic Activity 10    3/12/2024  Veronica Bishop OT

## 2024-03-12 NOTE — PT/OT/SLP EVAL
Physical Therapy Evaluation and Treatment    Patient Name: Caitlin Ahmadi   MRN: [id number]  Recent Surgery: Procedure(s) (LRB):  REVISION, ARTHROPLASTY, KNEE (Right) Day of Surgery    Recommendations:     Discharge Recommendations: Low Intensity Therapy   Discharge Equipment Recommendations: none   Barriers to discharge: None    Assessment:     Caitlin Ahmadi is a 69 y.o. female admitted with a medical diagnosis of S/P revision of total knee, right. She presents with the following impairments/functional limitations: weakness, impaired endurance, impaired functional mobility, gait instability, impaired balance, pain, decreased safety awareness, decreased lower extremity function, decreased ROM, orthopedic precautions.    Rehab Prognosis: Good; patient would benefit from acute PT services to address these deficits and reach maximum level of function.    Plan:     During this hospitalization, patient to be seen 3 x/week to address the above listed problems via gait training, therapeutic activities, therapeutic exercises    Plan of Care Expires: 03/26/24    Subjective     Chief Complaint: Pt is motivated to participate  Patient Comments/Goals: return to PLOF  Pain/Comfort:  Pain Rating 1: 5/10  Location - Side 1: Right  Location - Orientation 1: generalized  Location 1: knee  Pain Addressed 1: Reposition, Distraction, Cessation of Activity  Pain Rating Post-Intervention 1: 5/10    Social History:  Living Environment: Patient lives with their significant other in a single story home with number of outside stair(s): 0  Prior Level of Function: Prior to admission, patient was independent, driving and retired, and ambulated household and community distances using no AD  Equipment Used at Home: none  DME owned (not currently used):  grab bars and rolling walker  Assistance Upon Discharge: significant other    Objective:     Communicated with nurse and epic chart review prior to session. Patient found HOB elevated  "with peripheral IV, oxygen, wound vac upon PT entry to room.    General Precautions: Standard, fall   Orthopedic Precautions: RLE weight bearing as tolerated   Braces: N/A    Respiratory Status: Nasal cannula, flow 2 L/min    Exams:  Cognition: Patient is oriented to Person, Place, Time, Situation  RLE ROM: WFL  RLE Strength:  NT due to surgery  LLE ROM: WFL  LLE Strength:  Grossly 4/5  Sensation:    -       Intact  Skin Integrity/Edema:     -       Skin integrity: Visible skin intact    Functional Mobility:  Gait belt applied - Yes  Bed Mobility  Rolling Right: stand by assistance  Scooting: stand by assistance  Supine to Sit: stand by assistance  Sit to Supine: stand by assistance  Transfers  Sit to Stand: contact guard assistance with rolling walker and with cues for weight bearing precautions  Gait  Patient ambulated 10ft with rolling walker and contact guard assistance. Patient demonstrates occasional unsteady gait. No c/o dizziness or SOB, no gross LOB. Pt hesitant to WB through R LE, educated on WBAT precautions and proper use of RW. All lines remained intact throughout ambulation trail and portable Supplemental O2 2L utilized.  Balance  Sitting: stand by assistance  Standing: contact guard assistance    Therapeutic Activities and Exercises:   Pt educated on role of PT in acute care and POC. Educated on R LE WBAT, proper positioning of LE, and use of ice. Educated on importance of OOB activities, activity pacing, and HEP (marching/hip flex, hip abd, heel slides/LAQ, quad sets, ankle pumps) in order to maintain/regain strength. Encouraged to sit up in chair for all meals. Educated on proper use of RW for safety and to reduce risk of falling. Educated on "call don't fall" policy and increased risk of falling due to weakness, instructed to utilize call bell for assistance with all transfers. Pt agreeable to all requests.    AM-PAC 6 CLICK MOBILITY  Total Score:16    Patient left HOB elevated with all lines " intact, call button in reach, bed alarm on, and friend present.    GOALS:   Multidisciplinary Problems       Physical Therapy Goals          Problem: Physical Therapy    Goal Priority Disciplines Outcome Goal Variances Interventions   Physical Therapy Goal     PT, PT/OT      Description: Goals to be met by 3/26/24.  1. Pt will complete bed mobility MOD I.  2. Pt will complete sit to stand MOD I.  3. Pt will ambulate 200ft MOD I using RW.  4. Pt will increase AMPAC score by 2 points to progress functional mobility.                       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    Cataract     Encounter for blood transfusion     Hypertension     PONV (postoperative nausea and vomiting)     Seropositive rheumatoid arthritis 2/17/2022       Past Surgical History:   Procedure Laterality Date    ADENOIDECTOMY      CATARACT EXTRACTION W/  INTRAOCULAR LENS IMPLANT Left 12/09/2020    CATARACT EXTRACTION W/  INTRAOCULAR LENS IMPLANT      CHOLECYSTECTOMY      COLONOSCOPY N/A 10/13/2021    Procedure: COLONOSCOPY;  Surgeon: Elissa Yoon MD;  Location: [facility];  Service: Endoscopy;  Laterality: N/A;    HERNIA REPAIR      HYSTERECTOMY      TONSILLECTOMY      TOTAL KNEE ARTHROPLASTY Bilateral        Time Tracking:     PT Received On: 03/12/24  PT Start Time: 1330  PT Stop Time: 1355  PT Total Time (min): 25 min     Billable Minutes: Evaluation 15min and Therapeutic Activity 10min    3/12/2024

## 2024-03-13 VITALS
BODY MASS INDEX: 25.23 KG/M2 | WEIGHT: 166.44 LBS | RESPIRATION RATE: 18 BRPM | DIASTOLIC BLOOD PRESSURE: 55 MMHG | HEART RATE: 103 BPM | TEMPERATURE: 99 F | OXYGEN SATURATION: 96 % | SYSTOLIC BLOOD PRESSURE: 111 MMHG | HEIGHT: 68 IN

## 2024-03-13 DIAGNOSIS — J45.31 MILD PERSISTENT ASTHMA WITH ACUTE EXACERBATION: Primary | ICD-10-CM

## 2024-03-13 LAB
ANION GAP SERPL CALC-SCNC: 11 MMOL/L (ref 8–16)
ANION GAP SERPL CALC-SCNC: 11 MMOL/L (ref 8–16)
BASOPHILS # BLD AUTO: 0.02 K/UL (ref 0–0.2)
BASOPHILS NFR BLD: 0.1 % (ref 0–1.9)
BUN SERPL-MCNC: 12 MG/DL (ref 8–23)
BUN SERPL-MCNC: 12 MG/DL (ref 8–23)
CALCIUM SERPL-MCNC: 8.5 MG/DL (ref 8.7–10.5)
CALCIUM SERPL-MCNC: 8.5 MG/DL (ref 8.7–10.5)
CHLORIDE SERPL-SCNC: 106 MMOL/L (ref 95–110)
CHLORIDE SERPL-SCNC: 106 MMOL/L (ref 95–110)
CO2 SERPL-SCNC: 20 MMOL/L (ref 23–29)
CO2 SERPL-SCNC: 20 MMOL/L (ref 23–29)
CREAT SERPL-MCNC: 0.8 MG/DL (ref 0.5–1.4)
CREAT SERPL-MCNC: 0.8 MG/DL (ref 0.5–1.4)
DIFFERENTIAL METHOD BLD: ABNORMAL
EOSINOPHIL # BLD AUTO: 0 K/UL (ref 0–0.5)
EOSINOPHIL NFR BLD: 0 % (ref 0–8)
ERYTHROCYTE [DISTWIDTH] IN BLOOD BY AUTOMATED COUNT: 13.3 % (ref 11.5–14.5)
EST. GFR  (NO RACE VARIABLE): >60 ML/MIN/1.73 M^2
EST. GFR  (NO RACE VARIABLE): >60 ML/MIN/1.73 M^2
GLUCOSE SERPL-MCNC: 194 MG/DL (ref 70–110)
GLUCOSE SERPL-MCNC: 194 MG/DL (ref 70–110)
HCT VFR BLD AUTO: 30.3 % (ref 37–48.5)
HCT VFR BLD AUTO: 30.3 % (ref 37–48.5)
HGB BLD-MCNC: 9.9 G/DL (ref 12–16)
HGB BLD-MCNC: 9.9 G/DL (ref 12–16)
IMM GRANULOCYTES # BLD AUTO: 0.05 K/UL (ref 0–0.04)
IMM GRANULOCYTES NFR BLD AUTO: 0.4 % (ref 0–0.5)
LYMPHOCYTES # BLD AUTO: 0.8 K/UL (ref 1–4.8)
LYMPHOCYTES NFR BLD: 6.3 % (ref 18–48)
MCH RBC QN AUTO: 30.2 PG (ref 27–31)
MCHC RBC AUTO-ENTMCNC: 32.7 G/DL (ref 32–36)
MCV RBC AUTO: 92 FL (ref 82–98)
MONOCYTES # BLD AUTO: 0.7 K/UL (ref 0.3–1)
MONOCYTES NFR BLD: 5.2 % (ref 4–15)
NEUTROPHILS # BLD AUTO: 11.8 K/UL (ref 1.8–7.7)
NEUTROPHILS NFR BLD: 88 % (ref 38–73)
NRBC BLD-RTO: 0 /100 WBC
PLATELET # BLD AUTO: 196 K/UL (ref 150–450)
PMV BLD AUTO: 10.3 FL (ref 9.2–12.9)
POTASSIUM SERPL-SCNC: 4.4 MMOL/L (ref 3.5–5.1)
POTASSIUM SERPL-SCNC: 4.4 MMOL/L (ref 3.5–5.1)
RBC # BLD AUTO: 3.28 M/UL (ref 4–5.4)
SODIUM SERPL-SCNC: 137 MMOL/L (ref 136–145)
SODIUM SERPL-SCNC: 137 MMOL/L (ref 136–145)
WBC # BLD AUTO: 13.37 K/UL (ref 3.9–12.7)

## 2024-03-13 PROCEDURE — 94761 N-INVAS EAR/PLS OXIMETRY MLT: CPT

## 2024-03-13 PROCEDURE — 80048 BASIC METABOLIC PNL TOTAL CA: CPT | Performed by: ORTHOPAEDIC SURGERY

## 2024-03-13 PROCEDURE — 25000003 PHARM REV CODE 250: Performed by: ORTHOPAEDIC SURGERY

## 2024-03-13 PROCEDURE — 36415 COLL VENOUS BLD VENIPUNCTURE: CPT | Performed by: ORTHOPAEDIC SURGERY

## 2024-03-13 PROCEDURE — 99900035 HC TECH TIME PER 15 MIN (STAT)

## 2024-03-13 PROCEDURE — 25000242 PHARM REV CODE 250 ALT 637 W/ HCPCS: Performed by: ORTHOPAEDIC SURGERY

## 2024-03-13 PROCEDURE — 94640 AIRWAY INHALATION TREATMENT: CPT

## 2024-03-13 PROCEDURE — 63600175 PHARM REV CODE 636 W HCPCS: Performed by: ORTHOPAEDIC SURGERY

## 2024-03-13 PROCEDURE — 85025 COMPLETE CBC W/AUTO DIFF WBC: CPT | Performed by: STUDENT IN AN ORGANIZED HEALTH CARE EDUCATION/TRAINING PROGRAM

## 2024-03-13 PROCEDURE — 97116 GAIT TRAINING THERAPY: CPT | Mod: CQ

## 2024-03-13 PROCEDURE — 94799 UNLISTED PULMONARY SVC/PX: CPT | Mod: XB

## 2024-03-13 PROCEDURE — 97530 THERAPEUTIC ACTIVITIES: CPT | Mod: CQ

## 2024-03-13 PROCEDURE — 97530 THERAPEUTIC ACTIVITIES: CPT

## 2024-03-13 PROCEDURE — 97535 SELF CARE MNGMENT TRAINING: CPT

## 2024-03-13 RX ORDER — ONDANSETRON 4 MG/1
4 TABLET, ORALLY DISINTEGRATING ORAL ONCE
Qty: 1 TABLET | Refills: 0 | Status: SHIPPED | OUTPATIENT
Start: 2024-03-13 | End: 2024-03-14

## 2024-03-13 RX ORDER — OXYCODONE AND ACETAMINOPHEN 10; 325 MG/1; MG/1
1 TABLET ORAL EVERY 6 HOURS PRN
Qty: 28 TABLET | Refills: 0 | Status: SHIPPED | OUTPATIENT
Start: 2024-03-13 | End: 2024-03-18 | Stop reason: SDUPTHER

## 2024-03-13 RX ADMIN — CHLORHEXIDINE GLUCONATE 0.12% ORAL RINSE 10 ML: 1.2 LIQUID ORAL at 08:03

## 2024-03-13 RX ADMIN — BUSPIRONE HYDROCHLORIDE 10 MG: 10 TABLET ORAL at 08:03

## 2024-03-13 RX ADMIN — OXYCODONE HYDROCHLORIDE 10 MG: 5 TABLET ORAL at 12:03

## 2024-03-13 RX ADMIN — DEXAMETHASONE SODIUM PHOSPHATE 8 MG: 4 INJECTION INTRA-ARTICULAR; INTRALESIONAL; INTRAMUSCULAR; INTRAVENOUS; SOFT TISSUE at 12:03

## 2024-03-13 RX ADMIN — ARFORMOTEROL TARTRATE 15 MCG: 15 SOLUTION RESPIRATORY (INHALATION) at 07:03

## 2024-03-13 RX ADMIN — OXYCODONE HYDROCHLORIDE 10 MG: 5 TABLET ORAL at 10:03

## 2024-03-13 RX ADMIN — MORPHINE SULFATE 2 MG: 4 INJECTION INTRAVENOUS at 08:03

## 2024-03-13 RX ADMIN — MORPHINE SULFATE 2 MG: 4 INJECTION INTRAVENOUS at 02:03

## 2024-03-13 RX ADMIN — DOCUSATE SODIUM 100 MG: 100 CAPSULE, LIQUID FILLED ORAL at 08:03

## 2024-03-13 RX ADMIN — FLUTICASONE PROPIONATE 50 MCG: 50 SPRAY, METERED NASAL at 08:03

## 2024-03-13 RX ADMIN — PANTOPRAZOLE SODIUM 40 MG: 40 TABLET, DELAYED RELEASE ORAL at 08:03

## 2024-03-13 RX ADMIN — CEFAZOLIN 2 G: 2 INJECTION, POWDER, FOR SOLUTION INTRAMUSCULAR; INTRAVENOUS at 12:03

## 2024-03-13 RX ADMIN — SODIUM CHLORIDE: 9 INJECTION, SOLUTION INTRAVENOUS at 12:03

## 2024-03-13 RX ADMIN — APIXABAN 2.5 MG: 2.5 TABLET, FILM COATED ORAL at 08:03

## 2024-03-13 NOTE — PT/OT/SLP PROGRESS
Occupational Therapy   Treatment    Name: Caitlin Ahmadi  MRN: [id number]  Admitting Diagnosis:  S/P revision of total knee, right  1 Day Post-Op    Recommendations:     Discharge Recommendations: Low Intensity Therapy  Discharge Equipment Recommendations:  walker, rolling  Barriers to discharge:  None    Assessment:     Caitlin Ahmadi is a 69 y.o. female with a medical diagnosis of S/P revision of total knee, right.   Performance deficits affecting function are weakness, gait instability, impaired endurance, impaired balance, decreased upper extremity function, decreased lower extremity function, impaired self care skills, decreased ROM.     Rehab Prognosis:  Good; patient would benefit from acute skilled OT services to address these deficits and reach maximum level of function.       Plan:     Patient to be seen 2 x/week to address the above listed problems via self-care/home management, therapeutic activities, therapeutic exercises  Plan of Care Expires: 03/26/24  Plan of Care Reviewed with: patient, friend    Subjective     Chief Complaint: Knee discomfort  Patient/Family Comments/goals: return home  Pain/Comfort:  Pain Rating 1: 0/10  Pain Rating Post-Intervention 1: 0/10    Objective:     Communicated with: nurse prior to session.  Patient found supine with peripheral IV, oxygen, wound vac upon OT entry to room.    General Precautions: Standard, fall    Orthopedic Precautions:RLE weight bearing as tolerated  Braces: N/A  Respiratory Status: Room air     Occupational Performance:     Bed Mobility:    Patient completed Rolling/Turning to Right with modified independence  Patient completed Scooting/Bridging with modified independence  Patient completed Supine to Sit with modified independence  Patient completed Sit to Supine with modified independence     Functional Mobility/Transfers:  Patient completed Sit <> Stand Transfer with supervision  with  rolling walker   Patient completed Bed <> Chair  Transfer using Stand Pivot technique with supervision with rolling walker  Patient completed Toilet Transfer Stand Pivot technique with supervision with  rolling walker  Functional Mobility: Pt ambulated SPV x200 feet with RW. After seated rest, patient ambulated 2x8 feet from bed<>bathroom    Activities of Daily Living:  Grooming: supervision standing at sink  Lower Body Dressing: set-up donning pants with HOB elevated  Toileting: supervision standard commode      AMPAC 6 Click ADL: 21    Treatment & Education:  Pt progressing well with therapy. Once returned to bed, therapy encouraged patient to continue with previously educated UB exercises to further improve strength/endurance needed for daily activities. Pt verbalized understanding.     Patient left HOB elevated with all lines intact, call button in reach, and friend present    GOALS:   Multidisciplinary Problems       Occupational Therapy Goals          Problem: Occupational Therapy    Goal Priority Disciplines Outcome Interventions   Occupational Therapy Goal     OT, PT/OT Ongoing, Progressing    Description: Goals to be met by: 3/26/24     Patient will increase functional independence with ADLs by performing:    LE Dressing with Set-up Assistance.  Grooming while standing at sink with Modified Scott.  Toileting from toilet with Modified Scott for hygiene and clothing management.   Toilet transfer to toilet with Modified Scott with RW.  Upper extremity exercise program x15 reps per handout, with independence.                         Time Tracking:     OT Date of Treatment: 03/13/24  OT Start Time: 1030  OT Stop Time: 1055  OT Total Time (min): 25 min    Billable Minutes:Self Care/Home Management 15  Therapeutic Activity 10    NILESH Darnell  OT/JOSE ALEJANDRO: OT          3/13/2024

## 2024-03-13 NOTE — SUBJECTIVE & OBJECTIVE
"Principal Problem:S/P revision of total knee, right    Principal Orthopedic Problem:  Revision of right total knee; Uni for primary    Interval History:  No acute events overnight   Pain well controlled on present regimen   Diet advancement tolerated  PT OT eval completed and recommends low intensity/home with home health services    Review of patient's allergies indicates:   Allergen Reactions    Pneumococcal vaccine Swelling    Celebrex [celecoxib] Itching and Rash    Hydrocodone-acetaminophen Nausea Only and Nausea And Vomiting    Meloxicam Rash    Sulfa (sulfonamide antibiotics) Rash       Current Facility-Administered Medications   Medication    0.9%  NaCl infusion    acetaminophen tablet 650 mg    albuterol nebulizer solution 2.5 mg    amitriptyline tablet 100 mg    apixaban tablet 2.5 mg    bisacodyL suppository 10 mg    busPIRone tablet 10 mg    chlorhexidine 0.12 % solution 10 mL    docusate sodium capsule 100 mg    fluticasone propionate 50 mcg/actuation nasal spray 50 mcg    gabapentin capsule 300 mg    hydrALAZINE injection 10 mg    ibuprofen tablet 600 mg    methotrexate tablet 20 mg    morphine injection 2 mg    ondansetron disintegrating tablet 8 mg    ondansetron injection 4 mg    oxyCODONE immediate release tablet 10 mg    oxyCODONE immediate release tablet 5 mg    pantoprazole EC tablet 40 mg     Objective:     Vital Signs (Most Recent):  Temp: 98.6 °F (37 °C) (03/13/24 0804)  Pulse: 103 (03/13/24 0804)  Resp: 18 (03/13/24 1028)  BP: (!) 111/55 (03/13/24 0804)  SpO2: 96 % (03/13/24 0804) Vital Signs (24h Range):  Temp:  [97.8 °F (36.6 °C)-98.6 °F (37 °C)] 98.6 °F (37 °C)  Pulse:  [] 103  Resp:  [16-20] 18  SpO2:  [92 %-99 %] 96 %  BP: (105-138)/(52-68) 111/55     Weight: 75.5 kg (166 lb 7.2 oz)  Height: 5' 8" (172.7 cm)  Body mass index is 25.31 kg/m².      Intake/Output Summary (Last 24 hours) at 3/13/2024 1057  Last data filed at 3/13/2024 0453  Gross per 24 hour   Intake 1510.84 ml "   Output --   Net 1510.84 ml        General    Constitutional: She is oriented to person, place, and time. She appears well-developed and well-nourished.   HENT:   Head: Normocephalic and atraumatic.   Eyes: Pupils are equal, round, and reactive to light.   Neck: Neck supple.   Pulmonary/Chest: Effort normal.   Abdominal: Soft.   Neurological: She is alert and oriented to person, place, and time.   Psychiatric: She has a normal mood and affect.           Right Knee Exam     Comments:  POD1 -     ANTHONY in place, dressing C/D/I  No warmth/ erythema noted to surgical site  Sensation intact throughout extremity   Str 5/5 dorsal/plantar flexion   Cap refill <2   2 + pulses          Significant Labs: All pertinent labs within the past 24 hours have been reviewed.    Significant Imaging: I have reviewed all pertinent imaging results/findings.

## 2024-03-13 NOTE — ANESTHESIA POSTPROCEDURE EVALUATION
Anesthesia Post Evaluation    Patient: Caitlin Ahmadi    Procedure(s) Performed: Procedure(s) (LRB):  REVISION, ARTHROPLASTY, KNEE (Right)    Final Anesthesia Type: general      Patient location during evaluation: PACU  Patient participation: Yes- Able to Participate  Level of consciousness: awake and alert  Post-procedure vital signs: reviewed and stable  Pain management: adequate  Airway patency: patent  KIRT mitigation strategies: Verification of full reversal of neuromuscular block  PONV status at discharge: No PONV  Anesthetic complications: no      Cardiovascular status: hemodynamically stable  Respiratory status: spontaneous ventilation  Hydration status: euvolemic  Follow-up not needed.              Vitals Value Taken Time   /55 03/13/24 0804   Temp 37 °C (98.6 °F) 03/13/24 0804   Pulse 103 03/13/24 0804   Resp 18 03/13/24 0811   SpO2 96 % 03/13/24 0804         Event Time   Out of Recovery 11:15:00         Pain/Lucy Score: Pain Rating Prior to Med Admin: 9 (3/13/2024  8:11 AM)  Pain Rating Post Med Admin: 5 (3/13/2024  8:41 AM)  Lucy Score: 9 (3/13/2024  7:01 AM)

## 2024-03-13 NOTE — DISCHARGE SUMMARY
[facility] Surg  Orthopedics  Discharge Summary      Patient Name: Caitlin Ahmadi  MRN: [id number]  Admission Date: 3/12/2024  Hospital Length of Stay: 1 days  Discharge Date and Time:  03/13/2024 11:00 AM  Attending Physician: Scott Rangel MD   Discharging Provider: MELISSA Lujan  Primary Care Provider: Amol Steve MD    HPI:  Patient is a 69-year-old female with a known history of right knee Uni performed several years ago.  She continued with right knee pain and instability who failed advancement of ADLs with non operative means.  The she elected to undergo right total knee revision from a Uni to primary.    Procedure(s) (LRB):  REVISION, ARTHROPLASTY, KNEE (Right)      Hospital Course:  Patient received medical management, pain control, DVT prophylaxis, PT OT eval and treatment, diet advancement.  She is met or surpassed all goals and is stable for discharge home with home health.  She will be placed on aspirin 81 mg b.i.d. for DVT prophylaxis.  She will follow up in the clinic in 2 weeks.  She may call with any questions or concerns in the interim.    Consults (From admission, onward)          Status Ordering Provider     Inpatient consult to Hospitalist  Once        Provider:  (Not yet assigned)    Acknowledged SCOTT RANGEL     IP consult case management/social work  Once        Provider:  (Not yet assigned)    Completed SCOTT RANGEL            Significant Diagnostic Studies: Labs: All labs within the past 24 hours have been reviewed    Pending Diagnostic Studies:       Procedure Component Value Units Date/Time    Specimen to Pathology, Surgery Orthopedics [[id number]] Collected: 03/12/24 0931    Order Status: Sent Lab Status: In process Updated: 03/12/24 1028    Specimen: Tissue           Final Active Diagnoses:    Diagnosis Date Noted POA    PRINCIPAL PROBLEM:  S/P revision of total knee, right [Z96.651] 03/12/2024 Not Applicable    Total knee replacement status  [Z96.659] 03/12/2024 Not Applicable    Gastroesophageal reflux disease without esophagitis [K21.9] 02/27/2024 Yes    Primary hypertension [I10] 03/29/2022 Yes    Seropositive rheumatoid arthritis [M05.9] 02/17/2022 Yes      Problems Resolved During this Admission:      Discharged Condition: stable    Disposition: Home or Self Care    Follow Up:    Patient Instructions:      CBC Auto Differential   Standing Status: Future Number of Occurrences: 1 Standing Exp. Date: 04/27/25     Comprehensive Metabolic Panel   Standing Status: Future Number of Occurrences: 1 Standing Exp. Date: 04/27/25     Urinalysis   Standing Status: Future Number of Occurrences: 1 Standing Exp. Date: 04/27/25     Order Specific Question Answer Comments   Collection Type Urine, Clean Catch      Medications:  Reconciled Home Medications:      Medication List        ASK your doctor about these medications      albuterol 90 mcg/actuation inhaler  Commonly known as: PROVENTIL/VENTOLIN HFA  INHALE 2 PUFFS INTO THE LUNGS EVERY 6 HOURS AS NEEDED FOR WHEEZING     amitriptyline 100 MG tablet  Commonly known as: ELAVIL  TAKE 3 TABLETS(300 MG) BY MOUTH EVERY EVENING     budesonide 0.5 mg/2 mL nebulizer solution  Commonly known as: PULMICORT  Take 2 mLs (0.5 mg total) by nebulization 2 (two) times daily. Controller     busPIRone 10 MG tablet  Commonly known as: BUSPAR  TAKE 1 TABLET(10 MG) BY MOUTH TWICE DAILY     * fluticasone furoate-vilanteroL 100-25 mcg/dose diskus inhaler  Commonly known as: BREO ELLIPTA  Inhale 1 puff into the lungs once daily. Controller.Wash out mouth after use     * fluticasone furoate-vilanteroL 100-25 mcg/dose diskus inhaler  Commonly known as: BREO  INHALE 1 PUFF INTO THE LUNGS EVERY DAY. WASH MOUTH AFTER USE     fluticasone propionate 50 mcg/actuation nasal spray  Commonly known as: FLONASE  SHAKE LIQUID AND USE 2 SPRAYS(100 MCG) IN EACH NOSTRIL EVERY DAY     gabapentin 300 MG capsule  Commonly known as: NEURONTIN  Take 1 capsule  (300 mg total) by mouth every evening.     * ibuprofen 200 MG tablet  Commonly known as: ADVIL,MOTRIN  Take 600 mg by mouth every 6 (six) hours as needed.     * ibuprofen 600 MG tablet  Commonly known as: ADVIL,MOTRIN  Take 1 tablet (600 mg total) by mouth 2 (two) times daily as needed for Pain.     losartan 25 MG tablet  Commonly known as: COZAAR  TAKE 1 TABLET(25 MG) BY MOUTH EVERY DAY     methotrexate 2.5 MG Tab  Take 8 tablets (20 mg total) by mouth every 7 days.     pantoprazole 40 MG tablet  Commonly known as: PROTONIX  Take 1 tablet (40 mg total) by mouth once daily.           * This list has 4 medication(s) that are the same as other medications prescribed for you. Read the directions carefully, and ask your doctor or other care provider to review them with you.                  MELISSA Lujan  Orthopedics  [facility] Med S[facility]

## 2024-03-13 NOTE — PLAN OF CARE
Bed mobility mod I. Gait trained SPV with RW x200 feet. Toileted SPV.  Rec low intensity therapy at discharge

## 2024-03-13 NOTE — ASSESSMENT & PLAN NOTE
Pod1- RTKA revision, uni to primary     Okay to discharge home with home health   Patient will be placed on aspirin 81 mg b.i.d. for DVT prophylaxis   She will follow up in the clinic in 2 weeks  She may call with any questions or concerns in the interim

## 2024-03-13 NOTE — PT/OT/SLP PROGRESS
Physical Therapy  Treatment    Caitlin Ahmadi   MRN: [id number]   Admitting Diagnosis: S/P revision of total knee, right    PT Received On: 03/13/24  PT Start Time: 1040     PT Stop Time: 1105    PT Total Time (min): 25 min       Billable Minutes:  Gait Training 15 and Therapeutic Activity 10    Treatment Type: Treatment  PT/PTA: PTA     Number of PTA visits since last PT visit: 1       General Precautions: Standard, fall  Orthopedic Precautions: RLE weight bearing as tolerated  Braces: N/A  Respiratory Status: Room air    Spiritual, Cultural Beliefs, Holiness Practices, Values that Affect Care: no    Subjective:  Communicated with patient's nurse and completed Epic chart review prior to session.  Patient agreed to PT session.     Pain/Comfort  Pain Rating 1: 0/10  Pain Rating Post-Intervention 1: 0/10    Objective:   Patient found with: peripheral IV, wound vac    Supine > sit EOB: Modified Independent    Forward scoot towards EOB: Modified Independent    STS from EOB > RW: SPV    200ft, 8ft x2 trials w/ RW SPV    Stand pivot T/F to EOB x2 trials w/ RW: SPV    Stand pivot T/F to toilet w/ RW: SPV    STS from toilet > RW: SPV    Sit > supine: Modified Independent    Reviewed AROM TE to BLE including: hip flex/ext, knee flex/ext, ankle PF/DF  To be completed a minimum of 10 reps for each LE in order to promote return of function, strength and ROM.     Educated patient on importance of increased tolerance to upright position and direct impact on CV endurance and strength. Patient encouraged to sit up in chair/ EOB, for a minimum of 2 consecutive hours, 3x per day. Encouraged patient to perform AROM TE to BLE throughout the day within all available planes of motion. Re enforced importance of utilizing call light to meet needs in room and not attempt to get up without staff assistance. Patient verbalized understanding and agreed to comply.       AM-PAC 6 CLICK MOBILITY  How much help from another person does this  patient currently need?   1 = Unable, Total/Dependent Assistance  2 = A lot, Maximum/Moderate Assistance  3 = A little, Minimum/Contact Guard/Supervision  4 = None, Modified [place]/Independent    Turning over in bed (including adjusting bedclothes, sheets and blankets)?: 4  Sitting down on and standing up from a chair with arms (e.g., wheelchair, bedside commode, etc.): 3  Moving from lying on back to sitting on the side of the bed?: 4  Moving to and from a bed to a chair (including a wheelchair)?: 3  Need to walk in hospital room?: 3  Climbing 3-5 steps with a railing?: 1 (NT)  Basic Mobility Total Score: 18    AM-PAC Raw Score CMS G-Code Modifier Level of Impairment Assistance   6 % Total / Unable   7 - 9 CM 80 - 100% Maximal Assist   10 - 14 CL 60 - 80% Moderate Assist   15 - 19 CK 40 - 60% Moderate Assist   20 - 22 CJ 20 - 40% Minimal Assist   23 CI 1-20% SBA / CGA   24 CH 0% Independent/ Mod I     Patient left with bed in chair position with call button in reach and visitor present.    Assessment:  Caitlin Ahmadi is a 69 y.o. female with a medical diagnosis of S/P revision of total knee, right and presents with overall decline in functional mobility. Patient would continue to benefit from skilled PT to address functional limitations listed below in order to return to PLOF/decrease caregiver burden.     Rehab identified problem list/impairments: weakness, gait instability, impaired endurance, impaired balance, decreased lower extremity function, impaired self care skills, decreased ROM    Rehab potential is good.    Activity tolerance: Good    Discharge recommendations: Low Intensity Therapy      Barriers to discharge:      Equipment recommendations: walker, rolling     GOALS:   Multidisciplinary Problems       Physical Therapy Goals          Problem: Physical Therapy    Goal Priority Disciplines Outcome Goal Variances Interventions   Physical Therapy Goal     PT, PT/OT      Description:  Goals to be met by 3/26/24.  1. Pt will complete bed mobility MOD I.  2. Pt will complete sit to stand MOD I.  3. Pt will ambulate 200ft MOD I using RW.  4. Pt will increase AMPAC score by 2 points to progress functional mobility.                       PLAN:    Patient to be seen 3 x/week to address the above listed problems via gait training, therapeutic activities, therapeutic exercises  Plan of Care expires: 03/26/24  Plan of Care reviewed with: patient, friend         03/13/2024

## 2024-03-13 NOTE — PROGRESS NOTES
[facility] Surg  Orthopedics  Progress Note    Patient Name: Caitlin Ahmadi  MRN: [id number]  Admission Date: 3/12/2024  Hospital Length of Stay: 1 days  Attending Provider: Jalen Rangel MD  Primary Care Provider: Amol Steve MD  Follow-up For: Procedure(s) (LRB):  REVISION, ARTHROPLASTY, KNEE (Right)    Post-Operative Day: 1 Day Post-Op  Subjective:     Principal Problem:S/P revision of total knee, right    Principal Orthopedic Problem:  Revision of right total knee; Uni for primary    Interval History:  No acute events overnight   Pain well controlled on present regimen   Diet advancement tolerated  PT OT eval completed and recommends low intensity/home with home health services    Review of patient's allergies indicates:   Allergen Reactions    Pneumococcal vaccine Swelling    Celebrex [celecoxib] Itching and Rash    Hydrocodone-acetaminophen Nausea Only and Nausea And Vomiting    Meloxicam Rash    Sulfa (sulfonamide antibiotics) Rash       Current Facility-Administered Medications   Medication    0.9%  NaCl infusion    acetaminophen tablet 650 mg    albuterol nebulizer solution 2.5 mg    amitriptyline tablet 100 mg    apixaban tablet 2.5 mg    bisacodyL suppository 10 mg    busPIRone tablet 10 mg    chlorhexidine 0.12 % solution 10 mL    docusate sodium capsule 100 mg    fluticasone propionate 50 mcg/actuation nasal spray 50 mcg    gabapentin capsule 300 mg    hydrALAZINE injection 10 mg    ibuprofen tablet 600 mg    methotrexate tablet 20 mg    morphine injection 2 mg    ondansetron disintegrating tablet 8 mg    ondansetron injection 4 mg    oxyCODONE immediate release tablet 10 mg    oxyCODONE immediate release tablet 5 mg    pantoprazole EC tablet 40 mg     Objective:     Vital Signs (Most Recent):  Temp: 98.6 °F (37 °C) (03/13/24 0804)  Pulse: 103 (03/13/24 0804)  Resp: 18 (03/13/24 1028)  BP: (!) 111/55 (03/13/24 0804)  SpO2: 96 % (03/13/24 0804) Vital Signs (24h Range):  Temp:  [97.8  "°F (36.6 °C)-98.6 °F (37 °C)] 98.6 °F (37 °C)  Pulse:  [] 103  Resp:  [16-20] 18  SpO2:  [92 %-99 %] 96 %  BP: (105-138)/(52-68) 111/55     Weight: 75.5 kg (166 lb 7.2 oz)  Height: 5' 8" (172.7 cm)  Body mass index is 25.31 kg/m².      Intake/Output Summary (Last 24 hours) at 3/13/2024 1057  Last data filed at 3/13/2024 0453  Gross per 24 hour   Intake 1510.84 ml   Output --   Net 1510.84 ml        General    Constitutional: She is oriented to person, place, and time. She appears well-developed and well-nourished.   HENT:   Head: Normocephalic and atraumatic.   Eyes: Pupils are equal, round, and reactive to light.   Neck: Neck supple.   Pulmonary/Chest: Effort normal.   Abdominal: Soft.   Neurological: She is alert and oriented to person, place, and time.   Psychiatric: She has a normal mood and affect.           Right Knee Exam     Comments:  POD1 -     ANTHONY in place, dressing C/D/I  No warmth/ erythema noted to surgical site  Sensation intact throughout extremity   Str 5/5 dorsal/plantar flexion   Cap refill <2   2 + pulses          Significant Labs: All pertinent labs within the past 24 hours have been reviewed.    Significant Imaging: I have reviewed all pertinent imaging results/findings.  Assessment/Plan:     * S/P revision of total knee, right  Pod1- RTKA revision, uni to primary     Okay to discharge home with home health   Patient will be placed on aspirin 81 mg b.i.d. for DVT prophylaxis   She will follow up in the clinic in 2 weeks  She may call with any questions or concerns in the interim      Dr. Rangel is aware of the patient & current presentation. He agrees with the current plan above.       MELISSA Lujan  Orthopedics  [facility]    "

## 2024-03-13 NOTE — HPI
Patient is a 69-year-old female with a longstanding history of right knee pain instability status post right Uni  Patient failed advancement of ADLs with non operative means the she elected to undergo right total knee replacement and revision of unit

## 2024-03-13 NOTE — PLAN OF CARE
[facility]  Discharge Final Note    Primary Care Provider: Amol Steve MD    Expected Discharge Date: 3/13/2024    Final Discharge Note (most recent)       Final Note - 03/13/24 1109          Final Note    Assessment Type Final Discharge Note     Anticipated Discharge Disposition Home-Health Care [facility]     Hospital Resources/Appts/Education Provided Appointments scheduled and added to AVS;Post-Acute resouces added to AVS        Post-Acute Status    Post-Acute Authorization HME;Home Health     HME Status Set-up Complete/Auth obtained     Home Health Status Set-up Complete/Auth obtained     Discharge Delays None known at this time                     After-discharge care                Durable Medical Equipment       [facility]   Service: Durable Medical Equipment    [address] [place] [id number]   Phone: [phone]                 Home Medical Care       [facility]   Service: Home Health Services    [address]  [facility] [address]   Phone: [phone]                     [facility] arranged.     No additional d/c needs/orders at this time.

## 2024-03-13 NOTE — PLAN OF CARE
Purposeful rounding every 2hrs  Pain management with pain medication  Abx given as prescribed  Education on fall injury  Remained free from falls  Call light within reach   Bed in the lowest position  Chart check complete

## 2024-03-14 LAB
FINAL PATHOLOGIC DIAGNOSIS: NORMAL
GROSS: NORMAL
Lab: NORMAL

## 2024-03-14 PROCEDURE — G0180 MD CERTIFICATION HHA PATIENT: HCPCS | Mod: ,,, | Performed by: ORTHOPAEDIC SURGERY

## 2024-03-15 LAB — BACTERIA SPEC AEROBE CULT: NO GROWTH

## 2024-03-18 RX ORDER — OXYCODONE AND ACETAMINOPHEN 10; 325 MG/1; MG/1
1 TABLET ORAL EVERY 6 HOURS PRN
Qty: 28 TABLET | Refills: 0 | Status: SHIPPED | OUTPATIENT
Start: 2024-03-20 | End: 2024-03-28 | Stop reason: SDUPTHER

## 2024-03-19 LAB — BACTERIA SPEC ANAEROBE CULT: NORMAL

## 2024-03-25 ENCOUNTER — HOSPITAL ENCOUNTER (OUTPATIENT)
Dept: RADIOLOGY | Facility: HOSPITAL | Age: 69
Discharge: HOME | End: 2024-03-25
Attending: ORTHOPAEDIC SURGERY
Payer: MEDICARE

## 2024-03-25 ENCOUNTER — OFFICE VISIT (OUTPATIENT)
Dept: ORTHOPEDICS | Facility: CLINIC | Age: 69
End: 2024-03-25
Payer: MEDICARE

## 2024-03-25 VITALS — BODY MASS INDEX: 25.23 KG/M2 | WEIGHT: 166.44 LBS | HEIGHT: 68 IN

## 2024-03-25 DIAGNOSIS — Z96.651 STATUS POST RIGHT PARTIAL KNEE REPLACEMENT: ICD-10-CM

## 2024-03-25 DIAGNOSIS — Z96.651 S/P REVISION OF TOTAL KNEE, RIGHT: Primary | ICD-10-CM

## 2024-03-25 DIAGNOSIS — T84.022D DISPLACEMENT OF INTERNAL RIGHT KNEE PROSTHESIS, SUBSEQUENT ENCOUNTER: ICD-10-CM

## 2024-03-25 PROCEDURE — 73564 X-RAY EXAM KNEE 4 OR MORE: CPT | Mod: 26,RT,, | Performed by: RADIOLOGY

## 2024-03-25 PROCEDURE — 73562 X-RAY EXAM OF KNEE 3: CPT | Mod: 26,LT,, | Performed by: RADIOLOGY

## 2024-03-25 PROCEDURE — 99024 POSTOP FOLLOW-UP VISIT: CPT | Mod: S$GLB,,, | Performed by: PHYSICIAN ASSISTANT

## 2024-03-25 PROCEDURE — 73562 X-RAY EXAM OF KNEE 3: CPT | Mod: TC,59,LT

## 2024-03-25 PROCEDURE — 99999 PR PBB SHADOW E&M-EST. PATIENT-LVL IV: CPT | Mod: PBBFAC,,, | Performed by: PHYSICIAN ASSISTANT

## 2024-03-25 NOTE — PROGRESS NOTES
Patient ID: Caitlin Ahmadi is a 69 y.o. female.    Chief Complaint: Post-op Evaluation of the Right Knee      HPI: Caitlin Ahmadi  is a 69 y.o. female who c/o Post-op Evaluation of the Right Knee     Post op visit 1   Patient notes pain is 8/10   The patient is doing well since surgery and is pleased with her results   She has been able to advance activity of daily living and thus her quality of life  She is attests to doing a little too much yesterday and is quite sore today but is making do   She is taking Percocets; we discussed trying to break these tablets in half and alternating with over-the-counter medication  She is compliant with PT as well as all postop instructions   She has using a rolling walker to assist with ambulation   She is presently undergoing home health; once finishing she would like to go to the [facility] for outpatient PT  Compliant with DVT prophylaxis      Patient is presently denying any shortness of breath, chest pain, fever/chills, nausea/vomiting, loss of taste or smell, numbness/tingling or sensation changes, loss of bladder or bowel function, loss of taste/smell.     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    Cataract     Encounter for blood transfusion     Hypertension     PONV (postoperative nausea and vomiting)     Seropositive rheumatoid arthritis 2/17/2022     Past Surgical History:   Procedure Laterality Date    ADENOIDECTOMY      CATARACT EXTRACTION W/  INTRAOCULAR LENS IMPLANT Left 12/09/2020    CATARACT EXTRACTION W/  INTRAOCULAR LENS IMPLANT      CHOLECYSTECTOMY      COLONOSCOPY N/A 10/13/2021    Procedure: COLONOSCOPY;  Surgeon: Elissa Yoon MD;  Location: [facility];  Service: Endoscopy;  Laterality: N/A;    HERNIA REPAIR      HYSTERECTOMY      REVISION OF KNEE  ARTHROPLASTY Right 3/12/2024    Procedure: REVISION, ARTHROPLASTY, KNEE;  Surgeon: Jalen Rangel MD;  Location: [facility];  Service: General;  Laterality: Right;    TONSILLECTOMY      TOTAL KNEE ARTHROPLASTY Bilateral      Family History   Problem Relation Age of Onset    No Known Problems Mother     Alzheimer's disease Father      Social History     Socioeconomic History    Marital status:    Tobacco Use    Smoking status: Former     Current packs/day: 0.00     Average packs/day: 1 pack/day for 20.0 years (20.0 ttl pk-yrs)     Types: Cigarettes     Start date: 1970     Quit date: 1990     Years since quittin.7    Smokeless tobacco: Never   Substance and Sexual Activity    Alcohol use: Yes     Alcohol/week: 5.0 standard drinks of alcohol     Types: 5 Glasses of wine per week     Comment: no longer drinkning    Drug use: Never    Sexual activity: Yes     Partners: Male     Medication List with Changes/Refills   Current Medications    ALBUTEROL (PROVENTIL/VENTOLIN HFA) 90 MCG/ACTUATION INHALER    INHALE 2 PUFFS INTO THE LUNGS EVERY 6 HOURS AS NEEDED FOR WHEEZING    AMITRIPTYLINE (ELAVIL) 100 MG TABLET    TAKE 3 TABLETS(300 MG) BY MOUTH EVERY EVENING    BUDESONIDE (PULMICORT) 0.5 MG/2 ML NEBULIZER SOLUTION    Take 2 mLs (0.5 mg total) by nebulization 2 (two) times daily. Controller    BUSPIRONE (BUSPAR) 10 MG TABLET    TAKE 1 TABLET(10 MG) BY MOUTH TWICE DAILY    FLUTICASONE FUROATE-VILANTEROL (BREO ELLIPTA) 100-25 MCG/DOSE DISKUS INHALER    Inhale 1 puff into the lungs once daily. Controller.Wash out mouth after use    FLUTICASONE FUROATE-VILANTEROL (BREO) 100-25 MCG/DOSE DISKUS INHALER    INHALE 1 PUFF INTO THE LUNGS EVERY DAY. WASH MOUTH AFTER USE    FLUTICASONE PROPIONATE (FLONASE) 50 MCG/ACTUATION NASAL SPRAY    SHAKE LIQUID AND USE 2 SPRAYS(100 MCG) IN EACH NOSTRIL EVERY DAY    GABAPENTIN (NEURONTIN) 300 MG CAPSULE    Take 1 capsule (300 mg total) by mouth every evening.    IBUPROFEN  (ADVIL,MOTRIN) 200 MG TABLET    Take 600 mg by mouth every 6 (six) hours as needed.    IBUPROFEN (ADVIL,MOTRIN) 600 MG TABLET    Take 1 tablet (600 mg total) by mouth 2 (two) times daily as needed for Pain.    LOSARTAN (COZAAR) 25 MG TABLET    TAKE 1 TABLET(25 MG) BY MOUTH EVERY DAY    METHOTREXATE 2.5 MG TAB    Take 8 tablets (20 mg total) by mouth every 7 days.    OXYCODONE-ACETAMINOPHEN (PERCOCET)  MG PER TABLET    Take 1 tablet by mouth every 6 (six) hours as needed for Pain.    PANTOPRAZOLE (PROTONIX) 40 MG TABLET    Take 1 tablet (40 mg total) by mouth once daily.     Review of patient's allergies indicates:   Allergen Reactions    Pneumococcal vaccine Swelling    Celebrex [celecoxib] Itching and Rash    Hydrocodone-acetaminophen Nausea Only and Nausea And Vomiting    Meloxicam Rash    Sulfa (sulfonamide antibiotics) Rash       Objective:     Right Lower Extremity  NVI  WWP foot  Comp soft  Cap refill < 2 sec  Calf NT, Soft  (-) Roni sign  JACOB  ROM : Patient is able to easily exhibit full flexion and extension on passive range of motion.   Wiggles toes  DF/PF intact  Sensation intact  Inc C/D/I  No SOI    IMAGING  FINDINGS:  Again demonstrated postoperative findings from recent right total knee arthroplasty.  No evidence of hardware complication.  Postoperative subcutaneous and intra-articular gas has resolved.  Skin staples remain in place.  There is a probable small joint effusion present.  No acute fractures or dislocations visualized.  Prior left tibiofemoral medial hemiarthroplasty again noted and grossly unchanged.     Impression:     Postoperative findings as above    Assessment:       Encounter Diagnosis   Name Primary?    S/P revision of total knee, right Yes          Plan:       Caitlin was seen today for post-op evaluation.    Diagnoses and all orders for this visit:    S/P revision of total knee, right        MaguiMiriam Ahmadi is an established pt here for postop follow-up after right total  knee replacement by Dr. Rangel.  The incision was cleaned with hydrogen peroxide.  All staples were removed, and suture strips were applied across the incision.  They should remain in place until they fall off in approximately 1-2 weeks. The patient was instructed not to soak the incision in standing water but may clean the incision with clean running water and antibacterial soap.  Patient should notify the office of any signs or symptoms of infection including fevers, erythema, purulent drainage, increasing pain.  Patient will continue with DVT prophylaxis.  Patient will start outpatient physical therapy.  Will follow-up in 4-6 weeks.  Patient verbalized understanding of all instructions and agreed with the above plan.    No follow-ups on file.    The patient understands, chooses and consents to this plan and accepts all   the risks which include but are not limited to the risks mentioned above.     Disclaimer: This note was prepared using a voice recognition system and is likely to have sound alike errors within the text.

## 2024-03-27 ENCOUNTER — PATIENT MESSAGE (OUTPATIENT)
Dept: ORTHOPEDICS | Facility: CLINIC | Age: 69
End: 2024-03-27
Payer: MEDICARE

## 2024-03-28 RX ORDER — OXYCODONE AND ACETAMINOPHEN 10; 325 MG/1; MG/1
1 TABLET ORAL EVERY 8 HOURS PRN
Qty: 21 TABLET | Refills: 0 | Status: SHIPPED | OUTPATIENT
Start: 2024-03-28 | End: 2024-04-03 | Stop reason: SDUPTHER

## 2024-04-03 ENCOUNTER — CLINICAL SUPPORT (OUTPATIENT)
Dept: REHABILITATION | Facility: HOSPITAL | Age: 69
End: 2024-04-03
Payer: MEDICARE

## 2024-04-03 DIAGNOSIS — M25.661 DECREASED RANGE OF MOTION OF RIGHT KNEE: Primary | ICD-10-CM

## 2024-04-03 DIAGNOSIS — Z96.651 S/P REVISION OF TOTAL KNEE, RIGHT: ICD-10-CM

## 2024-04-03 PROCEDURE — 97140 MANUAL THERAPY 1/> REGIONS: CPT

## 2024-04-03 PROCEDURE — 97161 PT EVAL LOW COMPLEX 20 MIN: CPT

## 2024-04-03 PROCEDURE — 97112 NEUROMUSCULAR REEDUCATION: CPT

## 2024-04-03 PROCEDURE — 97110 THERAPEUTIC EXERCISES: CPT

## 2024-04-03 RX ORDER — OXYCODONE AND ACETAMINOPHEN 10; 325 MG/1; MG/1
1 TABLET ORAL EVERY 8 HOURS PRN
Qty: 21 TABLET | Refills: 0 | Status: SHIPPED | OUTPATIENT
Start: 2024-04-03 | End: 2024-04-10 | Stop reason: SDUPTHER

## 2024-04-03 NOTE — PLAN OF CARE
[facility]   Physical Therapy Initial Evaluation      Date: 4/3/2024   Name: Caitlin Ahmadi  Clinic Number: [id number]    Therapy Diagnosis:    Encounter Diagnosis   Name Primary?    S/P revision of total knee, right       Physician: Marisel Craig PA     Physician Orders: PT Eval and Treat  Medical Diagnosis from Referral: Z96.651 (ICD-10-CM) - S/P revision of total knee, right  Evaluation Date: 4/3/2024  Authorization Period Expiration: 12/31/2024  Plan of Care Expiration: 7/3/2024  Progress Note Due: 5/3/2024  Visit # / Visits authorized: 1/1   FOTO: 1/3 (last performed on 4/3/2024)      Precautions: Standard    Time In: 1100  Time Out: 1215  Total Billable Time (timed & untimed codes): 75 minutes    Subjective     Date of onset: 3 months ago    History of current condition - Caitlin reports that 8 years ago she had a partial right knee arthroplasty done. Pt reports with in the past few months her pain continued to increase and saw Dr. Rangel who informed her she needed a revision performed. Pt comes in today 3 weeks s/p R TKA.     Falls: no falls within the last 3 months    Imaging: [x] Xray [] MRI [] CT: Performed on: 3/12/24 FINDINGS:  Postoperative exam.  Right knee prosthesis is in place.  Hardware appears intact.  No unexpected postoperative findings.  There is air noted in the surrounding soft tissues and joint.    Pain:  Current 8/10, worst 10/10, best 6/10   Location: [x] Right   [] Left:  knee   Description: aching  and dull  Aggravating Factors: bending, straightening knee  Easing Factors: activity avoidance, rest,     Prior Therapy:   [] N/A    [x] Yes: home health for the prior two weeks  Social History: Pt lives with their spouse  Occupation: Pt is reitred  Prior Level of Function: Independent and pain free with all ADL, IADL, community mobility and functional activities.   Current Level of Function: Independent with all ADL, IADL, community mobility and  functional activities with reports of increased pain and need for increased time and frequent breaks.      Dominant Extremity:    [x] Right    [] Left    Pts goals: Pt reported goals are to decrease overall pain levels in order to return to prior functional level.     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    Medications:   Caitlin has a current medication list which includes the following prescription(s): albuterol, amitriptyline, budesonide, buspirone, fluticasone furoate-vilanterol, fluticasone furoate-vilanterol, fluticasone propionate, gabapentin, ibuprofen, ibuprofen, losartan, methotrexate, oxycodone-acetaminophen, and pantoprazole.    Allergies:   Review of patient's allergies indicates:   Allergen Reactions    Pneumococcal vaccine Swelling    Celebrex [celecoxib] Itching and Rash    Hydrocodone-acetaminophen Nausea Only and Nausea And Vomiting    Meloxicam Rash    Sulfa (sulfonamide antibiotics) Rash        Objective        Knee ROM:  Right: 0/-10/118  Left:   3/0/145  Goal: 3/0/130    STRENGTH:   L/E MMT Right  (spine) Left Pain/Dysfunction with Movement Goal   Hip Flexion  4-/5 4/5  4+/5 B   Hip Extension  4/5 4/5  4+/5 B   Hip Abduction  4/5 4/5  4+/5 B   Knee Extension 4-/5 5/5  5/5 B   Knee Flexion 4/5 4+/5  5/5 B   Ankle DF 4+/5 5/5  5/5 B   Ankle  "PF 5/5 5/5 5/5 B          MUSCLE LENGTH:   Muscle Tested  Right Left  Limitation Goal   Hamstrings  [] Normal  [x] Limited [x] Normal  [] Limited  Normal B   Gastrocnemius  [] Normal  [x] Limited [x] Normal  [] Limited  Normal B              GAIT ANALYSIS The patient ambulated with the following assistive device: none; the pt presents with the following gait abnormalities: decreased step length on right and decreased knee extension on right            SENSATION  [x] Intact to Light Touch   [] Impaired:            POSTURE:  Pt presents with postural abnormalities which include:    [x] Forward Head   [] Increased Lumbar Lordosis   [x] Rounded Shoulder   [] Genu Recurvatum   [] Increased Thoracic Kyphosis [] Genu Valgus   [] Trunk Deviated    [] Pes Planus   [] Scapular Winging    [] Other:                 Treatment     Total Treatment time (time-based codes) separate from Evaluation: (45) minutes     Caitlin received the treatments listed below:      MANUAL THERAPY TECHNIQUES were applied for (15) minutes, including:    Patella mobilizations on all directions  Tibiafemoral posterior oscillations 30" 3x  Knee PROM    THERAPEUTIC EXERCISES to develop strength, endurance, ROM, flexibility, posture, and core stabilization for (10) minutes including:    Heel prop 10' (3# for 3')    NEUROMUSCULAR RE-EDUCATION ACTIVITIES to improve Balance, Coordination, Kinesthetic, Sense, Proprioception, and Posture for (20) minutes.  The following were included:    Quad set /c strap 5" 10x  NMES Quad set /c strap 10" on/off 13'    Patient Education and Home Exercises     Education provided: (5) minutes  PURPOSE: Patient educated on the impairments noted above and the effects of physical therapy intervention to improve overall condition and QOL.   STRENGTH: Patient educated on the importance of improved core and extremity strength in order to improve alignment of the spine and extremities with static positions and dynamic movement. "   POSTURE: Patient educated on postural awareness to reduce stress and maintain optimal alignment of the spine with static positions and dynamic movement     Written Home Exercises Provided: yes.  Exercises were reviewed and Ciatlin was able to demonstrate them prior to the end of the session.  Caitlin demonstrated good  understanding of the education provided. See EMR under Patient Instructions for exercises provided during therapy sessions.    Assessment     Caitlin is a 69 y.o. female referred to outpatient Physical Therapy with a medical diagnosis of Z96.651 (ICD-10-CM) - S/P revision of total knee, right. Pt presents with impairments in the following categories: IMPAIRMENTS: ROM, strength, endurance, joint mobility, gait mechanics, and functional movement patterns    Pt prognosis is Excellent  Pt will benefit from skilled outpatient Physical Therapy to address the deficits stated above and in the chart below, provide pt/family education, and to maximize pt's level of independence.     Plan of care discussed with patient: Yes  Pt's spiritual, cultural and educational needs considered and patient is agreeable to the plan of care and goals as stated below:     Anticipated Barriers for therapy: none    Medical Necessity is demonstrated by the following  History  Co-morbidities and personal factors that may impact the plan of care [] LOW: no personal factors / co-morbidities  [x] MODERATE: 1-2 personal factors / co-morbidities  [] HIGH: 3+ personal factors / co-morbidities    Moderate / High Support Documentation:   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    Cataract     Encounter for blood transfusion     Hypertension     PONV (postoperative nausea and vomiting)     Seropositive rheumatoid arthritis 2/17/2022        Examination  Body Structures and Functions, activity limitations and participation restrictions that may impact the plan of care  "[x] LOW: addressing 1-2 elements  [] MODERATE: 3+ elements  [] HIGH: 4+ elements (please support below)    Moderate / High Support Documentation: See above in "Current Level of Function"      Clinical Presentation [x] LOW: stable  [] MODERATE: Evolving  [] HIGH: Unstable     Decision Making/ Complexity Score: low         Short Term Goals:  6 weeks Status  Date Met   PAIN: Pt will report worst pain of 5/10 in order to progress toward max functional ability and improve quality of life. [x] Progressing  [] Met  [] Not Met    FUNCTION: Patient will demonstrate improved function as indicated by a score increase of 5% on FOTO. [x] Progressing  [] Met  [] Not Met    MOBILITY: Patient will improve AROM to 50% of stated goals, listed in objective measures above, in order to progress towards independence with functional activities.  [x] Progressing  [] Met  [] Not Met    STRENGTH: Patient will improve strength to 50% of stated goals, listed in objective measures above, in order to progress towards independence with functional activities. [x] Progressing  [] Met  [] Not Met    POSTURE: Patient will correct postural deviations in sitting and standing, to decrease pain and promote long term stability.  [x] Progressing  [] Met  [] Not Met    GAIT: Patient will demonstrate improved gait mechanics including increased knee extension and step length in order to improve functional mobility, improve quality of life, and decrease risk of further injury or fall.  [x] Progressing  [] Met  [] Not Met    HEP: Patient will demonstrate independence with HEP in order to progress toward functional independence. [x] Progressing  [] Met  [] Not Met      Long Term Goals:  12 weeks Status Date Met   PAIN: Pt will report worst pain of 1/10 in order to progress toward max functional ability and improve quality of life [x] Progressing  [] Met  [] Not Met    FUNCTION: Patient will demonstrate improved function as indicated by a score increase of 15% on " FOTO. [x] Progressing  [] Met  [] Not Met    MOBILITY: Patient will improve AROM to stated goals, listed in objective measures above, in order to return to maximal functional potential and improve quality of life.  [x] Progressing  [] Met  [] Not Met    STRENGTH: Patient will improve strength to stated goals, listed in objective measures above, in order to improve functional independence and quality of life.  [x] Progressing  [] Met  [] Not Met    GAIT: Patient will demonstrate normalized gait mechanics with minimal compensation in order to return to PLOF. [x] Progressing  [] Met  [] Not Met    Patient will return to normal ADL's, IADL's, community involvement, recreational activities, and work-related activities with less than or equal to 1/10 pain and maximal function.  [x] Progressing  [] Met  [] Not Met      Plan     Plan of care Certification: 4/3/2024 to 7/3/2024.    Outpatient Physical Therapy 2 times weekly for 12 weeks to include any combination of the following interventions: virtual visits, dry needling, modalities, electrical stimulation (IFC, Pre-Mod, Attended with Functional Dry Needling), Cervical/Lumbar Traction, Gait Training, Manual Therapy, Neuromuscular Re-ed, Patient Education, Self Care, Therapeutic Activities, Therapeutic Exercise, and Therapeutic Activites     Francisco Paredes, PT, DPT

## 2024-04-04 ENCOUNTER — CLINICAL SUPPORT (OUTPATIENT)
Dept: REHABILITATION | Facility: HOSPITAL | Age: 69
End: 2024-04-04
Payer: MEDICARE

## 2024-04-04 DIAGNOSIS — M25.661 DECREASED RANGE OF MOTION OF RIGHT KNEE: Primary | ICD-10-CM

## 2024-04-04 PROCEDURE — 97140 MANUAL THERAPY 1/> REGIONS: CPT

## 2024-04-04 PROCEDURE — 97110 THERAPEUTIC EXERCISES: CPT

## 2024-04-04 PROCEDURE — 97112 NEUROMUSCULAR REEDUCATION: CPT

## 2024-04-04 NOTE — PROGRESS NOTES
[facility]   Physical Therapy Treatment Note        Name: Caitlin Ahmadi  [facility] Number: [id number]    Therapy Diagnosis:   Encounter Diagnosis   Name Primary?    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     He/She was compliant with home exercise program.  Response to previous treatment: soreness following new exercises performed  Functional change: improved knee ROM     Pain: NT/10     Location: R knee    Objective      Objective Measures updated at progress report or POC update only unless otherwise noted.     Knee AROM/PROM Right   Eval Right  4/4/2024 Left   Goal   Hyper - Zero - Flexion (PRE)   0- NT 3-0-130    Hyper - Zero - Flexion (POST) NT 0-2-120  N/A        Treatment     Caitlin received the treatments listed below:       MANUAL THERAPY TECHNIQUES were applied for (14) minutes, including:    Soft tissue mobilization:     Joint mobilizations:   Patellar glides/tilts in all directions   Distal femur AP glides   Passive knee extension with distal femur stabilization   Knee extension stretch   Functional dry needling: DEFERRED        THERAPEUTIC EXERCISES to develop strength, endurance, ROM, flexibility, posture, and core stabilization for (15) minutes  including:    Performed Today:     Gastrocnemius stretch (strap) 3 minutes x 10s holds   Hamstring stretch (seated) 3x30s   Heel prop: 4# for 5 minutes   Heel slides 4 minutes x 10s holds      Interventions DEFERRED today                  NEUROMUSCULAR RE-EDUCATION ACTIVITIES to improve Balance, Coordination, Kinesthetic, Sense, Proprioception, and Posture for (16) minutes.  The following were included:    Performed Today:     NMES: 4 minutes each   Quadriceps sets   SAQ   LAQ   TKE- red band      Interventions DEFERRED today                  THERAPEUTIC ACTIVITIES to improve dynamic and functional  performance for (0) minutes including:    Performed Today:      Interventions DEFERRED today                  Next Session:  Weight shifts   Hamstring curls   Calf raises        Patient Education and Home Exercises       Home Exercises Provided and Patient Education Provided     Education provided: (time included with exercises) minutes  PURPOSE: Patient educated on the impairments noted above and the effects of physical therapy intervention to improve overall condition and QOL.   EXERCISE: Patient was educated on all the above exercise prior/during/after for proper posture, positioning, and execution for safe performance with home exercise program.   STRENGTH: Patient educated on the importance of improved core and extremity strength in order to improve alignment of the spine and extremities with static positions and dynamic movement.   GAIT & BALANCE: Patient educated on the importance of strong core and lower extremity musculature in order to improve both static and dynamic balance, improve gait mechanics, reduce fall risk and improve household and community mobility.     Written Home Exercises Provided: yes.  Exercises were reviewed and Caitlin was able to demonstrate them prior to the end of the session.  Caitlin demonstrated good  understanding of the education provided. See EMR under Patient Instructions for exercises  provided during therapy sessions.    Assessment     Pt tolerated session well today. Incorporated hamstring stretch, gastrocnemius stretch, heel prop and joint mobilizations, after which pt was able to accomplish neutral knee extensions. Incorporated neuromuscular electrical stimulation with various interventions to improve quadriceps activation.     Caitlin is progressing well towards her goals.   Patient prognosis is Excellent.     Patient will continue to benefit from skilled outpatient physical therapy to address the deficits listed in the problem list box on initial evaluation, provide pt/family education and to maximize patient's level of independence in the home and community environment.     Patient's spiritual, cultural and educational needs considered and pt agreeable to plan of care and goals.     Anticipated Barriers for therapy: none     Short Term Goals:  6 weeks Status  Date Met   PAIN: Pt will report worst pain of 5/10 in order to progress toward max functional ability and improve quality of life. [x] Progressing  [] Met  [] Not Met     FUNCTION: Patient will demonstrate improved function as indicated by a score increase of 5% on FOTO. [x] Progressing  [] Met  [] Not Met     MOBILITY: Patient will improve AROM to 50% of stated goals, listed in objective measures above, in order to progress towards independence with functional activities.  [x] Progressing  [] Met  [] Not Met     STRENGTH: Patient will improve strength to 50% of stated goals, listed in objective measures above, in order to progress towards independence with functional activities. [x] Progressing  [] Met  [] Not Met     POSTURE: Patient will correct postural deviations in sitting and standing, to decrease pain and promote long term stability.  [x] Progressing  [] Met  [] Not Met     GAIT: Patient will demonstrate improved gait mechanics including increased knee extension and step length in order to improve functional mobility, improve  quality of life, and decrease risk of further injury or fall.  [x] Progressing  [] Met  [] Not Met     HEP: Patient will demonstrate independence with HEP in order to progress toward functional independence. [x] Progressing  [] Met  [] Not Met        Long Term Goals:  12 weeks Status Date Met   PAIN: Pt will report worst pain of 1/10 in order to progress toward max functional ability and improve quality of life [x] Progressing  [] Met  [] Not Met     FUNCTION: Patient will demonstrate improved function as indicated by a score increase of 15% on FOTO. [x] Progressing  [] Met  [] Not Met     MOBILITY: Patient will improve AROM to stated goals, listed in objective measures above, in order to return to maximal functional potential and improve quality of life.  [x] Progressing  [] Met  [] Not Met     STRENGTH: Patient will improve strength to stated goals, listed in objective measures above, in order to improve functional independence and quality of life.  [x] Progressing  [] Met  [] Not Met     GAIT: Patient will demonstrate normalized gait mechanics with minimal compensation in order to return to PLOF. [x] Progressing  [] Met  [] Not Met     Patient will return to normal ADL's, IADL's, community involvement, recreational activities, and work-related activities with less than or equal to 1/10 pain and maximal function.  [x] Progressing  [] Met  [] Not Met              Plan     Continue Plan of Care (POC) and progress per patient tolerance. See treatment section for details on planned progressions next session.      Roseann Salmeron, PT

## 2024-04-09 LAB — FUNGUS SPEC CULT: NORMAL

## 2024-04-10 ENCOUNTER — CLINICAL SUPPORT (OUTPATIENT)
Dept: REHABILITATION | Facility: HOSPITAL | Age: 69
End: 2024-04-10
Payer: MEDICARE

## 2024-04-10 DIAGNOSIS — M25.661 DECREASED RANGE OF MOTION OF RIGHT KNEE: Primary | ICD-10-CM

## 2024-04-10 PROCEDURE — 97110 THERAPEUTIC EXERCISES: CPT

## 2024-04-10 PROCEDURE — 97530 THERAPEUTIC ACTIVITIES: CPT

## 2024-04-10 PROCEDURE — 97140 MANUAL THERAPY 1/> REGIONS: CPT

## 2024-04-10 PROCEDURE — 97112 NEUROMUSCULAR REEDUCATION: CPT

## 2024-04-10 RX ORDER — OXYCODONE AND ACETAMINOPHEN 10; 325 MG/1; MG/1
1 TABLET ORAL EVERY 8 HOURS PRN
Qty: 15 TABLET | Refills: 0 | Status: SHIPPED | OUTPATIENT
Start: 2024-04-10 | End: 2024-04-22

## 2024-04-10 NOTE — PROGRESS NOTES
[facility]   Physical Therapy Treatment Note        Name: Caitlin Ahmadi  [facility] Number: [id number]    Therapy Diagnosis:   Encounter Diagnosis   Name Primary?    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 Notes she has been doing her heel prop every other day. States she has been doing a lot around her house and is trying to keep moving     He/She was compliant with home exercise program.  Response to previous treatment: decreased stiffness and improved gait following exercises   Functional change: improved knee extension ROM, improved heel strike and knee extension with gait      Pain: NT/10     Location: R knee    Objective      Objective Measures updated at progress report or POC update only unless otherwise noted.     Knee AROM/PROM Right   Eval Right  4/4/2024 Left   Goal   Hyper - Zero - Flexion (PRE)   0- NT 3-0-130    Hyper - Zero - Flexion (POST) NT 0-2-120  N/A        Treatment     Caitlin received the treatments listed below:       MANUAL THERAPY TECHNIQUES were applied for (15) minutes, including:    Soft tissue mobilization:   Scar mobilizations and education on scar mobilization technique and frequency   Joint mobilizations:   Patellar glides/tilts in all directions   Distal femur AP glides   Passive knee extension with distal femur  stabilization   Knee extension stretch   Functional dry needling: DEFERRED        THERAPEUTIC EXERCISES to develop strength, endurance, ROM, flexibility, posture, and core stabilization for (12) minutes including:    Performed Today:     Upright bike for ROM: 6 minutes   Heel prop: 6# for 5 minutes      Interventions DEFERRED today     Heel slides 4 minutes x 10s holds   Gastrocnemius stretch (strap) 3 minutes x 10s holds   Hamstring stretch (seated) 3x30s            NEUROMUSCULAR RE-EDUCATION ACTIVITIES to improve Balance, Coordination, Kinesthetic, Sense, Proprioception, and Posture for (18) minutes.  The following were included:    Performed Today:     NMES:    Quadriceps sets with strap - 5 minutes   LAQ with strap- 3 minutes   LAQ- 3 minutes   Single leg stance - 5 minutes      Interventions DEFERRED today                  THERAPEUTIC ACTIVITIES to improve dynamic and functional  performance for (10) minutes including:    Performed Today:     Shuttle squats: 2 bands for 5 minutes   Calf raises 3x10  Interventions DEFERRED today                  Next Session:  Weight shifts   Hamstring curls   Calf raises        Patient Education and Home Exercises       Home Exercises Provided and Patient Education Provided     Education provided: (time included with exercises) minutes  PURPOSE: Patient educated on the impairments noted above and the effects of physical therapy intervention to improve overall condition and QOL.   EXERCISE: Patient was educated on all the above exercise prior/during/after for proper posture, positioning, and execution for safe performance with home exercise program.   STRENGTH: Patient educated on the importance of improved core and extremity strength in order to improve alignment of the spine and extremities with static positions and dynamic movement.   GAIT & BALANCE: Patient educated on the importance of strong core and lower extremity musculature in order to improve both static and dynamic  "balance, improve gait mechanics, reduce fall risk and improve household and community mobility.     Written Home Exercises Provided: yes.  Exercises were reviewed and Caitlin was able to demonstrate them prior to the end of the session.  Caitlin demonstrated good  understanding of the education provided. See EMR under Patient Instructions for exercises provided during therapy sessions.    Assessment     Pt tolerated session well today. Incorporated scar tissue mobilization and educated patient on performing intervention 2x per day every day to improve scar mobility; see "patient instructions" for details. Pt continues to lack end range knee extension and states she is performing her heel props every other day. Educated patient on performing intervention several times per day every day to improve ROM. Added shuttle squats to improve quadriceps strength and functional mobility    Caitlin is progressing well towards her goals.   Patient prognosis is Excellent.     Patient will continue to benefit from skilled outpatient physical therapy to address the deficits listed in the problem list box on initial evaluation, provide pt/family education and to maximize patient's level of independence in the home and community environment.     Patient's spiritual, cultural and educational needs considered and pt agreeable to plan of care and goals.     Anticipated Barriers for therapy: none     Short Term Goals:  6 weeks Status  Date Met   PAIN: Pt will report worst pain of 5/10 in order to progress toward max functional ability and improve quality of life. [x] Progressing  [] Met  [] Not Met     FUNCTION: Patient will demonstrate improved function as indicated by a score increase of 5% on FOTO. [x] Progressing  [] Met  [] Not Met     MOBILITY: Patient will improve AROM to 50% of stated goals, listed in objective measures above, in order to progress towards independence with functional activities.  [x] Progressing  [] Met  [] Not Met   "   STRENGTH: Patient will improve strength to 50% of stated goals, listed in objective measures above, in order to progress towards independence with functional activities. [x] Progressing  [] Met  [] Not Met     POSTURE: Patient will correct postural deviations in sitting and standing, to decrease pain and promote long term stability.  [x] Progressing  [] Met  [] Not Met     GAIT: Patient will demonstrate improved gait mechanics including increased knee extension and step length in order to improve functional mobility, improve quality of life, and decrease risk of further injury or fall.  [x] Progressing  [] Met  [] Not Met     HEP: Patient will demonstrate independence with HEP in order to progress toward functional independence. [x] Progressing  [] Met  [] Not Met        Long Term Goals:  12 weeks Status Date Met   PAIN: Pt will report worst pain of 1/10 in order to progress toward max functional ability and improve quality of life [x] Progressing  [] Met  [] Not Met     FUNCTION: Patient will demonstrate improved function as indicated by a score increase of 15% on FOTO. [x] Progressing  [] Met  [] Not Met     MOBILITY: Patient will improve AROM to stated goals, listed in objective measures above, in order to return to maximal functional potential and improve quality of life.  [x] Progressing  [] Met  [] Not Met     STRENGTH: Patient will improve strength to stated goals, listed in objective measures above, in order to improve functional independence and quality of life.  [x] Progressing  [] Met  [] Not Met     GAIT: Patient will demonstrate normalized gait mechanics with minimal compensation in order to return to PLOF. [x] Progressing  [] Met  [] Not Met     Patient will return to normal ADL's, IADL's, community involvement, recreational activities, and work-related activities with less than or equal to 1/10 pain and maximal function.  [x] Progressing  [] Met  [] Not Met              Plan     Continue Plan of  Care (POC) and progress per patient tolerance. See treatment section for details on planned progressions next session.      Roseann Salmeron PT

## 2024-04-12 ENCOUNTER — CLINICAL SUPPORT (OUTPATIENT)
Dept: REHABILITATION | Facility: HOSPITAL | Age: 69
End: 2024-04-12
Payer: MEDICARE

## 2024-04-12 DIAGNOSIS — M25.661 DECREASED RANGE OF MOTION OF RIGHT KNEE: Primary | ICD-10-CM

## 2024-04-12 PROCEDURE — 97110 THERAPEUTIC EXERCISES: CPT

## 2024-04-12 PROCEDURE — 97140 MANUAL THERAPY 1/> REGIONS: CPT

## 2024-04-12 PROCEDURE — 97530 THERAPEUTIC ACTIVITIES: CPT

## 2024-04-12 NOTE — PROGRESS NOTES
[facility]   Physical Therapy Treatment Note        Name: Caitlin Ahmadi  [facility] Number: [id number]    Therapy Diagnosis:   Encounter Diagnosis   Name Primary?    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 Notes that she felt great after her previous visit; however, that night and into the next day she had a ton of pain in the knee and had to take an aspirin. States that this is something that has happened to her randomly in the past too and is not sure that it is related to her last session    He/She was compliant with home exercise program.  Response to previous treatment: decreased stiffness and improved gait following exercises   Functional change: improved knee extension ROM, improved heel strike and knee extension with gait      Pain: NT/10     Location: R knee    Objective      Objective Measures updated at progress report or POC update only unless otherwise noted.     Knee AROM/PROM Right   Eval Right  4/4/2024 Left   Goal   Hyper - Zero - Flexion (PRE)   0- NT 3-0-130    Hyper - Zero - Flexion (POST) NT 0-2-120  N/A        Treatment     Caitlin received the treatments listed below:       MANUAL THERAPY TECHNIQUES were applied for (8) minutes, including:    Soft tissue mobilization:   Scar mobilizations and education on scar mobilization  technique and frequency   Joint mobilizations:   Patellar glides/tilts in all directions   Tibiofemoral AP and PA glides    Passive knee extension with distal femur stabilization   Knee extension stretch   Functional dry needling: DEFERRED        THERAPEUTIC EXERCISES to develop strength, endurance, ROM, flexibility, posture, and core stabilization for (15) minutes including:    Performed Today:     Redumbent bike for ROM: 5 minutes   Heel prop: 6# for 5 minutes   Hamstring stretch (strap) 4x30s   Heel slides: 3 minutes x 5s holds    Interventions DEFERRED today     Gastrocnemius stretch (strap) 3 minutes x 10s holds            NEUROMUSCULAR RE-EDUCATION ACTIVITIES to improve Balance, Coordination, Kinesthetic, Sense, Proprioception, and Posture for (0) minutes.  The following were included:    Performed Today:          Interventions DEFERRED today                  THERAPEUTIC ACTIVITIES to improve dynamic and functional  performance for (15) minutes including:    Performed Today:     LAQ + strap: 3 minutes x 5s holds   LAQ 2x10   Bridges 3x10   Shuttle squats: 3 bands for 5 minutes   Calf raises 3x10    Interventions DEFERRED today                  Next Session:  Weight shifts   Hamstring curls   Calf raises        Patient Education and Home Exercises       Home Exercises Provided and Patient Education Provided     Education provided: (time included with exercises) minutes  PURPOSE: Patient educated on the impairments noted above and the effects of physical therapy intervention to improve overall condition and QOL.   EXERCISE: Patient was educated on all the above exercise prior/during/after for proper posture, positioning, and execution for safe performance with home exercise program.   STRENGTH: Patient educated on the importance of improved core and extremity strength in order to improve alignment of the spine and extremities with static positions and dynamic movement.   GAIT & BALANCE: Patient educated on the  importance of strong core and lower extremity musculature in order to improve both static and dynamic balance, improve gait mechanics, reduce fall risk and improve household and community mobility.     Written Home Exercises Provided: yes.  Exercises were reviewed and Caitlin was able to demonstrate them prior to the end of the session.  Caitlin demonstrated good  understanding of the education provided. See EMR under Patient Instructions for exercises provided during therapy sessions.    Assessment     Pt tolerated session well today. Increased knee extension noted with manual therapy and heel prop today which resulted in improved knee extension and heel strike with gait. Added bridges to improve functional strength of the posterior strength. Added LAQ's with strap assistance to improve AROM knee extension; pt then demonstrates improved AROM even when strap assistance was removed.     Caitlin is progressing well towards her goals.   Patient prognosis is Excellent.     Patient will continue to benefit from skilled outpatient physical therapy to address the deficits listed in the problem list box on initial evaluation, provide pt/family education and to maximize patient's level of independence in the home and community environment.     Patient's spiritual, cultural and educational needs considered and pt agreeable to plan of care and goals.     Anticipated Barriers for therapy: none     Short Term Goals:  6 weeks Status  Date Met   PAIN: Pt will report worst pain of 5/10 in order to progress toward max functional ability and improve quality of life. [x] Progressing  [] Met  [] Not Met     FUNCTION: Patient will demonstrate improved function as indicated by a score increase of 5% on FOTO. [x] Progressing  [] Met  [] Not Met     MOBILITY: Patient will improve AROM to 50% of stated goals, listed in objective measures above, in order to progress towards independence with functional activities.  [x] Progressing  [] Met  [] Not Met      STRENGTH: Patient will improve strength to 50% of stated goals, listed in objective measures above, in order to progress towards independence with functional activities. [x] Progressing  [] Met  [] Not Met     POSTURE: Patient will correct postural deviations in sitting and standing, to decrease pain and promote long term stability.  [x] Progressing  [] Met  [] Not Met     GAIT: Patient will demonstrate improved gait mechanics including increased knee extension and step length in order to improve functional mobility, improve quality of life, and decrease risk of further injury or fall.  [x] Progressing  [] Met  [] Not Met     HEP: Patient will demonstrate independence with HEP in order to progress toward functional independence. [x] Progressing  [] Met  [] Not Met        Long Term Goals:  12 weeks Status Date Met   PAIN: Pt will report worst pain of 1/10 in order to progress toward max functional ability and improve quality of life [x] Progressing  [] Met  [] Not Met     FUNCTION: Patient will demonstrate improved function as indicated by a score increase of 15% on FOTO. [x] Progressing  [] Met  [] Not Met     MOBILITY: Patient will improve AROM to stated goals, listed in objective measures above, in order to return to maximal functional potential and improve quality of life.  [x] Progressing  [] Met  [] Not Met     STRENGTH: Patient will improve strength to stated goals, listed in objective measures above, in order to improve functional independence and quality of life.  [x] Progressing  [] Met  [] Not Met     GAIT: Patient will demonstrate normalized gait mechanics with minimal compensation in order to return to PLOF. [x] Progressing  [] Met  [] Not Met     Patient will return to normal ADL's, IADL's, community involvement, recreational activities, and work-related activities with less than or equal to 1/10 pain and maximal function.  [x] Progressing  [] Met  [] Not Met              Plan     Continue Plan of  Care (POC) and progress per patient tolerance. See treatment section for details on planned progressions next session.      Roseann Salmeron PT

## 2024-04-15 ENCOUNTER — OFFICE VISIT (OUTPATIENT)
Dept: OPHTHALMOLOGY | Facility: CLINIC | Age: 69
End: 2024-04-15
Payer: MEDICARE

## 2024-04-15 DIAGNOSIS — H52.203 MYOPIA WITH ASTIGMATISM AND PRESBYOPIA, BILATERAL: Primary | ICD-10-CM

## 2024-04-15 DIAGNOSIS — H52.4 MYOPIA WITH ASTIGMATISM AND PRESBYOPIA, BILATERAL: Primary | ICD-10-CM

## 2024-04-15 DIAGNOSIS — H52.13 MYOPIA WITH ASTIGMATISM AND PRESBYOPIA, BILATERAL: Primary | ICD-10-CM

## 2024-04-15 PROCEDURE — 92310 CONTACT LENS FITTING OU: CPT | Mod: CSM,S$GLB,, | Performed by: OPTOMETRIST

## 2024-04-15 PROCEDURE — 99499 UNLISTED E&M SERVICE: CPT | Mod: ,,, | Performed by: OPTOMETRIST

## 2024-04-15 NOTE — PROGRESS NOTES
HPI     Contact Lens Follow Up            Comments: Pt states she could not see close up with trial CTL, no pain.          Last edited by Rosita Cooney MA on 4/15/2024  1:29 PM.            Assessment /Plan     For exam results, see Encounter Report.    Myopia with astigmatism and presbyopia, bilateral      Contact Lens Prescription (4/15/2024)          Brand Base Curve Diameter Sphere    Right Acuvue 1-Day Moist 8.5 14.2 +2.50    Left no lens         Expiration Date: 4/15/2025    Replacement: Daily    Wearing Schedule: Daily Wear            Dispensed trial contact lenses today. Patient is to wear lenses for 1 week.   Ok to order supply if no problems. RTC PRN if any problems arise.    Otherwise, RTC 1 yr for dilated eye exam.  Discussed above and answered questions.

## 2024-04-18 ENCOUNTER — CLINICAL SUPPORT (OUTPATIENT)
Dept: REHABILITATION | Facility: HOSPITAL | Age: 69
End: 2024-04-18
Payer: MEDICARE

## 2024-04-18 DIAGNOSIS — M25.661 DECREASED RANGE OF MOTION OF RIGHT KNEE: Primary | ICD-10-CM

## 2024-04-18 PROCEDURE — 97530 THERAPEUTIC ACTIVITIES: CPT

## 2024-04-18 PROCEDURE — 97112 NEUROMUSCULAR REEDUCATION: CPT

## 2024-04-18 PROCEDURE — 97110 THERAPEUTIC EXERCISES: CPT

## 2024-04-18 NOTE — PROGRESS NOTES
[facility]   Physical Therapy Treatment Note        Name: Caitlin Ahmadi  [facility] Number: [id number]    Therapy Diagnosis:   Encounter Diagnosis   Name Primary?    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 Notes that she felt great after her previous visit; however, that night and into the next day she had a ton of pain in the knee and had to take an aspirin. States that this is something that has happened to her randomly in the past too and is not sure that it is related to her last session    He/She was compliant with home exercise program.  Response to previous treatment: decreased stiffness and improved gait following exercises   Functional change: improved knee extension ROM, improved heel strike and knee extension with gait      Pain: NT/10     Location: R knee    Objective      Objective Measures updated at progress report or POC update only unless otherwise noted.     Knee AROM/PROM Right   Eval Right  4/4/2024 Left   Goal   Hyper - Zero - Flexion (PRE)   0- NT 3-0-130    Hyper - Zero - Flexion (POST) NT 0-2-120  N/A        Treatment     Caitlin received the treatments listed below:       MANUAL THERAPY TECHNIQUES were applied for (not billed) minutes, including:    Soft tissue mobilization:   Scar mobilizations and education on scar  mobilization technique and frequency   Joint mobilizations:   Patellar glides/tilts in all directions   Tibiofemoral AP and PA glides    Passive knee extension with distal femur stabilization   Knee extension stretch   Functional dry needling: DEFERRED        THERAPEUTIC EXERCISES to develop strength, endurance, ROM, flexibility, posture, and core stabilization for (15) minutes including:    Performed Today:     Redumbent bike for ROM: 5 minutes   Prone hang 7 minutes total   Tailgater 2 minutes total   Prone quadriceps stretch (strap) 3x1 minute      Interventions DEFERRED today     Heel prop: 6# for 5 minutes   Hamstring stretch (strap) 4x30s   Heel slides: 3 minutes x 5s holds          NEUROMUSCULAR RE-EDUCATION ACTIVITIES to improve Balance, Coordination, Kinesthetic, Sense, Proprioception, and Posture for (10) minutes.  The following were included:    Performed Today:     Prone TKE 3 minutes x 5s holds   Single leg stance      Interventions DEFERRED today                  THERAPEUTIC ACTIVITIES to improve dynamic and functional  performance for (15) minutes including:    Performed Today:     Prone hamstring curls 3x10   LAQ 2# 3x10   Bridges 3x10   Shuttle squats:  DL 4 bands 3x10   SL 2 bands 3x10   Calf raises on orange beam- 3x10    Interventions DEFERRED today                  Next Session:        Patient Education and Home Exercises       Home Exercises Provided and Patient Education Provided     Education provided: (time included with exercises) minutes  PURPOSE: Patient educated on the impairments noted above and the effects of physical therapy intervention to improve overall condition and QOL.   EXERCISE: Patient was educated on all the above exercise prior/during/after for proper posture, positioning, and execution for safe performance with home exercise program.   STRENGTH: Patient educated on the importance of improved core and extremity strength in order to improve alignment of the spine and  extremities with static positions and dynamic movement.   GAIT & BALANCE: Patient educated on the importance of strong core and lower extremity musculature in order to improve both static and dynamic balance, improve gait mechanics, reduce fall risk and improve household and community mobility.     Written Home Exercises Provided: yes.  Exercises were reviewed and Caitlin was able to demonstrate them prior to the end of the session.  Caitlin demonstrated good  understanding of the education provided. See EMR under Patient Instructions for exercises provided during therapy sessions.    Assessment     Pt tolerated session well today. Increased knee extension noted with manual therapy and heel prop today which resulted in improved knee extension and heel strike with gait. Added bridges to improve functional strength of the posterior strength. Added LAQ's with strap assistance to improve AROM knee extension; pt then demonstrates improved AROM even when strap assistance was removed.     Caitlin is progressing well towards her goals.   Patient prognosis is Excellent.     Patient will continue to benefit from skilled outpatient physical therapy to address the deficits listed in the problem list box on initial evaluation, provide pt/family education and to maximize patient's level of independence in the home and community environment.     Patient's spiritual, cultural and educational needs considered and pt agreeable to plan of care and goals.     Anticipated Barriers for therapy: none     Short Term Goals:  6 weeks Status  Date Met   PAIN: Pt will report worst pain of 5/10 in order to progress toward max functional ability and improve quality of life. [x] Progressing  [] Met  [] Not Met     FUNCTION: Patient will demonstrate improved function as indicated by a score increase of 5% on FOTO. [x] Progressing  [] Met  [] Not Met     MOBILITY: Patient will improve AROM to 50% of stated goals, listed in objective measures above, in order  to progress towards independence with functional activities.  [x] Progressing  [] Met  [] Not Met     STRENGTH: Patient will improve strength to 50% of stated goals, listed in objective measures above, in order to progress towards independence with functional activities. [x] Progressing  [] Met  [] Not Met     POSTURE: Patient will correct postural deviations in sitting and standing, to decrease pain and promote long term stability.  [x] Progressing  [] Met  [] Not Met     GAIT: Patient will demonstrate improved gait mechanics including increased knee extension and step length in order to improve functional mobility, improve quality of life, and decrease risk of further injury or fall.  [x] Progressing  [] Met  [] Not Met     HEP: Patient will demonstrate independence with HEP in order to progress toward functional independence. [x] Progressing  [] Met  [] Not Met        Long Term Goals:  12 weeks Status Date Met   PAIN: Pt will report worst pain of 1/10 in order to progress toward max functional ability and improve quality of life [x] Progressing  [] Met  [] Not Met     FUNCTION: Patient will demonstrate improved function as indicated by a score increase of 15% on FOTO. [x] Progressing  [] Met  [] Not Met     MOBILITY: Patient will improve AROM to stated goals, listed in objective measures above, in order to return to maximal functional potential and improve quality of life.  [x] Progressing  [] Met  [] Not Met     STRENGTH: Patient will improve strength to stated goals, listed in objective measures above, in order to improve functional independence and quality of life.  [x] Progressing  [] Met  [] Not Met     GAIT: Patient will demonstrate normalized gait mechanics with minimal compensation in order to return to PLOF. [x] Progressing  [] Met  [] Not Met     Patient will return to normal ADL's, IADL's, community involvement, recreational activities, and work-related activities with less than or equal to 1/10 pain  and maximal function.  [x] Progressing  [] Met  [] Not Met              Plan     Continue Plan of Care (POC) and progress per patient tolerance. See treatment section for details on planned progressions next session.      Roseann Salmeron, PT

## 2024-04-19 ENCOUNTER — EXTERNAL HOME HEALTH (OUTPATIENT)
Dept: HOME HEALTH SERVICES | Facility: HOSPITAL | Age: 69
End: 2024-04-19
Payer: MEDICARE

## 2024-04-22 ENCOUNTER — CLINICAL SUPPORT (OUTPATIENT)
Dept: REHABILITATION | Facility: HOSPITAL | Age: 69
End: 2024-04-22
Payer: MEDICARE

## 2024-04-22 DIAGNOSIS — M25.661 DECREASED RANGE OF MOTION OF RIGHT KNEE: Primary | ICD-10-CM

## 2024-04-22 PROCEDURE — 97112 NEUROMUSCULAR REEDUCATION: CPT

## 2024-04-22 PROCEDURE — 97530 THERAPEUTIC ACTIVITIES: CPT

## 2024-04-22 PROCEDURE — 97140 MANUAL THERAPY 1/> REGIONS: CPT

## 2024-04-22 PROCEDURE — 97110 THERAPEUTIC EXERCISES: CPT

## 2024-04-22 RX ORDER — OXYCODONE AND ACETAMINOPHEN 5; 325 MG/1; MG/1
1 TABLET ORAL EVERY 8 HOURS PRN
Qty: 15 TABLET | Refills: 0 | Status: SHIPPED | OUTPATIENT
Start: 2024-04-22 | End: 2024-04-29 | Stop reason: SDUPTHER

## 2024-04-22 NOTE — PROGRESS NOTES
[facility]   Physical Therapy Treatment Note        Name: Caitlin Ahmadi  [facility] Number: [id number]    Therapy Diagnosis:   Encounter Diagnosis   Name Primary?    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     He/She was compliant with home exercise program.  Response to previous treatment: decreased stiffness and improved gait following exercises   Functional change: improved knee extension ROM, improved heel strike and knee extension with gait      Pain: NT/10     Location: R knee    Objective      Objective Measures updated at progress report or POC update only unless otherwise noted.     Knee AROM/PROM Right   Eval Right  4/4/2024 Left   Goal   Hyper - Zero - Flexion (PRE)   0- NT 3-0-130    Hyper - Zero - Flexion (POST) NT 0-2-120  N/A        Treatment     Caitlin received the treatments listed below:       MANUAL THERAPY TECHNIQUES were applied for (10) minutes, including:    Soft tissue mobilization:   Scar mobilizations and education on scar mobilization technique and frequency   Joint mobilizations:   Patellar glides/tilts in all directions   Tibiofemoral AP and PA glides    Passive knee extension with distal femur stabilization   Knee extension stretch   Functional dry needling:  DEFERRED        THERAPEUTIC EXERCISES to develop strength, endurance, ROM, flexibility, posture, and core stabilization for (15) minutes including:    Performed Today:     Recumbent bike for ROM: 5 minutes   Prone hang 5 minutes total   Tailgater 2 minutes total   Prone quadriceps stretch (strap) 3x1 minute   Knee PROM      Interventions DEFERRED today     Heel prop: 6# for 5 minutes   Hamstring stretch (strap) 4x30s   Heel slides: 3 minutes x 5s holds          NEUROMUSCULAR RE-EDUCATION ACTIVITIES to improve Balance, Coordination, Kinesthetic, Sense, Proprioception, and Posture for (8) minutes.  The following were included:    Performed Today:     Single leg stance: 5 minutes x 10s holds   Prone TKE 3 minutes x 5s holds    Interventions DEFERRED today                THERAPEUTIC ACTIVITIES to improve dynamic and functional  performance for (20) minutes including:    Performed Today:     Prone hamstring curls 1.5# 3x10   LAQ 3# 3x10   Bridges 3x10   Shuttle squats:  DL 4 bands 3x10   SL 2 bands 3x10   Calf raises on orange beam- 3x10   Step ups: 4 inch 3x10    Interventions DEFERRED today                  Next Session:  Sit to stands   Tandem stance   Tandem walking in parallel bars   Steamboats   Straight leg raise flexion      Patient Education and Home Exercises       Home Exercises Provided and Patient Education Provided     Education provided: (time included with exercises) minutes  PURPOSE: Patient educated on the impairments noted above and the effects of physical therapy intervention to improve overall condition and QOL.   EXERCISE: Patient was educated on all the above exercise prior/during/after for proper posture, positioning, and execution for safe performance with home exercise program.   STRENGTH: Patient educated on the importance of improved core and extremity strength in order to improve alignment of the spine and extremities with static positions and dynamic movement.   GAIT & BALANCE: Patient  educated on the importance of strong core and lower extremity musculature in order to improve both static and dynamic balance, improve gait mechanics, reduce fall risk and improve household and community mobility.     Written Home Exercises Provided: yes.  Exercises were reviewed and Caitlin was able to demonstrate them prior to the end of the session.  Caitlin demonstrated good  understanding of the education provided. See EMR under Patient Instructions for exercises provided during therapy sessions.    Assessment     Pt tolerated session well today. Incorporated step ups to improve functional strength; pt notes moderate stiffness and discomfort initially but states that this eases significantly as she progresses through her reps. Improved tolerance for single leg stance today. Pt has visible improvement in gait mechanics with symmetrical stance time, heel strike and push off. Pt continues to lack end range knee extension and has poor tolerance for prone hang and mobilizations to improve this motion    Caitlin is progressing well towards her goals.   Patient prognosis is Excellent.     Patient will continue to benefit from skilled outpatient physical therapy to address the deficits listed in the problem list box on initial evaluation, provide pt/family education and to maximize patient's level of independence in the home and community environment.     Patient's spiritual, cultural and educational needs considered and pt agreeable to plan of care and goals.     Anticipated Barriers for therapy: none     Short Term Goals:  6 weeks Status  Date Met   PAIN: Pt will report worst pain of 5/10 in order to progress toward max functional ability and improve quality of life. [x] Progressing  [] Met  [] Not Met     FUNCTION: Patient will demonstrate improved function as indicated by a score increase of 5% on FOTO. [x] Progressing  [] Met  [] Not Met     MOBILITY: Patient will improve AROM to 50% of stated goals, listed in objective  measures above, in order to progress towards independence with functional activities.  [x] Progressing  [] Met  [] Not Met     STRENGTH: Patient will improve strength to 50% of stated goals, listed in objective measures above, in order to progress towards independence with functional activities. [x] Progressing  [] Met  [] Not Met     POSTURE: Patient will correct postural deviations in sitting and standing, to decrease pain and promote long term stability.  [x] Progressing  [] Met  [] Not Met     GAIT: Patient will demonstrate improved gait mechanics including increased knee extension and step length in order to improve functional mobility, improve quality of life, and decrease risk of further injury or fall.  [x] Progressing  [] Met  [] Not Met     HEP: Patient will demonstrate independence with HEP in order to progress toward functional independence. [x] Progressing  [] Met  [] Not Met        Long Term Goals:  12 weeks Status Date Met   PAIN: Pt will report worst pain of 1/10 in order to progress toward max functional ability and improve quality of life [x] Progressing  [] Met  [] Not Met     FUNCTION: Patient will demonstrate improved function as indicated by a score increase of 15% on FOTO. [x] Progressing  [] Met  [] Not Met     MOBILITY: Patient will improve AROM to stated goals, listed in objective measures above, in order to return to maximal functional potential and improve quality of life.  [x] Progressing  [] Met  [] Not Met     STRENGTH: Patient will improve strength to stated goals, listed in objective measures above, in order to improve functional independence and quality of life.  [x] Progressing  [] Met  [] Not Met     GAIT: Patient will demonstrate normalized gait mechanics with minimal compensation in order to return to PLOF. [x] Progressing  [] Met  [] Not Met     Patient will return to normal ADL's, IADL's, community involvement, recreational activities, and work-related activities with less  than or equal to 1/10 pain and maximal function.  [x] Progressing  [] Met  [] Not Met              Plan     Continue Plan of Care (POC) and progress per patient tolerance. See treatment section for details on planned progressions next session.      Roseann Salmeron, PT

## 2024-04-23 ENCOUNTER — DOCUMENT SCAN (OUTPATIENT)
Dept: HOME HEALTH SERVICES | Facility: HOSPITAL | Age: 69
End: 2024-04-23
Payer: MEDICARE

## 2024-04-23 ENCOUNTER — PATIENT MESSAGE (OUTPATIENT)
Dept: OPTOMETRY | Facility: CLINIC | Age: 69
End: 2024-04-23
Payer: MEDICARE

## 2024-04-25 ENCOUNTER — CLINICAL SUPPORT (OUTPATIENT)
Dept: REHABILITATION | Facility: HOSPITAL | Age: 69
End: 2024-04-25
Payer: MEDICARE

## 2024-04-25 DIAGNOSIS — M25.661 DECREASED RANGE OF MOTION OF RIGHT KNEE: Primary | ICD-10-CM

## 2024-04-25 PROCEDURE — 97530 THERAPEUTIC ACTIVITIES: CPT

## 2024-04-25 PROCEDURE — 97112 NEUROMUSCULAR REEDUCATION: CPT

## 2024-04-25 PROCEDURE — 97110 THERAPEUTIC EXERCISES: CPT

## 2024-04-25 NOTE — PROGRESS NOTES
[facility]   Physical Therapy Treatment Note        Name: Caitlin Ahmadi  [facility] Number: [id number]    Therapy Diagnosis:   Encounter Diagnosis   Name Primary?    Decreased range of motion of right knee Yes       Physician: Marisel Craig PA    Visit Date: 4/25/2024    Physician Orders: PT Eval and Treat  Medical Diagnosis from Referral: Z96.651 (ICD-10-CM) - S/P revision of total knee, right  Evaluation Date: 4/3/2024  Authorization Period Expiration: 12/31/2024  Plan of Care Expiration: 7/3/2024  Progress Note Due: 5/3/2024  Visit # / Visits authorized: 5/20 (+1 for evaluation)  FOTO: 1/3 (last performed on 4/3/2024)       Precautions: Standard    Time In: 1430   Time Out: 1527  Total Billable Time: 55 minutes (Billing reflects 1 on 1 treatment time spent with patient)    Subjective     Patient reports: she had a loss of balance due to her ankle rolling and states she     He/She was compliant with home exercise program.  Response to previous treatment: decreased stiffness and improved gait following exercises   Functional change: improved knee extension ROM, improved heel strike and knee extension with gait      Pain: NT/10     Location: R knee    Objective      Objective Measures updated at progress report or POC update only unless otherwise noted.     Knee AROM/PROM Right   Eval Right  4/4/2024 Left   Goal   Hyper - Zero - Flexion (PRE)   0- NT 3-0-130    Hyper - Zero - Flexion (POST) NT 0-2-120  N/A        Treatment     Caitlin received the treatments listed below:       MANUAL THERAPY TECHNIQUES were applied for (0) minutes, including:    Soft tissue mobilization:   Scar mobilizations and education on scar mobilization technique and frequency   Joint mobilizations:   Patellar glides/tilts in all directions   Tibiofemoral AP and PA glides    Passive knee extension with distal femur stabilization   Knee extension stretch   Functional dry needling:  DEFERRED        THERAPEUTIC EXERCISES to develop strength, endurance, ROM, flexibility, posture, and core stabilization for (14) minutes including:    Performed Today:     Recumbent bike for ROM: 5 minutes   Heel slides: 4 minutes x 5s holds   Heel prop: 6# for 5 minutes    Interventions DEFERRED today     Hamstring stretch (strap) 4x30s   Prone hang 5 minutes total   Tailgater 2 minutes total   Prone quadriceps stretch (strap) 3x1 minute          NEUROMUSCULAR RE-EDUCATION ACTIVITIES to improve Balance, Coordination, Kinesthetic, Sense, Proprioception, and Posture for (8) minutes.  The following were included:    Performed Today:     Single leg stance: 5 minutes x 20s holds   Prone TKE 3 minutes x 5s holds    Interventions DEFERRED today                THERAPEUTIC ACTIVITIES to improve dynamic and functional  performance for (33) minutes including:    Performed Today:     hamstring curls 21# 3x10   Knee extensions 15# 2x10   LAQ + straight leg raise flexion 2x10   Straight leg raise abduction: 3x10   Bridges 3x10   Leg press   Double le# 3x10   Calf raises on orange beam- 3x10   Step ups: 6 inch 3x10    Interventions DEFERRED today     Shuttle squats:  DL 4 bands 3x10   SL 2 bands 3x10              Next Session:  Sit to stands   Tandem stance   Tandem walking in parallel bars   Steamboats   Straight leg raise flexion      Patient Education and Home Exercises       Home Exercises Provided and Patient Education Provided     Education provided: (time included with exercises) minutes  PURPOSE: Patient educated on the impairments noted above and the effects of physical therapy intervention to improve overall condition and QOL.   EXERCISE: Patient was educated on all the above exercise prior/during/after for proper posture, positioning, and execution for safe performance with home exercise program.   STRENGTH: Patient educated on the importance of improved core and extremity strength in order to improve alignment of  the spine and extremities with static positions and dynamic movement.   GAIT & BALANCE: Patient educated on the importance of strong core and lower extremity musculature in order to improve both static and dynamic balance, improve gait mechanics, reduce fall risk and improve household and community mobility.     Written Home Exercises Provided: yes.  Exercises were reviewed and Caitlin was able to demonstrate them prior to the end of the session.  Caitlin demonstrated good  understanding of the education provided. See EMR under Patient Instructions for exercises provided during therapy sessions.    Assessment     Pt tolerated session well today. Increased hold time tolerated with single leg stance today indicating improved neuromuscular control and single leg stability. Pts strength continues to improve and she was able to tolerate knee extension machine, hamstring curl machine and leg press machine today in order to further improve functional strength     Caitlin is progressing well towards her goals.   Patient prognosis is Excellent.     Patient will continue to benefit from skilled outpatient physical therapy to address the deficits listed in the problem list box on initial evaluation, provide pt/family education and to maximize patient's level of independence in the home and community environment.     Patient's spiritual, cultural and educational needs considered and pt agreeable to plan of care and goals.     Anticipated Barriers for therapy: none     Short Term Goals:  6 weeks Status  Date Met   PAIN: Pt will report worst pain of 5/10 in order to progress toward max functional ability and improve quality of life. [x] Progressing  [] Met  [] Not Met     FUNCTION: Patient will demonstrate improved function as indicated by a score increase of 5% on FOTO. [x] Progressing  [] Met  [] Not Met     MOBILITY: Patient will improve AROM to 50% of stated goals, listed in objective measures above, in order to progress towards  independence with functional activities.  [x] Progressing  [] Met  [] Not Met     STRENGTH: Patient will improve strength to 50% of stated goals, listed in objective measures above, in order to progress towards independence with functional activities. [x] Progressing  [] Met  [] Not Met     POSTURE: Patient will correct postural deviations in sitting and standing, to decrease pain and promote long term stability.  [x] Progressing  [] Met  [] Not Met     GAIT: Patient will demonstrate improved gait mechanics including increased knee extension and step length in order to improve functional mobility, improve quality of life, and decrease risk of further injury or fall.  [x] Progressing  [] Met  [] Not Met     HEP: Patient will demonstrate independence with HEP in order to progress toward functional independence. [x] Progressing  [] Met  [] Not Met        Long Term Goals:  12 weeks Status Date Met   PAIN: Pt will report worst pain of 1/10 in order to progress toward max functional ability and improve quality of life [x] Progressing  [] Met  [] Not Met     FUNCTION: Patient will demonstrate improved function as indicated by a score increase of 15% on FOTO. [x] Progressing  [] Met  [] Not Met     MOBILITY: Patient will improve AROM to stated goals, listed in objective measures above, in order to return to maximal functional potential and improve quality of life.  [x] Progressing  [] Met  [] Not Met     STRENGTH: Patient will improve strength to stated goals, listed in objective measures above, in order to improve functional independence and quality of life.  [x] Progressing  [] Met  [] Not Met     GAIT: Patient will demonstrate normalized gait mechanics with minimal compensation in order to return to PLOF. [x] Progressing  [] Met  [] Not Met     Patient will return to normal ADL's, IADL's, community involvement, recreational activities, and work-related activities with less than or equal to 1/10 pain and maximal function.   [x] Progressing  [] Met  [] Not Met              Plan     Continue Plan of Care (POC) and progress per patient tolerance. See treatment section for details on planned progressions next session.      Roseann Salmeron, PT

## 2024-04-29 DIAGNOSIS — F41.9 ANXIETY: ICD-10-CM

## 2024-04-29 DIAGNOSIS — K21.9 GERD WITHOUT ESOPHAGITIS: ICD-10-CM

## 2024-04-29 RX ORDER — OXYCODONE AND ACETAMINOPHEN 5; 325 MG/1; MG/1
1 TABLET ORAL EVERY 8 HOURS PRN
Qty: 15 TABLET | Refills: 0 | Status: SHIPPED | OUTPATIENT
Start: 2024-04-29 | End: 2024-05-13 | Stop reason: SDUPTHER

## 2024-04-29 NOTE — TELEPHONE ENCOUNTER
Care Due:                  Date            Visit Type   Department     Provider  --------------------------------------------------------------------------------                                MYCHART                              FOLLOWUP/OF  [facility] INTER[facility]  Last Visit: 01-      [facility] VISIT   MEDICINE       Amol Steve  Next Visit: None Scheduled  None         None Found                                                            Last  Test          Frequency    Reason                     Performed    Due Date  --------------------------------------------------------------------------------    Office Visit  12 months..  amitriptyline, busPIRone,   01- 01-                             losartan.................    [employer] Embedded Care Due Messages. Reference number: [id number].   4/29/2024 10:41:56 AM CDT

## 2024-04-30 LAB
ACID FAST MOD KINY STN SPEC: NORMAL
MYCOBACTERIUM SPEC QL CULT: NORMAL

## 2024-04-30 RX ORDER — PANTOPRAZOLE SODIUM 40 MG/1
40 TABLET, DELAYED RELEASE ORAL DAILY
Qty: 90 TABLET | Refills: 3 | Status: SHIPPED | OUTPATIENT
Start: 2024-04-30

## 2024-05-01 RX ORDER — BUSPIRONE HYDROCHLORIDE 10 MG/1
10 TABLET ORAL 2 TIMES DAILY
Qty: 180 TABLET | Refills: 0 | Status: SHIPPED | OUTPATIENT
Start: 2024-05-01 | End: 2024-06-06 | Stop reason: SDUPTHER

## 2024-05-02 ENCOUNTER — CLINICAL SUPPORT (OUTPATIENT)
Dept: REHABILITATION | Facility: HOSPITAL | Age: 69
End: 2024-05-02
Payer: MEDICARE

## 2024-05-02 DIAGNOSIS — M25.661 DECREASED RANGE OF MOTION OF RIGHT KNEE: Primary | ICD-10-CM

## 2024-05-02 PROCEDURE — 97112 NEUROMUSCULAR REEDUCATION: CPT

## 2024-05-02 PROCEDURE — 97110 THERAPEUTIC EXERCISES: CPT

## 2024-05-02 PROCEDURE — 97530 THERAPEUTIC ACTIVITIES: CPT

## 2024-05-03 PROBLEM — M25.661 DECREASED RANGE OF MOTION OF RIGHT KNEE: Status: RESOLVED | Noted: 2024-04-03 | Resolved: 2024-05-03

## 2024-05-03 NOTE — PROGRESS NOTES
[facility]   Physical Therapy Treatment Note / Discharge Note       Name: Caitlin Ahmadi  Clinic Number: [id number]    Therapy Diagnosis:   Encounter Diagnosis   Name Primary?    Decreased range of motion of right knee Yes       Physician: Marisel Craig PA    Visit Date: 5/2/2024    Physician Orders: PT Eval and Treat  Medical Diagnosis from Referral: Z96.651 (ICD-10-CM) - S/P revision of total knee, right  Evaluation Date: 4/3/2024  Authorization Period Expiration: 12/31/2024  Plan of Care Expiration: 7/3/2024  Progress Note Due: 5/3/2024  Visit # / Visits authorized: 5/20 (+1 for evaluation)  FOTO: 1/3 (last performed on 4/3/2024)       Precautions: Standard    Time In: 1330  Time Out: 1425  Total Billable Time: 47 minutes (Billing reflects 1 on 1 treatment time spent with patient)    Subjective     Patient reports: she has been feeling great and is no longer having pain. States she feels like she is more functional now than she was prior to surgery. Pt is ready for discharge from PT today     He/She was compliant with home exercise program.  Response to previous treatment: decreased stiffness and improved gait following exercises   Functional change: improved knee extension ROM, improved heel strike and knee extension with gait      Pain: NT/10     Location: R knee    Objective      Objective Measures updated at progress report or POC update only unless otherwise noted.     Knee AROM/PROM Right   Eval Right5/3/2024 Left   Goal   Hyper - Zero - Flexion (PRE)   0- NT 3-0-130 0-0-125   Hyper - Zero - Flexion (POST) NT 0-0-125  N/A      STRENGTH:   L/E MMT Right  (spine) Left Pain/Dysfunction with Movement Goal   Hip Flexion  4/5 4/5   4+/5 B   Hip Extension  4/5 4/5   4+/5 B   Hip Abduction  4/5 4/5   4+/5 B   Knee Extension 4+/5 5/5   5/5 B   Knee Flexion 4/+5 4+/5   5/5 B   Ankle DF 5/5 5/5   5/5 B   Ankle PF 5/5 5/5   5/5 B            MUSCLE LENGTH:   Muscle Tested   Right Left  Limitation Goal   Hamstrings  [x] Normal  [] Limited [x] Normal  [] Limited   Normal B   Gastrocnemius  [x] Normal  [] Limited [x] Normal  [] Limited   Normal B          GAIT ANALYSIS The patient ambulated with the following assistive device: none; the pt presents with the following gait abnormalities: none       Treatment     Caitlin received the treatments listed below:     CPT Intervention Duration / Intensity  2024   MT Soft tissue mobilization     MT Joint mobs      TE Recumbent bike Level 5 for 5 mins    TE Heel slides  4 mins x 5s holds    TE Heel prop      TE Prone hang  5 mins    TE Tailgaters  3 mins total    NMR Prone TKE      NMR Single leg stance- upper extremity support  5 mins x 20s holds    NMR Straight leg raise abduction  3x10 b    NMR LAQ + SLR flexion      TA Bridges  10# 3x10    TA Knee extensions  15# 2x10    TA Hamstring curls  25# 3x10    TA Leg press  Double le# 3x10    TA Step ups  6 inch, 3x10    TA Calf raises - orange beam  3x10           PLAN Single leg press  Sit to stands   Tandem stance   Tandem walking   Steamboats       CPT Codes available for Billing:   (00) minutes of Manual therapy (MT) to improve pain and ROM.  (17) minutes of Therapeutic Exercise (TE) to develop strength, endurance, range of motion, and flexibility.  (10) minutes of Neuromuscular Re-Education (NMR)  to improve: Balance, Coordination, Kinesthetic, Sense, Proprioception, and Posture.  (20) minutes of Therapeutic Activities (TA) to improve functional performance.  Unattended Electrical Stimulation (ES) for muscle performance or pain modulation.  BFR: Blood flow restriction applied during exercise  NP or (-): Not Performed      Patient Education and Home Exercises       Home Exercises Provided and Patient Education Provided     Education provided: (time included with exercises) minutes  PURPOSE: Patient educated on the impairments noted above and the effects of physical therapy intervention to  improve overall condition and QOL.   EXERCISE: Patient was educated on all the above exercise prior/during/after for proper posture, positioning, and execution for safe performance with home exercise program.   STRENGTH: Patient educated on the importance of improved core and extremity strength in order to improve alignment of the spine and extremities with static positions and dynamic movement.   GAIT & BALANCE: Patient educated on the importance of strong core and lower extremity musculature in order to improve both static and dynamic balance, improve gait mechanics, reduce fall risk and improve household and community mobility.     Written Home Exercises Provided: yes.  Exercises were reviewed and Caitlin was able to demonstrate them prior to the end of the session.  Caitlin demonstrated good  understanding of the education provided. See EMR under Patient Instructions for exercises provided during therapy sessions.    Assessment     Pt tolerated session well today. Increased weight with LAQs and bridges to improve functional strength. Pt demonstrates improved single leg stability with decreased need for B upper extremity support. An assessment was completed today with improvements noted in knee range of motion, gross lower extremity strength, and walking gait compared to prior assessment; please see exam for details. Caitlin feels that she has met all of her personal functional goals and is ready for discharge from physical therapy     Caitlin is progressing well towards her goals.   Patient prognosis is Excellent.     Patient will continue to benefit from skilled outpatient physical therapy to address the deficits listed in the problem list box on initial evaluation, provide pt/family education and to maximize patient's level of independence in the home and community environment.     Patient's spiritual, cultural and educational needs considered and pt agreeable to plan of care and goals.     Anticipated Barriers for  therapy: none     Short Term Goals:  6 weeks Status  Date Met   PAIN: Pt will report worst pain of 5/10 in order to progress toward max functional ability and improve quality of life. [] Progressing  [x] Met  [] Not Met  5/2/2024   MOBILITY: Patient will improve AROM to 50% of stated goals, listed in objective measures above, in order to progress towards independence with functional activities.  [] Progressing  [x] Met  [] Not Met  5/2/2024   STRENGTH: Patient will improve strength to 50% of stated goals, listed in objective measures above, in order to progress towards independence with functional activities. [] Progressing  [x] Met  [] Not Met  5/2/2024   POSTURE: Patient will correct postural deviations in sitting and standing, to decrease pain and promote long term stability.  [] Progressing  [x] Met  [] Not Met  5/2/2024   GAIT: Patient will demonstrate improved gait mechanics including increased knee extension and step length in order to improve functional mobility, improve quality of life, and decrease risk of further injury or fall.  [] Progressing  [x] Met  [] Not Met  5/2/2024   HEP: Patient will demonstrate independence with HEP in order to progress toward functional independence. [] Progressing  [x] Met  [] Not Met  5/2/2024      Long Term Goals:  12 weeks Status Date Met   PAIN: Pt will report worst pain of 1/10 in order to progress toward max functional ability and improve quality of life [] Progressing  [x] Met  [] Not Met 5/2/2024    MOBILITY: Patient will improve AROM to stated goals, listed in objective measures above, in order to return to maximal functional potential and improve quality of life.  [] Progressing  [x] Met  [] Not Met  5/2/2024   STRENGTH: Patient will improve strength to stated goals, listed in objective measures above, in order to improve functional independence and quality of life.  [] Progressing  [x] Met  [] Not Met  5/2/2024   GAIT: Patient will demonstrate normalized gait  mechanics with minimal compensation in order to return to PLOF. [] Progressing  [x] Met  [] Not Met  5/2/2024   Patient will return to normal ADL's, IADL's, community involvement, recreational activities, and work-related activities with less than or equal to 1/10 pain and maximal function.  [] Progressing  [x] Met  [] Not Met  5/2/2024            Plan     Patient discharged from physical therapy       Roseann Salmeron, PT

## 2024-05-09 ENCOUNTER — PATIENT MESSAGE (OUTPATIENT)
Dept: INTERNAL MEDICINE | Facility: CLINIC | Age: 69
End: 2024-05-09
Payer: MEDICARE

## 2024-05-09 ENCOUNTER — OFFICE VISIT (OUTPATIENT)
Dept: ORTHOPEDICS | Facility: CLINIC | Age: 69
End: 2024-05-09
Payer: MEDICARE

## 2024-05-09 VITALS — HEIGHT: 68 IN | WEIGHT: 166.44 LBS | BODY MASS INDEX: 25.23 KG/M2

## 2024-05-09 DIAGNOSIS — Z96.651 S/P REVISION OF TOTAL KNEE, RIGHT: Primary | ICD-10-CM

## 2024-05-09 PROCEDURE — 1101F PT FALLS ASSESS-DOCD LE1/YR: CPT | Mod: CPTII,S$GLB,, | Performed by: PHYSICIAN ASSISTANT

## 2024-05-09 PROCEDURE — 3288F FALL RISK ASSESSMENT DOCD: CPT | Mod: CPTII,S$GLB,, | Performed by: PHYSICIAN ASSISTANT

## 2024-05-09 PROCEDURE — 99024 POSTOP FOLLOW-UP VISIT: CPT | Mod: S$GLB,,, | Performed by: PHYSICIAN ASSISTANT

## 2024-05-09 PROCEDURE — 99999 PR PBB SHADOW E&M-EST. PATIENT-LVL III: CPT | Mod: PBBFAC,,, | Performed by: PHYSICIAN ASSISTANT

## 2024-05-09 PROCEDURE — 1160F RVW MEDS BY RX/DR IN RCRD: CPT | Mod: CPTII,S$GLB,, | Performed by: PHYSICIAN ASSISTANT

## 2024-05-09 PROCEDURE — 1125F AMNT PAIN NOTED PAIN PRSNT: CPT | Mod: CPTII,S$GLB,, | Performed by: PHYSICIAN ASSISTANT

## 2024-05-09 PROCEDURE — 1159F MED LIST DOCD IN RCRD: CPT | Mod: CPTII,S$GLB,, | Performed by: PHYSICIAN ASSISTANT

## 2024-05-09 RX ORDER — METHOCARBAMOL 500 MG/1
500 TABLET, FILM COATED ORAL NIGHTLY PRN
Qty: 30 TABLET | Refills: 0 | Status: SHIPPED | OUTPATIENT
Start: 2024-05-09 | End: 2024-05-30

## 2024-05-09 NOTE — PROGRESS NOTES
Patient ID: Caitlin Ahmadi is a 69 y.o. female.    Chief Complaint: Post-op Evaluation (R TK Revision 3/12/24/)      HPI: Caitlin Ahmadi  is a 69 y.o. female who c/o Post-op Evaluation (R TK Revision 3/12/24/)     Post op visit 2  Patient notes pain is 6/10   She is doing well although eager to make more advancement   She states she is still receiving quite a bit of pain and burning sensation to the medial aspect of the knee especially last night   Upon further discussion the patient states she tried stopping the gabapentin.  I advised her to restart this medication and will additionally call in a muscle relaxer for her.  She has not using any devices to assist with ambulation nor is she wearing any knee braces  She has been able to advance activity driving and returned to ADLs   Compliant with DVT prophylaxis  Compliant with PT    Patient is presently denying any shortness of breath, chest pain, fever/chills, nausea/vomiting, loss of taste or smell, numbness/tingling or sensation changes, loss of bladder or bowel function, loss of taste/smell.     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;  Service: Endoscopy;  Laterality: N/A;    HERNIA REPAIR      HYSTERECTOMY      REVISION OF KNEE ARTHROPLASTY Right 3/12/2024    Procedure: REVISION, ARTHROPLASTY, KNEE;  Surgeon:  Jalen Rangel MD;  Location: [facility];  Service: General;  Laterality: Right;    TONSILLECTOMY      TOTAL KNEE ARTHROPLASTY Bilateral      Family History   Problem Relation Name Age of Onset    No Known Problems Mother      Alzheimer's disease Father       Social History     Socioeconomic History    Marital status:    Tobacco Use    Smoking status: Former     Current packs/day: 0.00     Average packs/day: 1 pack/day for 20.0 years (20.0 ttl pk-yrs)     Types: Cigarettes     Start date: 1970     Quit date: 1990     Years since quittin.9    Smokeless tobacco: Never   Substance and Sexual Activity    Alcohol use: Yes     Alcohol/week: 5.0 standard drinks of alcohol     Types: 5 Glasses of wine per week     Comment: no longer drinkning    Drug use: Never    Sexual activity: Yes     Partners: Male     Medication List with Changes/Refills   Current Medications    ALBUTEROL (PROVENTIL/VENTOLIN HFA) 90 MCG/ACTUATION INHALER    INHALE 2 PUFFS INTO THE LUNGS EVERY 6 HOURS AS NEEDED FOR WHEEZING    AMITRIPTYLINE (ELAVIL) 100 MG TABLET    TAKE 3 TABLETS(300 MG) BY MOUTH EVERY EVENING    BUDESONIDE (PULMICORT) 0.5 MG/2 ML NEBULIZER SOLUTION    Take 2 mLs (0.5 mg total) by nebulization 2 (two) times daily. Controller    BUSPIRONE (BUSPAR) 10 MG TABLET    Take 1 tablet (10 mg total) by mouth 2 (two) times daily.    FLUTICASONE FUROATE-VILANTEROL (BREO ELLIPTA) 100-25 MCG/DOSE DISKUS INHALER    Inhale 1 puff into the lungs once daily. Controller.Wash out mouth after use    FLUTICASONE FUROATE-VILANTEROL (BREO) 100-25 MCG/DOSE DISKUS INHALER    INHALE 1 PUFF INTO THE LUNGS EVERY DAY. WASH MOUTH AFTER USE    FLUTICASONE PROPIONATE (FLONASE) 50 MCG/ACTUATION NASAL SPRAY    SHAKE LIQUID AND USE 2 SPRAYS(100 MCG) IN EACH NOSTRIL EVERY DAY    GABAPENTIN (NEURONTIN) 300 MG CAPSULE    Take 1 capsule (300 mg total) by mouth every evening.    IBUPROFEN (ADVIL,MOTRIN) 200 MG TABLET    Take 600 mg by mouth every 6  (six) hours as needed.    IBUPROFEN (ADVIL,MOTRIN) 600 MG TABLET    Take 1 tablet (600 mg total) by mouth 2 (two) times daily as needed for Pain.    LOSARTAN (COZAAR) 25 MG TABLET    TAKE 1 TABLET(25 MG) BY MOUTH EVERY DAY    METHOTREXATE 2.5 MG TAB    Take 8 tablets (20 mg total) by mouth every 7 days.    OXYCODONE-ACETAMINOPHEN (PERCOCET) 5-325 MG PER TABLET    Take 1 tablet by mouth every 8 (eight) hours as needed for Pain.    PANTOPRAZOLE (PROTONIX) 40 MG TABLET    Take 1 tablet (40 mg total) by mouth once daily.     Review of patient's allergies indicates:   Allergen Reactions    Pneumococcal vaccine Swelling    Celebrex [celecoxib] Itching and Rash    Hydrocodone-acetaminophen Nausea Only and Nausea And Vomiting    Meloxicam Rash    Sulfa (sulfonamide antibiotics) Rash       Objective:     Right Lower Extremity  NVI  WWP foot  Comp soft  Cap refill < 2 sec  Calf NT, soft  (-) Roni sign  JACOB  ROM : Patient is able to easily exhibit full flexion and extension on passive range of motion.   Wiggles toes  DF/PF intact  Sensation intact  Inc C/D/I  No SOI    Imaging:    No x-ray obtained today    Assessment:       Encounter Diagnosis   Name Primary?    S/P revision of total knee, right Yes          Plan:       Caitlin was seen today for post-op evaluation.    Diagnoses and all orders for this visit:    S/P revision of total knee, right        MaguiMiriam Ahmadi is an established pt here for postop follow-up after right total knee replacement by Dr. Rangel.  The patient will continue the current medication regimen and treatment plan.  Patient should notify the office of any signs or symptoms of infection including fevers, erythema, purulent drainage, increasing pain.  Patient will continue with DVT prophylaxis until at least 6 weeks postop.  Patient will continue outpatient physical therapy.  Will follow-up as scheduled. Patient verbalized understanding of all instructions and agreed with the above plan.    No  follow-ups on file.    The patient understands, chooses and consents to this plan and accepts all   the risks which include but are not limited to the risks mentioned above.     Disclaimer: This note was prepared using a voice recognition system and is likely to have sound alike errors within the text.

## 2024-05-11 DIAGNOSIS — F33.41 RECURRENT MAJOR DEPRESSIVE DISORDER, IN PARTIAL REMISSION: ICD-10-CM

## 2024-05-11 NOTE — TELEPHONE ENCOUNTER
No care due was identified.  [facility] Embedded Care Due Messages. Reference number: [id number].   5/11/2024 11:54:43 AM CDT

## 2024-05-13 RX ORDER — OXYCODONE AND ACETAMINOPHEN 5; 325 MG/1; MG/1
1 TABLET ORAL EVERY 8 HOURS PRN
Qty: 15 TABLET | Refills: 0 | Status: SHIPPED | OUTPATIENT
Start: 2024-05-13 | End: 2024-05-23 | Stop reason: SDUPTHER

## 2024-05-14 RX ORDER — AMITRIPTYLINE HYDROCHLORIDE 100 MG/1
TABLET ORAL
Qty: 270 TABLET | Refills: 0 | Status: SHIPPED | OUTPATIENT
Start: 2024-05-14 | End: 2024-06-06 | Stop reason: SDUPTHER

## 2024-05-15 ENCOUNTER — TELEPHONE (OUTPATIENT)
Dept: PULMONOLOGY | Facility: CLINIC | Age: 69
End: 2024-05-15
Payer: MEDICARE

## 2024-05-16 ENCOUNTER — CLINICAL SUPPORT (OUTPATIENT)
Dept: PULMONOLOGY | Facility: CLINIC | Age: 69
End: 2024-05-16
Payer: MEDICARE

## 2024-05-16 ENCOUNTER — APPOINTMENT (OUTPATIENT)
Dept: RADIOLOGY | Facility: HOSPITAL | Age: 69
End: 2024-05-16
Attending: INTERNAL MEDICINE
Payer: MEDICARE

## 2024-05-16 VITALS
OXYGEN SATURATION: 100 % | HEART RATE: 86 BPM | WEIGHT: 156.75 LBS | RESPIRATION RATE: 16 BRPM | BODY MASS INDEX: 23.76 KG/M2 | HEIGHT: 68 IN

## 2024-05-16 DIAGNOSIS — J45.31 MILD PERSISTENT ASTHMA WITH ACUTE EXACERBATION: ICD-10-CM

## 2024-05-16 DIAGNOSIS — M81.0 AGE-RELATED OSTEOPOROSIS WITHOUT CURRENT PATHOLOGICAL FRACTURE: ICD-10-CM

## 2024-05-16 DIAGNOSIS — J47.9 BRONCHIECTASIS WITHOUT COMPLICATION: Primary | ICD-10-CM

## 2024-05-16 PROCEDURE — 99999 PR PBB SHADOW E&M-EST. PATIENT-LVL III: CPT | Mod: PBBFAC,,,

## 2024-05-16 PROCEDURE — 77080 DXA BONE DENSITY AXIAL: CPT | Mod: TC

## 2024-05-16 PROCEDURE — 77080 DXA BONE DENSITY AXIAL: CPT | Mod: 26,,, | Performed by: RADIOLOGY

## 2024-05-16 RX ORDER — SODIUM CHLORIDE FOR INHALATION 3 %
4 VIAL, NEBULIZER (ML) INHALATION 2 TIMES DAILY
Qty: 360 ML | Refills: 6 | Status: SHIPPED | OUTPATIENT
Start: 2024-05-16

## 2024-05-16 NOTE — PROGRESS NOTES
Pulmonary Disease Management  [facility]  Initial Visit       Diagnosis: Mild Asthma, bronchiectasis   Last Hospital Admission: 3/12/24  Last provider visit: 10/4/23      Current Outpatient Medications:     albuterol (PROVENTIL/VENTOLIN HFA) 90 mcg/actuation inhaler, INHALE 2 PUFFS INTO THE LUNGS EVERY 6 HOURS AS NEEDED FOR WHEEZING, Disp: 6.7 g, Rfl: 11    amitriptyline (ELAVIL) 100 MG tablet, TAKE 3 TABLETS(300 MG) BY MOUTH EVERY EVENING, Disp: 270 tablet, Rfl: 0    budesonide (PULMICORT) 0.5 mg/2 mL nebulizer solution, Take 2 mLs (0.5 mg total) by nebulization 2 (two) times daily. Controller, Disp: 120 mL, Rfl: 5    busPIRone (BUSPAR) 10 MG tablet, Take 1 tablet (10 mg total) by mouth 2 (two) times daily., Disp: 180 tablet, Rfl: 0    fluticasone furoate-vilanteroL (BREO ELLIPTA) 100-25 mcg/dose diskus inhaler, Inhale 1 puff into the lungs once daily. Controller.Wash out mouth after use, Disp: 60 each, Rfl: 11    fluticasone furoate-vilanteroL (BREO) 100-25 mcg/dose diskus inhaler, INHALE 1 PUFF INTO THE LUNGS EVERY DAY. WASH MOUTH AFTER USE, Disp: 60 each, Rfl: 11    fluticasone propionate (FLONASE) 50 mcg/actuation nasal spray, SHAKE LIQUID AND USE 2 SPRAYS(100 MCG) IN EACH NOSTRIL EVERY DAY, Disp: 16 g, Rfl: 11    gabapentin (NEURONTIN) 300 MG capsule, Take 1 capsule (300 mg total) by mouth every evening., Disp: 30 capsule, Rfl: 11    ibuprofen (ADVIL,MOTRIN) 200 MG tablet, Take 600 mg by mouth every 6 (six) hours as needed., Disp: , Rfl:     ibuprofen (ADVIL,MOTRIN) 600 MG tablet, Take 1 tablet (600 mg total) by mouth 2 (two) times daily as needed for Pain., Disp: 60 tablet, Rfl: 1    losartan (COZAAR) 25 MG tablet, TAKE 1 TABLET(25 MG) BY MOUTH EVERY DAY, Disp: 90 tablet, Rfl: 3    methocarbamoL (ROBAXIN) 500 MG Tab, Take 1 tablet (500 mg total) by mouth nightly as needed (muscle spasms)., Disp: 30 tablet, Rfl: 0    methotrexate 2.5 MG Tab, Take 8 tablets (20 mg total) by mouth every 7 days., Disp:  "96 tablet, Rfl: 1    oxyCODONE-acetaminophen (PERCOCET) 5-325 mg per tablet, Take 1 tablet by mouth every 8 (eight) hours as needed for Pain., Disp: 15 tablet, Rfl: 0    pantoprazole (PROTONIX) 40 MG tablet, Take 1 tablet (40 mg total) by mouth once daily., Disp: 90 tablet, Rfl: 3    Review of patient's allergies indicates:   Allergen Reactions    Pneumococcal vaccine Swelling    Celebrex [celecoxib] Itching and Rash    Hydrocodone-acetaminophen Nausea Only and Nausea And Vomiting    Meloxicam Rash    Sulfa (sulfonamide antibiotics) Rash       Smoking history:   Social History     Tobacco Use   Smoking Status Former    Current packs/day: 0.00    Average packs/day: 1 pack/day for 20.0 years (20.0 ttl pk-yrs)    Types: Cigarettes    Start date: 1970    Quit date: 1990    Years since quittin.9   Smokeless Tobacco Never       Pulse: 86  SpO2: 100 %  Resp: 16  Height: 5' 8" (172.7 cm)  Weight: 71.1 kg (156 lb 12 oz)  BMI (kg/m2): 23.88    ACT Questionnaire:  Asthma Control Test  In the past 4  weeks, how much of the time did your asthma keep you from getting as much done at work, school or at home?: None of the time  During the past 4 weeks, how often have you had shortness of breath?: Once or twice a week  During the past 4 weeks, how often did your asthma symptoms (wheezing, couging, shortness of breath, chest tightness or pain) wake you up at night or earlier than usual in the morning?: Not at all  During the past 4 weeks, how often have you used your rescue inhaler or nebulizer medication (such as albuterol)?: 2 or 3 times a week  How would you rate your asthma control during the past 4 weeks?: Well controlled  If your score is 19 or less, your asthma may not be under control: 21    PFT Results:  Pre FVC   Date/Time Value Ref Range Status   2022 04:00 PM 2.77 L Final     Pre FEV1   Date/Time Value Ref Range Status   2022 04:00 PM 2.08 L Final     Pre FEV1 FVC   Date/Time Value Ref Range " Status   03/23/2022 04:00 PM 75.13 % Final     Pre TLC   Date/Time Value Ref Range Status   08/24/2020 03:27 PM 5.02 4.64 - 6.62 L Final     Pre DLCO   Date/Time Value Ref Range Status   03/23/2022 04:00 PM 14.14 ml/(min*mmHg) Final       Therapist Comments:   Patient was seen today to review respiratory medication purpose and proper technique for use of inhalers. Patient practiced proper technique using MDI with valved holding chamber (spacer) and DPI inhalers. Patient demonstrated understanding. Literature was given to patient.     Asthma trigger checklist was verbally reviewed and literature given to patient.     Infection prevention was discussed. Patient was reminded to get RSV vaccine. Hand hygiene and cleaning of respiratory equipment was also discussed. Patient verbalized understanding.      Asthma action plan was reviewed and literature was given to patient. Patient verbalized understanding.     Plan:  Monthly Pulmonary Disease Management Questionnaire  Follow-up PDM appointment scheduled for 9/24/24  Reinforce education  Meds: Breo, NaCl , albuterol   DME Needs: [facility]   Action Plan: Asthma  Immunization: Pneumococcal- current, Flu-current, RSV-DUE  Next Provider Visit: 6/18/24  Next Spirometry/CPFT: not scheduled   Approximate time spent with patient: 60 mins

## 2024-05-21 DIAGNOSIS — E55.9 VITAMIN D INSUFFICIENCY: Primary | ICD-10-CM

## 2024-05-21 RX ORDER — ERGOCALCIFEROL 1.25 MG/1
50000 CAPSULE ORAL
Qty: 12 CAPSULE | Refills: 0 | Status: SHIPPED | OUTPATIENT
Start: 2024-05-21 | End: 2024-08-19

## 2024-05-23 ENCOUNTER — OFFICE VISIT (OUTPATIENT)
Dept: RHEUMATOLOGY | Facility: CLINIC | Age: 69
End: 2024-05-23
Payer: MEDICARE

## 2024-05-23 VITALS
WEIGHT: 161.63 LBS | SYSTOLIC BLOOD PRESSURE: 122 MMHG | HEIGHT: 68 IN | BODY MASS INDEX: 24.49 KG/M2 | HEART RATE: 88 BPM | DIASTOLIC BLOOD PRESSURE: 72 MMHG

## 2024-05-23 DIAGNOSIS — M85.89 OSTEOPENIA OF MULTIPLE SITES: ICD-10-CM

## 2024-05-23 DIAGNOSIS — R76.8 POSITIVE ANA (ANTINUCLEAR ANTIBODY): ICD-10-CM

## 2024-05-23 DIAGNOSIS — E55.9 VITAMIN D INSUFFICIENCY: ICD-10-CM

## 2024-05-23 DIAGNOSIS — M05.9 SEROPOSITIVE RHEUMATOID ARTHRITIS: Primary | ICD-10-CM

## 2024-05-23 DIAGNOSIS — D84.821 IMMUNODEFICIENCY DUE TO DRUGS (CODE): ICD-10-CM

## 2024-05-23 DIAGNOSIS — Z51.81 MEDICATION MONITORING ENCOUNTER: ICD-10-CM

## 2024-05-23 PROCEDURE — 3008F BODY MASS INDEX DOCD: CPT | Mod: CPTII,S$GLB,, | Performed by: INTERNAL MEDICINE

## 2024-05-23 PROCEDURE — 3078F DIAST BP <80 MM HG: CPT | Mod: CPTII,S$GLB,, | Performed by: INTERNAL MEDICINE

## 2024-05-23 PROCEDURE — 1159F MED LIST DOCD IN RCRD: CPT | Mod: CPTII,S$GLB,, | Performed by: INTERNAL MEDICINE

## 2024-05-23 PROCEDURE — 3074F SYST BP LT 130 MM HG: CPT | Mod: CPTII,S$GLB,, | Performed by: INTERNAL MEDICINE

## 2024-05-23 PROCEDURE — 99214 OFFICE O/P EST MOD 30 MIN: CPT | Mod: S$GLB,,, | Performed by: INTERNAL MEDICINE

## 2024-05-23 PROCEDURE — 3288F FALL RISK ASSESSMENT DOCD: CPT | Mod: CPTII,S$GLB,, | Performed by: INTERNAL MEDICINE

## 2024-05-23 PROCEDURE — 1125F AMNT PAIN NOTED PAIN PRSNT: CPT | Mod: CPTII,S$GLB,, | Performed by: INTERNAL MEDICINE

## 2024-05-23 PROCEDURE — 99999 PR PBB SHADOW E&M-EST. PATIENT-LVL IV: CPT | Mod: PBBFAC,,, | Performed by: INTERNAL MEDICINE

## 2024-05-23 PROCEDURE — 1100F PTFALLS ASSESS-DOCD GE2>/YR: CPT | Mod: CPTII,S$GLB,, | Performed by: INTERNAL MEDICINE

## 2024-05-23 RX ORDER — OXYCODONE AND ACETAMINOPHEN 5; 325 MG/1; MG/1
1 TABLET ORAL EVERY 8 HOURS PRN
Qty: 15 TABLET | Refills: 0 | Status: SHIPPED | OUTPATIENT
Start: 2024-05-23 | End: 2024-06-02 | Stop reason: SDUPTHER

## 2024-05-23 NOTE — PROGRESS NOTES
RHEUMATOLOGY OUTPATIENT CLINIC NOTE    5/23/2024    Attending Rheumatologist: Akhil Hernadez  Primary Care Provider/Physician Requesting Consultation: Amol Steve MD   Chief Complaint/Reason For Consultation:  Rheumatoid Arthritis      Subjective:     Caitlin Ahmadi is a 69 y.o. White female with SPRA    Recurrent hand stiffness and self limited arthralgias since off MTX (2/2024) after surgical intervention (b/l TKA).    Review of Systems   Constitutional:  Negative for fever.   Eyes:  Negative for photophobia and pain.   Respiratory:  Negative for cough and shortness of breath.    Cardiovascular:  Negative for chest pain.   Gastrointestinal:  Negative for blood in stool.   Genitourinary:  Negative for dysuria and urgency.   Musculoskeletal:  Positive for joint pain.   Skin:  Negative for rash.   Neurological:  Negative for focal weakness and weakness.       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    Cataract     Encounter for blood transfusion     Hypertension     PONV (postoperative nausea and vomiting)     Seropositive rheumatoid arthritis 2/17/2022     Past Surgical History:   Procedure Laterality Date    ADENOIDECTOMY      CATARACT EXTRACTION W/  INTRAOCULAR LENS IMPLANT Left 12/09/2020    CATARACT EXTRACTION W/  INTRAOCULAR LENS IMPLANT      CHOLECYSTECTOMY      COLONOSCOPY N/A 10/13/2021    Procedure: COLONOSCOPY;  Surgeon: Elissa Yoon MD;  Location: [facility];  Service: Endoscopy;  Laterality: N/A;    HERNIA REPAIR      HYSTERECTOMY      REVISION OF KNEE ARTHROPLASTY Right 3/12/2024    Procedure: REVISION, ARTHROPLASTY, KNEE;  Surgeon: Jalen Rangel MD;  Location: [facility];  Service: General;  Laterality: Right;    TONSILLECTOMY      TOTAL KNEE ARTHROPLASTY Bilateral      Family History   Problem Relation Name Age of  Onset    No Known Problems Mother      Alzheimer's disease Father       Social History     Tobacco Use   Smoking Status Former    Current packs/day: 0.00    Average packs/day: 1 pack/day for 20.0 years (20.0 ttl pk-yrs)    Types: Cigarettes    Start date: 1970    Quit date: 1990    Years since quittin.9   Smokeless Tobacco Never       Current Outpatient Medications:     albuterol (PROVENTIL/VENTOLIN HFA) 90 mcg/actuation inhaler, INHALE 2 PUFFS INTO THE LUNGS EVERY 6 HOURS AS NEEDED FOR WHEEZING, Disp: 6.7 g, Rfl: 11    amitriptyline (ELAVIL) 100 MG tablet, TAKE 3 TABLETS(300 MG) BY MOUTH EVERY EVENING, Disp: 270 tablet, Rfl: 0    busPIRone (BUSPAR) 10 MG tablet, Take 1 tablet (10 mg total) by mouth 2 (two) times daily., Disp: 180 tablet, Rfl: 0    ergocalciferol (ERGOCALCIFEROL) 50,000 unit Cap, Take 1 capsule (50,000 Units total) by mouth every 7 days., Disp: 12 capsule, Rfl: 0    fluticasone furoate-vilanteroL (BREO) 100-25 mcg/dose diskus inhaler, INHALE 1 PUFF INTO THE LUNGS EVERY DAY. WASH MOUTH AFTER USE, Disp: 60 each, Rfl: 11    fluticasone propionate (FLONASE) 50 mcg/actuation nasal spray, SHAKE LIQUID AND USE 2 SPRAYS(100 MCG) IN EACH NOSTRIL EVERY DAY, Disp: 16 g, Rfl: 11    gabapentin (NEURONTIN) 300 MG capsule, Take 1 capsule (300 mg total) by mouth every evening., Disp: 30 capsule, Rfl: 11    ibuprofen (ADVIL,MOTRIN) 200 MG tablet, Take 600 mg by mouth every 6 (six) hours as needed., Disp: , Rfl:     methocarbamoL (ROBAXIN) 500 MG Tab, Take 1 tablet (500 mg total) by mouth nightly as needed (muscle spasms)., Disp: 30 tablet, Rfl: 0    methotrexate 2.5 MG Tab, Take 8 tablets (20 mg total) by mouth every 7 days., Disp: 96 tablet, Rfl: 1    oxyCODONE-acetaminophen (PERCOCET) 5-325 mg per tablet, Take 1 tablet by mouth every 8 (eight) hours as needed for Pain., Disp: 15 tablet, Rfl: 0    pantoprazole (PROTONIX) 40 MG tablet, Take 1 tablet (40 mg total) by mouth once daily., Disp: 90 tablet,  Rfl: 3    sodium chloride 3% 3 % nebulizer solution, Take 4 mLs by nebulization 2 (two) times a day., Disp: 360 mL, Rfl: 6    budesonide (PULMICORT) 0.5 mg/2 mL nebulizer solution, Take 2 mLs (0.5 mg total) by nebulization 2 (two) times daily. Controller, Disp: 120 mL, Rfl: 5    fluticasone furoate-vilanteroL (BREO ELLIPTA) 100-25 mcg/dose diskus inhaler, Inhale 1 puff into the lungs once daily. Controller.Wash out mouth after use, Disp: 60 each, Rfl: 11    ibuprofen (ADVIL,MOTRIN) 600 MG tablet, Take 1 tablet (600 mg total) by mouth 2 (two) times daily as needed for Pain., Disp: 60 tablet, Rfl: 1    losartan (COZAAR) 25 MG tablet, TAKE 1 TABLET(25 MG) BY MOUTH EVERY DAY, Disp: 90 tablet, Rfl: 3     Objective:     Vitals:    05/23/24 1423   BP: 122/72   Pulse: 88     Physical Exam   Eyes: Conjunctivae are normal.   Pulmonary/Chest: Effort normal. No respiratory distress.   Musculoskeletal:         General: No swelling or tenderness. Normal range of motion.   Neurological: She displays no weakness.   Skin: No rash noted.       Reviewed available old and all outside pertinent medical records available.    All lab results personally reviewed and interpreted by me.       ASSESSMENT / PLAN     1. Seropositive rheumatoid arthritis  CDAI: Moderate disease activity.  Progressive sx since off MTX (2/2024-).  Resume at 12.5mg per week, escalate weekly to goal 20mg split dose once per week w/ daily FA.  C-Reactive Protein      2. Osteopenia of multiple sites  Low FRAX.  No past fragility fx  Ca/vit D supp.      3. Immunodeficiency due to drugs (CODE)  CBC Auto Differential      4. Medication monitoring encounter  Comprehensive Metabolic Panel      5. Vitamin D insufficiency  Follow high dose vit D course with OTC supplement   6.      Psoriasis                                                          Monitor response to MTX after at least 4 months of rx   7.      MATT+                                                                 Negative JULISA.  No associated end organ damage.          Akhil Hernadez M.D.  ;

## 2024-05-29 ENCOUNTER — NURSE TRIAGE (OUTPATIENT)
Dept: ADMINISTRATIVE | Facility: CLINIC | Age: 69
End: 2024-05-29
Payer: MEDICARE

## 2024-05-29 NOTE — TELEPHONE ENCOUNTER
Pt twisted her back on Sunday. She attempted to make an appointment on Monday but reports none were available. Pt had an appointment scheduled tomorrow with pcp but she doesn't want to wait so she canceled it. She has been trying Percocet that she had at home without relief. Pain is radiating down into her hip and  her leg. Pain is currently 9/10 and she reports it started suddenly on Sunday without injury. Care advice is be seen in ER now. Pt verbalized understanding.   Reason for Disposition   SEVERE back pain of sudden onset and age > 60 years    Additional Information   Negative: Passed out (i.e., fainted, collapsed and was not responding)   Negative: Shock suspected (e.g., cold/pale/clammy skin, too weak to stand, low BP, rapid pulse)   Negative: Sounds like a life-threatening emergency to the triager    Protocols used: Back Pain-A-[place]

## 2024-05-30 ENCOUNTER — OFFICE VISIT (OUTPATIENT)
Dept: INTERNAL MEDICINE | Facility: CLINIC | Age: 69
End: 2024-05-30
Payer: MEDICARE

## 2024-05-30 VITALS
OXYGEN SATURATION: 98 % | SYSTOLIC BLOOD PRESSURE: 136 MMHG | HEIGHT: 68 IN | HEART RATE: 101 BPM | DIASTOLIC BLOOD PRESSURE: 80 MMHG | BODY MASS INDEX: 24.15 KG/M2 | WEIGHT: 159.38 LBS | TEMPERATURE: 97 F

## 2024-05-30 DIAGNOSIS — M54.16 LUMBAR RADICULITIS: Primary | ICD-10-CM

## 2024-05-30 PROCEDURE — 99214 OFFICE O/P EST MOD 30 MIN: CPT | Mod: S$GLB,,, | Performed by: INTERNAL MEDICINE

## 2024-05-30 PROCEDURE — 3008F BODY MASS INDEX DOCD: CPT | Mod: CPTII,S$GLB,, | Performed by: INTERNAL MEDICINE

## 2024-05-30 PROCEDURE — 1126F AMNT PAIN NOTED NONE PRSNT: CPT | Mod: CPTII,S$GLB,, | Performed by: INTERNAL MEDICINE

## 2024-05-30 PROCEDURE — 3288F FALL RISK ASSESSMENT DOCD: CPT | Mod: CPTII,S$GLB,, | Performed by: INTERNAL MEDICINE

## 2024-05-30 PROCEDURE — 1159F MED LIST DOCD IN RCRD: CPT | Mod: CPTII,S$GLB,, | Performed by: INTERNAL MEDICINE

## 2024-05-30 PROCEDURE — 99999 PR PBB SHADOW E&M-EST. PATIENT-LVL IV: CPT | Mod: PBBFAC,,, | Performed by: INTERNAL MEDICINE

## 2024-05-30 PROCEDURE — 1101F PT FALLS ASSESS-DOCD LE1/YR: CPT | Mod: CPTII,S$GLB,, | Performed by: INTERNAL MEDICINE

## 2024-05-30 PROCEDURE — 3079F DIAST BP 80-89 MM HG: CPT | Mod: CPTII,S$GLB,, | Performed by: INTERNAL MEDICINE

## 2024-05-30 PROCEDURE — 3075F SYST BP GE 130 - 139MM HG: CPT | Mod: CPTII,S$GLB,, | Performed by: INTERNAL MEDICINE

## 2024-05-30 RX ORDER — KETOROLAC TROMETHAMINE 10 MG/1
10 TABLET, FILM COATED ORAL EVERY 6 HOURS
Qty: 20 TABLET | Refills: 0 | Status: SHIPPED | OUTPATIENT
Start: 2024-05-30 | End: 2024-06-06 | Stop reason: ALTCHOICE

## 2024-05-30 RX ORDER — CYCLOBENZAPRINE HCL 10 MG
TABLET ORAL
Qty: 30 TABLET | Refills: 0 | Status: SHIPPED | OUTPATIENT
Start: 2024-05-30 | End: 2024-06-06 | Stop reason: ALTCHOICE

## 2024-05-30 NOTE — PROGRESS NOTES
Subjective:      Patient ID: Caitlin Ahmadi is a 69 y.o. female.    Chief Complaint: Hospital Follow Up    Back Pain  This is a new problem. The current episode started in the past 7 days. The problem occurs daily. The problem has been waxing and waning since onset. The pain is present in the gluteal and lumbar spine. The quality of the pain is described as aching, burning and shooting. The pain radiates to the right thigh. The pain is moderate. The pain is The same all the time. The symptoms are aggravated by bending. Pertinent negatives include no abdominal pain, bladder incontinence, bowel incontinence, chest pain, fever, numbness or paresis. Risk factors include sedentary lifestyle. Treatments tried: morpine, toradol, muscle relaxant in ER. The treatment provided moderate relief.       70 yo with   Patient Active Problem List   Diagnosis    Recurrent major depressive disorder, in partial remission    Osteopenia of multiple sites    Anxiety    General weakness    Seropositive rheumatoid arthritis    Primary hypertension    Immunodeficiency due to drugs (CODE)    SOB (shortness of breath)    Mild persistent asthma with acute exacerbation    Adult bronchiectasis    Cellulitis of finger of right hand    Cannabis intoxication    Fracture of nasal bone    Hyperlipidemia    Immunocompromised state    Heart valve disease    Malignant neoplasm of appendix    Nausea and vomiting    Aortic atherosclerosis    Primary osteoarthritis of both knees    Gastroesophageal reflux disease without esophagitis    Total knee replacement status    S/P revision of total knee, right     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     ER yesterday [facility].  Er admidistered morphine and IM muscle relaxers.  States also received toradol in ER which helped pain.   Rx ketoralac but did not fill yet.       Review of Systems   Constitutional:  Negative for chills and fever.   Respiratory:  Negative for cough.    Cardiovascular:  Negative for chest pain.   Gastrointestinal:  Negative for abdominal pain and bowel incontinence.   Genitourinary:  Negative for bladder incontinence.   Musculoskeletal:  Positive for back pain.   Neurological:  Negative for numbness.     Objective:   /80 (BP Location: Right arm, Patient Position: Sitting, BP Method: Medium (Manual))   Pulse 101   Temp 97 °F (36.1 °C) (Tympanic)   Ht 5' 8" (1.727 m)   Wt 72.3 kg (159 lb 6.3 oz)   SpO2 98%   BMI 24.24 kg/m²     Physical Exam  Constitutional:       General: She is awake.      Appearance: Normal appearance.   HENT:      Head: Normocephalic and atraumatic.   Eyes:      Conjunctiva/sclera: Conjunctivae normal.   Pulmonary:      Effort: Pulmonary effort is normal.   Musculoskeletal:      Cervical back: Normal range of motion.      Lumbar back: No tenderness or bony tenderness. Decreased range of motion (due to pain.). Negative right straight leg raise test and negative left straight leg raise test.   Neurological:      Mental Status: She is alert. Mental status is at baseline.      Gait: Gait normal.   Psychiatric:         Mood and Affect: Mood normal.         Behavior: Behavior normal. Behavior is cooperative.         Thought Content: Thought content normal.         Judgment: Judgment normal.         Lab Results   Component Value Date    WBC 6.65 05/16/2024    HGB 12.3 05/16/2024    HGB 9.9 (L) 03/13/2024    HGB 9.9 (L) 03/13/2024    HCT 37.8 05/16/2024    MCV 87 05/16/2024    MCV 92 03/13/2024    MCV 89 02/27/2024     05/16/2024    CHOL 241 (H) 09/26/2022    TRIG 277 (H) 09/26/2022    HDL 50 09/26/2022    LDLCALC 135.6 " 09/26/2022    LDLCALC 149.0 07/06/2020    ALT 21 05/16/2024    AST 19 05/16/2024     05/16/2024    K 4.3 05/16/2024    CALCIUM 10.0 05/16/2024     05/16/2024    CO2 24 05/16/2024    BUN 14 05/16/2024    CREATININE 0.8 05/16/2024    CREATININE 0.8 03/13/2024    CREATININE 0.8 03/13/2024    EGFRNORACEVR >60 05/16/2024    EGFRNORACEVR >60 03/13/2024    EGFRNORACEVR >60 03/13/2024    TSH 2.218 09/26/2022    TSH 2.397 07/06/2020    GLU 95 05/16/2024    [id number] 18 (L) 05/16/2024    BNP 19 08/18/2020          The 10-year ASCVD risk score (Mario LE, et al., 2019) is: 15.2%    Values used to calculate the score:      Age: 69 years      Sex: Female      Is Non- : No      Diabetic: No      Tobacco smoker: No      Systolic Blood Pressure: 136 mmHg      Is BP treated: Yes      HDL Cholesterol: 50 mg/dL      Total Cholesterol: 241 mg/dL     Assessment:     1. Lumbar radiculitis      Plan:   1. Lumbar radiculitis  -     cyclobenzaprine (FLEXERIL) 10 MG tablet; 1/2 to one tab po qhs prn muscle spasm  Dispense: 30 tablet; Refill: 0  -     ketorolac (TORADOL) 10 mg tablet; Take 1 tablet (10 mg total) by mouth every 6 (six) hours. For up to 5 days. For back pain  Dispense: 20 tablet; Refill: 0    Declined gabapentin.     There are no Patient Instructions on file for this visit.    Future Appointments   Date Time Provider Department Center   6/6/2024 10:20 AM Amol Steve MD [facility] IM [place]   6/13/2024 12:30 PM ON XR1-DR [facility] XRAY [place]   6/13/2024  1:00 PM Jalen Rangel MD ON ORTHO  [facility]   6/18/2024 11:00 AM Keshawn Coelho MD [facility] PULMSVC [place]   6/24/2024  1:40 PM ONLH MAMMO1 ONLH MAMMO [place]   9/10/2024  2:40 PM Amol Steve MD [facility] IM [facility]   9/24/2024  9:20 AM Sunny Lloyd MD [facility] CARDIO [facility]   9/24/2024 10:30 AM PULMONARY DISEASE MANAGEMENT [facility] PULMFUN [facility]   12/5/2024  9:45 AM LABORATORY, [facility] H[facility] LAB [facility]    12/12/2024 12:30 PM [facility]       Lab Frequency Next Occurrence   X-ray Knee Ortho Right with Flexion Once 02/05/2024   Ambulatory referral/consult to Home Health Once 02/06/2024   Mammo Digital Screening Bilat Once 02/28/2024   Protein electrophoresis, serum     Immunofixation Electrophoresis     CBC Auto Differential     Comprehensive Metabolic Panel     C-Reactive Protein     Vitamin D     CBC Auto Differential     Comprehensive Metabolic Panel     C-Reactive Protein         Follow up in about 1 week (around 6/6/2024).

## 2024-06-03 RX ORDER — OXYCODONE AND ACETAMINOPHEN 5; 325 MG/1; MG/1
1 TABLET ORAL EVERY 8 HOURS PRN
Qty: 15 TABLET | Refills: 0 | Status: SHIPPED | OUTPATIENT
Start: 2024-06-03 | End: 2024-06-10 | Stop reason: SDUPTHER

## 2024-06-06 ENCOUNTER — HOSPITAL ENCOUNTER (OUTPATIENT)
Dept: RADIOLOGY | Facility: HOSPITAL | Age: 69
Discharge: HOME | End: 2024-06-06
Attending: INTERNAL MEDICINE
Payer: MEDICARE

## 2024-06-06 ENCOUNTER — OFFICE VISIT (OUTPATIENT)
Dept: INTERNAL MEDICINE | Facility: CLINIC | Age: 69
End: 2024-06-06
Payer: MEDICARE

## 2024-06-06 VITALS
BODY MASS INDEX: 24.26 KG/M2 | HEART RATE: 94 BPM | TEMPERATURE: 98 F | DIASTOLIC BLOOD PRESSURE: 80 MMHG | SYSTOLIC BLOOD PRESSURE: 138 MMHG | HEIGHT: 68 IN | OXYGEN SATURATION: 98 % | WEIGHT: 160.06 LBS

## 2024-06-06 DIAGNOSIS — D84.821 IMMUNODEFICIENCY DUE TO DRUGS (CODE): ICD-10-CM

## 2024-06-06 DIAGNOSIS — M54.16 LUMBAR RADICULITIS: ICD-10-CM

## 2024-06-06 DIAGNOSIS — E78.5 HYPERLIPIDEMIA, UNSPECIFIED HYPERLIPIDEMIA TYPE: ICD-10-CM

## 2024-06-06 DIAGNOSIS — Z00.00 ROUTINE GENERAL MEDICAL EXAMINATION AT A HEALTH CARE FACILITY: Primary | ICD-10-CM

## 2024-06-06 DIAGNOSIS — I10 PRIMARY HYPERTENSION: ICD-10-CM

## 2024-06-06 DIAGNOSIS — F41.9 ANXIETY: ICD-10-CM

## 2024-06-06 DIAGNOSIS — F33.41 RECURRENT MAJOR DEPRESSIVE DISORDER, IN PARTIAL REMISSION: ICD-10-CM

## 2024-06-06 DIAGNOSIS — K21.9 GASTROESOPHAGEAL REFLUX DISEASE WITHOUT ESOPHAGITIS: ICD-10-CM

## 2024-06-06 DIAGNOSIS — J45.31 MILD PERSISTENT ASTHMA WITH ACUTE EXACERBATION: ICD-10-CM

## 2024-06-06 DIAGNOSIS — I70.0 AORTIC ATHEROSCLEROSIS: ICD-10-CM

## 2024-06-06 DIAGNOSIS — C18.1 MALIGNANT NEOPLASM OF APPENDIX: ICD-10-CM

## 2024-06-06 DIAGNOSIS — D84.9 IMMUNOCOMPROMISED STATE: ICD-10-CM

## 2024-06-06 PROBLEM — L03.011 CELLULITIS OF FINGER OF RIGHT HAND: Status: RESOLVED | Noted: 2023-07-02 | Resolved: 2024-06-06

## 2024-06-06 PROBLEM — F12.929: Status: RESOLVED | Noted: 2019-11-08 | Resolved: 2024-06-06

## 2024-06-06 PROCEDURE — 3075F SYST BP GE 130 - 139MM HG: CPT | Mod: CPTII,S$GLB,, | Performed by: INTERNAL MEDICINE

## 2024-06-06 PROCEDURE — 1101F PT FALLS ASSESS-DOCD LE1/YR: CPT | Mod: CPTII,S$GLB,, | Performed by: INTERNAL MEDICINE

## 2024-06-06 PROCEDURE — 1159F MED LIST DOCD IN RCRD: CPT | Mod: CPTII,S$GLB,, | Performed by: INTERNAL MEDICINE

## 2024-06-06 PROCEDURE — 1125F AMNT PAIN NOTED PAIN PRSNT: CPT | Mod: CPTII,S$GLB,, | Performed by: INTERNAL MEDICINE

## 2024-06-06 PROCEDURE — 99397 PER PM REEVAL EST PAT 65+ YR: CPT | Mod: S$GLB,,, | Performed by: INTERNAL MEDICINE

## 2024-06-06 PROCEDURE — 3288F FALL RISK ASSESSMENT DOCD: CPT | Mod: CPTII,S$GLB,, | Performed by: INTERNAL MEDICINE

## 2024-06-06 PROCEDURE — 72100 X-RAY EXAM L-S SPINE 2/3 VWS: CPT | Mod: 26,,, | Performed by: RADIOLOGY

## 2024-06-06 PROCEDURE — 99999 PR PBB SHADOW E&M-EST. PATIENT-LVL IV: CPT | Mod: PBBFAC,,, | Performed by: INTERNAL MEDICINE

## 2024-06-06 PROCEDURE — 72100 X-RAY EXAM L-S SPINE 2/3 VWS: CPT | Mod: TC

## 2024-06-06 PROCEDURE — 3008F BODY MASS INDEX DOCD: CPT | Mod: CPTII,S$GLB,, | Performed by: INTERNAL MEDICINE

## 2024-06-06 PROCEDURE — 3079F DIAST BP 80-89 MM HG: CPT | Mod: CPTII,S$GLB,, | Performed by: INTERNAL MEDICINE

## 2024-06-06 RX ORDER — BUSPIRONE HYDROCHLORIDE 10 MG/1
10 TABLET ORAL 2 TIMES DAILY
Qty: 180 TABLET | Refills: 3 | Status: SHIPPED | OUTPATIENT
Start: 2024-06-06

## 2024-06-06 RX ORDER — AMITRIPTYLINE HYDROCHLORIDE 100 MG/1
TABLET ORAL
Qty: 270 TABLET | Refills: 2 | Status: SHIPPED | OUTPATIENT
Start: 2024-06-06

## 2024-06-06 RX ORDER — METHYLPREDNISOLONE 4 MG/1
TABLET ORAL
Qty: 1 EACH | Refills: 0 | Status: SHIPPED | OUTPATIENT
Start: 2024-06-06

## 2024-06-06 NOTE — PROGRESS NOTES
Subjective:      Patient ID: Caitlin Ahmadi is a 69 y.o. female.    Chief Complaint: Follow-up    Follow-up  Pertinent negatives include no abdominal pain, chest pain, chills, coughing, fever or sore throat.         69 y.o. with  Patient Active Problem List   Diagnosis    Recurrent major depressive disorder, in partial remission    Osteopenia of multiple sites    Anxiety    General weakness    Seropositive rheumatoid arthritis    Primary hypertension    Immunodeficiency due to drugs (CODE)    SOB (shortness of breath)    Mild persistent asthma with acute exacerbation    Adult bronchiectasis    Fracture of nasal bone    Hyperlipidemia    Heart valve disease    Malignant neoplasm of appendix    Nausea and vomiting    Aortic atherosclerosis    Primary osteoarthritis of both knees    Gastroesophageal reflux disease without esophagitis    Total knee replacement status    S/P revision of total knee, right    Routine general medical examination at a health care facility     Past Medical History:   Diagnosis Date    Adult bronchiectasis 10/04/2023    Asthma     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      PONV (postoperative nausea and vomiting)     Seropositive rheumatoid arthritis 02/17/2022       Here today for annual prev exam.  Compliant with meds without significant side effects. Energy and appetite are good.     Pt also reports back pain unchanged despite compliance with Flexeril and Toradol.        Past Surgical History:   Procedure Laterality Date    ADENOIDECTOMY      CATARACT EXTRACTION W/  INTRAOCULAR LENS IMPLANT Left 12/09/2020    CATARACT EXTRACTION W/  INTRAOCULAR LENS IMPLANT       "CHOLECYSTECTOMY      COLONOSCOPY N/A 10/13/2021    Procedure: COLONOSCOPY;  Surgeon: Elissa Yoon MD;  Location: [facility];  Service: Endoscopy;  Laterality: N/A;    HERNIA REPAIR      HYSTERECTOMY      REVISION OF KNEE ARTHROPLASTY Right 3/12/2024    Procedure: REVISION, ARTHROPLASTY, KNEE;  Surgeon: Jalen Rangel MD;  Location: [facility] OR;  Service: General;  Laterality: Right;    TONSILLECTOMY      TOTAL KNEE ARTHROPLASTY Bilateral      Social History     Socioeconomic History    Marital status:    Tobacco Use    Smoking status: Former     Current packs/day: 0.00     Average packs/day: 1 pack/day for 20.0 years (20.0 ttl pk-yrs)     Types: Cigarettes     Start date: 1970     Quit date: 1990     Years since quittin.9    Smokeless tobacco: Never   Substance and Sexual Activity    Alcohol use: Yes     Alcohol/week: 5.0 standard drinks of alcohol     Types: 5 Glasses of wine per week     Comment: no longer drinkning    Drug use: Never    Sexual activity: Yes     Partners: Male     family history includes Alzheimer's disease in her father; No Known Problems in her mother.    Review of Systems   Constitutional:  Negative for chills and fever.   HENT:  Negative for ear pain and sore throat.    Respiratory:  Negative for cough.    Cardiovascular:  Negative for chest pain.   Gastrointestinal:  Negative for abdominal pain and blood in stool.   Genitourinary:  Negative for dysuria and hematuria.   Neurological:  Negative for seizures and syncope.     Objective:   /80 (BP Location: Right arm, Patient Position: Sitting, BP Method: Medium (Manual))   Pulse 94   Temp 98.1 °F (36.7 °C) (Tympanic)   Ht 5' 8" (1.727 m)   Wt 72.6 kg (160 lb 0.9 oz)   SpO2 98%   BMI 24.34 kg/m²     Physical Exam  Constitutional:       General: She is not in acute distress.     Appearance: She is well-developed. She is not ill-appearing.   Cardiovascular:      Rate and Rhythm: Normal rate. "   Pulmonary:      Effort: Pulmonary effort is normal.   Skin:     General: Skin is warm and dry.   Neurological:      Mental Status: She is alert.   Psychiatric:         Behavior: Behavior normal.         Lab Results   Component Value Date    WBC 6.65 05/16/2024    HGB 12.3 05/16/2024    HGB 9.9 (L) 03/13/2024    HGB 9.9 (L) 03/13/2024    HCT 37.8 05/16/2024    MCV 87 05/16/2024    MCV 92 03/13/2024    MCV 89 02/27/2024     05/16/2024    CHOL 241 (H) 09/26/2022    TRIG 277 (H) 09/26/2022    HDL 50 09/26/2022    LDLCALC 135.6 09/26/2022    LDLCALC 149.0 07/06/2020    ALT 21 05/16/2024    AST 19 05/16/2024     05/16/2024    K 4.3 05/16/2024    CALCIUM 10.0 05/16/2024     05/16/2024    CO2 24 05/16/2024    BUN 14 05/16/2024    CREATININE 0.8 05/16/2024    CREATININE 0.8 03/13/2024    CREATININE 0.8 03/13/2024    EGFRNORACEVR >60 05/16/2024    EGFRNORACEVR >60 03/13/2024    EGFRNORACEVR >60 03/13/2024    TSH 2.218 09/26/2022    TSH 2.397 07/06/2020    GLU 95 05/16/2024    [id number] 18 (L) 05/16/2024    BNP 19 08/18/2020          The 10-year ASCVD risk score (Mario LE, et al., 2019) is: 11.7%    Values used to calculate the score:      Age: 69 years      Sex: Female      Is Non- : No      Diabetic: No      Tobacco smoker: No      Systolic Blood Pressure: 138 mmHg      Is BP treated: No      HDL Cholesterol: 50 mg/dL      Total Cholesterol: 241 mg/dL     Assessment:     1. Routine general medical examination at a health care facility    2. Anxiety    3. Gastroesophageal reflux disease without esophagitis    4. Hyperlipidemia, unspecified hyperlipidemia type    5. Immunodeficiency due to drugs (CODE)    6. Malignant neoplasm of appendix    7. Mild persistent asthma with acute exacerbation    8. Primary hypertension    9. Recurrent major depressive disorder, in partial remission    10. Aortic atherosclerosis    11. Immunocompromised state    12. Lumbar radiculitis      Plan:    1. Routine general medical examination at a health care facility  Overview:  Heart healthy diet and regular exercise.  Regular use of sunscreen.  Health maintenance reviewed patient      2. Anxiety  Overview:  Stable.  Continue current medications.    Orders:  -     busPIRone (BUSPAR) 10 MG tablet; Take 1 tablet (10 mg total) by mouth 2 (two) times daily.  Dispense: 180 tablet; Refill: 3    3. Gastroesophageal reflux disease without esophagitis  Overview:  Stable on Protonix.      4. Hyperlipidemia, unspecified hyperlipidemia type  Overview:  Hyperlipidemia (Problem)    Orders:  -     TSH; Future; Expected date: 06/06/2024  -     Lipid Panel; Future; Expected date: 06/06/2024    5. Immunodeficiency due to drugs (CODE)  Overview:  On methotrexate per Rheumatology      6. Malignant neoplasm of appendix  Overview:  Malignant tumor of appendix (Problem)      7. Mild persistent asthma with acute exacerbation  Overview:  stable      8. Primary hypertension  Overview:  Controlled.  Continue diet and exercise.        9. Recurrent major depressive disorder, in partial remission  Overview:  Started amitriptyline in her 30s. 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 Has gradually increased amitriptyline over past several years. Self increased from 250 to 300 during covid. On 250mg for 2 to 3 years.     Orders:  -     amitriptyline (ELAVIL) 100 MG tablet; TAKE 3 TABLETS(300 MG) BY MOUTH EVERY EVENING  Dispense: 270 tablet; Refill: 2    10. Aortic atherosclerosis  Overview:  Stable.      11. Immunocompromised state  Overview:  Patient immunocompromised (Problem)  .      12. Lumbar radiculitis  -     Ambulatory referral/consult to Physical/Occupational Therapy; Future; Expected date: 06/13/2024  -     X-Ray Lumbar Spine AP And Lateral; Future; Expected date: 06/06/2024  -     methylPREDNISolone (MEDROL, VENTURA,) 4 mg tablet; use as directed  Dispense: 1 each; Refill: 0        Patient Instructions   Check with  your pharmacy regarding rsv and shingles vaccine.      Future Appointments   Date Time Provider Department Center   6/13/2024 12:30 PM ONL [id number] [facility] XRAY [place]   6/13/2024  1:00 PM Jalen Rangel MD ON ORTHO [facility]   6/18/2024 11:00 AM Keshawn Coelho MD [facility] PULMS[facility] [place]   6/24/2024  1:40 PM ONLH MAMMO1 ON MAMMO [place]   9/10/2024  2:40 PM Amol Steve MD [facility] IM [facility]   9/24/2024  9:20 AM Sunny Lloyd MD [facility] CARDIO [facility]   9/24/2024 10:30 AM PULMONARY DISEASE MANAGEMENT [facility] PULMFUN [facility]   12/5/2024  9:45 AM LABORATORY, [facility] LAB [facility]   12/12/2024 12:30 PM Akhil Hernadez MD [facility] RHEU  [facility]       Lab Frequency Next Occurrence   X-ray Knee Ortho Right with Flexion Once 02/05/2024   Ambulatory referral/consult to Home Health Once 02/06/2024   Mammo Digital Screening Bilat Once 02/28/2024   Protein electrophoresis, serum     Immunofixation Electrophoresis     CBC Auto Differential     Comprehensive Metabolic Panel     C-Reactive Protein     Vitamin D     CBC Auto Differential     Comprehensive Metabolic Panel     C-Reactive Protein         Follow up in about 1 year (around 6/6/2025), or if symptoms worsen or fail to improve, for fasting labs tomorrow. .

## 2024-06-07 ENCOUNTER — LAB VISIT (OUTPATIENT)
Dept: LAB | Facility: HOSPITAL | Age: 69
End: 2024-06-07
Attending: INTERNAL MEDICINE
Payer: MEDICARE

## 2024-06-07 DIAGNOSIS — E78.5 HYPERLIPIDEMIA, UNSPECIFIED HYPERLIPIDEMIA TYPE: ICD-10-CM

## 2024-06-07 LAB
CHOLEST SERPL-MCNC: 219 MG/DL (ref 120–199)
CHOLEST/HDLC SERPL: 4.1 {RATIO} (ref 2–5)
HDLC SERPL-MCNC: 53 MG/DL (ref 40–75)
HDLC SERPL: 24.2 % (ref 20–50)
LDLC SERPL CALC-MCNC: 132 MG/DL (ref 63–159)
NONHDLC SERPL-MCNC: 166 MG/DL
TRIGL SERPL-MCNC: 170 MG/DL (ref 30–150)
TSH SERPL DL<=0.005 MIU/L-ACNC: 3.56 UIU/ML (ref 0.4–4)

## 2024-06-07 PROCEDURE — 80061 LIPID PANEL: CPT | Performed by: INTERNAL MEDICINE

## 2024-06-07 PROCEDURE — 36415 COLL VENOUS BLD VENIPUNCTURE: CPT | Performed by: INTERNAL MEDICINE

## 2024-06-07 PROCEDURE — 84443 ASSAY THYROID STIM HORMONE: CPT | Performed by: INTERNAL MEDICINE

## 2024-06-10 ENCOUNTER — PATIENT MESSAGE (OUTPATIENT)
Dept: INTERNAL MEDICINE | Facility: CLINIC | Age: 69
End: 2024-06-10
Payer: MEDICARE

## 2024-06-10 DIAGNOSIS — E78.5 HYPERLIPIDEMIA, UNSPECIFIED HYPERLIPIDEMIA TYPE: Primary | ICD-10-CM

## 2024-06-10 RX ORDER — OXYCODONE AND ACETAMINOPHEN 5; 325 MG/1; MG/1
1 TABLET ORAL EVERY 8 HOURS PRN
Qty: 15 TABLET | Refills: 0 | Status: SHIPPED | OUTPATIENT
Start: 2024-06-10

## 2024-06-11 ENCOUNTER — TELEPHONE (OUTPATIENT)
Dept: ORTHOPEDICS | Facility: CLINIC | Age: 69
End: 2024-06-11
Payer: MEDICARE

## 2024-06-11 NOTE — TELEPHONE ENCOUNTER
Called and spoke with patient - appt moved to 6/17/24 at 7am and patient will get xray on 6/12/24 - stated to patient that we would go over the xray report at her appt on 6/17    Verbalized understanding and thankful for call

## 2024-06-11 NOTE — TELEPHONE ENCOUNTER
----- Message from Wagner Maldonado sent at 6/11/2024  7:59 AM CDT -----  Contact: Miriam  Patient called viral has a post op appointment on 6/13/24 and would like to know if  has a sooner time that day. Call Back is [phone]

## 2024-06-12 ENCOUNTER — HOSPITAL ENCOUNTER (OUTPATIENT)
Dept: RADIOLOGY | Facility: HOSPITAL | Age: 69
Discharge: HOME | End: 2024-06-12
Attending: ORTHOPAEDIC SURGERY
Payer: MEDICARE

## 2024-06-12 DIAGNOSIS — Z96.651 STATUS POST RIGHT PARTIAL KNEE REPLACEMENT: ICD-10-CM

## 2024-06-12 DIAGNOSIS — T84.022D DISPLACEMENT OF INTERNAL RIGHT KNEE PROSTHESIS, SUBSEQUENT ENCOUNTER: ICD-10-CM

## 2024-06-12 PROCEDURE — 73564 X-RAY EXAM KNEE 4 OR MORE: CPT | Mod: TC,RT

## 2024-06-12 PROCEDURE — 73562 X-RAY EXAM OF KNEE 3: CPT | Mod: TC,LT

## 2024-06-12 PROCEDURE — 73564 X-RAY EXAM KNEE 4 OR MORE: CPT | Mod: 26,RT,, | Performed by: RADIOLOGY

## 2024-06-12 PROCEDURE — 73562 X-RAY EXAM OF KNEE 3: CPT | Mod: 26,59,LT, | Performed by: RADIOLOGY

## 2024-06-17 ENCOUNTER — OFFICE VISIT (OUTPATIENT)
Dept: ORTHOPEDICS | Facility: CLINIC | Age: 69
End: 2024-06-17
Payer: MEDICARE

## 2024-06-17 ENCOUNTER — PATIENT MESSAGE (OUTPATIENT)
Dept: INTERNAL MEDICINE | Facility: CLINIC | Age: 69
End: 2024-06-17
Payer: MEDICARE

## 2024-06-17 ENCOUNTER — CLINICAL SUPPORT (OUTPATIENT)
Dept: REHABILITATION | Facility: HOSPITAL | Age: 69
End: 2024-06-17
Attending: INTERNAL MEDICINE
Payer: MEDICARE

## 2024-06-17 VITALS — BODY MASS INDEX: 24.26 KG/M2 | HEIGHT: 68 IN | WEIGHT: 160.06 LBS

## 2024-06-17 DIAGNOSIS — Z96.652 STATUS POST LEFT PARTIAL KNEE REPLACEMENT: ICD-10-CM

## 2024-06-17 DIAGNOSIS — M70.61 GREATER TROCHANTERIC BURSITIS OF BOTH HIPS: ICD-10-CM

## 2024-06-17 DIAGNOSIS — M54.16 LUMBAR RADICULITIS: Primary | ICD-10-CM

## 2024-06-17 DIAGNOSIS — Z74.09 DECREASED FUNCTIONAL MOBILITY AND ENDURANCE: Primary | ICD-10-CM

## 2024-06-17 DIAGNOSIS — M54.50 LOW BACK PAIN, UNSPECIFIED BACK PAIN LATERALITY, UNSPECIFIED CHRONICITY, UNSPECIFIED WHETHER SCIATICA PRESENT: ICD-10-CM

## 2024-06-17 DIAGNOSIS — M70.62 GREATER TROCHANTERIC BURSITIS OF BOTH HIPS: ICD-10-CM

## 2024-06-17 DIAGNOSIS — M43.16 SPONDYLOLISTHESIS, LUMBAR REGION: ICD-10-CM

## 2024-06-17 DIAGNOSIS — M62.81 PROXIMAL MUSCLE WEAKNESS: ICD-10-CM

## 2024-06-17 DIAGNOSIS — M53.86 DECREASED RANGE OF MOTION OF LUMBAR SPINE: ICD-10-CM

## 2024-06-17 DIAGNOSIS — Z96.651 S/P REVISION OF TOTAL KNEE, RIGHT: Primary | ICD-10-CM

## 2024-06-17 DIAGNOSIS — M54.16 LUMBAR RADICULITIS: ICD-10-CM

## 2024-06-17 DIAGNOSIS — M51.36 LUMBAR DEGENERATIVE DISC DISEASE: ICD-10-CM

## 2024-06-17 PROCEDURE — 3008F BODY MASS INDEX DOCD: CPT | Mod: CPTII,S$GLB,, | Performed by: ORTHOPAEDIC SURGERY

## 2024-06-17 PROCEDURE — 1125F AMNT PAIN NOTED PAIN PRSNT: CPT | Mod: CPTII,S$GLB,, | Performed by: ORTHOPAEDIC SURGERY

## 2024-06-17 PROCEDURE — 97162 PT EVAL MOD COMPLEX 30 MIN: CPT | Mod: KX

## 2024-06-17 PROCEDURE — 99999 PR PBB SHADOW E&M-EST. PATIENT-LVL III: CPT | Mod: PBBFAC,,, | Performed by: ORTHOPAEDIC SURGERY

## 2024-06-17 PROCEDURE — 97535 SELF CARE MNGMENT TRAINING: CPT | Mod: KX

## 2024-06-17 PROCEDURE — 1101F PT FALLS ASSESS-DOCD LE1/YR: CPT | Mod: CPTII,S$GLB,, | Performed by: ORTHOPAEDIC SURGERY

## 2024-06-17 PROCEDURE — 99214 OFFICE O/P EST MOD 30 MIN: CPT | Mod: S$GLB,,, | Performed by: ORTHOPAEDIC SURGERY

## 2024-06-17 PROCEDURE — 1159F MED LIST DOCD IN RCRD: CPT | Mod: CPTII,S$GLB,, | Performed by: ORTHOPAEDIC SURGERY

## 2024-06-17 PROCEDURE — 97112 NEUROMUSCULAR REEDUCATION: CPT | Mod: KX

## 2024-06-17 PROCEDURE — 3288F FALL RISK ASSESSMENT DOCD: CPT | Mod: CPTII,S$GLB,, | Performed by: ORTHOPAEDIC SURGERY

## 2024-06-17 RX ORDER — DICLOFENAC SODIUM 75 MG/1
75 TABLET, DELAYED RELEASE ORAL 2 TIMES DAILY
Qty: 60 TABLET | Refills: 2 | Status: SHIPPED | OUTPATIENT
Start: 2024-06-17

## 2024-06-17 NOTE — PATIENT INSTRUCTIONS
Continue with [place] [facility] [place]  You having severe back issues and there is an x-ray ordered by her primary care on your lumbar spine that showed degenerative disc disease at L2-3 and L3-4 there is a mild slippage on the lumbar spine and does could affect the nerves going down to her legs and specially to your knees  You should ask her primary care physician to refer you to the pain management clinic/back clinic since I do not treat backs   The ibuprofen 600 is not working for you anymore and you taking Tylenol also  Will switch you from I am going to Voltaren 75 mg 1 tablet twice a day with food  You stop take the ibuprofen  You can still take Tylenol 650 mg 3 times a day  Total knee revision might take up to 18 months with improvement but you doing extremely well you have almost full range of motion and you ambulating without any assistive devices/better than before surgery   Must take your Protonix with the Voltaren  Return in around 4 months for follow-up

## 2024-06-17 NOTE — PROGRESS NOTES
Subjective:     Patient ID: Caitlin Ahmadi is a 69 y.o. female.    Chief Complaint: Pain and Post-op Evaluation of the Right Knee  01/11/2024  HPI:  Bilateral knee pain with the right worse than the left   Bilateral hip pains   Low back pain   Patient states in 2017 had right partial knee replacement by Dr. Wheeler, in 2018 she had left partial knee replacement done by Dr. randhawa at [facility].  She said she did well with both and all of a sudden the right knee became severely painful over the last 3 months.  She has been having pain on and off with it and swelling.  She made her appointment today.  In the meantime she was having pain in her hips for 6 months and no history of trauma she can not sleep on the right side she sleeps on the left.  She has been seen by Rheumatology and primary care and other providers and no thing was offered to her.  She tried Motrin mixed with Tylenol 2 tablets of each may taken twice a day seems to ease things up a little bit.  She was not placed on other anti-inflammatories by prescription.  She has not taking steroid pills because she is on methotrexate.  Patient gives history of infection occurring into her thumb was on IV antibiotics and was given tramadol for the pain and not having any medications at this time for pain.  I 1 point she said she is having back pain then she said it has not hurting her as much.  There is no radicular symptoms type going below the knee.  She did receive an injection of the right knee awhile back by Rheumatology over the pes anserinus bursa on the medial tibial flare according to the patient      1/26/24    Right knee severe pain.  On x-ray looks like it is loose and malrotated.  Workup for infection has been negative.  Sed rate, CRP, crystals, WBC, knee aspirate cultures have been negative also.  I went over does labs with the patient.  I think at this time she needs to have right knee revised.  Spent a long time discussing the revision and  the pros and cons of surgery in details and allow her to ask questions.  Went over the instructions.  She needs to be seen by Cardiology.  She can not take Mobic or Celebrex  She can take ibuprofen with Tylenol   No fever no chills no shortness of breath or difficulty with chewing swallowing loss of bowel bladder control  She does complain of numbness on the outside of her knee as well in her foot.  I did tell her the numbness in the foot has nothing to do with the knee and that could be coming from her back    02/17/2024   Right total knee revision 03/12/2024.  Her partial knee replacement was completely loose patella was severely arthritic.  She said she is definitely much better than before surgery.  She claims that her pain is 8/10 because having back issues.  Primary care obtained an x-ray and told her that she has some arthritis.  I did tell her I do not treat back pain and I reviewed her x-rays with her from the total knee as well as the lumbar spine that was obtained by primary care.  She is having some burning pains that going down the legs and around the anterior thigh and below the knee joint.  She goes to physical therapy at [facility] on the [place].  She says definitely is better than before surgery but the other creams are bothering her quite a bit.  She has history of rheumatoid arthritis.  She is taking ibuprofen 600 and Tylenol and it has not helping.  She can not take meloxicam and Celebrex.  I did tell him maybe the anti-inflammatories stop working for her maybe should switch her.  We did recommend Protonix and she said she does take it to.  I recommended that her primary care refer her to pain management and Pain Clinic at this time.  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      PONV (postoperative nausea and vomiting)     Seropositive rheumatoid arthritis 2022     Past Surgical History:   Procedure Laterality Date    ADENOIDECTOMY      CATARACT EXTRACTION W/  INTRAOCULAR LENS IMPLANT Left 2020    CATARACT EXTRACTION W/  INTRAOCULAR LENS IMPLANT      CHOLECYSTECTOMY      COLONOSCOPY N/A 10/13/2021    Procedure: COLONOSCOPY;  Surgeon: Elissa Yoon MD;  Location: [facility];  Service: Endoscopy;  Laterality: N/A;    HERNIA REPAIR      HYSTERECTOMY      REVISION OF KNEE ARTHROPLASTY Right 3/12/2024    Procedure: REVISION, ARTHROPLASTY, KNEE;  Surgeon: Jalen Rangel MD;  Location: [facility] OR;  Service: General;  Laterality: Right;    TONSILLECTOMY      TOTAL KNEE ARTHROPLASTY Bilateral      Family History   Problem Relation Name Age of Onset    No Known Problems Mother      Alzheimer's disease Father       Social History     Socioeconomic History    Marital status:    Tobacco Use    Smoking status: Former     Current packs/day: 0.00     Average packs/day: 1 pack/day for 20.0 years (20.0 ttl pk-yrs)     Types: Cigarettes     Start date: 1970     Quit date: 1990     Years since quittin.0    Smokeless tobacco: Never   Substance and Sexual Activity    Alcohol use: Yes     Alcohol/week: 5.0 standard drinks of alcohol     Types: 5 Glasses of wine per week     Comment: no longer drinkning    Drug use: Never    Sexual activity: Yes     Partners: Male     Medication List with Changes/Refills   New Medications    DICLOFENAC (VOLTAREN)  75 MG EC TABLET    Take 1 tablet (75 mg total) by mouth 2 (two) times daily. Take with food   Current Medications    ALBUTEROL (PROVENTIL/VENTOLIN HFA) 90 MCG/ACTUATION INHALER    INHALE 2 PUFFS INTO THE LUNGS EVERY 6 HOURS AS NEEDED FOR WHEEZING    AMITRIPTYLINE (ELAVIL) 100 MG TABLET    TAKE 3 TABLETS(300 MG) BY MOUTH EVERY EVENING    BUDESONIDE (PULMICORT) 0.5 MG/2 ML NEBULIZER SOLUTION    Take 2 mLs (0.5 mg total) by nebulization 2 (two) times daily. Controller    BUSPIRONE (BUSPAR) 10 MG TABLET    Take 1 tablet (10 mg total) by mouth 2 (two) times daily.    ERGOCALCIFEROL (ERGOCALCIFEROL) 50,000 UNIT CAP    Take 1 capsule (50,000 Units total) by mouth every 7 days.    FLUTICASONE FUROATE-VILANTEROL (BREO ELLIPTA) 100-25 MCG/DOSE DISKUS INHALER    Inhale 1 puff into the lungs once daily. Controller.Wash out mouth after use    FLUTICASONE FUROATE-VILANTEROL (BREO) 100-25 MCG/DOSE DISKUS INHALER    INHALE 1 PUFF INTO THE LUNGS EVERY DAY. WASH MOUTH AFTER USE    FLUTICASONE PROPIONATE (FLONASE) 50 MCG/ACTUATION NASAL SPRAY    SHAKE LIQUID AND USE 2 SPRAYS(100 MCG) IN EACH NOSTRIL EVERY DAY    IBUPROFEN (ADVIL,MOTRIN) 200 MG TABLET    Take 600 mg by mouth every 6 (six) hours as needed.    IBUPROFEN (ADVIL,MOTRIN) 600 MG TABLET    Take 1 tablet (600 mg total) by mouth 2 (two) times daily as needed for Pain.    METHOTREXATE 2.5 MG TAB    Take 8 tablets (20 mg total) by mouth every 7 days.    METHYLPREDNISOLONE (MEDROL, VENTURA,) 4 MG TABLET    use as directed    OXYCODONE-ACETAMINOPHEN (PERCOCET) 5-325 MG PER TABLET    Take 1 tablet by mouth every 8 (eight) hours as needed for Pain.    PANTOPRAZOLE (PROTONIX) 40 MG TABLET    Take 1 tablet (40 mg total) by mouth once daily.    SODIUM CHLORIDE 3% 3 % NEBULIZER SOLUTION    Take 4 mLs by nebulization 2 (two) times a day.     Review of patient's allergies indicates:   Allergen Reactions    Pneumococcal vaccine Swelling    Celebrex [celecoxib] Itching and Rash     Hydrocodone-acetaminophen Nausea Only and Nausea And Vomiting    Meloxicam Rash    Sulfa (sulfonamide antibiotics) Rash     Review of Systems   Constitutional: Negative for decreased appetite.   HENT:  Negative for tinnitus.    Eyes:  Negative for double vision.   Cardiovascular:  Negative for chest pain.   Respiratory:  Negative for wheezing.    Hematologic/Lymphatic: Negative for bleeding problem.   Skin:  Negative for dry skin.   Musculoskeletal:  Positive for arthritis, back pain, joint pain, muscle weakness and myalgias. Negative for gout, neck pain and stiffness.   Gastrointestinal:  Negative for abdominal pain.   Genitourinary:  Negative for bladder incontinence.   Neurological:  Negative for numbness, paresthesias and sensory change.   Psychiatric/Behavioral:  Negative for altered mental status.        Objective:   Body mass index is 24.34 kg/m².  There were no vitals filed for this visit.       General    Constitutional: She is oriented to person, place, and time. She appears well-developed.   HENT:   Head: Atraumatic.   Eyes: EOM are normal.   Pulmonary/Chest: Effort normal.   Neurological: She is alert and oriented to person, place, and time.   Psychiatric: Judgment normal.           Ambulating without any assistive devices   Pelvis is level   Negative straight leg raising bilaterally   Bilateral hips passive motion no pain in the groin and excellent range of motion   Mild pain to palpation over the greater trochanters with the right worse than the left with radiation down the lateral thigh.  Hip flexors, abductors and adductors were slightly weak at 5-/5   Right knee preop with a incision or you knee replacement the scar healed well.  There is an area highly suspicious of psoriasis approximately a cm and a half by cm and half just a medial to the surgical incision.  There is swelling and pain over the medial aspect of the knee joint and over the medial tibial flare.  She does have almost full motion no  defect in the patella or quadriceps tendon.  Collaterals are stable.  Negative anterior drawer.  .  Possible patellofemoral crepitus to range of motion and compression    Right knee postop revision surgical scar healed well.  She has 0-125 degrees of flexion.  Very mild swelling.  Stable in extension and in flexion.  Complains of some tenderness and numbness of the anterior thigh.  There is also some tenderness distally.  There is no defect in the patella or quadriceps tendon.    The left knee with anterior surgical scar healed well.  She has 0-130 degrees of flexion stable to varus valgus stressing.  There is no swelling in the knee joint.  There is no tenderness to palpation there is some crepitus to compression on the patella and slight tenderness at that point.  Negative anterior drawer  Calves are soft nontender  Ankle motion intact   Skin is warm to touch    Relevant imaging results reviewed and interpreted by me, discussed with the patient and / or family today   X-ray 06/12/2024 right total knee revision using primary united femoral component and tibial tray with a stem very well fixed in excellent alignment and patella is midline   The left knee with partial knee replacement still looking okay  X-ray of the lumbar spine showing L2-3 and L3-4 degenerative disc disease with mild spondylolisthesis and very mild scoliotic curvature    X-ray 01/11/2024 of the pelvis and the hips showing excellent joint space.  There is small osteophyte over the greater trochanters consistent with bursitis of the hips.  The bone look osteopenic   X-ray 1/11/24 bilateral knees showing left knee medial partial knee replacement still in good alignment.  There is some mild patellofemoral arthritic changes and small marginal osteophyte.  Right knee looks like the femoral component of the partial knee replacement medially is loose there is radiolucent line on the lateral view like a when she will type of movement of the femoral  component and does not look where it is supposed to probably rotated due to loosening.  There is also radiolucent line on the tibial component on the lateral view posteriorly.  Indicative of loosened partial knee replacement  Assessment:     Encounter Diagnoses   Name Primary?    S/P revision of total knee, right Yes    Status post left partial knee replacement     Greater trochanteric bursitis of both hips     Low back pain, unspecified back pain laterality, unspecified chronicity, unspecified whether sciatica present     Lumbar degenerative disc disease     Spondylolisthesis, lumbar region         Plan:   S/P revision of total knee, right    Status post left partial knee replacement    Greater trochanteric bursitis of both hips    Low back pain, unspecified back pain laterality, unspecified chronicity, unspecified whether sciatica present  -     diclofenac (VOLTAREN) 75 MG EC tablet; Take 1 tablet (75 mg total) by mouth 2 (two) times daily. Take with food  Dispense: 60 tablet; Refill: 2    Lumbar degenerative disc disease  -     diclofenac (VOLTAREN) 75 MG EC tablet; Take 1 tablet (75 mg total) by mouth 2 (two) times daily. Take with food  Dispense: 60 tablet; Refill: 2    Spondylolisthesis, lumbar region  -     diclofenac (VOLTAREN) 75 MG EC tablet; Take 1 tablet (75 mg total) by mouth 2 (two) times daily. Take with food  Dispense: 60 tablet; Refill: 2         Patient Instructions   Continue with [place] [facility] [place]  You having severe back issues and there is an x-ray ordered by her primary care on your lumbar spine that showed degenerative disc disease at L2-3 and L3-4 there is a mild slippage on the lumbar spine and does could affect the nerves going down to her legs and specially to your knees  You should ask her primary care physician to refer you to the pain management clinic/back clinic since I do not treat backs   The ibuprofen 600 is not working for you anymore and you taking  Tylenol also  Will switch you from I am going to Voltaren 75 mg 1 tablet twice a day with food  You stop take the ibuprofen  You can still take Tylenol 650 mg 3 times a day  Total knee revision might take up to 18 months with improvement but you doing extremely well you have almost full range of motion and you ambulating without any assistive devices/better than before surgery   Must take your Protonix with the Voltaren  Return in around 4 months for follow-up  Patient is immunocompromised on methotrexate which carries a little bit longer healing process      Disclaimer: This note was prepared using a voice recognition system and is likely to have sound alike errors within the text.

## 2024-06-17 NOTE — PLAN OF CARE
"[facility]   Physical Therapy Initial Evaluation      Date: 6/17/2024   Name: Caitlin Ahmadi  Clinic Number: [id number]    Therapy Diagnosis:    Encounter Diagnoses   Name Primary?    Lumbar radiculitis     Decreased functional mobility and endurance Yes    Decreased range of motion of lumbar spine     Proximal muscle weakness       Physician: Amol Steve MD     Physician Orders: PT Eval and Treat  Medical Diagnosis from Referral: Lumbar radiculitis [M54.16]   Evaluation Date: 6/17/2024  Authorization Period Expiration: 6/6/2025   Plan of Care Expiration: 9/17/2024  Progress Note Due: 7/17/2024  Visit # / Visits authorized: 1/1   FOTO: 1/3 (last performed on 6/17/2024)    Precautions: Standard    Time In: 1600  Time Out: 1702  Total Billable Time (timed & untimed codes): 52 minutes    Subjective     Date of onset: 3 weeks ago    History of current condition - Caitlin reports low back pain which started a few weeks ago when she bent over to  a plastic stool and "tweaked" her back. States she felt a little tiny pop/catch and had a lot of difficulty returning to standing afterward. Tried a muscle relaxer which  did not help. Presented to MD whom prescribed prednisone and PT to help.. states her X-ray shows DDD. Has improved some since initial injury but overall pain persists.     Imaging: [x] Xray [] MRI [] CT: Performed on: 6/6/2024    Pain:  Current 8/10, worst 10/10, best 7/10   Location: [x] Right   [x] Left:  low back, posterior hips and into the anterolateral thigh on R   Description: burning, numbness and tingling  Aggravating Factors: random movements, prolonged sitting   Easing Factors: activity avoidance, rest, prednisone, percocet, posterior pelvic tilt    Prior Therapy:   [x] N/A    [] Yes:   Social History: Pt lives with their family  Occupation: Pt is retired  Prior Level of Function: Independent and pain free with all ADL, IADL, community mobility and functional " activities.   Current Level of Function: Independent with all ADL, IADL, community mobility and functional activities with reports of increased pain and need for increased time and frequent breaks.      Dominant Extremity:    [x] Right    [] Left    Pts goals: Pt reported goals are to decrease overall pain levels in order to return to prior functional level.     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      PONV (postoperative nausea and vomiting)     Seropositive rheumatoid arthritis 02/17/2022       Surgical History:   Caitlin Ahmadi  has a past surgical history that includes Total knee arthroplasty (Bilateral); Cholecystectomy; Hysterectomy; Tonsillectomy; Adenoidectomy; Hernia repair; Cataract extraction w/  intraocular lens implant (Left, 12/09/2020); Cataract extraction w/  intraocular lens implant; Colonoscopy (N/A, 10/13/2021); and Revision of knee arthroplasty (Right, 3/12/2024).    Medications:   Caitlin has a current medication list which includes the following prescription(s): albuterol, amitriptyline, budesonide, buspirone, diclofenac, ergocalciferol, fluticasone furoate-vilanterol, fluticasone furoate-vilanterol, fluticasone propionate, ibuprofen, ibuprofen, methotrexate, methylprednisolone, oxycodone-acetaminophen, pantoprazole, rosuvastatin, and sodium chloride 3%.    Allergies:   Review of patient's allergies indicates:   Allergen Reactions    Pneumococcal vaccine Swelling    Celebrex [celecoxib] Itching and Rash    Hydrocodone-acetaminophen Nausea Only and Nausea And Vomiting     Meloxicam Rash    Sulfa (sulfonamide antibiotics) Rash        Objective        RANGE OF MOTION:   Lumbar ROM Right  (spine) Left   Pain/Dysfunction with Movement Goal   Lumbar Flexion (60º) 50% ---  100%   Lumbar Extension (30º) 50% ---  75%   Lumbar Side Bending (25º) 100% 100% Catching/pulling in the lumbar region    Lumbar Rotation              STRENGTH:   L/E MMT Right  (spine) Left Pain/Dysfunction with Movement Goal   Modified (90/90) Abdominal Strength  poor ---     Hip Flexion  3+/5 3+/5  4+/5 B   Hip Extension  3+/5 3+/5  4+/5 B   Hip Abduction  4-/5 4-/5  4+/5 B   Knee Extension 4+/5 5/5  5/5 B   Knee Flexion 4/5 4/5  5/5 B   Hip IR 4-/5 4-/5  4+/5 B   Hip ER 4-/5 4-/5  4+/5 B   Ankle DF    5/5 B   Ankle PF    5/5 B   Ankle Inversion    5/5 B   Ankle Eversion    5/5 B          SENSATION  [x] Intact to Light Touch   [] Impaired:      PALPATION: Increased tone and tenderness to palpation of: lumbar spinous processes and B lumbar paraspinals       POSTURE:  Pt presents with postural abnormalities which include: forward head, rounded shoulders , and increased thoracic kyphosis         FUNCTIONAL MOVEMENT PATTERNS  Movement Analysis Notes   Bed Mobility  DYSFUNCTIONAL, PAINFUL     Transfers DYSFUNCTIONAL, PAINFUL     Sit to Stand DYSFUNCTIONAL, PAINFUL     Squat     Single Leg Squat      Step Down                 Function:     Intake Outcome Measure for FOTO Lumbar  Survey    Therapist reviewed FOTO scores for Caitlin on 6/17/2024.   FOTO report - see Media section or FOTO account for episode details    Intake Score: 33%         Treatment     Total Treatment time (time-based codes) separate from Evaluation: (11) minutes     Caitlin received the treatments listed below:      CPT Intervention Performed   Today Duration / Intensity   MT Joint mobilizations  x Lumbar distraction   TE         NMR Abdominal bracing  x 3 mins    NMR Posterior pelvic tilt  x 3 mins              TA Bridges                                                                              PLAN               CPT Codes available for Billing:   (05) minutes of Manual therapy (MT) to improve pain and ROM.  (00) minutes of Therapeutic Exercise (TE) to develop strength, endurance, range of motion, and flexibility.  (06) minutes of Neuromuscular Re-Education (NMR)  to improve: Balance, Coordination, Kinesthetic, Sense, Proprioception, and Posture.  (00) minutes of Therapeutic Activities (TA) to improve functional performance.  Vasopneumatic Device Therapy () for management of swelling/edema. (34616)  Unattended Electrical Stimulation (ES) for muscle performance or pain modulation.  BFR: Blood flow restriction applied during exercise  NP or (-): Not Performed    Patient Education and Home Exercises     Education provided: (10) minutes  PURPOSE: Patient educated on the impairments noted above and the effects of physical therapy intervention to improve overall condition and QOL.   EXERCISE: Patient was educated on all the above exercise prior/during/after for proper posture, positioning, and execution for safe performance with home exercise program.   STRENGTH: Patient educated on the importance of improved core and extremity strength in order to improve alignment of the spine and extremities with static positions and dynamic movement.   GAIT & BALANCE: Patient educated on the importance of strong core and lower extremity musculature in order to improve both static and dynamic balance, improve gait mechanics, reduce fall risk and improve household and community mobility.   POSTURE: Patient educated on postural awareness to reduce stress and maintain optimal alignment of the spine with static positions and dynamic movement     Written Home Exercises Provided: yes.  Exercises were reviewed and Caitlin was able to demonstrate them prior to the end of the session.  Caitlin demonstrated good  understanding of the education provided. See EMR under Patient Instructions for exercises  provided during therapy sessions.    Assessment     Caitlin is a 69 y.o. female referred to outpatient Physical Therapy with a medical diagnosis of Lumbar radiculitis. Pt presents with impairments in the following categories: IMPAIRMENTS: ROM, strength, and functional movement patterns. Patient was very anxious regarding diagnosis and treatment of condition; significant increased time spent with patient education today as a result.     Pt prognosis is Good  Pt will benefit from skilled outpatient Physical Therapy to address the deficits stated above and in the chart below, provide pt/family education, and to maximize pt's level of independence.     Plan of care discussed with patient: Yes  Pt's spiritual, cultural and educational needs considered and patient is agreeable to the plan of care and goals as stated below:     Anticipated Barriers for therapy: co-morbidities, sedentary lifestyle, lack of understanding of condition, adherence to treatment plan, and coping style    Medical Necessity is demonstrated by the following  History  Co-morbidities and personal factors that may impact the plan of care [] LOW: no personal factors / co-morbidities  [] MODERATE: 1-2 personal factors / co-morbidities  [x] HIGH: 3+ personal factors / co-morbidities    Moderate / High Support Documentation:   Past Medical History:   Diagnosis Date    Adult bronchiectasis 10/04/2023    Asthma     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      PONV (postoperative nausea and vomiting)     Seropositive rheumatoid arthritis 02/17/2022        Examination  Body Structures and Functions, activity limitations and participation restrictions that  "may impact the plan of care [] LOW: addressing 1-2 elements  [x] MODERATE: 3+ elements  [] HIGH: 4+ elements (please support below)    Moderate / High Support Documentation: See above in "Current Level of Function"      Clinical Presentation [] LOW: stable  [x] MODERATE: Evolving  [] HIGH: Unstable     Decision Making/ Complexity Score: moderate         Short Term Goals:  6 weeks Status  Date Met   PAIN: Pt will report worst pain of 6/10 in order to progress toward max functional ability and improve quality of life. [x] Progressing  [] Met  [] Not Met    FUNCTION: Patient will demonstrate improved function as indicated by a score of greater than or equal to 44 out of 100 on FOTO. [x] Progressing  [] Met  [] Not Met    MOBILITY: Patient will improve AROM to 50% of stated goals, listed in objective measures above, in order to progress towards independence with functional activities.  [x] Progressing  [] Met  [] Not Met    STRENGTH: Patient will improve strength to 50% of stated goals, listed in objective measures above, in order to progress towards independence with functional activities. [x] Progressing  [] Met  [] Not Met    POSTURE: Patient will correct postural deviations in sitting and standing, to decrease pain and promote long term stability.  [x] Progressing  [] Met  [] Not Met    HEP: Patient will demonstrate independence with HEP in order to progress toward functional independence. [x] Progressing  [] Met  [] Not Met      Long Term Goals:  12 weeks Status Date Met   PAIN: Pt will report worst pain of 3/10 in order to progress toward max functional ability and improve quality of life [x] Progressing  [] Met  [] Not Met    FUNCTION: Patient will demonstrate improved function as indicated by a score of greater than or equal to 55 out of 100 on FOTO. [x] Progressing  [] Met  [] Not Met    MOBILITY: Patient will improve AROM to stated goals, listed in objective measures above, in order to return to maximal " functional potential and improve quality of life.  [x] Progressing  [] Met  [] Not Met    STRENGTH: Patient will improve strength to stated goals, listed in objective measures above, in order to improve functional independence and quality of life.  [x] Progressing  [] Met  [] Not Met    Patient will return to normal ADL's, IADL's, community involvement, recreational activities, and work-related activities with less than or equal to 3/10 pain and maximal function.  [x] Progressing  [] Met  [] Not Met      Plan     Plan of care Certification: 6/17/2024 to 9/17/2024.    Outpatient Physical Therapy 2 times weekly for 12 weeks to include any combination of the following interventions: virtual visits, dry needling, modalities, electrical stimulation (IFC, Pre-Mod, Attended with Functional Dry Needling), Cervical/Lumbar Traction, Gait Training, Manual Therapy, Neuromuscular Re-ed, Patient Education, Self Care, Therapeutic Exercise, and Therapeutic Activites     Roseann Salmeron, PT, DPT    [email or web address]

## 2024-06-18 RX ORDER — ROSUVASTATIN CALCIUM 10 MG/1
10 TABLET, COATED ORAL DAILY
Qty: 90 TABLET | Refills: 1 | Status: SHIPPED | OUTPATIENT
Start: 2024-06-18

## 2024-06-19 ENCOUNTER — PATIENT OUTREACH (OUTPATIENT)
Dept: PULMONOLOGY | Facility: CLINIC | Age: 69
End: 2024-06-19
Payer: MEDICARE

## 2024-06-26 PROBLEM — Z74.09 DECREASED FUNCTIONAL MOBILITY AND ENDURANCE: Status: ACTIVE | Noted: 2024-06-26

## 2024-06-26 PROBLEM — M62.81 PROXIMAL MUSCLE WEAKNESS: Status: ACTIVE | Noted: 2024-06-26

## 2024-06-26 PROBLEM — M53.86 DECREASED RANGE OF MOTION OF LUMBAR SPINE: Status: ACTIVE | Noted: 2024-06-26

## 2024-07-25 ENCOUNTER — OFFICE VISIT (OUTPATIENT)
Dept: PULMONOLOGY | Facility: CLINIC | Age: 69
End: 2024-07-25
Payer: MEDICARE

## 2024-07-25 VITALS
HEART RATE: 88 BPM | DIASTOLIC BLOOD PRESSURE: 68 MMHG | OXYGEN SATURATION: 97 % | BODY MASS INDEX: 24.92 KG/M2 | WEIGHT: 164.44 LBS | SYSTOLIC BLOOD PRESSURE: 124 MMHG | HEIGHT: 68 IN

## 2024-07-25 DIAGNOSIS — J45.30 MILD PERSISTENT ASTHMA WITHOUT COMPLICATION: Chronic | ICD-10-CM

## 2024-07-25 DIAGNOSIS — J47.9 ADULT BRONCHIECTASIS: Primary | ICD-10-CM

## 2024-07-25 PROCEDURE — 3078F DIAST BP <80 MM HG: CPT | Mod: CPTII,S$GLB,, | Performed by: INTERNAL MEDICINE

## 2024-07-25 PROCEDURE — 1159F MED LIST DOCD IN RCRD: CPT | Mod: CPTII,S$GLB,, | Performed by: INTERNAL MEDICINE

## 2024-07-25 PROCEDURE — 99213 OFFICE O/P EST LOW 20 MIN: CPT | Mod: S$GLB,,, | Performed by: INTERNAL MEDICINE

## 2024-07-25 PROCEDURE — 3288F FALL RISK ASSESSMENT DOCD: CPT | Mod: CPTII,S$GLB,, | Performed by: INTERNAL MEDICINE

## 2024-07-25 PROCEDURE — 1100F PTFALLS ASSESS-DOCD GE2>/YR: CPT | Mod: CPTII,S$GLB,, | Performed by: INTERNAL MEDICINE

## 2024-07-25 PROCEDURE — 3008F BODY MASS INDEX DOCD: CPT | Mod: CPTII,S$GLB,, | Performed by: INTERNAL MEDICINE

## 2024-07-25 PROCEDURE — 99999 PR PBB SHADOW E&M-EST. PATIENT-LVL III: CPT | Mod: PBBFAC,,, | Performed by: INTERNAL MEDICINE

## 2024-07-25 PROCEDURE — 3074F SYST BP LT 130 MM HG: CPT | Mod: CPTII,S$GLB,, | Performed by: INTERNAL MEDICINE

## 2024-07-25 PROCEDURE — 1160F RVW MEDS BY RX/DR IN RCRD: CPT | Mod: CPTII,S$GLB,, | Performed by: INTERNAL MEDICINE

## 2024-07-25 NOTE — PROGRESS NOTES
Subjective:      Patient ID: Caitlin Ahmadi is a 69 y.o. female.    Chief Complaint: Shortness of Breath, Asthma, and Follow-up    Shortness of Breath  Her past medical history is significant for asthma.   Asthma  She complains of shortness of breath. Her past medical history is significant for asthma.   Follow-up        Seen previously with the followin yo female with a history of RA, long history of asthma well controlled on Breo QD and PRN albuterol. Had a CT of the chest one year ago showing small focus of lingular atelectasis. For some reason she was told she had interstitial lung disease but cant remember by who. Main complaint is cough with occasional sputum, worse at night and first laying down. No dyspnea, fever, chills, chest pain or hemoptysis. Was supposed to have PFTs done today but felt light headed and declined to do them. PFTs one year ago were essentially normal and FeNO in January was < 25. NO other complaints at this time.     2023:  Cough has significantly improved with the addition of proton pump inhibitor but she still has daily to every other day difficult to expectorate thick green mucus.  Upon my review of her prior CT imaging do note that she has mild diffuse cylindrical bronchiectasis.  Otherwise dyspnea and asthma symptoms are mostly stable.  No fevers, chills, unintentional weight loss or chest pain.    2024  Here for follow up with the above  Overall doing well  Asthma symptoms controlled on Breo 100  Patient states reflux treatment has improved symptoms significantly  No new complaints    Review of Systems   Respiratory:  Positive for shortness of breath.     as per HPI otherwise negative    Objective:     Physical Exam   Constitutional: She is oriented to person, place, and time. She appears well-developed. No distress.   HENT:   Head: Normocephalic.   Cardiovascular: Normal rate and regular rhythm.   Pulmonary/Chest: Normal expansion, symmetric chest wall  "expansion, effort normal and breath sounds normal.   Musculoskeletal:      Cervical back: Neck supple.   Neurological: She is alert and oriented to person, place, and time.   Psychiatric: She has a normal mood and affect.   Nursing note and vitals reviewed.          7/25/2024    11:13 AM 6/17/2024     7:01 AM 6/6/2024    10:31 AM 5/30/2024    10:46 AM 5/23/2024     2:23 PM 5/16/2024     9:45 AM 5/9/2024     1:04 PM   Pulmonary Function Tests   SpO2 97 %  98 % 98 %  100 %    Height 5' 8" (1.727 m) 5' 8" (1.727 m) 5' 8" (1.727 m) 5' 8" (1.727 m) 5' 8" (1.727 m) 5' 8" (1.727 m) 5' 8" (1.727 m)   Weight 74.6 kg (164 lb 7.4 oz) 72.6 kg (160 lb 0.9 oz) 72.6 kg (160 lb 0.9 oz) 72.3 kg (159 lb 6.3 oz) 73.3 kg (161 lb 9.6 oz) 71.1 kg (156 lb 12 oz) 75.5 kg (166 lb 7.2 oz)   BMI (Calculated) 25 24.3 24.3 24.2 24.6 23.8 25.3        Assessment:     1. Adult bronchiectasis    2. Mild persistent asthma without complication          Plan:     Asthma symptoms stable on current regimen  Continue to treat reflux  No medication changes recommended at this time  Return in 6 months, sooner if needed     "

## 2024-08-11 DIAGNOSIS — F33.41 RECURRENT MAJOR DEPRESSIVE DISORDER, IN PARTIAL REMISSION: ICD-10-CM

## 2024-08-11 NOTE — TELEPHONE ENCOUNTER
No care due was identified.  [facility] Embedded Care Due Messages. Reference number: [id number].   8/11/2024 10:30:19 AM CDT

## 2024-08-12 RX ORDER — AMITRIPTYLINE HYDROCHLORIDE 100 MG/1
TABLET ORAL
Qty: 270 TABLET | Refills: 3 | Status: SHIPPED | OUTPATIENT
Start: 2024-08-12

## 2024-08-12 NOTE — TELEPHONE ENCOUNTER
Refill Decision Note   Caitlin Ahmadi  is requesting a refill authorization.  Brief Assessment and Rationale for Refill:  Approve     Medication Therapy Plan:         Alert overridden per protocol: Yes   Comments:     Note composed:11:02 AM 08/12/2024

## 2024-08-13 DIAGNOSIS — E55.9 VITAMIN D INSUFFICIENCY: ICD-10-CM

## 2024-08-13 RX ORDER — ERGOCALCIFEROL 1.25 MG/1
50000 CAPSULE ORAL
Qty: 12 CAPSULE | Refills: 0 | Status: SHIPPED | OUTPATIENT
Start: 2024-08-13

## 2024-08-21 ENCOUNTER — OFFICE VISIT (OUTPATIENT)
Dept: SPORTS MEDICINE | Facility: CLINIC | Age: 69
End: 2024-08-21
Payer: MEDICARE

## 2024-08-21 DIAGNOSIS — M70.60 GREATER TROCHANTERIC BURSITIS, UNSPECIFIED LATERALITY: Primary | ICD-10-CM

## 2024-08-21 DIAGNOSIS — M05.9 SEROPOSITIVE RHEUMATOID ARTHRITIS: ICD-10-CM

## 2024-08-21 PROCEDURE — 1159F MED LIST DOCD IN RCRD: CPT | Mod: CPTII,S$GLB,, | Performed by: STUDENT IN AN ORGANIZED HEALTH CARE EDUCATION/TRAINING PROGRAM

## 2024-08-21 PROCEDURE — 1160F RVW MEDS BY RX/DR IN RCRD: CPT | Mod: CPTII,S$GLB,, | Performed by: STUDENT IN AN ORGANIZED HEALTH CARE EDUCATION/TRAINING PROGRAM

## 2024-08-21 PROCEDURE — 99999 PR PBB SHADOW E&M-EST. PATIENT-LVL II: CPT | Mod: PBBFAC,,, | Performed by: STUDENT IN AN ORGANIZED HEALTH CARE EDUCATION/TRAINING PROGRAM

## 2024-08-21 PROCEDURE — 20611 DRAIN/INJ JOINT/BURSA W/US: CPT | Mod: 50,S$GLB,, | Performed by: STUDENT IN AN ORGANIZED HEALTH CARE EDUCATION/TRAINING PROGRAM

## 2024-08-21 PROCEDURE — 99204 OFFICE O/P NEW MOD 45 MIN: CPT | Mod: 25,S$GLB,, | Performed by: STUDENT IN AN ORGANIZED HEALTH CARE EDUCATION/TRAINING PROGRAM

## 2024-08-21 RX ORDER — TRIAMCINOLONE ACETONIDE 40 MG/ML
40 INJECTION, SUSPENSION INTRA-ARTICULAR; INTRAMUSCULAR
Status: DISCONTINUED | OUTPATIENT
Start: 2024-08-21 | End: 2024-08-21 | Stop reason: HOSPADM

## 2024-08-21 RX ADMIN — TRIAMCINOLONE ACETONIDE 40 MG: 40 INJECTION, SUSPENSION INTRA-ARTICULAR; INTRAMUSCULAR at 10:08

## 2024-08-21 NOTE — PROCEDURES
Sports Medicine US - Guidance for Needle Placement    Date/Time: 8/21/2024 10:00 AM    Performed by: Tez Conti MD  Authorized by: Tez Conti MD  Preparation: Patient was prepped and draped in the usual sterile fashion.  Local anesthesia used: no    Anesthesia:  Local anesthesia used: no    Sedation:  Patient sedated: no    Patient tolerance: patient tolerated the procedure well with no immediate complications  Comments: Ultrasound guidance was used for needle localization. Images were saved and stored for documentation. The appropriate structures were visualized. Dynamic visualization of the needle was continuous throughout the procedures and maintained good position.

## 2024-08-21 NOTE — PATIENT INSTRUCTIONS
Assessment:  Caitlin Ahmadi is a 69 y.o. female No chief complaint on file.      No diagnosis found.     Plan:  Cortizone steroid injection given in bilateral Greater Troch  We discussed the proper protocols after the injection such as no submerging pools, baths tubs, or hot tubs for 24 hr.  Showering is okay today.  We also discussed that blood sugars can be elevated after an injection and asked patient to properly checked her sugars over the next few days and contact their PCP if there are any concerns.  We discussed red flags such as fevers, chills, red, warm, tender joint at the area of injection to please seek medical care immediately.    Apply topical diclofenac (Voltaren) up to 4 times a day to the affected area.  It can be bought over the counter at any local pharmacy.    Lidocaine patches can be bought over the counter  Patient can use ice as needed          Follow-up: as needed.    Thank you for choosing [facility] and Dr. Tez Conti for your orthopedic & sports medicine care. It is our goal to provide you with exceptional care that will help keep you healthy, active, and get you back in the game.    Please do not hesitate to reach out to us via email, phone, or MyChart with any questions, concerns, or feedback.    If you felt that you received exemplary care today, please consider leaving us feedback on [employer] at:  [email or web address]    If you are experiencing pain/discomfort ,or have questions after 5pm and would like to be connected to the [facility] on-call team, please call this number and specify which Sports Medicine provider is treating you: [phone]

## 2024-08-21 NOTE — PROCEDURES
Large Joint Aspiration/Injection: bilateral greater trochanteric bursa    Date/Time: 8/21/2024 10:00 AM    Performed by: Tez Conti MD  Authorized by: Tez Conti MD    Consent Done?:  Yes (Verbal)  Indications:  Pain  Site marked: the procedure site was marked    Timeout: prior to procedure the correct patient, procedure, and site was verified    Prep: patient was prepped and draped in usual sterile fashion    Local anesthetic:  Bupivacaine 0.5% without epinephrine and lidocaine 1% without epinephrine    Details:  Needle Size:  22 G  Ultrasonic Guidance for needle placement?: Yes    Images are saved and documented.  Approach:  Lateral  Location:  Hip  Laterality:  Bilateral  Site:  Bilateral greater trochanteric bursa  Medications (Right):  40 mg triamcinolone acetonide 40 mg/mL  Medications (Left):  40 mg triamcinolone acetonide 40 mg/mL  Patient tolerance:  Patient tolerated the procedure well with no immediate complications     Ultrasound guidance was used for needle localization. Images were saved and stored for documentation. The appropriate structures were visualized. Dynamic visualization of the needle was continuous throughout the procedures and maintained good position.

## 2024-08-21 NOTE — PROGRESS NOTES
Patient ID: Caitlin Ahmadi  YOB: 1955  MRN: [id number]    Chief Complaint: Pain of the Left Hip and Pain of the Right Hip        History of Present Illness: Caitlin Ahmadi is a right-hand dominant 69 y.o. female who presents today with bilateral hip pain. She has been having pain in her hips for 6 months and no history of trauma she can not sleep on the right side she sleeps on the left.  Sitting to standing is problematic.  She tried Motrin mixed with Tylenol 2 tablets of each may taken twice a day seems to ease things up a little bit. Received CSI, bilateral GT on 1/11 by Dr. Rangel, this injection provided relief for approximately 3 months, she would like to try Ultrasound guided steroid injection today.     The patient is active in none.  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      Mild persistent asthma without complication 07/25/2024    PONV (postoperative nausea and vomiting)     Seropositive rheumatoid arthritis 02/17/2022     Past Surgical History:   Procedure Laterality Date    ADENOIDECTOMY      CATARACT EXTRACTION W/  INTRAOCULAR LENS IMPLANT Left 12/09/2020    CATARACT EXTRACTION W/  INTRAOCULAR LENS IMPLANT      CHOLECYSTECTOMY      COLONOSCOPY N/A 10/13/2021    Procedure: COLONOSCOPY;  Surgeon: Elissa Yoon MD;  Location: [facility];  Service: Endoscopy;  Laterality: N/A;    HERNIA REPAIR      HYSTERECTOMY      REVISION OF KNEE ARTHROPLASTY Right 3/12/2024    Procedure: REVISION, ARTHROPLASTY, KNEE;   Surgeon: Jalen Rangel MD;  Location: [facility];  Service: General;  Laterality: Right;    TONSILLECTOMY      TOTAL KNEE ARTHROPLASTY Bilateral      Family History   Problem Relation Name Age of Onset    No Known Problems Mother      Alzheimer's disease Father       Social History     Socioeconomic History    Marital status:    Tobacco Use    Smoking status: Former     Current packs/day: 0.00     Average packs/day: 1 pack/day for 20.0 years (20.0 ttl pk-yrs)     Types: Cigarettes     Start date: 1970     Quit date: 1990     Years since quittin.2    Smokeless tobacco: Never   Substance and Sexual Activity    Alcohol use: Yes     Alcohol/week: 5.0 standard drinks of alcohol     Types: 5 Glasses of wine per week     Comment: no longer drinkning    Drug use: Never    Sexual activity: Yes     Partners: Male     Medication List with Changes/Refills   Current Medications    ALBUTEROL (PROVENTIL/VENTOLIN HFA) 90 MCG/ACTUATION INHALER    INHALE 2 PUFFS INTO THE LUNGS EVERY 6 HOURS AS NEEDED FOR WHEEZING    AMITRIPTYLINE (ELAVIL) 100 MG TABLET    TAKE 3 TABLETS(300 MG) BY MOUTH EVERY EVENING    BUDESONIDE (PULMICORT) 0.5 MG/2 ML NEBULIZER SOLUTION    Take 2 mLs (0.5 mg total) by nebulization 2 (two) times daily. Controller    BUSPIRONE (BUSPAR) 10 MG TABLET    Take 1 tablet (10 mg total) by mouth 2 (two) times daily.    DICLOFENAC (VOLTAREN) 75 MG EC TABLET    Take 1 tablet (75 mg total) by mouth 2 (two) times daily. Take with food    ERGOCALCIFEROL (ERGOCALCIFEROL) 50,000 UNIT CAP    TAKE 1 CAPSULE BY MOUTH EVERY 7 DAYS    FLUTICASONE FUROATE-VILANTEROL (BREO ELLIPTA) 100-25 MCG/DOSE DISKUS INHALER    Inhale 1 puff into the lungs once daily. Controller.Wash out mouth after use    FLUTICASONE FUROATE-VILANTEROL (BREO) 100-25 MCG/DOSE DISKUS INHALER    INHALE 1 PUFF INTO THE LUNGS EVERY DAY. WASH MOUTH AFTER USE    FLUTICASONE PROPIONATE (FLONASE) 50 MCG/ACTUATION NASAL SPRAY    SHAKE LIQUID AND  USE 2 SPRAYS(100 MCG) IN EACH NOSTRIL EVERY DAY    IBUPROFEN (ADVIL,MOTRIN) 200 MG TABLET    Take 600 mg by mouth every 6 (six) hours as needed.    IBUPROFEN (ADVIL,MOTRIN) 600 MG TABLET    Take 1 tablet (600 mg total) by mouth 2 (two) times daily as needed for Pain.    METHOTREXATE 2.5 MG TAB    Take 8 tablets (20 mg total) by mouth every 7 days.    METHYLPREDNISOLONE (MEDROL, VENTURA,) 4 MG TABLET    use as directed    OXYCODONE-ACETAMINOPHEN (PERCOCET) 5-325 MG PER TABLET    Take 1 tablet by mouth every 8 (eight) hours as needed for Pain.    PANTOPRAZOLE (PROTONIX) 40 MG TABLET    Take 1 tablet (40 mg total) by mouth once daily.    ROSUVASTATIN (CRESTOR) 10 MG TABLET    Take 1 tablet (10 mg total) by mouth once daily.    SODIUM CHLORIDE 3% 3 % NEBULIZER SOLUTION    Take 4 mLs by nebulization 2 (two) times a day.     Review of patient's allergies indicates:   Allergen Reactions    Pneumococcal vaccine Swelling    Celebrex [celecoxib] Itching and Rash    Hydrocodone-acetaminophen Nausea Only and Nausea And Vomiting    Meloxicam Rash    Sulfa (sulfonamide antibiotics) Rash       Physical Exam:   There is no height or weight on file to calculate BMI.    GENERAL: Well appearing, in no acute distress.  HEAD: Normocephalic and atraumatic.  ENT: External ears and nose grossly normal.  EYES: EOMI bilaterally  PULMONARY: Respirations are grossly even and non-labored.  NEURO: Awake, alert, and oriented x 3.  SKIN: No obvious rashes appreciated.  PSYCH: Mood & affect are appropriate.    Detailed MSK exam:     Left hip exam:  -ROM: internal rotation 30, external rotation 60  -KEILY negative, FADIR negative, Lyudmila negative  -Muscle strength 5/5 flexion, 5/5 extension, 5/5 abduction, 5/5 adduction  -negative log roll  -negative straight leg raise  -TTP: greater trochanter    Right hip exam:  -ROM: internal rotation 30, external rotation 60  -KEILY negative, FADIR negative, Lyudmila negative  -Muscle strength 5/5 flexion, 5/5 extension,  5/5 abduction, 5/5 adduction  -negative log roll  -negative straight leg raise  -TTP: greater trochanter      Imaging:  X-ray Knee Ortho Right with Flexion  Narrative: EXAMINATION:  XR KNEE ORTHO RIGHT WITH FLEXION    CLINICAL HISTORY:  Instability of internal right knee prosthesis, subsequent encounter    TECHNIQUE:  AP standing as well as PA flexion standing and Merchant views of both knees were performed.  A lateral view of the right knee is also performed.    COMPARISON:  Prior radiographs    FINDINGS:  Bilateral arthroplasty changes noted without evidence of loosening.  Alignment anatomic.  No acute abnormality.  Impression: As above    Electronically signed by: Dangelo Irene MD  Date:    06/12/2024  Time:    09:40        Relevant imaging results were reviewed and interpreted by me and per my read shows no acute abnormalities.  This was discussed with the patient and / or family today.     Assessment:  Caitlin Ahmadi is a 69 y.o. female presenting with bilateral hip pain.   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   Plan: Steroid injection given today (see separate procedure note for details). We discussed the proper protocols after the injection such as no submerging pools, baths tubs, or hot tubs for 24 hr.  Showering is okay today.  We also discussed that blood sugars can be elevated after an injection and asked patient to properly checked her sugars over the next few days and contact their PCP if there are any concerns.  We discussed red flags such as fevers, chills, red, warm, tender joint at the area of injection to please seek medical care immediately. Continue conservative management for pain.   Follow up as needed. All questions answered.      Greater trochanteric bursitis, unspecified laterality  -     Sports Medicine US - Guidance for Needle Placement  -     Large Joint Aspiration/Injection: bilateral greater trochanteric bursa         Ultrasound  guidance was used for needle localization. Images were saved and stored for documentation. The appropriate structures were visualized. Dynamic visualization of the needle was continuous throughout the procedures and maintained good position.      A copy of today's visit note has been sent to the referring provider.     Electronically signed:  Tez Conti MD, MPH  08/21/2024  10:27 AM

## 2024-08-22 RX ORDER — METHOTREXATE 2.5 MG/1
20 TABLET ORAL
Qty: 96 TABLET | Refills: 1 | Status: SHIPPED | OUTPATIENT
Start: 2024-08-22 | End: 2025-02-18

## 2024-09-09 PROBLEM — Z00.00 ROUTINE GENERAL MEDICAL EXAMINATION AT A HEALTH CARE FACILITY: Status: RESOLVED | Noted: 2024-06-06 | Resolved: 2024-09-09

## 2024-09-24 ENCOUNTER — PATIENT OUTREACH (OUTPATIENT)
Dept: PULMONOLOGY | Facility: CLINIC | Age: 69
End: 2024-09-24
Payer: MEDICARE

## 2024-10-01 RX ORDER — NABUMETONE 750 MG/1
750 TABLET, FILM COATED ORAL DAILY PRN
Qty: 30 TABLET | Refills: 0 | Status: CANCELLED | OUTPATIENT
Start: 2024-10-01

## 2024-10-08 ENCOUNTER — TELEPHONE (OUTPATIENT)
Dept: PAIN MEDICINE | Facility: CLINIC | Age: 69
End: 2024-10-08
Payer: MEDICARE

## 2024-10-08 NOTE — TELEPHONE ENCOUNTER
Patient called and confirmed time and location for appointment. Patient given instructions about how our Interventional Pain Department practices. Patient advised to bring any prior images to the appointment.     Called patient to confirm appt, no answer left message to return call

## 2024-10-10 ENCOUNTER — OFFICE VISIT (OUTPATIENT)
Dept: PAIN MEDICINE | Facility: CLINIC | Age: 69
End: 2024-10-10
Payer: MEDICARE

## 2024-10-10 ENCOUNTER — TELEPHONE (OUTPATIENT)
Dept: INTERNAL MEDICINE | Facility: CLINIC | Age: 69
End: 2024-10-10
Payer: MEDICARE

## 2024-10-10 VITALS
RESPIRATION RATE: 17 BRPM | DIASTOLIC BLOOD PRESSURE: 72 MMHG | HEIGHT: 68 IN | BODY MASS INDEX: 26.56 KG/M2 | WEIGHT: 175.25 LBS | SYSTOLIC BLOOD PRESSURE: 118 MMHG | HEART RATE: 90 BPM

## 2024-10-10 DIAGNOSIS — M47.816 LUMBAR SPONDYLOSIS: Primary | ICD-10-CM

## 2024-10-10 DIAGNOSIS — M51.360 DEGENERATION OF INTERVERTEBRAL DISC OF LUMBAR REGION WITH DISCOGENIC BACK PAIN: ICD-10-CM

## 2024-10-10 DIAGNOSIS — M46.1 SACROILIITIS: ICD-10-CM

## 2024-10-10 DIAGNOSIS — M54.16 LUMBAR RADICULITIS: ICD-10-CM

## 2024-10-10 DIAGNOSIS — M54.9 DORSALGIA, UNSPECIFIED: ICD-10-CM

## 2024-10-10 DIAGNOSIS — C18.1 MALIGNANT NEOPLASM OF APPENDIX: ICD-10-CM

## 2024-10-10 DIAGNOSIS — M41.26 OTHER IDIOPATHIC SCOLIOSIS, LUMBAR REGION: ICD-10-CM

## 2024-10-10 DIAGNOSIS — M43.16 SPONDYLOLISTHESIS OF LUMBAR REGION: ICD-10-CM

## 2024-10-10 DIAGNOSIS — M05.9 SEROPOSITIVE RHEUMATOID ARTHRITIS: ICD-10-CM

## 2024-10-10 PROCEDURE — 99999 PR PBB SHADOW E&M-EST. PATIENT-LVL IV: CPT | Mod: PBBFAC,,, | Performed by: ANESTHESIOLOGY

## 2024-10-10 RX ORDER — PREGABALIN 50 MG/1
50 CAPSULE ORAL NIGHTLY
Qty: 30 CAPSULE | Refills: 2 | Status: SHIPPED | OUTPATIENT
Start: 2024-10-10

## 2024-10-10 NOTE — TELEPHONE ENCOUNTER
----- Message from Irena sent at 10/10/2024  1:19 PM CDT -----  Name of Caller:Angie with [facility]  Best Call Back Number:[phone]  Additional Information: Angie stated the member isn't taking the medication rosuvastatin 10 mg.

## 2024-10-10 NOTE — PROGRESS NOTES
New Patient Interventional Pain Note (Initial Visit)    Referring Physician: Amol Steve MD    PCP: Amol Steve MD    Chief Complaint:     Chief Complaint   Patient presents with    Hip Pain     Right and left     Low-back Pain        SUBJECTIVE:    Caitlin Ahmadi is a 69 y.o. female who presents to the clinic for the evaluation of lower back and bilateral pain.   Patient reports 4 year history of lower back and bilateral hip pain.  Patient has had previous right TKA with revision and left partial TKA.  Patient denies any previous surgeries on her lumbar spine.    Pain described as throbbing aching pain that starts in her lower back and bilateral buttocks area.  Patient reports associated bilateral hip pain primarily over the lateral aspect.  Patient reports that pain will intermittently radiate down her bilateral lower extremities along the posterior aspect to the calf, left-greater-than-right.  Pain is worse with prolonged sitting or standing, better with lying supine.  Pain is currently rated a 9/10.  Patient denies any fevers, chills, saddle anesthesia, or bowel and bladder incontinence.      Non-Pharmacologic Treatments:  Physical Therapy/Home Exercise: yes  Ice/Heat:yes  TENS: no  Acupuncture: no  Massage: yes  Chiropractic: yes        Previous Pain Medications:  Tylenol, ibuprofen, diclofenac, Flexeril, gabapentin, hydrocodone, topicals     report:  Reviewed and consistent with medication use as prescribed.    Pain Procedures:   none      Imaging:     Results for orders placed during the hospital encounter of 06/06/24    X-Ray Lumbar Spine AP And Lateral    Narrative  EXAM: XR LUMBAR SPINE AP AND LATERAL    CLINICAL HISTORY:  [M54.16]-Radiculopathy, lumbar region.    Lumbar spine x-ray, 3 views    COMPARISON: 05/29/2024    FINDINGS:    Minimal scoliosis, convex to the right and centered at L3-4.  2 mm grade 1 spondylolisthesis L4-L5.  Moderate disc space narrowing L2-3 with small  osteophytes.  Severe disc space narrowing L3-4 with prominent endplate sclerosis and osteophytes.  Well-preserved disc spaces L4-5 and L5-S1.  Paravertebral soft tissues are normal.  No significant interval change since the prior examination.    Impression  Multilevel degenerative changes.  No significant interval change since 05/29/2024.    Finalized on: 6/6/2024 12:39 PM By:  Jalen Shepherd MD  [facility]# [id number]      2024-06-06 12:41:[id number]    [facility]    Results for orders placed during the hospital encounter of 01/11/24    X-Ray Hips Bilateral 2 View Incl AP Pelvis    Narrative  EXAMINATION:  XR HIPS BILATERAL 2 VIEW INCL AP PELVIS    CLINICAL HISTORY:  Pain in right hip    TECHNIQUE:  AP view of the pelvis and frogleg lateral views of both hips were performed.    COMPARISON:  03/08/2021    FINDINGS:  The bony pelvis is intact. Both femoral heads are well seated within acetabula.  No significant joint space narrowing identified.  No plain film evidence to suggest AVN of either hip.    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      Mild persistent asthma without complication 07/25/2024    PONV (postoperative nausea and vomiting)     Seropositive rheumatoid arthritis 02/17/2022     Past Surgical History:   Procedure Laterality Date    ADENOIDECTOMY      CATARACT EXTRACTION W/  INTRAOCULAR LENS IMPLANT Left 12/09/2020    CATARACT EXTRACTION W/  INTRAOCULAR LENS IMPLANT      CHOLECYSTECTOMY      COLONOSCOPY N/A  10/13/2021    Procedure: COLONOSCOPY;  Surgeon: Elissa Yoon MD;  Location: [facility] ENDO;  Service: Endoscopy;  Laterality: N/A;    HERNIA REPAIR      HYSTERECTOMY      REVISION OF KNEE ARTHROPLASTY Right 3/12/2024    Procedure: REVISION, ARTHROPLASTY, KNEE;  Surgeon: Jalen Rangel MD;  Location: [facility] OR;  Service: General;  Laterality: Right;    TONSILLECTOMY      TOTAL KNEE ARTHROPLASTY Bilateral      Social History     Socioeconomic History    Marital status:    Tobacco Use    Smoking status: Former     Current packs/day: 0.00     Average packs/day: 1 pack/day for 20.0 years (20.0 ttl pk-yrs)     Types: Cigarettes     Start date: 1970     Quit date: 1990     Years since quittin.3    Smokeless tobacco: Never   Substance and Sexual Activity    Alcohol use: Yes     Alcohol/week: 5.0 standard drinks of alcohol     Types: 5 Glasses of wine per week     Comment: no longer drinkning    Drug use: Never    Sexual activity: Yes     Partners: Male     Family History   Problem Relation Name Age of Onset    No Known Problems Mother      Alzheimer's disease Father         Review of patient's allergies indicates:   Allergen Reactions    Pneumococcal vaccine Swelling    Celebrex [celecoxib] Itching and Rash    Hydrocodone-acetaminophen Nausea Only and Nausea And Vomiting    Meloxicam Rash    Sulfa (sulfonamide antibiotics) Rash       Current Outpatient Medications   Medication Sig    albuterol (PROVENTIL/VENTOLIN HFA) 90 mcg/actuation inhaler INHALE 2 PUFFS INTO THE LUNGS EVERY 6 HOURS AS NEEDED FOR WHEEZING    amitriptyline (ELAVIL) 100 MG tablet TAKE 3 TABLETS(300 MG) BY MOUTH EVERY EVENING    fluticasone propionate (FLONASE) 50 mcg/actuation nasal spray SHAKE LIQUID AND USE 2 SPRAYS(100 MCG) IN EACH NOSTRIL EVERY DAY    ibuprofen (ADVIL,MOTRIN) 200 MG tablet Take 600 mg by mouth every 6 (six) hours as needed.    ibuprofen (ADVIL,MOTRIN) 600 MG tablet Take 1 tablet (600 mg total) by  "mouth 2 (two) times daily as needed for Pain.    methotrexate 2.5 MG Tab Take 8 tablets (20 mg total) by mouth every 7 days.    pantoprazole (PROTONIX) 40 MG tablet Take 1 tablet (40 mg total) by mouth once daily.    budesonide (PULMICORT) 0.5 mg/2 mL nebulizer solution Take 2 mLs (0.5 mg total) by nebulization 2 (two) times daily. Controller    fluticasone furoate-vilanteroL (BREO) 100-25 mcg/dose diskus inhaler INHALE 1 PUFF INTO THE LUNGS EVERY DAY. WASH MOUTH AFTER USE (Patient not taking: Reported on 10/10/2024)    pregabalin (LYRICA) 50 MG capsule Take 1 capsule (50 mg total) by mouth every evening.     No current facility-administered medications for this visit.         ROS  Review of Systems   Constitutional:  Negative for chills, diaphoresis, fatigue and fever.   HENT:  Negative for ear discharge, ear pain, rhinorrhea, trouble swallowing and voice change.    Respiratory:  Negative for chest tightness, shortness of breath, wheezing and stridor.    Cardiovascular:  Negative for chest pain and leg swelling.   Gastrointestinal:  Negative for blood in stool, diarrhea, nausea and vomiting.   Endocrine: Negative for cold intolerance and heat intolerance.   Genitourinary:  Positive for urgency. Negative for dysuria and hematuria.   Musculoskeletal:  Positive for arthralgias, back pain, gait problem, joint swelling and myalgias. Negative for neck pain and neck stiffness.   Skin:  Negative for rash.   Neurological:  Positive for weakness and numbness. Negative for tremors, seizures, speech difficulty, light-headedness and headaches.   Hematological:  Does not bruise/bleed easily.   Psychiatric/Behavioral:  Negative for agitation, confusion and suicidal ideas.             OBJECTIVE:  /72   Pulse 90   Resp 17   Ht 5' 8" (1.727 m)   Wt 79.5 kg (175 lb 4.3 oz)   BMI 26.65 kg/m²         Physical Exam  Constitutional:       Appearance: Normal appearance.   HENT:      Head: Normocephalic and atraumatic.   Eyes: "      Extraocular Movements: Extraocular movements intact.      Pupils: Pupils are equal, round, and reactive to light.   Cardiovascular:      Pulses: Normal pulses.   Pulmonary:      Effort: Pulmonary effort is normal.   Abdominal:      General: Abdomen is flat.   Musculoskeletal:      Right hip: Tenderness and bony tenderness present. No crepitus. Normal range of motion. Normal strength.      Left hip: Tenderness and bony tenderness present. No crepitus. Decreased range of motion. Decreased strength.   Skin:     General: Skin is warm.      Capillary Refill: Capillary refill takes less than 2 seconds.   Neurological:      Mental Status: She is alert and oriented to person, place, and time.      Motor: Weakness present. No abnormal muscle tone.      Gait: Gait abnormal.      Deep Tendon Reflexes:      Reflex Scores:       Patellar reflexes are 1+ on the right side and 1+ on the left side.       Achilles reflexes are 1+ on the right side and 1+ on the left side.     Comments: 4/5 strength in left knee extension left hip adduction   Psychiatric:         Mood and Affect: Mood normal.         Behavior: Behavior normal.         Thought Content: Thought content normal.           Musculoskeletal:      Lumbar Exam  Incision: yes  Pain with Flexion: yes  Pain with Extension: yes  ROM:  Decreased  Paraspinous TTP:  Positive bilaterally  Facet TTP:  L5-S1  Facet Loading:  Positive bilaterally  SLR:  Positive bilaterally at 75° in L3 distribution  SIJ TTP:  Positive bilaterally  KEILY:  Positive bilaterally      LABS:  Lab Results   Component Value Date    WBC 6.65 05/16/2024    HGB 12.3 05/16/2024    HCT 37.8 05/16/2024    MCV 87 05/16/2024     05/16/2024       CMP  Sodium   Date Value Ref Range Status   05/16/2024 138 136 - 145 mmol/L Final     Potassium   Date Value Ref Range Status   05/16/2024 4.3 3.5 - 5.1 mmol/L Final     Chloride   Date Value Ref Range Status   05/16/2024 104 95 - 110 mmol/L Final     CO2   Date  "Value Ref Range Status   05/16/2024 24 23 - 29 mmol/L Final     Glucose   Date Value Ref Range Status   05/16/2024 95 70 - 110 mg/dL Final     BUN   Date Value Ref Range Status   05/16/2024 14 8 - 23 mg/dL Final     Creatinine   Date Value Ref Range Status   05/16/2024 0.8 0.5 - 1.4 mg/dL Final     Calcium   Date Value Ref Range Status   05/16/2024 10.0 8.7 - 10.5 mg/dL Final     Total Protein   Date Value Ref Range Status   05/16/2024 7.9 6.0 - 8.4 g/dL Final     Albumin   Date Value Ref Range Status   05/16/2024 4.0 3.5 - 5.2 g/dL Final     Total Bilirubin   Date Value Ref Range Status   05/16/2024 0.3 0.1 - 1.0 mg/dL Final     Comment:     For infants and newborns, interpretation of results should be based  on gestational age, weight and in agreement with clinical  observations.    Premature Infant recommended reference ranges:  Up to 24 hours.............<8.0 mg/dL  Up to 48 hours............<12.0 mg/dL  3-5 days..................<15.0 mg/dL  6-29 days.................<15.0 mg/dL       Alkaline Phosphatase   Date Value Ref Range Status   05/16/2024 87 55 - 135 U/L Final     AST   Date Value Ref Range Status   05/16/2024 19 10 - 40 U/L Final     ALT   Date Value Ref Range Status   05/16/2024 21 10 - 44 U/L Final     Anion Gap   Date Value Ref Range Status   05/16/2024 10 8 - 16 mmol/L Final     eGFR if    Date Value Ref Range Status   07/19/2022 >60 >60 mL/min/1.73 m^2 Final     eGFR if non    Date Value Ref Range Status   07/19/2022 >60 >60 mL/min/1.73 m^2 Final     Comment:     Calculation used to obtain the estimated glomerular filtration  rate (eGFR) is the CKD-EPI equation.          No results found for: "LABA1C", "HGBA1C"          ASSESSMENT:       69 y.o. year old female with lower back and bilateral hip pain, consistent with     1. Lumbar spondylosis  pregabalin (LYRICA) 50 MG capsule      2. Degeneration of intervertebral disc of lumbar region with discogenic back pain  " pregabalin (LYRICA) 50 MG capsule      3. Dorsalgia, unspecified  MRI Lumbar Spine Without Contrast    pregabalin (LYRICA) 50 MG capsule      4. Lumbar radiculitis  Ambulatory referral/consult to Pain Clinic    pregabalin (LYRICA) 50 MG capsule      5. Other idiopathic scoliosis, lumbar region  MRI Lumbar Spine Without Contrast    pregabalin (LYRICA) 50 MG capsule      6. Spondylolisthesis of lumbar region  pregabalin (LYRICA) 50 MG capsule      7. Seropositive rheumatoid arthritis        8. Malignant neoplasm of appendix        9. Sacroiliitis          Lumbar spondylosis  -     pregabalin (LYRICA) 50 MG capsule; Take 1 capsule (50 mg total) by mouth every evening.  Dispense: 30 capsule; Refill: 2    Degeneration of intervertebral disc of lumbar region with discogenic back pain  -     pregabalin (LYRICA) 50 MG capsule; Take 1 capsule (50 mg total) by mouth every evening.  Dispense: 30 capsule; Refill: 2    Dorsalgia, unspecified  -     MRI Lumbar Spine Without Contrast; Future; Expected date: 10/10/2024  -     pregabalin (LYRICA) 50 MG capsule; Take 1 capsule (50 mg total) by mouth every evening.  Dispense: 30 capsule; Refill: 2    Lumbar radiculitis  -     Ambulatory referral/consult to Pain Clinic  -     pregabalin (LYRICA) 50 MG capsule; Take 1 capsule (50 mg total) by mouth every evening.  Dispense: 30 capsule; Refill: 2    Other idiopathic scoliosis, lumbar region  -     MRI Lumbar Spine Without Contrast; Future; Expected date: 10/10/2024  -     pregabalin (LYRICA) 50 MG capsule; Take 1 capsule (50 mg total) by mouth every evening.  Dispense: 30 capsule; Refill: 2    Spondylolisthesis of lumbar region  -     pregabalin (LYRICA) 50 MG capsule; Take 1 capsule (50 mg total) by mouth every evening.  Dispense: 30 capsule; Refill: 2    Seropositive rheumatoid arthritis    Malignant neoplasm of appendix    Sacroiliitis             PLAN:   - Interventions:   - none at this time.  Pain appears to be consistent with L3  radiculopathy with possible overlapping hip pathology and sacroiliac joint dysfunction.  We will obtain updated MRI lumbar spine    - Anticoagulation use:   No no anticoagulation    - Medications:  - start pregabalin 50 mg at night  - start amitriptyline 100 mg at night  - continue methotrexate as prescribed by Rheumatology    - Therapy:   - patient has completed 6 weeks of formal physical therapy with the last 3 months with moderate relief.  Continue home exercises that has minimal scoliosis centered     - Imaging/Diagnostic:  - x-rays of lumbar spine reviewed and findings discussed with patient.  Minimal levoscoliosis L3-L4 to the right.  Grade 1 anterolisthesis of L4 upon L5.  That has advanced loss of disc height at L2-L3 and L3-L4.  - x-rays of bilateral hips reviewed and findings discussed with patient.  Joints space is appropriate for age  - we will obtain updated MRI lumbar spine without contrast to further evaluate lower back pain with bilateral lumbar radiculopathy that has continued despite over 8 weeks of conservative therapy.    - Consults:   - continue follow up with Orthopedics for bilateral knee and hip pain.  Patient is status post right TKA with revision and left partial TKA  - continue follow up with Rheumatology for rheumatoid arthritis        - Patient Questions: Answered all of the patient's questions regarding diagnosis, therapy, and treatment     This condition does not require this patient to take time off of work, and the primary goal of our Pain Management services is to improve the patient's functional capacity.     - Follow up visit: return to clinic after MRI        The above plan and management options were discussed at length with patient. Patient is in agreement with the above and verbalized understanding.    I discussed the goals of interventional chronic pain management with the patient on today's visit.  I explained the utility of injections for diagnostic and therapeutic  purposes.  We discussed a multimodal approach to pain including treating the patient's given worst pain at any given time.  We will use a systematic approach to addressing pain.  We will also adopt a multimodal approach that includes injections, adjuvant medications, physical therapy, at times psychiatry.  There may be a limited role for opioid use intermittently in the treatment of pain, more particularly for acute pain although no one approach can be used as a sole treatment modality.    I emphasized the importance of regular exercise, core strengthening and stretching, diet and weight loss as a cornerstone of long-term pain management.      Manuel Lemon MD  Interventional Pain Management  [facility]    Future Appointments   Date Time Provider Department Center   10/17/2024  1:00 PM Jalen Rangel MD [facility] ORTHO  Medical C   10/19/2024  8:00 AM [facility] MRI [facility] MRI [facility]   10/24/2024  9:40 AM Roseann Thorne NP [facility] INT RORO [facility]   12/5/2024  9:45 AM LABORATORY, [facility] LAB [facility]   12/12/2024 12:30 PM Akhil Hernadez MD [facility] RHEU  Medical C   1/14/2025  2:00 PM PULMONARY DISEASE MANAGEMENT SUM [facility] PULMFUN [facility]   1/16/2025  1:40 PM Sunny Lloyd MD [facility] CARDIO [facility]   1/30/2025 11:15 AM Keshawn Coelho MD [facility] PULMSVC [facility]           Disclaimer:  This note was prepared using voice recognition system and is likely to have sound alike errors that may have been overlooked even after proof reading.  Please call me with any questions    I spent a total of 45 minutes on the day of the visit.  This includes face to face time and non-face to face time preparing to see the patient (eg, review of tests), obtaining and/or reviewing separately obtained history, documenting clinical information in the electronic or other health record, independently interpreting results and communicating results to the patient/family/caregiver, or care coordinator.

## 2024-10-14 ENCOUNTER — PATIENT MESSAGE (OUTPATIENT)
Dept: INTERNAL MEDICINE | Facility: CLINIC | Age: 69
End: 2024-10-14
Payer: MEDICARE

## 2024-10-14 DIAGNOSIS — F41.9 ANXIETY: ICD-10-CM

## 2024-10-15 NOTE — PROGRESS NOTES
Interventional Pain Note     Referring Physician: No ref. provider found    PCP: Amol Steve MD    Chief Complaint:     Chief Complaint   Patient presents with    Results     MRI follow up        SUBJECTIVE:  Interval History (10/24/2024):  Patient Caitlin Ahmadi presents today for follow-up visit.  Patient is here today for lumbar MRI review. She has mild back pain but primarily bilateral hip pain. She has seen Dr. Rangle in the past. She has bilateral hip pain and buttock pain. At times pain radiates to the lateral thighs about longterm to the knees. Pain is worse when she goes form a sitting to standing position and with prolonged activity. She rates her pain today 4/10 but it will go up to 6/10.  She has completed physical therapy a few months ago without relief.  She was unable to take lyrica due to SE.   Patient denies night fever/night sweats, urinary incontinence, bowel incontinence, significant weight loss and significant motor weakness.   Patient denies any other complaints or concerns at this time.      Interval history 10/10/2024  Caitlin Ahmadi is a 69 y.o. female who presents to the clinic for the evaluation of lower back and bilateral pain.   Patient reports 4 year history of lower back and bilateral hip pain.  Patient has had previous right TKA with revision and left partial TKA.  Patient denies any previous surgeries on her lumbar spine.    Pain described as throbbing aching pain that starts in her lower back and bilateral buttocks area.  Patient reports associated bilateral hip pain primarily over the lateral aspect.  Patient reports that pain will intermittently radiate down her bilateral lower extremities along the posterior aspect to the calf, left-greater-than-right.  Pain is worse with prolonged sitting or standing, better with lying supine.  Pain is currently rated a 9/10.  Patient denies any fevers, chills, saddle anesthesia, or bowel and bladder  incontinence.      Non-Pharmacologic Treatments:  Physical Therapy/Home Exercise: yes  Ice/Heat:yes  TENS: no  Acupuncture: no  Massage: yes  Chiropractic: yes        Previous Pain Medications:  Tylenol, ibuprofen, diclofenac, Flexeril, gabapentin, hydrocodone, topicals     report:  Reviewed and consistent with medication use as prescribed.    Pain Procedures:   none      Imaging:   10/19/2024 Lumbar MRI  FINDINGS:  There are 5 non-rib-bearing lumbar type vertebrae.  The lowest fully formed intervertebral disc was labeled L5-S1 for the purpose of numbering.     The alignment is normal.     Sclerosis and edema of the endplates of L3-L4.  Multilevel anterior spurring.     The height of the vertebral bodies is normal.     Disc height loss at L2-L3 at L3-L4 with disc desiccation.     The conus terminates at L1. It demonstrates normal signal intensity and contour.     Individual disc levels:     T12-L1: No disc herniation.  No spinal canal or neural foraminal stenosis noted.     L1-L2: Disc bulge and ligamentum flavum redundancy without significant spinal canal or neural foraminal stenosis.     L2-L3: Disc bulge and ligamentum flavum redundancy contribute to mild narrowing of the spinal canal to 9 mm air AP.  Disc height loss, disc bulge, and facet arthropathy contribute to severe right and moderate left neural foraminal narrowing.     L3-L4: Disc bulge without significant spinal canal stenosis.  Disc bulge and facet arthropathy ontribute to severe bilateral neural foraminal narrowing.     L4-L5: Disc bulge without significant spinal canal or neural foraminal stenosis.     L5-S1: No disc herniation.  No spinal canal or neural foraminal stenosis noted.     Impression:     1. Disc bulge and facet arthropathy contribute to severe neural foraminal stenosis at the levels of L3-L3 and L3-L4.  2. Mild spinal canal narrowing at the level of L2-L3.          Results for orders placed during the hospital encounter of  06/06/24    X-Ray Lumbar Spine AP And Lateral    Narrative  EXAM: XR LUMBAR SPINE AP AND LATERAL    CLINICAL HISTORY:  [M54.16]-Radiculopathy, lumbar region.    Lumbar spine x-ray, 3 views    COMPARISON: 05/29/2024    FINDINGS:    Minimal scoliosis, convex to the right and centered at L3-4.  2 mm grade 1 spondylolisthesis L4-L5.  Moderate disc space narrowing L2-3 with small osteophytes.  Severe disc space narrowing L3-4 with prominent endplate sclerosis and osteophytes.  Well-preserved disc spaces L4-5 and L5-S1.  Paravertebral soft tissues are normal.  No significant interval change since the prior examination.    Impression  Multilevel degenerative changes.  No significant interval change since 05/29/2024.    Finalized on: 6/6/2024 12:39 PM By:  Jalen Shepherd MD  [facility]# [id number]      2024-06-06 12:41:[id number]    [facility]    Results for orders placed during the hospital encounter of 01/11/24    X-Ray Hips Bilateral 2 View Incl AP Pelvis    Narrative  EXAMINATION:  XR HIPS BILATERAL 2 VIEW INCL AP PELVIS    CLINICAL HISTORY:  Pain in right hip    TECHNIQUE:  AP view of the pelvis and frogleg lateral views of both hips were performed.    COMPARISON:  03/08/2021    FINDINGS:  The bony pelvis is intact. Both femoral heads are well seated within acetabula.  No significant joint space narrowing identified.  No plain film evidence to suggest AVN of either hip.    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      Mild  persistent asthma without complication 2024    PONV (postoperative nausea and vomiting)     Seropositive rheumatoid arthritis 2022     Past Surgical History:   Procedure Laterality Date    ADENOIDECTOMY      CATARACT EXTRACTION W/  INTRAOCULAR LENS IMPLANT Left 2020    CATARACT EXTRACTION W/  INTRAOCULAR LENS IMPLANT      CHOLECYSTECTOMY      COLONOSCOPY N/A 10/13/2021    Procedure: COLONOSCOPY;  Surgeon: Elissa Yoon MD;  Location: [facility];  Service: Endoscopy;  Laterality: N/A;    HERNIA REPAIR      HYSTERECTOMY      REVISION OF KNEE ARTHROPLASTY Right 3/12/2024    Procedure: REVISION, ARTHROPLASTY, KNEE;  Surgeon: Jalen Rangel MD;  Location: [facility] OR;  Service: General;  Laterality: Right;    TONSILLECTOMY      TOTAL KNEE ARTHROPLASTY Bilateral      Social History     Socioeconomic History    Marital status:    Tobacco Use    Smoking status: Former     Current packs/day: 0.00     Average packs/day: 1 pack/day for 20.0 years (20.0 ttl pk-yrs)     Types: Cigarettes     Start date: 1970     Quit date: 1990     Years since quittin.3    Smokeless tobacco: Never   Substance and Sexual Activity    Alcohol use: Yes     Alcohol/week: 5.0 standard drinks of alcohol     Types: 5 Glasses of wine per week     Comment: no longer drinkning    Drug use: Never    Sexual activity: Yes     Partners: Male     Family History   Problem Relation Name Age of Onset    No Known Problems Mother      Alzheimer's disease Father         Review of patient's allergies indicates:   Allergen Reactions    Pneumococcal vaccine Swelling    Celebrex [celecoxib] Itching and Rash    Hydrocodone-acetaminophen Nausea Only and Nausea And Vomiting    Meloxicam Rash    Sulfa (sulfonamide antibiotics) Rash       Current Outpatient Medications   Medication Sig    albuterol (PROVENTIL/VENTOLIN HFA) 90 mcg/actuation inhaler INHALE 2 PUFFS INTO THE LUNGS EVERY 6 HOURS AS NEEDED FOR WHEEZING     amitriptyline (ELAVIL) 100 MG tablet TAKE 3 TABLETS(300 MG) BY MOUTH EVERY EVENING    fluticasone furoate-vilanteroL (BREO) 100-25 mcg/dose diskus inhaler INHALE 1 PUFF INTO THE LUNGS EVERY DAY. WASH MOUTH AFTER USE    fluticasone propionate (FLONASE) 50 mcg/actuation nasal spray SHAKE LIQUID AND USE 2 SPRAYS(100 MCG) IN EACH NOSTRIL EVERY DAY    ibuprofen (ADVIL,MOTRIN) 200 MG tablet Take 600 mg by mouth every 6 (six) hours as needed.    ibuprofen (ADVIL,MOTRIN) 600 MG tablet Take 1 tablet (600 mg total) by mouth 2 (two) times daily as needed for Pain.    methotrexate 2.5 MG Tab Take 8 tablets (20 mg total) by mouth every 7 days.    pantoprazole (PROTONIX) 40 MG tablet Take 1 tablet (40 mg total) by mouth once daily.    pregabalin (LYRICA) 50 MG capsule Take 1 capsule (50 mg total) by mouth every evening.    budesonide (PULMICORT) 0.5 mg/2 mL nebulizer solution Take 2 mLs (0.5 mg total) by nebulization 2 (two) times daily. Controller     No current facility-administered medications for this visit.         ROS  Review of Systems   Constitutional:  Negative for chills, diaphoresis, fatigue and fever.   HENT:  Negative for ear discharge, ear pain, rhinorrhea, trouble swallowing and voice change.    Respiratory:  Negative for chest tightness, shortness of breath, wheezing and stridor.    Cardiovascular:  Negative for chest pain and leg swelling.   Gastrointestinal:  Negative for blood in stool, diarrhea, nausea and vomiting.   Endocrine: Negative for cold intolerance and heat intolerance.   Genitourinary:  Positive for urgency. Negative for dysuria and hematuria.   Musculoskeletal:  Positive for arthralgias, back pain, gait problem, joint swelling and myalgias. Negative for neck pain and neck stiffness.   Skin:  Negative for rash.   Neurological:  Positive for weakness and numbness. Negative for tremors, seizures, speech difficulty, light-headedness and headaches.   Hematological:  Does not bruise/bleed easily.  "  Psychiatric/Behavioral:  Negative for agitation, confusion and suicidal ideas.             OBJECTIVE:  BP (!) 141/74   Pulse 88   Resp 17   Ht 5' 8" (1.727 m)   Wt 79.8 kg (175 lb 14.8 oz)   BMI 26.75 kg/m²         Physical Exam  Constitutional:       Appearance: Normal appearance.   HENT:      Head: Normocephalic and atraumatic.   Eyes:      Extraocular Movements: Extraocular movements intact.      Pupils: Pupils are equal, round, and reactive to light.   Cardiovascular:      Pulses: Normal pulses.   Pulmonary:      Effort: Pulmonary effort is normal.   Abdominal:      General: Abdomen is flat.   Musculoskeletal:      Right hip: Tenderness and bony tenderness present. No crepitus. Normal range of motion. Normal strength.      Left hip: Tenderness and bony tenderness present. No crepitus. Decreased range of motion. Decreased strength.   Skin:     General: Skin is warm.      Capillary Refill: Capillary refill takes less than 2 seconds.   Neurological:      Mental Status: She is alert and oriented to person, place, and time.      Motor: Weakness present. No abnormal muscle tone.      Gait: Gait abnormal.      Deep Tendon Reflexes:      Reflex Scores:       Patellar reflexes are 1+ on the right side and 1+ on the left side.       Achilles reflexes are 1+ on the right side and 1+ on the left side.     Comments: 4/5 strength in left knee extension left hip adduction   Psychiatric:         Mood and Affect: Mood normal.         Behavior: Behavior normal.         Thought Content: Thought content normal.           Musculoskeletal:      Lumbar Exam  Incision: yes  Pain with Flexion: yes  Pain with Extension: yes  ROM:  Decreased  Paraspinous TTP:  Positive bilaterally  Facet TTP:  L5-S1  Facet Loading:  Positive bilaterally  SLR:  Positive bilaterally at 75° in L3 distribution  SIJ TTP:  Positive bilaterally  KEILY:  Positive bilaterally  - Sacroiliac Distraction Test (anterior pressure): Positive  - Sacroiliac " Compression Test (lateral pressure): Positive   -Gaenslen's test positive bilaterally  GTB tenderness bilaterally    LABS:  Lab Results   Component Value Date    WBC 6.65 05/16/2024    HGB 12.3 05/16/2024    HCT 37.8 05/16/2024    MCV 87 05/16/2024     05/16/2024       CMP  Sodium   Date Value Ref Range Status   05/16/2024 138 136 - 145 mmol/L Final     Potassium   Date Value Ref Range Status   05/16/2024 4.3 3.5 - 5.1 mmol/L Final     Chloride   Date Value Ref Range Status   05/16/2024 104 95 - 110 mmol/L Final     CO2   Date Value Ref Range Status   05/16/2024 24 23 - 29 mmol/L Final     Glucose   Date Value Ref Range Status   05/16/2024 95 70 - 110 mg/dL Final     BUN   Date Value Ref Range Status   05/16/2024 14 8 - 23 mg/dL Final     Creatinine   Date Value Ref Range Status   05/16/2024 0.8 0.5 - 1.4 mg/dL Final     Calcium   Date Value Ref Range Status   05/16/2024 10.0 8.7 - 10.5 mg/dL Final     Total Protein   Date Value Ref Range Status   05/16/2024 7.9 6.0 - 8.4 g/dL Final     Albumin   Date Value Ref Range Status   05/16/2024 4.0 3.5 - 5.2 g/dL Final     Total Bilirubin   Date Value Ref Range Status   05/16/2024 0.3 0.1 - 1.0 mg/dL Final     Comment:     For infants and newborns, interpretation of results should be based  on gestational age, weight and in agreement with clinical  observations.    Premature Infant recommended reference ranges:  Up to 24 hours.............<8.0 mg/dL  Up to 48 hours............<12.0 mg/dL  3-5 days..................<15.0 mg/dL  6-29 days.................<15.0 mg/dL       Alkaline Phosphatase   Date Value Ref Range Status   05/16/2024 87 55 - 135 U/L Final     AST   Date Value Ref Range Status   05/16/2024 19 10 - 40 U/L Final     ALT   Date Value Ref Range Status   05/16/2024 21 10 - 44 U/L Final     Anion Gap   Date Value Ref Range Status   05/16/2024 10 8 - 16 mmol/L Final     eGFR if    Date Value Ref Range Status   07/19/2022 >60 >60 mL/min/1.73  "m^2 Final     eGFR if non    Date Value Ref Range Status   07/19/2022 >60 >60 mL/min/1.73 m^2 Final     Comment:     Calculation used to obtain the estimated glomerular filtration  rate (eGFR) is the CKD-EPI equation.          No results found for: "LABA1C", "HGBA1C"          ASSESSMENT:       69 y.o. year old female with lower back and bilateral hip pain, consistent with     1. Sacroiliitis  Case Request-RAD/Other Procedure Area: bilateral SIJ + bilateral GTB + bilateral IA hip injection      2. Greater trochanteric bursitis, unspecified laterality  Case Request-RAD/Other Procedure Area: bilateral SIJ + bilateral GTB + bilateral IA hip injection      3. Bilateral hip pain  Case Request-RAD/Other Procedure Area: bilateral SIJ + bilateral GTB + bilateral IA hip injection      4. Lumbar spondylosis        5. Bulging lumbar disc            Sacroiliitis  -     Case Request-RAD/Other Procedure Area: bilateral SIJ + bilateral GTB + bilateral IA hip injection    Greater trochanteric bursitis, unspecified laterality  -     Case Request-RAD/Other Procedure Area: bilateral SIJ + bilateral GTB + bilateral IA hip injection    Bilateral hip pain  -     Case Request-RAD/Other Procedure Area: bilateral SIJ + bilateral GTB + bilateral IA hip injection    Lumbar spondylosis    Bulging lumbar disc               PLAN:   - Interventions:   Schedule bilateral SIJ + bilateral GTB + bilateral IA hip injection.   Explained the risks and benefits of the procedure in detail with the patient today in clinic along with alternative treatment options, and the patient elected to pursue the intervention.      Consider L2/3 L3/4 for overlapping L3 radiculopathy     - Anticoagulation use:   No no anticoagulation    - Medications:  - DC lyrica- SE  - continue amitriptyline 100 mg at night. We have discussed potential common side effects of amitriptyline including constipation, dizziness, dry mouth, drowsiness, urinary retention or " headache.  Patient expresses understanding.    - continue methotrexate as prescribed by Rheumatology    - Therapy:   - patient has completed 6 weeks of formal physical therapy with the last 3 months with moderate relief.  Continue home exercises that has minimal scoliosis centered     - Imaging/Diagnostic:  - x-rays of lumbar spine reviewed and findings discussed with patient.  Minimal levoscoliosis L3-L4 to the right.  Grade 1 anterolisthesis of L4 upon L5.  That has advanced loss of disc height at L2-L3 and L3-L4.  - x-rays of bilateral hips reviewed and findings discussed with patient.  Joints space is appropriate for age  - Mri lumbar spine reviewed today. Most degenerative findings at L2/3 and L3/4    - Consults:   - continue follow up with Orthopedics for bilateral knee and hip pain.  Patient is status post right TKA with revision and left partial TKA  - continue follow up with Rheumatology for rheumatoid arthritis        - Patient Questions: Answered all of the patient's questions regarding diagnosis, therapy, and treatment     This condition does not require this patient to take time off of work, and the primary goal of our Pain Management services is to improve the patient's functional capacity.     - Follow up visit: return to clinic 4 weeks after procedure        The above plan and management options were discussed at length with patient. Patient is in agreement with the above and verbalized understanding.    I discussed the goals of interventional chronic pain management with the patient on today's visit.  I explained the utility of injections for diagnostic and therapeutic purposes.  We discussed a multimodal approach to pain including treating the patient's given worst pain at any given time.  We will use a systematic approach to addressing pain.  We will also adopt a multimodal approach that includes injections, adjuvant medications, physical therapy, at times psychiatry.  There may be a limited role  for opioid use intermittently in the treatment of pain, more particularly for acute pain although no one approach can be used as a sole treatment modality.    I emphasized the importance of regular exercise, core strengthening and stretching, diet and weight loss as a cornerstone of long-term pain management.      Roseann Thorne NP  Interventional Pain Management  [facility]    Future Appointments   Date Time Provider Department Center   12/5/2024  9:45 AM LABORATORY, [facility]   12/12/2024 12:30 PM Akhil Hernadez MD ON [facility]   1/14/2025  2:00 PM PULMONARY DISEASE MANAGEMENT [facility] PULM[facility]   1/16/2025  1:40 PM Sunny Lloyd MD [facility] CARDIO [facility]   1/30/2025 11:15 AM Keshawn Coelho MD [facility] PULMSVC [facility]   2/3/2025 11:00 AM Jalen Rangel MD ON ORTHO  [facility]     Disclaimer:  This note was prepared using voice recognition system and is likely to have sound alike errors that may have been overlooked even after proof reading.  Please call me with any questions    No LOS data to display  This includes face to face time and non-face to face time preparing to see the patient (eg, review of tests), obtaining and/or reviewing separately obtained history, documenting clinical information in the electronic or other health record, independently interpreting results and communicating results to the patient/family/caregiver, or care coordinator.

## 2024-10-19 ENCOUNTER — HOSPITAL ENCOUNTER (OUTPATIENT)
Dept: RADIOLOGY | Facility: HOSPITAL | Age: 69
Discharge: HOME | End: 2024-10-19
Attending: ANESTHESIOLOGY
Payer: MEDICARE

## 2024-10-19 DIAGNOSIS — M54.9 DORSALGIA, UNSPECIFIED: ICD-10-CM

## 2024-10-19 DIAGNOSIS — M41.26 OTHER IDIOPATHIC SCOLIOSIS, LUMBAR REGION: ICD-10-CM

## 2024-10-19 PROCEDURE — 72148 MRI LUMBAR SPINE W/O DYE: CPT | Mod: TC

## 2024-10-19 PROCEDURE — 72148 MRI LUMBAR SPINE W/O DYE: CPT | Mod: 26,,, | Performed by: STUDENT IN AN ORGANIZED HEALTH CARE EDUCATION/TRAINING PROGRAM

## 2024-10-24 ENCOUNTER — OFFICE VISIT (OUTPATIENT)
Dept: PAIN MEDICINE | Facility: CLINIC | Age: 69
End: 2024-10-24
Payer: MEDICARE

## 2024-10-24 VITALS
BODY MASS INDEX: 26.66 KG/M2 | RESPIRATION RATE: 17 BRPM | HEART RATE: 88 BPM | HEIGHT: 68 IN | DIASTOLIC BLOOD PRESSURE: 74 MMHG | WEIGHT: 175.94 LBS | SYSTOLIC BLOOD PRESSURE: 141 MMHG

## 2024-10-24 DIAGNOSIS — M46.1 SACROILIITIS: Primary | ICD-10-CM

## 2024-10-24 DIAGNOSIS — M51.369 BULGING LUMBAR DISC: ICD-10-CM

## 2024-10-24 DIAGNOSIS — M25.552 BILATERAL HIP PAIN: ICD-10-CM

## 2024-10-24 DIAGNOSIS — M25.551 BILATERAL HIP PAIN: ICD-10-CM

## 2024-10-24 DIAGNOSIS — M47.816 LUMBAR SPONDYLOSIS: ICD-10-CM

## 2024-10-24 DIAGNOSIS — M70.60 GREATER TROCHANTERIC BURSITIS, UNSPECIFIED LATERALITY: ICD-10-CM

## 2024-10-24 PROCEDURE — 3077F SYST BP >= 140 MM HG: CPT | Mod: CPTII,S$GLB,, | Performed by: NURSE PRACTITIONER

## 2024-10-24 PROCEDURE — 99214 OFFICE O/P EST MOD 30 MIN: CPT | Mod: S$GLB,,, | Performed by: NURSE PRACTITIONER

## 2024-10-24 PROCEDURE — 1125F AMNT PAIN NOTED PAIN PRSNT: CPT | Mod: CPTII,S$GLB,, | Performed by: NURSE PRACTITIONER

## 2024-10-24 PROCEDURE — 1159F MED LIST DOCD IN RCRD: CPT | Mod: CPTII,S$GLB,, | Performed by: NURSE PRACTITIONER

## 2024-10-24 PROCEDURE — 3288F FALL RISK ASSESSMENT DOCD: CPT | Mod: CPTII,S$GLB,, | Performed by: NURSE PRACTITIONER

## 2024-10-24 PROCEDURE — 1100F PTFALLS ASSESS-DOCD GE2>/YR: CPT | Mod: CPTII,S$GLB,, | Performed by: NURSE PRACTITIONER

## 2024-10-24 PROCEDURE — 3078F DIAST BP <80 MM HG: CPT | Mod: CPTII,S$GLB,, | Performed by: NURSE PRACTITIONER

## 2024-10-24 PROCEDURE — 99999 PR PBB SHADOW E&M-EST. PATIENT-LVL III: CPT | Mod: PBBFAC,,, | Performed by: NURSE PRACTITIONER

## 2024-10-24 PROCEDURE — 3008F BODY MASS INDEX DOCD: CPT | Mod: CPTII,S$GLB,, | Performed by: NURSE PRACTITIONER

## 2024-10-25 NOTE — PRE-PROCEDURE INSTRUCTIONS
Spoke with patient regarding procedure scheduled on 11.7     Arrival time 1200     Has patient been sick with fever or on antibiotics within the last 7 days? No     Does the patient have any open wounds, sores or rashes? No     Does the patient have any recent fractures? no     Has patient received a vaccination within the last 7 days? No     Received the COVID vaccination? yes     Has the patient stopped all medications as directed? na     Does patient have a pacemaker, defibrillator, or implantable stimulator? No     Does the patient have a ride to and from procedure and someone reliable to remain with patient?  ZE     Is the patient diabetic? no     Does the patient have sleep apnea? Or use O2 at home? no     Is the patient receiving sedation? yes     Is the patient instructed to remain NPO beginning at midnight the night before their procedure? yes     Procedure location confirmed with patient? Yes     Covid- Denies signs/symptoms. Instructed to notify PAT/MD if any changes.

## 2024-10-29 RX ORDER — BUSPIRONE HYDROCHLORIDE 10 MG/1
10 TABLET ORAL 3 TIMES DAILY
Qty: 270 TABLET | Refills: 3 | Status: SHIPPED | OUTPATIENT
Start: 2024-10-29 | End: 2025-10-29

## 2024-10-31 ENCOUNTER — TELEPHONE (OUTPATIENT)
Dept: PAIN MEDICINE | Facility: CLINIC | Age: 69
End: 2024-10-31
Payer: MEDICARE

## 2024-11-07 ENCOUNTER — HOSPITAL ENCOUNTER (OUTPATIENT)
Facility: HOSPITAL | Age: 69
Discharge: HOME | End: 2024-11-07
Attending: ANESTHESIOLOGY | Admitting: ANESTHESIOLOGY
Payer: MEDICARE

## 2024-11-07 VITALS
DIASTOLIC BLOOD PRESSURE: 60 MMHG | WEIGHT: 175.69 LBS | SYSTOLIC BLOOD PRESSURE: 119 MMHG | OXYGEN SATURATION: 99 % | TEMPERATURE: 98 F | HEART RATE: 82 BPM | RESPIRATION RATE: 16 BRPM | BODY MASS INDEX: 26.72 KG/M2

## 2024-11-07 DIAGNOSIS — M16.0 BILATERAL PRIMARY OSTEOARTHRITIS OF HIP: Primary | ICD-10-CM

## 2024-11-07 PROCEDURE — 20610 DRAIN/INJ JOINT/BURSA W/O US: CPT | Mod: 50 | Performed by: ANESTHESIOLOGY

## 2024-11-07 PROCEDURE — 27096 INJECT SACROILIAC JOINT: CPT | Mod: 50 | Performed by: ANESTHESIOLOGY

## 2024-11-07 PROCEDURE — 20610 DRAIN/INJ JOINT/BURSA W/O US: CPT | Mod: 50,,, | Performed by: ANESTHESIOLOGY

## 2024-11-07 PROCEDURE — 63600175 PHARM REV CODE 636 W HCPCS: Performed by: ANESTHESIOLOGY

## 2024-11-07 PROCEDURE — 27096 INJECT SACROILIAC JOINT: CPT | Mod: 50,59,, | Performed by: ANESTHESIOLOGY

## 2024-11-07 PROCEDURE — 25500020 PHARM REV CODE 255: Performed by: ANESTHESIOLOGY

## 2024-11-07 RX ORDER — METHYLPREDNISOLONE ACETATE 40 MG/ML
INJECTION, SUSPENSION INTRA-ARTICULAR; INTRALESIONAL; INTRAMUSCULAR; SOFT TISSUE
Status: DISCONTINUED | OUTPATIENT
Start: 2024-11-07 | End: 2024-11-07 | Stop reason: HOSPADM

## 2024-11-07 RX ORDER — FENTANYL CITRATE 50 UG/ML
INJECTION, SOLUTION INTRAMUSCULAR; INTRAVENOUS
Status: DISCONTINUED | OUTPATIENT
Start: 2024-11-07 | End: 2024-11-07 | Stop reason: HOSPADM

## 2024-11-07 RX ORDER — ONDANSETRON HYDROCHLORIDE 2 MG/ML
4 INJECTION, SOLUTION INTRAVENOUS ONCE AS NEEDED
Status: DISCONTINUED | OUTPATIENT
Start: 2024-11-07 | End: 2024-11-07 | Stop reason: HOSPADM

## 2024-11-07 RX ORDER — LIDOCAINE HYDROCHLORIDE 10 MG/ML
INJECTION, SOLUTION EPIDURAL; INFILTRATION; INTRACAUDAL; PERINEURAL
Status: DISCONTINUED | OUTPATIENT
Start: 2024-11-07 | End: 2024-11-07 | Stop reason: HOSPADM

## 2024-11-07 RX ORDER — MIDAZOLAM HYDROCHLORIDE 1 MG/ML
INJECTION, SOLUTION INTRAMUSCULAR; INTRAVENOUS
Status: DISCONTINUED | OUTPATIENT
Start: 2024-11-07 | End: 2024-11-07 | Stop reason: HOSPADM

## 2024-11-07 NOTE — OP NOTE
Fluoroscopic Sacroiliac and Greater Trochanter Injection    INFORMED CONSENT: The procedure, risks, benefits and options were discussed with patient. There are no contraindications to the procedure. The patient expressed understanding and agreed to proceed. The personnel performing the procedure was discussed.    Date of procedure 11/07/2024    Time-out taken to identify patient and procedure side prior to starting the procedure.                     PROCEDURE:  1) bilateral greater trochanteric bursa injection under Fluoroscopy    2) Bilateral sacroiliac joint injection under Fluoroscopy                           Pre Procedure diagnosis:    Sacroiliitis [M46.1]  Greater trochanteric bursitis, unspecified laterality [M70.60]  Bilateral hip pain [M25.551, M25.552]  1. Bilateral primary osteoarthritis of hip        Post-Procedure diagnosis:   same      PHYSICIAN: Manuel Lemon MD    ASSISTANTS: None    MEDICATIONS INJECTED: 2 mL 40mg/ml Depo-Medrol and 6mL Bupivacaine 0.25% split between the four sites    LOCAL ANESTHETIC USED: Xylocaine 1% 6ml     Sedation: Conscious sedation provided by M.D    SEDATION MEDICATIONS: local/IV sedation: Versed 2 mg and fentanyl 50 mcg IV.  Conscious sedation ordered by MD.  Patient reevaluated and sedation administered by MD and monitored by RN.  Total sedation time was less than 15 min.      ESTIMATED BLOOD LOSS: None.     COMPLICATIONS: None.       TECHNIQUE:     Bilateral Greater trochanteric bursa injection:  The area overlying the greater trochanteric bursa was identified using fluoroscopy, and the area overlying the skin was prepped and draped in usual sterile fashion. Local Xylocaine was injected by raising a wheel and going down to the periosteum using a 27-gauge hypodermic needle. A 5 inch 22-gauge spinal needle was introduce into the left greater trochanteric bursa Negative pressure applied to confirm no intravascular placement. Omnipaque was injected to confirm placement  and to confirm that there was no vascular runoff. Injectate above was then injected slowly.  Displacement of the contrast after injection of the medication confirmed that the medication went into the area of the greater trochanteric bursa      Bilateral Sacroiliac Joint Injection   Laying in the prone position, the patient was prepped and draped in the usual sterile fashion using ChloraPrep and fenestrated drape.  The area was determined under fluoroscopy.  Local Xylocaine was injected by raising a wheel and going down to the periosteum using a 27-gauge hypodermic needle.  The 3.5 inch 22-gauge spinal needle was introduced into the sacroiliac joint.  Negative pressure applied to confirm no intravascular placement.  Omnipaque was injected to confirm placement and to confirm that there was no vascular runoff.  The medication was then injected slowly.  The patient tolerated the procedure well.                              The patient was monitored for approximately 30 minutes after the procedure. Patient was given post procedure and discharge instructions to follow at home. We will see the patient back in two weeks or the patient may call to inform of status. The patient was discharged in a stable condition

## 2024-11-07 NOTE — H&P
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      Mild persistent asthma without complication 07/25/2024    PONV (postoperative nausea and vomiting)     Seropositive rheumatoid arthritis 02/17/2022     Past Surgical History:   Procedure Laterality Date    ADENOIDECTOMY      CATARACT EXTRACTION W/  INTRAOCULAR LENS IMPLANT Left 12/09/2020    CATARACT EXTRACTION W/  INTRAOCULAR LENS IMPLANT      CHOLECYSTECTOMY      COLONOSCOPY N/A 10/13/2021    Procedure: COLONOSCOPY;  Surgeon: Elissa Yoon MD;  Location: [facility];  Service: Endoscopy;  Laterality: N/A;    HERNIA REPAIR      HYSTERECTOMY      REVISION OF KNEE ARTHROPLASTY Right 3/12/2024    Procedure: REVISION, ARTHROPLASTY, KNEE;  Surgeon: Jalen Rangel MD;  Location: [facility];  Service: General;  Laterality: Right;    TONSILLECTOMY      TOTAL KNEE ARTHROPLASTY Bilateral      Review of patient's allergies indicates:   Allergen Reactions    Pneumococcal vaccine Swelling    Celebrex [celecoxib] Itching and Rash    Hydrocodone-acetaminophen Nausea Only and Nausea And Vomiting    Meloxicam Rash    Sulfa (sulfonamide antibiotics) Rash        No current facility-administered medications on file prior to encounter.     Current Outpatient Medications on File Prior to Encounter   Medication Sig Dispense Refill    albuterol (PROVENTIL/VENTOLIN HFA) 90 mcg/actuation inhaler INHALE 2 PUFFS INTO THE LUNGS  EVERY 6 HOURS AS NEEDED FOR WHEEZING 6.7 g 11    amitriptyline (ELAVIL) 100 MG tablet TAKE 3 TABLETS(300 MG) BY MOUTH EVERY EVENING 270 tablet 3    budesonide (PULMICORT) 0.5 mg/2 mL nebulizer solution Take 2 mLs (0.5 mg total) by nebulization 2 (two) times daily. Controller 120 mL 5    busPIRone (BUSPAR) 10 MG tablet Take 1 tablet (10 mg total) by mouth 3 (three) times daily. 270 tablet 3    fluticasone furoate-vilanteroL (BREO) 100-25 mcg/dose diskus inhaler INHALE 1 PUFF INTO THE LUNGS EVERY DAY. WASH MOUTH AFTER USE 60 each 11    fluticasone propionate (FLONASE) 50 mcg/actuation nasal spray SHAKE LIQUID AND USE 2 SPRAYS(100 MCG) IN EACH NOSTRIL EVERY DAY 16 g 11    ibuprofen (ADVIL,MOTRIN) 200 MG tablet Take 600 mg by mouth every 6 (six) hours as needed.      ibuprofen (ADVIL,MOTRIN) 600 MG tablet Take 1 tablet (600 mg total) by mouth 2 (two) times daily as needed for Pain. 60 tablet 1    methotrexate 2.5 MG Tab Take 8 tablets (20 mg total) by mouth every 7 days. 96 tablet 1    pantoprazole (PROTONIX) 40 MG tablet Take 1 tablet (40 mg total) by mouth once daily. 90 tablet 3    pregabalin (LYRICA) 50 MG capsule Take 1 capsule (50 mg total) by mouth every evening. 30 capsule 2        PMHx, PSHx, Allergies, Medications reviewed in epic    ROS negative except pain complaints in HPI    OBJECTIVE:    There were no vitals taken for this visit.    PHYSICAL EXAMINATION:    GENERAL: Well appearing, in no acute distress, alert and oriented x3.  PSYCH:  Mood and affect appropriate.  SKIN: Skin color, texture, turgor normal, no rashes or lesions which will impact the procedure.  CV: RRR with palpation of the radial artery.  PULM: No evidence of respiratory difficulty, symmetric chest rise. Clear to auscultation.  NEURO: Cranial nerves grossly intact.    Plan:    Proceed with procedure as planned Procedure(s) (LRB):  bilateral SIJ + bilateral GTB + bilateral IA hip injection (Right)    Manuel Lemon  MD  11/07/2024

## 2024-11-07 NOTE — PLAN OF CARE
Discharge instructions reviewed with patient, verbalized understanding. Voiced no complaints. Vital signs stable. Patient stood on side of bed and walked several steps without difficulty. Discharging home with ride.

## 2024-11-07 NOTE — DISCHARGE INSTRUCTIONS

## 2024-11-07 NOTE — DISCHARGE SUMMARY
Discharge Note  Short Stay      SUMMARY     Admit Date: 11/7/2024    Attending Physician: Manuel Lemon MD        Discharge Physician: Manuel Lemon MD        Discharge Date: 11/7/2024 1:00 PM    Procedure(s) (LRB):  bilateral SIJ + bilateral GTB (Bilateral)    Final Diagnosis: Sacroiliitis [M46.1]  Greater trochanteric bursitis, unspecified laterality [M70.60]  Bilateral hip pain [M25.551, M25.552]    Disposition: Home or self care    Patient Instructions:   Current Discharge Medication List        CONTINUE these medications which have NOT CHANGED    Details   amitriptyline (ELAVIL) 100 MG tablet TAKE 3 TABLETS(300 MG) BY MOUTH EVERY EVENING  Qty: 270 tablet, Refills: 3    Associated Diagnoses: Recurrent major depressive disorder, in partial remission      busPIRone (BUSPAR) 10 MG tablet Take 1 tablet (10 mg total) by mouth 3 (three) times daily.  Qty: 270 tablet, Refills: 3    Associated Diagnoses: Anxiety      fluticasone furoate-vilanteroL (BREO) 100-25 mcg/dose diskus inhaler INHALE 1 PUFF INTO THE LUNGS EVERY DAY. WASH MOUTH AFTER USE  Qty: 60 each, Refills: 11    Associated Diagnoses: Mild intermittent asthmatic bronchitis with acute exacerbation      methotrexate 2.5 MG Tab Take 8 tablets (20 mg total) by mouth every 7 days.  Qty: 96 tablet, Refills: 1    Associated Diagnoses: Seropositive rheumatoid arthritis      pantoprazole (PROTONIX) 40 MG tablet Take 1 tablet (40 mg total) by mouth once daily.  Qty: 90 tablet, Refills: 3    Associated Diagnoses: GERD without esophagitis      albuterol (PROVENTIL/VENTOLIN HFA) 90 mcg/actuation inhaler INHALE 2 PUFFS INTO THE LUNGS EVERY 6 HOURS AS NEEDED FOR WHEEZING  Qty: 6.7 g, Refills: 11    Associated Diagnoses: SOBOE (shortness of breath on exertion); Chronic cough      budesonide (PULMICORT) 0.5 mg/2 mL nebulizer solution Take 2 mLs (0.5 mg total) by nebulization 2 (two) times daily. Controller  Qty: 120 mL, Refills: 5    Associated Diagnoses: Mild  persistent asthma with acute exacerbation      fluticasone propionate (FLONASE) 50 mcg/actuation nasal spray SHAKE LIQUID AND USE 2 SPRAYS(100 MCG) IN EACH NOSTRIL EVERY DAY  Qty: 16 g, Refills: 11    Associated Diagnoses: Seasonal allergic rhinitis due to other allergic trigger      !! ibuprofen (ADVIL,MOTRIN) 200 MG tablet Take 600 mg by mouth every 6 (six) hours as needed.      !! ibuprofen (ADVIL,MOTRIN) 600 MG tablet Take 1 tablet (600 mg total) by mouth 2 (two) times daily as needed for Pain.  Qty: 60 tablet, Refills: 1    Comments: Take with food and Protonix      pregabalin (LYRICA) 50 MG capsule Take 1 capsule (50 mg total) by mouth every evening.  Qty: 30 capsule, Refills: 2    Associated Diagnoses: Lumbar spondylosis; Degeneration of intervertebral disc of lumbar region with discogenic back pain; Dorsalgia, unspecified; Lumbar radiculitis; Other idiopathic scoliosis, lumbar region; Spondylolisthesis of lumbar region       !! - Potential duplicate medications found. Please discuss with provider.              Discharge Diagnosis: Sacroiliitis [M46.1]  Greater trochanteric bursitis, unspecified laterality [M70.60]  Bilateral hip pain [M25.551, M25.552]  Condition on Discharge: Stable with no complications to procedure   Diet on Discharge: Same as before.  Activity: as per instruction sheet.  Discharge to: Home with a responsible adult.  Follow up: 2-4 weeks       Please call the office at [phone] if you experience any weakness or loss of sensation, fever > 101.5, pain uncontrolled with oral medications, persistent nausea/vomiting/or diarrhea, redness or drainage from the incisions, or any other worrisome concerns. If physician on call was not reached or could not communicate with our office for any reason please go to the nearest emergency department

## 2024-12-03 ENCOUNTER — TELEPHONE (OUTPATIENT)
Dept: RHEUMATOLOGY | Facility: CLINIC | Age: 69
End: 2024-12-03
Payer: MEDICARE

## 2024-12-03 NOTE — TELEPHONE ENCOUNTER
----- Message from Roderick sent at 12/3/2024  2:37 PM CST -----  Contact: [phone]  Type:  Patient Returning Call    Who Called:LEE SANDERSON [[id number]]  Who Left Message for Patient:Mahi Fay MA  Does the patient know what this is regarding?:missed a call from your office  Would the patient rather a call back or a response via [facility]? Call back  Best Call Back Number: [phone]  Additional Information: n [phone]

## 2024-12-03 NOTE — TELEPHONE ENCOUNTER
----- Message from Lynn sent at 12/3/2024 11:17 AM CST -----  Contact: Caitlin  .Patient is calling to speak with the nurse regarding orders . Reports  wanting to add the Rhem factor to the lab orders. Please give patient a call back at   [phone].

## 2024-12-05 ENCOUNTER — LAB VISIT (OUTPATIENT)
Dept: LAB | Facility: HOSPITAL | Age: 69
End: 2024-12-05
Attending: PHYSICAL MEDICINE & REHABILITATION
Payer: MEDICARE

## 2024-12-05 DIAGNOSIS — D84.821 IMMUNODEFICIENCY DUE TO DRUGS (CODE): ICD-10-CM

## 2024-12-05 DIAGNOSIS — M05.9 SEROPOSITIVE RHEUMATOID ARTHRITIS: ICD-10-CM

## 2024-12-05 DIAGNOSIS — Z51.81 MEDICATION MONITORING ENCOUNTER: ICD-10-CM

## 2024-12-05 DIAGNOSIS — M05.9 SEROPOSITIVE RHEUMATOID ARTHRITIS: Primary | ICD-10-CM

## 2024-12-05 LAB
ALBUMIN SERPL BCP-MCNC: 3.6 G/DL (ref 3.5–5.2)
ALP SERPL-CCNC: 89 U/L (ref 40–150)
ALT SERPL W/O P-5'-P-CCNC: 32 U/L (ref 10–44)
ANION GAP SERPL CALC-SCNC: 8 MMOL/L (ref 8–16)
AST SERPL-CCNC: 23 U/L (ref 10–40)
BASOPHILS # BLD AUTO: 0.07 K/UL (ref 0–0.2)
BASOPHILS NFR BLD: 1.3 % (ref 0–1.9)
BILIRUB SERPL-MCNC: 0.4 MG/DL (ref 0.1–1)
BUN SERPL-MCNC: 11 MG/DL (ref 8–23)
CALCIUM SERPL-MCNC: 9.5 MG/DL (ref 8.7–10.5)
CHLORIDE SERPL-SCNC: 106 MMOL/L (ref 95–110)
CO2 SERPL-SCNC: 27 MMOL/L (ref 23–29)
CREAT SERPL-MCNC: 0.8 MG/DL (ref 0.5–1.4)
CRP SERPL-MCNC: 5.4 MG/L (ref 0–8.2)
DIFFERENTIAL METHOD BLD: ABNORMAL
EOSINOPHIL # BLD AUTO: 0.2 K/UL (ref 0–0.5)
EOSINOPHIL NFR BLD: 3.9 % (ref 0–8)
ERYTHROCYTE [DISTWIDTH] IN BLOOD BY AUTOMATED COUNT: 14.6 % (ref 11.5–14.5)
EST. GFR  (NO RACE VARIABLE): >60 ML/MIN/1.73 M^2
GLUCOSE SERPL-MCNC: 122 MG/DL (ref 70–110)
HCT VFR BLD AUTO: 36.7 % (ref 37–48.5)
HGB BLD-MCNC: 12 G/DL (ref 12–16)
IMM GRANULOCYTES # BLD AUTO: 0.03 K/UL (ref 0–0.04)
IMM GRANULOCYTES NFR BLD AUTO: 0.6 % (ref 0–0.5)
LYMPHOCYTES # BLD AUTO: 1.4 K/UL (ref 1–4.8)
LYMPHOCYTES NFR BLD: 26.7 % (ref 18–48)
MCH RBC QN AUTO: 29.9 PG (ref 27–31)
MCHC RBC AUTO-ENTMCNC: 32.7 G/DL (ref 32–36)
MCV RBC AUTO: 91 FL (ref 82–98)
MONOCYTES # BLD AUTO: 0.6 K/UL (ref 0.3–1)
MONOCYTES NFR BLD: 10.7 % (ref 4–15)
NEUTROPHILS # BLD AUTO: 3 K/UL (ref 1.8–7.7)
NEUTROPHILS NFR BLD: 56.8 % (ref 38–73)
NRBC BLD-RTO: 0 /100 WBC
PLATELET # BLD AUTO: 223 K/UL (ref 150–450)
PMV BLD AUTO: 9.8 FL (ref 9.2–12.9)
POTASSIUM SERPL-SCNC: 4.4 MMOL/L (ref 3.5–5.1)
PROT SERPL-MCNC: 7.2 G/DL (ref 6–8.4)
RBC # BLD AUTO: 4.02 M/UL (ref 4–5.4)
SODIUM SERPL-SCNC: 141 MMOL/L (ref 136–145)
WBC # BLD AUTO: 5.32 K/UL (ref 3.9–12.7)

## 2024-12-05 PROCEDURE — 80053 COMPREHEN METABOLIC PANEL: CPT | Performed by: INTERNAL MEDICINE

## 2024-12-05 PROCEDURE — 85025 COMPLETE CBC W/AUTO DIFF WBC: CPT | Performed by: INTERNAL MEDICINE

## 2024-12-05 PROCEDURE — 36415 COLL VENOUS BLD VENIPUNCTURE: CPT | Performed by: INTERNAL MEDICINE

## 2024-12-05 PROCEDURE — 86140 C-REACTIVE PROTEIN: CPT | Performed by: INTERNAL MEDICINE

## 2024-12-06 ENCOUNTER — HOSPITAL ENCOUNTER (OUTPATIENT)
Dept: RADIOLOGY | Facility: HOSPITAL | Age: 69
Discharge: HOME | End: 2024-12-06
Attending: INTERNAL MEDICINE
Payer: MEDICARE

## 2024-12-06 DIAGNOSIS — Z12.31 OTHER SCREENING MAMMOGRAM: ICD-10-CM

## 2024-12-06 RX ORDER — METHOTREXATE 2.5 MG/1
20 TABLET ORAL
Qty: 96 TABLET | Refills: 1 | Status: SHIPPED | OUTPATIENT
Start: 2024-12-06 | End: 2025-06-04

## 2024-12-12 ENCOUNTER — LAB VISIT (OUTPATIENT)
Dept: LAB | Facility: HOSPITAL | Age: 69
End: 2024-12-12
Attending: INTERNAL MEDICINE
Payer: MEDICARE

## 2024-12-12 ENCOUNTER — OFFICE VISIT (OUTPATIENT)
Dept: RHEUMATOLOGY | Facility: CLINIC | Age: 69
End: 2024-12-12
Payer: MEDICARE

## 2024-12-12 VITALS
DIASTOLIC BLOOD PRESSURE: 81 MMHG | SYSTOLIC BLOOD PRESSURE: 164 MMHG | BODY MASS INDEX: 27.26 KG/M2 | HEART RATE: 96 BPM | WEIGHT: 179.88 LBS | HEIGHT: 68 IN

## 2024-12-12 DIAGNOSIS — M06.9 RHEUMATOID ARTHRITIS FLARE: ICD-10-CM

## 2024-12-12 DIAGNOSIS — Z51.81 MEDICATION MONITORING ENCOUNTER: ICD-10-CM

## 2024-12-12 DIAGNOSIS — M05.9 SEROPOSITIVE RHEUMATOID ARTHRITIS: Primary | ICD-10-CM

## 2024-12-12 DIAGNOSIS — M05.9 SEROPOSITIVE RHEUMATOID ARTHRITIS: ICD-10-CM

## 2024-12-12 DIAGNOSIS — J47.9 ADULT BRONCHIECTASIS: Chronic | ICD-10-CM

## 2024-12-12 DIAGNOSIS — D84.821 IMMUNODEFICIENCY DUE TO DRUGS (CODE): ICD-10-CM

## 2024-12-12 DIAGNOSIS — E55.9 VITAMIN D INSUFFICIENCY: ICD-10-CM

## 2024-12-12 PROCEDURE — 99999 PR PBB SHADOW E&M-EST. PATIENT-LVL II: CPT | Mod: PBBFAC,,, | Performed by: INTERNAL MEDICINE

## 2024-12-12 PROCEDURE — 36415 COLL VENOUS BLD VENIPUNCTURE: CPT | Performed by: INTERNAL MEDICINE

## 2024-12-12 RX ORDER — PREDNISONE 10 MG/1
10 TABLET ORAL DAILY
Qty: 30 TABLET | Refills: 1 | Status: SHIPPED | OUTPATIENT
Start: 2024-12-12 | End: 2025-01-11

## 2024-12-12 RX ORDER — ADALIMUMAB 40MG/0.8ML
40 KIT SUBCUTANEOUS
Qty: 2 PEN | Refills: 3 | Status: SHIPPED | OUTPATIENT
Start: 2024-12-12 | End: 2025-01-11

## 2024-12-12 NOTE — PROGRESS NOTES
RHEUMATOLOGY OUTPATIENT CLINIC NOTE    12/12/2024    Attending Rheumatologist: Akhil Hernadez  Primary Care Provider/Physician Requesting Consultation: Amol Steve MD   Chief Complaint/Reason For Consultation:  Rheumatoid Arthritis and Osteoporosis      Subjective:     Caitlin Ahmadi is a 69 y.o. White female with SPRA    Adherence w/ MTX 20mg split dose q7d.  Refractory pain w/ inflammatory characteristics.      Review of Systems   Constitutional:  Negative for fever.   Eyes:  Negative for photophobia.   Respiratory:  Negative for cough and shortness of breath.    Cardiovascular:  Negative for chest pain.        No hx of MI   Gastrointestinal:  Negative for blood in stool and melena.        No hx of diverticulitis   Genitourinary:  Negative for dysuria and hematuria.   Musculoskeletal:  Positive for joint pain and myalgias.   Skin:  Negative for rash.   Neurological:  Negative for focal weakness and weakness.        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      Mild persistent asthma without complication 07/25/2024    PONV (postoperative nausea and vomiting)     Seropositive rheumatoid arthritis 02/17/2022     Past Surgical History:   Procedure Laterality Date    ADENOIDECTOMY      CATARACT EXTRACTION W/  INTRAOCULAR LENS IMPLANT Left 12/09/2020    CATARACT EXTRACTION W/  INTRAOCULAR LENS IMPLANT      CHOLECYSTECTOMY      COLONOSCOPY N/A 10/13/2021    Procedure:  COLONOSCOPY;  Surgeon: Elissa Yoon MD;  Location: [facility] END[facility];  Service: Endoscopy;  Laterality: N/A;    HERNIA REPAIR      HYSTERECTOMY      INJECTION OF ANESTHETIC AGENT INTO SACROILIAC JOINT Bilateral 2024    Procedure: bilateral SIJ + bilateral GTB;  Surgeon: Manuel Lemon MD;  Location: [facility] PAIN [facility]GT;  Service: Pain Management;  Laterality: Bilateral;    REVISION OF KNEE ARTHROPLASTY Right 3/12/2024    Procedure: REVISION, ARTHROPLASTY, KNEE;  Surgeon: Jalen Rangel MD;  Location: [facility] OR;  Service: General;  Laterality: Right;    TONSILLECTOMY      TOTAL KNEE ARTHROPLASTY Bilateral      Family History   Problem Relation Name Age of Onset    No Known Problems Mother      Alzheimer's disease Father       Social History     Tobacco Use   Smoking Status Former    Current packs/day: 0.00    Average packs/day: 1 pack/day for 20.0 years (20.0 ttl pk-yrs)    Types: Cigarettes    Start date: 1970    Quit date: 1990    Years since quittin.5   Smokeless Tobacco Never       Current Outpatient Medications:     albuterol (PROVENTIL/VENTOLIN HFA) 90 mcg/actuation inhaler, INHALE 2 PUFFS INTO THE LUNGS EVERY 6 HOURS AS NEEDED FOR WHEEZING, Disp: 6.7 g, Rfl: 11    amitriptyline (ELAVIL) 100 MG tablet, TAKE 3 TABLETS(300 MG) BY MOUTH EVERY EVENING, Disp: 270 tablet, Rfl: 3    busPIRone (BUSPAR) 10 MG tablet, Take 1 tablet (10 mg total) by mouth 3 (three) times daily., Disp: 270 tablet, Rfl: 3    fluticasone propionate (FLONASE) 50 mcg/actuation nasal spray, SHAKE LIQUID AND USE 2 SPRAYS(100 MCG) IN EACH NOSTRIL EVERY DAY, Disp: 16 g, Rfl: 11    ibuprofen (ADVIL,MOTRIN) 600 MG tablet, Take 1 tablet (600 mg total) by mouth 2 (two) times daily as needed for Pain., Disp: 60 tablet, Rfl: 1    methotrexate 2.5 MG Tab, Take 8 tablets (20 mg total) by mouth every 7 days., Disp: 96 tablet, Rfl: 1    pantoprazole (PROTONIX) 40 MG tablet, Take 1 tablet (40 mg total) by mouth once daily., Disp:  90 tablet, Rfl: 3    budesonide (PULMICORT) 0.5 mg/2 mL nebulizer solution, Take 2 mLs (0.5 mg total) by nebulization 2 (two) times daily. Controller, Disp: 120 mL, Rfl: 5    fluticasone furoate-vilanteroL (BREO) 100-25 mcg/dose diskus inhaler, INHALE 1 PUFF INTO THE LUNGS EVERY DAY. WASH MOUTH AFTER USE, Disp: 60 each, Rfl: 11    ibuprofen (ADVIL,MOTRIN) 200 MG tablet, Take 600 mg by mouth every 6 (six) hours as needed., Disp: , Rfl:     pregabalin (LYRICA) 50 MG capsule, Take 1 capsule (50 mg total) by mouth every evening., Disp: 30 capsule, Rfl: 2     Objective:     Vitals:    12/12/24 1223   BP: (!) 164/81   Pulse: 96     Physical Exam   Pulmonary/Chest: Effort normal. No respiratory distress.   Musculoskeletal:         General: Swelling, tenderness and deformity present. Normal range of motion.   Skin: No rash noted.     Reviewed available old and all outside pertinent medical records available.    All lab results personally reviewed and interpreted by me.       ASSESSMENT / PLAN     1. Seropositive rheumatoid arthritis  CDAI: high disease activity.  Failure to MTX.  Monitor response to TNFi SC (vs Rachel, contingent on PrismRA) for 30% improvement after 12w.  C-Reactive Protein    adalimumab (HUMIRA PEN) PnKt injection      2. Rheumatoid arthritis flare  predniSONE (DELTASONE) 10 MG tablet      3. Adult bronchiectasis        4. Immunodeficiency due to drugs (CODE)  Hold immunosuppression in event of active infections or need for surgery.  CBC Auto Differential      5. Medication monitoring encounter  Comprehensive Metabolic Panel      6. Vitamin D insufficiency  Follow replacement dose w/ OTC ca/vit D supp 600/400mg BID.              Visit today included increased complexity associated with the care of the episodic problem Seropositive rheumatoid arthritis addressed and managing the longitudinal care of the patient due to the serious and/or complex managed problem(s) Rheumatoid arthritis flare, immunodeficiency  due to drugs, Medication monitoring encounter.    Akhil Hernadez M.D.

## 2024-12-30 ENCOUNTER — DOCUMENTATION ONLY (OUTPATIENT)
Dept: RHEUMATOLOGY | Facility: CLINIC | Age: 69
End: 2024-12-30
Payer: MEDICARE

## 2024-12-30 NOTE — PROGRESS NOTES
Patient's PrismRA Score is 16.2. This means patient has a 10% chance of responding to TNFi therapy.   Per Dr. Hernadez plan- Monitor response to TNFi SC (vs Rachel, contingent on PrismRA) for 30% improvement after 12w.

## 2025-01-06 ENCOUNTER — TELEPHONE (OUTPATIENT)
Dept: RHEUMATOLOGY | Facility: CLINIC | Age: 70
End: 2025-01-06
Payer: MEDICARE

## 2025-01-08 NOTE — PROGRESS NOTES
Interventional Pain Note     Referring Physician: No ref. provider found    PCP: Amol Steve MD    Chief Complaint:     Chief Complaint   Patient presents with    Follow-up        SUBJECTIVE:  Interval History (1/16/2025):  Patient Caitlin Ahmadi presents today for follow-up visit.  Patient was last seen on 11/7/2024 bilateral SIJ + bilateral GTB injection with 80% relief on the right sustained. and 80% relief on the left for 2 weeks. She has 5/10 pain on the left lower back that radiates down the back and side of the left leg down to the ankle. Pain is worse with prolonged sitting and standing.   Patient denies night fever/night sweats, urinary incontinence, bowel incontinence, significant weight loss and significant motor weakness.   Patient denies any other complaints or concerns at this time.    Interval History (10/24/2024):  Patient Caitlin Ahmadi presents today for follow-up visit.  Patient is here today for lumbar MRI review. She has mild back pain but primarily bilateral hip pain. She has seen Dr. Rangel in the past. She has bilateral hip pain and buttock pain. At times pain radiates to the lateral thighs about [facility] to the knees. Pain is worse when she goes form a sitting to standing position and with prolonged activity. She rates her pain today 4/10 but it will go up to 6/10.  She has completed physical therapy a few months ago without relief.  She was unable to take lyrica due to SE.   Patient denies night fever/night sweats, urinary incontinence, bowel incontinence, significant weight loss and significant motor weakness.   Patient denies any other complaints or concerns at this time.      Interval history 10/10/2024  Caitlin Ahmadi is a 69 y.o. female who presents to the clinic for the evaluation of lower back and bilateral pain.   Patient reports 4 year history of lower back and bilateral hip pain.  Patient has had previous right TKA with revision and left partial TKA.   Patient denies any previous surgeries on her lumbar spine.    Pain described as throbbing aching pain that starts in her lower back and bilateral buttocks area.  Patient reports associated bilateral hip pain primarily over the lateral aspect.  Patient reports that pain will intermittently radiate down her bilateral lower extremities along the posterior aspect to the calf, left-greater-than-right.  Pain is worse with prolonged sitting or standing, better with lying supine.  Pain is currently rated a 9/10.  Patient denies any fevers, chills, saddle anesthesia, or bowel and bladder incontinence.      Non-Pharmacologic Treatments:  Physical Therapy/Home Exercise: yes  Ice/Heat:yes  TENS: no  Acupuncture: no  Massage: yes  Chiropractic: yes        Previous Pain Medications:  Tylenol, ibuprofen, diclofenac, Flexeril, gabapentin, hydrocodone, topicals     report:  Reviewed and consistent with medication use as prescribed.    Pain Procedures:   11/7/2024 bilateral SIJ + bilateral GTB injection with 80% relief on the right sustained. and 80% relief on the left for 2 weeks      Imaging:   10/19/2024 Lumbar MRI  FINDINGS:  There are 5 non-rib-bearing lumbar type vertebrae.  The lowest fully formed intervertebral disc was labeled L5-S1 for the purpose of numbering.     The alignment is normal.     Sclerosis and edema of the endplates of L3-L4.  Multilevel anterior spurring.     The height of the vertebral bodies is normal.     Disc height loss at L2-L3 at L3-L4 with disc desiccation.     The conus terminates at L1. It demonstrates normal signal intensity and contour.     Individual disc levels:     T12-L1: No disc herniation.  No spinal canal or neural foraminal stenosis noted.     L1-L2: Disc bulge and ligamentum flavum redundancy without significant spinal canal or neural foraminal stenosis.     L2-L3: Disc bulge and ligamentum flavum redundancy contribute to mild narrowing of the spinal canal to 9 mm air AP.  Disc height  loss, disc bulge, and facet arthropathy contribute to severe right and moderate left neural foraminal narrowing.     L3-L4: Disc bulge without significant spinal canal stenosis.  Disc bulge and facet arthropathy ontribute to severe bilateral neural foraminal narrowing.     L4-L5: Disc bulge without significant spinal canal or neural foraminal stenosis.     L5-S1: No disc herniation.  No spinal canal or neural foraminal stenosis noted.     Impression:     1. Disc bulge and facet arthropathy contribute to severe neural foraminal stenosis at the levels of L3-L3 and L3-L4.  2. Mild spinal canal narrowing at the level of L2-L3.          Results for orders placed during the hospital encounter of 06/06/24    X-Ray Lumbar Spine AP And Lateral    Narrative  EXAM: XR LUMBAR SPINE AP AND LATERAL    CLINICAL HISTORY:  [M54.16]-Radiculopathy, lumbar region.    Lumbar spine x-ray, 3 views    COMPARISON: 05/29/2024    FINDINGS:    Minimal scoliosis, convex to the right and centered at L3-4.  2 mm grade 1 spondylolisthesis L4-L5.  Moderate disc space narrowing L2-3 with small osteophytes.  Severe disc space narrowing L3-4 with prominent endplate sclerosis and osteophytes.  Well-preserved disc spaces L4-5 and L5-S1.  Paravertebral soft tissues are normal.  No significant interval change since the prior examination.    Impression  Multilevel degenerative changes.  No significant interval change since 05/29/2024.    Finalized on: 6/6/2024 12:39 PM By:  Jalen Shepherd MD  [facility]# [id number]      2024-06-06 12:41:[id number]    [facility]    Results for orders placed during the hospital encounter of 01/11/24    X-Ray Hips Bilateral 2 View Incl AP Pelvis    Narrative  EXAMINATION:  XR HIPS BILATERAL 2 VIEW INCL AP PELVIS    CLINICAL HISTORY:  Pain in right hip    TECHNIQUE:  AP view of the pelvis and frogleg lateral views of both hips were performed.    COMPARISON:  03/08/2021    FINDINGS:  The bony pelvis is intact. Both femoral heads are well  seated within acetabula.  No significant joint space narrowing identified.  No plain film evidence to suggest AVN of either hip.    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      Mild persistent asthma without complication 07/25/2024    PONV (postoperative nausea and vomiting)     Seropositive rheumatoid arthritis 02/17/2022     Past Surgical History:   Procedure Laterality Date    ADENOIDECTOMY      CATARACT EXTRACTION W/  INTRAOCULAR LENS IMPLANT Left 12/09/2020    CATARACT EXTRACTION W/  INTRAOCULAR LENS IMPLANT      CHOLECYSTECTOMY      COLONOSCOPY N/A 10/13/2021    Procedure: COLONOSCOPY;  Surgeon: Eilssa Yoon MD;  Location: [facility] ENDO;  Service: Endoscopy;  Laterality: N/A;    HERNIA REPAIR      HYSTERECTOMY      INJECTION OF ANESTHETIC AGENT INTO SACROILIAC JOINT Bilateral 11/7/2024    Procedure: bilateral SIJ + bilateral GTB;  Surgeon: Manuel Lemon MD;  Location: [facility] PAIN MGT;  Service: Pain Management;  Laterality: Bilateral;    REVISION OF KNEE ARTHROPLASTY Right 3/12/2024    Procedure: REVISION, ARTHROPLASTY, KNEE;  Surgeon: Jalen Rangel MD;  Location: [facility] OR;  Service: General;  Laterality: Right;    TONSILLECTOMY      TOTAL KNEE ARTHROPLASTY Bilateral      Social History     Socioeconomic History    Marital status:    Tobacco Use    Smoking status: Former     Current packs/day: 0.00     Average packs/day: 1 pack/day for 20.0 years (20.0 ttl pk-yrs)     Types:  Cigarettes     Start date: 1970     Quit date: 1990     Years since quittin.6    Smokeless tobacco: Never   Substance and Sexual Activity    Alcohol use: Yes     Alcohol/week: 5.0 standard drinks of alcohol     Types: 5 Glasses of wine per week     Comment: no longer drinkning    Drug use: Never    Sexual activity: Yes     Partners: Male     Family History   Problem Relation Name Age of Onset    No Known Problems Mother      Alzheimer's disease Father         Review of patient's allergies indicates:   Allergen Reactions    Pneumococcal vaccine Swelling    Celebrex [celecoxib] Itching and Rash    Hydrocodone-acetaminophen Nausea Only and Nausea And Vomiting    Meloxicam Rash    Sulfa (sulfonamide antibiotics) Rash       Current Outpatient Medications   Medication Sig    adalimumab (HUMIRA PEN) PnKt injection Inject 1 pen  (40 mg total) into the skin every 14 (fourteen) days.    albuterol (PROVENTIL/VENTOLIN HFA) 90 mcg/actuation inhaler INHALE 2 PUFFS INTO THE LUNGS EVERY 6 HOURS AS NEEDED FOR WHEEZING    amitriptyline (ELAVIL) 100 MG tablet TAKE 3 TABLETS(300 MG) BY MOUTH EVERY EVENING    busPIRone (BUSPAR) 10 MG tablet Take 1 tablet (10 mg total) by mouth 3 (three) times daily.    fluticasone propionate (FLONASE) 50 mcg/actuation nasal spray SHAKE LIQUID AND USE 2 SPRAYS(100 MCG) IN EACH NOSTRIL EVERY DAY    ibuprofen (ADVIL,MOTRIN) 200 MG tablet Take 600 mg by mouth every 6 (six) hours as needed.    ibuprofen (ADVIL,MOTRIN) 600 MG tablet Take 1 tablet (600 mg total) by mouth 2 (two) times daily as needed for Pain.    methotrexate 2.5 MG Tab Take 8 tablets (20 mg total) by mouth every 7 days.    pantoprazole (PROTONIX) 40 MG tablet Take 1 tablet (40 mg total) by mouth once daily.    budesonide (PULMICORT) 0.5 mg/2 mL nebulizer solution Take 2 mLs (0.5 mg total) by nebulization 2 (two) times daily. Controller     No current facility-administered medications for this visit.         ROS  Review of  "Systems   Constitutional:  Negative for chills, diaphoresis, fatigue and fever.   HENT:  Negative for ear discharge, ear pain, rhinorrhea, trouble swallowing and voice change.    Respiratory:  Negative for chest tightness, shortness of breath, wheezing and stridor.    Cardiovascular:  Negative for chest pain and leg swelling.   Gastrointestinal:  Negative for blood in stool, diarrhea, nausea and vomiting.   Endocrine: Negative for cold intolerance and heat intolerance.   Genitourinary:  Positive for urgency. Negative for dysuria and hematuria.   Musculoskeletal:  Positive for arthralgias, back pain, gait problem, joint swelling and myalgias. Negative for neck pain and neck stiffness.   Skin:  Negative for rash.   Neurological:  Positive for weakness and numbness. Negative for tremors, seizures, speech difficulty, light-headedness and headaches.   Hematological:  Does not bruise/bleed easily.   Psychiatric/Behavioral:  Negative for agitation, confusion and suicidal ideas.             OBJECTIVE:  /72   Pulse 101   Resp 17   Ht 5' 8" (1.727 m)   Wt 81.5 kg (179 lb 10.8 oz)   BMI 27.32 kg/m²         Physical Exam  Constitutional:       Appearance: Normal appearance.   HENT:      Head: Normocephalic and atraumatic.   Eyes:      Extraocular Movements: Extraocular movements intact.      Pupils: Pupils are equal, round, and reactive to light.   Cardiovascular:      Pulses: Normal pulses.   Pulmonary:      Effort: Pulmonary effort is normal.   Abdominal:      General: Abdomen is flat.   Musculoskeletal:      Right hip: Tenderness and bony tenderness present. No crepitus. Normal range of motion. Normal strength.      Left hip: Tenderness and bony tenderness present. No crepitus. Decreased range of motion. Decreased strength.   Skin:     General: Skin is warm.      Capillary Refill: Capillary refill takes less than 2 seconds.   Neurological:      Mental Status: She is alert and oriented to person, place, and time. "      Motor: Weakness present. No abnormal muscle tone.      Gait: Gait abnormal.      Deep Tendon Reflexes:      Reflex Scores:       Patellar reflexes are 1+ on the right side and 1+ on the left side.       Achilles reflexes are 1+ on the right side and 1+ on the left side.     Comments: 4/5 strength in left knee extension left hip adduction   Psychiatric:         Mood and Affect: Mood normal.         Behavior: Behavior normal.         Thought Content: Thought content normal.           Musculoskeletal:      Lumbar Exam  Incision: yes  Pain with Flexion: yes  Pain with Extension: yes  ROM:  Decreased  Paraspinous TTP:  Positive bilaterally  Facet TTP:  L5-S1  Facet Loading:  Positive bilaterally  SLR:  Positive bilaterally at 75° in L3 distribution, L>R  SIJ TTP:  Positive bilaterally, L>R  KEILY:  Positive bilaterally  - Sacroiliac Distraction Test (anterior pressure): Positive  - Sacroiliac Compression Test (lateral pressure): Positive   -Gaenslen's test positive bilaterally  GTB tenderness bilaterally- improved    LABS:  Lab Results   Component Value Date    WBC 5.32 12/05/2024    HGB 12.0 12/05/2024    HCT 36.7 (L) 12/05/2024    MCV 91 12/05/2024     12/05/2024       CMP  Sodium   Date Value Ref Range Status   12/05/2024 141 136 - 145 mmol/L Final     Potassium   Date Value Ref Range Status   12/05/2024 4.4 3.5 - 5.1 mmol/L Final     Chloride   Date Value Ref Range Status   12/05/2024 106 95 - 110 mmol/L Final     CO2   Date Value Ref Range Status   12/05/2024 27 23 - 29 mmol/L Final     Glucose   Date Value Ref Range Status   12/05/2024 122 (H) 70 - 110 mg/dL Final     BUN   Date Value Ref Range Status   12/05/2024 11 8 - 23 mg/dL Final     Creatinine   Date Value Ref Range Status   12/05/2024 0.8 0.5 - 1.4 mg/dL Final     Calcium   Date Value Ref Range Status   12/05/2024 9.5 8.7 - 10.5 mg/dL Final     Total Protein   Date Value Ref Range Status   12/05/2024 7.2 6.0 - 8.4 g/dL Final     Albumin   Date  "Value Ref Range Status   12/05/2024 3.6 3.5 - 5.2 g/dL Final     Total Bilirubin   Date Value Ref Range Status   12/05/2024 0.4 0.1 - 1.0 mg/dL Final     Comment:     For infants and newborns, interpretation of results should be based  on gestational age, weight and in agreement with clinical  observations.    Premature Infant recommended reference ranges:  Up to 24 hours.............<8.0 mg/dL  Up to 48 hours............<12.0 mg/dL  3-5 days..................<15.0 mg/dL  6-29 days.................<15.0 mg/dL       Alkaline Phosphatase   Date Value Ref Range Status   12/05/2024 89 40 - 150 U/L Final     AST   Date Value Ref Range Status   12/05/2024 23 10 - 40 U/L Final     ALT   Date Value Ref Range Status   12/05/2024 32 10 - 44 U/L Final     Anion Gap   Date Value Ref Range Status   12/05/2024 8 8 - 16 mmol/L Final     eGFR if    Date Value Ref Range Status   07/19/2022 >60 >60 mL/min/1.73 m^2 Final     eGFR if non    Date Value Ref Range Status   07/19/2022 >60 >60 mL/min/1.73 m^2 Final     Comment:     Calculation used to obtain the estimated glomerular filtration  rate (eGFR) is the CKD-EPI equation.          No results found for: "LABA1C", "HGBA1C"          ASSESSMENT:       69 y.o. year old female with lower back and bilateral hip pain, consistent with     1. Lumbar spondylosis        2. Lumbar radiculopathy  Case Request-RAD/Other Procedure Area: left L2/3 + L3/4 TF CARINA      3. Sacroiliitis        4. Greater trochanteric bursitis, unspecified laterality              Lumbar spondylosis    Lumbar radiculopathy  -     Case Request-RAD/Other Procedure Area: left L2/3 + L3/4 TF CARINA    Sacroiliitis    Greater trochanteric bursitis, unspecified laterality                 PLAN:   - Interventions: Schedule left L2/3 + L3/4 for overlapping L3 radiculopathy   Explained the risks and benefits of the procedure in detail with the patient today in clinic along with alternative treatment " options, and the patient elected to pursue the intervention.        - Anticoagulation use:   No no anticoagulation    - Medications:  - DC lyrica- SE  - continue amitriptyline 300 mg at night. -outside provider    - continue methotrexate as prescribed by Rheumatology    - Therapy:   - patient has completed 6 weeks of formal physical therapy with the last 3 months with moderate relief.  Continue home exercises that has minimal scoliosis centered     - Imaging/Diagnostic:  - x-rays of lumbar spine reviewed and findings discussed with patient.  Minimal levoscoliosis L3-L4 to the right.  Grade 1 anterolisthesis of L4 upon L5.  That has advanced loss of disc height at L2-L3 and L3-L4.  - x-rays of bilateral hips reviewed and findings discussed with patient.  Joints space is appropriate for age  - Mri lumbar spine reviewed today. Most degenerative findings at L2/3 and L3/4    - Consults:   - continue follow up with Orthopedics for bilateral knee and hip pain.  Patient is status post right TKA with revision and left partial TKA  - continue follow up with Rheumatology for rheumatoid arthritis        - Patient Questions: Answered all of the patient's questions regarding diagnosis, therapy, and treatment     This condition does not require this patient to take time off of work, and the primary goal of our Pain Management services is to improve the patient's functional capacity.     - Follow up visit: return to clinic 4 weeks after procedure        The above plan and management options were discussed at length with patient. Patient is in agreement with the above and verbalized understanding.    I discussed the goals of interventional chronic pain management with the patient on today's visit.  I explained the utility of injections for diagnostic and therapeutic purposes.  We discussed a multimodal approach to pain including treating the patient's given worst pain at any given time.  We will use a systematic approach to  addressing pain.  We will also adopt a multimodal approach that includes injections, adjuvant medications, physical therapy, at times psychiatry.  There may be a limited role for opioid use intermittently in the treatment of pain, more particularly for acute pain although no one approach can be used as a sole treatment modality.    I emphasized the importance of regular exercise, core strengthening and stretching, diet and weight loss as a cornerstone of long-term pain management.      Roseann Thorne NP  Interventional Pain Management  [facility]    Future Appointments   Date Time Provider Department Center   1/30/2025 10:40 AM [facility] [id number]-SCR [facility] MAMMO [facility]   1/30/2025 11:15 AM Keshawn Coelho MD [facility] PULMSVC [facility]   2/3/2025 11:00 AM Jalen Rangel MD ON ORTHO  [facility]   3/10/2025  2:20 PM Sunny Lloyd MD [facility] CARDIO [facility]   4/22/2025  1:30 PM Del Boyce OD [facility] OPHTHAL [facility]   7/23/2025 11:00 AM LABORATORY, [facility] LAB [facility]   7/30/2025  2:30 PM Akhil Hernadez MD ON RHEU  [facility]     Disclaimer:  This note was prepared using voice recognition system and is likely to have sound alike errors that may have been overlooked even after proof reading.  Please call me with any questions    No LOS data to display  This includes face to face time and non-face to face time preparing to see the patient (eg, review of tests), obtaining and/or reviewing separately obtained history, documenting clinical information in the electronic or other health record, independently interpreting results and communicating results to the patient/family/caregiver, or care coordinator.

## 2025-01-10 ENCOUNTER — TELEPHONE (OUTPATIENT)
Dept: RHEUMATOLOGY | Facility: CLINIC | Age: 70
End: 2025-01-10
Payer: MEDICARE

## 2025-01-10 NOTE — TELEPHONE ENCOUNTER
Incoming call from patient. Reports she is taking abx for infected crown. Advised to hold Humira until abx course is completed. Pt to take next dose on Tuesday, 1/14/25. [facility] clinical PharmD informed.     Advised patient re: TNFi likelihood of response ~ 10% per PrismRA results and that Dr. Hernadez would likely opt for Rinvoq or Xeljanz next. Pt to trial Humira x 3 months and reevaluate unless Dr. Hernadez prefers switch sooner. Patient voiced understanding and had no further questions.

## 2025-01-16 ENCOUNTER — PATIENT MESSAGE (OUTPATIENT)
Dept: RHEUMATOLOGY | Facility: CLINIC | Age: 70
End: 2025-01-16
Payer: MEDICARE

## 2025-01-16 ENCOUNTER — OFFICE VISIT (OUTPATIENT)
Dept: PAIN MEDICINE | Facility: CLINIC | Age: 70
End: 2025-01-16
Payer: MEDICARE

## 2025-01-16 VITALS
BODY MASS INDEX: 27.23 KG/M2 | RESPIRATION RATE: 17 BRPM | SYSTOLIC BLOOD PRESSURE: 139 MMHG | HEIGHT: 68 IN | HEART RATE: 101 BPM | DIASTOLIC BLOOD PRESSURE: 72 MMHG | WEIGHT: 179.69 LBS

## 2025-01-16 DIAGNOSIS — M70.60 GREATER TROCHANTERIC BURSITIS, UNSPECIFIED LATERALITY: ICD-10-CM

## 2025-01-16 DIAGNOSIS — M54.16 LUMBAR RADICULOPATHY: ICD-10-CM

## 2025-01-16 DIAGNOSIS — M47.816 LUMBAR SPONDYLOSIS: Primary | ICD-10-CM

## 2025-01-16 DIAGNOSIS — M46.1 SACROILIITIS: ICD-10-CM

## 2025-01-16 DIAGNOSIS — M05.9 SEROPOSITIVE RHEUMATOID ARTHRITIS: Primary | ICD-10-CM

## 2025-01-16 PROCEDURE — 1125F AMNT PAIN NOTED PAIN PRSNT: CPT | Mod: CPTII,S$GLB,, | Performed by: NURSE PRACTITIONER

## 2025-01-16 PROCEDURE — 99999 PR PBB SHADOW E&M-EST. PATIENT-LVL III: CPT | Mod: PBBFAC,,, | Performed by: NURSE PRACTITIONER

## 2025-01-16 PROCEDURE — 99214 OFFICE O/P EST MOD 30 MIN: CPT | Mod: S$GLB,,, | Performed by: NURSE PRACTITIONER

## 2025-01-16 PROCEDURE — 3008F BODY MASS INDEX DOCD: CPT | Mod: CPTII,S$GLB,, | Performed by: NURSE PRACTITIONER

## 2025-01-16 PROCEDURE — 1101F PT FALLS ASSESS-DOCD LE1/YR: CPT | Mod: CPTII,S$GLB,, | Performed by: NURSE PRACTITIONER

## 2025-01-16 PROCEDURE — 3078F DIAST BP <80 MM HG: CPT | Mod: CPTII,S$GLB,, | Performed by: NURSE PRACTITIONER

## 2025-01-16 PROCEDURE — 3075F SYST BP GE 130 - 139MM HG: CPT | Mod: CPTII,S$GLB,, | Performed by: NURSE PRACTITIONER

## 2025-01-16 PROCEDURE — 3288F FALL RISK ASSESSMENT DOCD: CPT | Mod: CPTII,S$GLB,, | Performed by: NURSE PRACTITIONER

## 2025-01-16 PROCEDURE — 1159F MED LIST DOCD IN RCRD: CPT | Mod: CPTII,S$GLB,, | Performed by: NURSE PRACTITIONER

## 2025-01-17 RX ORDER — UPADACITINIB 15 MG/1
15 TABLET, EXTENDED RELEASE ORAL DAILY
Qty: 30 TABLET | Refills: 5 | Status: ACTIVE | OUTPATIENT
Start: 2025-01-17

## 2025-01-17 NOTE — PRE-PROCEDURE INSTRUCTIONS
Spoke with patient regarding procedure scheduled on 1.30     Arrival time 1100     Has patient been sick with fever or on antibiotics within the last 7 days? No     Does the patient have any open wounds, sores or rashes? No     Does the patient have any recent fractures? no     Has patient received a vaccination within the last 7 days? No     Received the COVID vaccination? yes     Has the patient stopped all medications as directed? na     Does patient have a pacemaker, defibrillator, or implantable stimulator? No     Does the patient have a ride to and from procedure and someone reliable to remain with patient?  [place]     Is the patient diabetic? no      Does the patient have sleep apnea? Or use O2 at home? no     Is the patient receiving sedation? Yes      Is the patient instructed to remain NPO beginning at midnight the night before their procedure? yes     Procedure location confirmed with patient? Yes     Covid- Denies signs/symptoms. Instructed to notify PAT/MD if any changes.

## 2025-01-30 ENCOUNTER — HOSPITAL ENCOUNTER (OUTPATIENT)
Facility: HOSPITAL | Age: 70
Discharge: HOME | End: 2025-01-30
Attending: ANESTHESIOLOGY | Admitting: ANESTHESIOLOGY
Payer: MEDICARE

## 2025-01-30 VITALS
OXYGEN SATURATION: 98 % | SYSTOLIC BLOOD PRESSURE: 131 MMHG | RESPIRATION RATE: 18 BRPM | TEMPERATURE: 97 F | HEART RATE: 88 BPM | HEIGHT: 68 IN | BODY MASS INDEX: 26.88 KG/M2 | WEIGHT: 177.38 LBS | DIASTOLIC BLOOD PRESSURE: 62 MMHG

## 2025-01-30 DIAGNOSIS — M54.16 LUMBAR RADICULOPATHY: Primary | ICD-10-CM

## 2025-01-30 DIAGNOSIS — M46.1 SACROILIITIS: ICD-10-CM

## 2025-01-30 PROCEDURE — 64484 NJX AA&/STRD TFRM EPI L/S EA: CPT | Mod: LT,,, | Performed by: ANESTHESIOLOGY

## 2025-01-30 PROCEDURE — 64483 NJX AA&/STRD TFRM EPI L/S 1: CPT | Mod: LT,,, | Performed by: ANESTHESIOLOGY

## 2025-01-30 PROCEDURE — 25500020 PHARM REV CODE 255: Performed by: ANESTHESIOLOGY

## 2025-01-30 PROCEDURE — 63600175 PHARM REV CODE 636 W HCPCS: Performed by: ANESTHESIOLOGY

## 2025-01-30 PROCEDURE — 64483 NJX AA&/STRD TFRM EPI L/S 1: CPT | Mod: LT | Performed by: ANESTHESIOLOGY

## 2025-01-30 PROCEDURE — 64484 NJX AA&/STRD TFRM EPI L/S EA: CPT | Mod: LT | Performed by: ANESTHESIOLOGY

## 2025-01-30 RX ORDER — MIDAZOLAM HYDROCHLORIDE 1 MG/ML
INJECTION, SOLUTION INTRAMUSCULAR; INTRAVENOUS
Status: DISCONTINUED | OUTPATIENT
Start: 2025-01-30 | End: 2025-01-30 | Stop reason: HOSPADM

## 2025-01-30 RX ORDER — FENTANYL CITRATE 50 UG/ML
INJECTION, SOLUTION INTRAMUSCULAR; INTRAVENOUS
Status: DISCONTINUED | OUTPATIENT
Start: 2025-01-30 | End: 2025-01-30 | Stop reason: HOSPADM

## 2025-01-30 RX ORDER — ONDANSETRON HYDROCHLORIDE 2 MG/ML
4 INJECTION, SOLUTION INTRAVENOUS ONCE AS NEEDED
Status: DISCONTINUED | OUTPATIENT
Start: 2025-01-30 | End: 2025-01-30 | Stop reason: HOSPADM

## 2025-01-30 RX ORDER — BETAMETHASONE SODIUM PHOSPHATE AND BETAMETHASONE ACETATE 3; 3 MG/ML; MG/ML
INJECTION, SUSPENSION INTRA-ARTICULAR; INTRALESIONAL; INTRAMUSCULAR; SOFT TISSUE
Status: DISCONTINUED | OUTPATIENT
Start: 2025-01-30 | End: 2025-01-30 | Stop reason: HOSPADM

## 2025-01-30 RX ORDER — LIDOCAINE HYDROCHLORIDE 10 MG/ML
INJECTION, SOLUTION EPIDURAL; INFILTRATION; INTRACAUDAL; PERINEURAL
Status: DISCONTINUED | OUTPATIENT
Start: 2025-01-30 | End: 2025-01-30 | Stop reason: HOSPADM

## 2025-01-30 NOTE — OP NOTE
Transforaminal Lumbar Epidural Steroid Injection    INFORMED CONSENT: The procedure, risks, benefits and options were discussed with patient. There are no contraindications to the procedure. The patient expressed understanding and agreed to proceed. The personnel performing the procedure was discussed.    Date of procedure 01/30/2025    Time-out taken to identify patient and procedure side prior to starting the procedure.                     PROCEDURE:    1)  Left  L2/L3 TRANSFORAMINAL EPIDURAL STEROID INJECTION    2)  Left  L3/L4 TRANSFORAMINAL EPIDURAL STEROID INJECTION    Pre Procedure diagnosis:    Lumbar radiculopathy [M54.16]  1. Sacroiliitis        Post-Procedure diagnosis:   same    Surgeon: Manuel Lemon MD    Assistants: None    Medication: 2ml Betamethasone PF 6mg/ml and 2ml Lidocaine PF 1%    Local: 3ml Lidocaine PF 1%    Sedation: Conscious sedation provided by M.D    SEDATION MEDICATIONS: local/IV sedation: Versed 1 mg and fentanyl 50 mcg IV.  Conscious sedation ordered by MD.  Patient reevaluated and sedation administered by MD and monitored by RN.  Total sedation time was less than 10 min.    Total sedation time was <10 min    Complications: None    Specimens: None        TECHNIQUE: The patient was brought to the procedure room. IV access was obtained prior to the procedure. The patient was positioned prone on the fluoroscopy table. Continuous hemodynamic monitoring was initiated including blood pressure, EKG, and pulse oximetry. . The skin was prepped with chlorhexidine and draped in a sterile fashion.   Local Xylocaine was injected by raising a wheel and going down to the periosteum using a 27-gauge hypodermic needle.      The Left  L2/L3 and Left L3/L4 transforaminal spaces were identified with fluoroscopy in the  AP, oblique, and lateral views.  A 22 gauge spinal quinke needle was then advanced into the area of the trans foraminal spaces at the above levels with confirmation of proper needle  position using AP, oblique, and lateral fluoroscopic views. Once the needle tip was in the area of the transforaminal space, and there was no blood, CSF or paraesthesias,  2 mL of Omnipaque 300mg/ml was injected at each level for a total of 4 mL.  Fluoroscopic imaging in the AP and lateral views revealed a clear outline of the spinal nerve with proximal spread of agent through the neural foramen into the epidural space. A total combination of 1 mL of Lidocaine PF 1% and 1ml of Betamethasone PF 6mg/ml was injected into each level for a total of 4mL of injected medications with displacement of the contrast dye confirming that the medication went into the area of the transforaminal spaces at each level. A sterile dressing was applied. Patient tolerated procedure well.        The patient was monitored for approximately 30 minutes after the procedure.  Patient was given post procedure and discharge instructions to follow at home.  The patient was discharged in a stable condition

## 2025-01-30 NOTE — DISCHARGE SUMMARY
Discharge Note  Short Stay      SUMMARY     Admit Date: 1/30/2025    Attending Physician: Manuel Lemon MD        Discharge Physician: Manuel Lemon MD        Discharge Date: 1/30/2025 12:04 PM    Procedure(s) (LRB):  left L2/3 + L3/4 TF CARINA (Left)    Final Diagnosis: Lumbar radiculopathy [M54.16]    Disposition: Home or self care    Patient Instructions:   Discharge Medication List as of 1/30/2025 10:47 AM        CONTINUE these medications which have NOT CHANGED    Details   albuterol (PROVENTIL/VENTOLIN HFA) 90 mcg/actuation inhaler INHALE 2 PUFFS INTO THE LUNGS EVERY 6 HOURS AS NEEDED FOR WHEEZING, Starting Thu 2/15/2024, Normal      amitriptyline (ELAVIL) 100 MG tablet TAKE 3 TABLETS(300 MG) BY MOUTH EVERY EVENING, Normal      budesonide (PULMICORT) 0.5 mg/2 mL nebulizer solution Take 2 mLs (0.5 mg total) by nebulization 2 (two) times daily. Controller, Starting Fri 1/6/2023, Until Thu 12/19/2024, Normal      fluticasone propionate (FLONASE) 50 mcg/actuation nasal spray SHAKE LIQUID AND USE 2 SPRAYS(100 MCG) IN EACH NOSTRIL EVERY DAY, Normal      !! ibuprofen (ADVIL,MOTRIN) 200 MG tablet Take 600 mg by mouth every 6 (six) hours as needed., Historical Med      !! ibuprofen (ADVIL,MOTRIN) 600 MG tablet Take 1 tablet (600 mg total) by mouth 2 (two) times daily as needed for Pain., Starting Fri 1/26/2024, Normal      methotrexate 2.5 MG Tab Take 8 tablets (20 mg total) by mouth every 7 days., Starting Fri 12/6/2024, Until Wed 6/4/2025, Normal      pantoprazole (PROTONIX) 40 MG tablet Take 1 tablet (40 mg total) by mouth once daily., Starting Tue 4/30/2024, Normal      predniSONE (DELTASONE) 10 MG tablet Take 10 mg by mouth once daily., Historical Med      upadacitinib (RINVOQ) 15 mg 24 hr tablet Take 1 tablet (15 mg total) by mouth once daily., Starting Fri 1/17/2025, Fill Later       !! - Potential duplicate medications found. Please discuss with provider.              Discharge Diagnosis: Lumbar  radiculopathy [M54.16]  Condition on Discharge: Stable with no complications to procedure   Diet on Discharge: Same as before.  Activity: as per instruction sheet.  Discharge to: Home with a responsible adult.  Follow up: 2-4 weeks       Please call the office at [phone] if you experience any weakness or loss of sensation, fever > 101.5, pain uncontrolled with oral medications, persistent nausea/vomiting/or diarrhea, redness or drainage from the incisions, or any other worrisome concerns. If physician on call was not reached or could not communicate with our office for any reason please go to the nearest emergency department

## 2025-01-30 NOTE — DISCHARGE INSTRUCTIONS

## 2025-01-30 NOTE — H&P
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      Mild persistent asthma without complication 07/25/2024    PONV (postoperative nausea and vomiting)     Seropositive rheumatoid arthritis 02/17/2022     Past Surgical History:   Procedure Laterality Date    ADENOIDECTOMY      CATARACT EXTRACTION W/  INTRAOCULAR LENS IMPLANT Left 12/09/2020    CATARACT EXTRACTION W/  INTRAOCULAR LENS IMPLANT      CHOLECYSTECTOMY      COLONOSCOPY N/A 10/13/2021    Procedure: COLONOSCOPY;  Surgeon: Elissa Yoon MD;  Location: [facility] ENDO;  Service: Endoscopy;  Laterality: N/A;    HERNIA REPAIR      HYSTERECTOMY      INJECTION OF ANESTHETIC AGENT INTO SACROILIAC JOINT Bilateral 11/7/2024    Procedure: bilateral SIJ + bilateral GTB;  Surgeon: Manuel Lemon MD;  Location: [facility] PAIN MGT;  Service: Pain Management;  Laterality: Bilateral;    REVISION OF KNEE ARTHROPLASTY Right 3/12/2024    Procedure: REVISION, ARTHROPLASTY, KNEE;  Surgeon: Jalen Rangel MD;  Location: [facility] OR;  Service: General;  Laterality: Right;    TONSILLECTOMY      TOTAL KNEE ARTHROPLASTY Bilateral      Review of patient's allergies indicates:   Allergen Reactions    Pneumococcal vaccine Swelling    Celebrex [celecoxib] Itching and Rash    Hydrocodone-acetaminophen Nausea Only and Nausea And Vomiting    Meloxicam Rash    Sulfa (sulfonamide antibiotics) Rash        No current facility-administered  "medications on file prior to encounter.     Current Outpatient Medications on File Prior to Encounter   Medication Sig Dispense Refill    albuterol (PROVENTIL/VENTOLIN HFA) 90 mcg/actuation inhaler INHALE 2 PUFFS INTO THE LUNGS EVERY 6 HOURS AS NEEDED FOR WHEEZING 6.7 g 11    amitriptyline (ELAVIL) 100 MG tablet TAKE 3 TABLETS(300 MG) BY MOUTH EVERY EVENING 270 tablet 3    budesonide (PULMICORT) 0.5 mg/2 mL nebulizer solution Take 2 mLs (0.5 mg total) by nebulization 2 (two) times daily. Controller 120 mL 5    fluticasone propionate (FLONASE) 50 mcg/actuation nasal spray SHAKE LIQUID AND USE 2 SPRAYS(100 MCG) IN EACH NOSTRIL EVERY DAY 16 g 11    ibuprofen (ADVIL,MOTRIN) 200 MG tablet Take 600 mg by mouth every 6 (six) hours as needed.      ibuprofen (ADVIL,MOTRIN) 600 MG tablet Take 1 tablet (600 mg total) by mouth 2 (two) times daily as needed for Pain. 60 tablet 1    methotrexate 2.5 MG Tab Take 8 tablets (20 mg total) by mouth every 7 days. 96 tablet 1    pantoprazole (PROTONIX) 40 MG tablet Take 1 tablet (40 mg total) by mouth once daily. 90 tablet 3    upadacitinib (RINVOQ) 15 mg 24 hr tablet Take 1 tablet (15 mg total) by mouth once daily. 30 tablet 5        PMHx, PSHx, Allergies, Medications reviewed in epic    ROS negative except pain complaints in HPI    OBJECTIVE:    /66 (BP Location: Right arm, Patient Position: Sitting)   Pulse 90   Temp 97.4 °F (36.3 °C) (Temporal)   Resp 17   Ht 5' 8" (1.727 m)   Wt 80.4 kg (177 lb 5.8 oz)   SpO2 98%   Breastfeeding No   BMI 26.97 kg/m²     PHYSICAL EXAMINATION:    GENERAL: Well appearing, in no acute distress, alert and oriented x3.  PSYCH:  Mood and affect appropriate.  SKIN: Skin color, texture, turgor normal, no rashes or lesions which will impact the procedure.  CV: RRR with palpation of the radial artery.  PULM: No evidence of respiratory difficulty, symmetric chest rise. Clear to auscultation.  NEURO: Cranial nerves grossly " intact.    Plan:    Proceed with procedure as planned Procedure(s) (LRB):  left L2/3 + L3/4 TF CARINA (Left)    Manuel Lemon MD  01/30/2025

## 2025-02-07 RX ORDER — PREDNISONE 5 MG/1
5 TABLET ORAL 2 TIMES DAILY PRN
Qty: 60 TABLET | Refills: 1 | Status: SHIPPED | OUTPATIENT
Start: 2025-02-07 | End: 2025-03-09

## 2025-02-07 NOTE — TELEPHONE ENCOUNTER
----- Message from Lorelei sent at 2/7/2025 11:14 AM CST -----  Type:  RX Refill Request    Who Called: Caitlin  Refill or New Rx:Refill  RX Name and Strength:predniSONE (DELTASONE) 10 MG tablet  How is the patient currently taking it? (ex. 1XDay):  Is this a 30 day or 90 day RX:  Preferred Pharmacy with phone number:  [id number] - [place], [place] - [address] [facility]  [address] [address]  Phone: [phone] Fax: [phone]       Local or Mail Order:Local  Ordering Provider:Akhil Hernadez  Would the patient rather a call back or a response via [facility]? Callback  Best Call Back Number:[phone]  Additional Information: Need a refill

## 2025-02-13 ENCOUNTER — OFFICE VISIT (OUTPATIENT)
Dept: PULMONOLOGY | Facility: CLINIC | Age: 70
End: 2025-02-13
Payer: MEDICARE

## 2025-02-13 ENCOUNTER — HOSPITAL ENCOUNTER (OUTPATIENT)
Dept: RADIOLOGY | Facility: HOSPITAL | Age: 70
Discharge: HOME | End: 2025-02-13
Attending: INTERNAL MEDICINE
Payer: MEDICARE

## 2025-02-13 VITALS
SYSTOLIC BLOOD PRESSURE: 130 MMHG | WEIGHT: 178.81 LBS | RESPIRATION RATE: 16 BRPM | HEART RATE: 87 BPM | HEIGHT: 68 IN | HEIGHT: 68 IN | BODY MASS INDEX: 27.1 KG/M2 | BODY MASS INDEX: 27.1 KG/M2 | OXYGEN SATURATION: 95 % | WEIGHT: 178.81 LBS | DIASTOLIC BLOOD PRESSURE: 80 MMHG

## 2025-02-13 DIAGNOSIS — J47.9 ADULT BRONCHIECTASIS: ICD-10-CM

## 2025-02-13 DIAGNOSIS — M05.9 SEROPOSITIVE RHEUMATOID ARTHRITIS: ICD-10-CM

## 2025-02-13 DIAGNOSIS — J45.30 MILD PERSISTENT ASTHMA WITHOUT COMPLICATION: Primary | Chronic | ICD-10-CM

## 2025-02-13 PROCEDURE — 1101F PT FALLS ASSESS-DOCD LE1/YR: CPT | Mod: CPTII,S$GLB,, | Performed by: INTERNAL MEDICINE

## 2025-02-13 PROCEDURE — 3075F SYST BP GE 130 - 139MM HG: CPT | Mod: CPTII,S$GLB,, | Performed by: INTERNAL MEDICINE

## 2025-02-13 PROCEDURE — 77067 SCR MAMMO BI INCL CAD: CPT | Mod: 26,,, | Performed by: RADIOLOGY

## 2025-02-13 PROCEDURE — 99999 PR PBB SHADOW E&M-EST. PATIENT-LVL IV: CPT | Mod: PBBFAC,,, | Performed by: INTERNAL MEDICINE

## 2025-02-13 PROCEDURE — 77063 BREAST TOMOSYNTHESIS BI: CPT | Mod: 26,,, | Performed by: RADIOLOGY

## 2025-02-13 PROCEDURE — 3288F FALL RISK ASSESSMENT DOCD: CPT | Mod: CPTII,S$GLB,, | Performed by: INTERNAL MEDICINE

## 2025-02-13 PROCEDURE — 77063 BREAST TOMOSYNTHESIS BI: CPT | Mod: TC

## 2025-02-13 PROCEDURE — 1159F MED LIST DOCD IN RCRD: CPT | Mod: CPTII,S$GLB,, | Performed by: INTERNAL MEDICINE

## 2025-02-13 PROCEDURE — 3008F BODY MASS INDEX DOCD: CPT | Mod: CPTII,S$GLB,, | Performed by: INTERNAL MEDICINE

## 2025-02-13 PROCEDURE — 99213 OFFICE O/P EST LOW 20 MIN: CPT | Mod: S$GLB,,, | Performed by: INTERNAL MEDICINE

## 2025-02-13 PROCEDURE — 3079F DIAST BP 80-89 MM HG: CPT | Mod: CPTII,S$GLB,, | Performed by: INTERNAL MEDICINE

## 2025-02-13 PROCEDURE — 1160F RVW MEDS BY RX/DR IN RCRD: CPT | Mod: CPTII,S$GLB,, | Performed by: INTERNAL MEDICINE

## 2025-02-13 PROCEDURE — 1126F AMNT PAIN NOTED NONE PRSNT: CPT | Mod: CPTII,S$GLB,, | Performed by: INTERNAL MEDICINE

## 2025-02-13 RX ORDER — BUDESONIDE AND FORMOTEROL FUMARATE DIHYDRATE 160; 4.5 UG/1; UG/1
2 AEROSOL RESPIRATORY (INHALATION) EVERY 12 HOURS
Qty: 10.2 G | Refills: 6 | Status: SHIPPED | OUTPATIENT
Start: 2025-02-13

## 2025-02-13 NOTE — PROGRESS NOTES
Subjective:      Patient ID: Caitlin Ahmadi is a 69 y.o. female.    Chief Complaint: Bronchiectasis    Follow-up      67 yo female with a history of RA, long history of asthma well controlled on Breo QD and PRN albuterol. Had a CT of the chest one year ago showing small focus of lingular atelectasis. For some reason she was told she had interstitial lung disease but cant remember by who. Main complaint is cough with occasional sputum, worse at night and first laying down. No dyspnea, fever, chills, chest pain or hemoptysis. Was supposed to have PFTs done today but felt light headed and declined to do them. PFTs one year ago were essentially normal and FeNO in January was < 25. NO other complaints at this time.     October 2023:  Cough has significantly improved with the addition of proton pump inhibitor but she still has daily to every other day difficult to expectorate thick green mucus.  Upon my review of her prior CT imaging do note that she has mild diffuse cylindrical bronchiectasis.  Otherwise dyspnea and asthma symptoms are mostly stable.  No fevers, chills, unintentional weight loss or chest pain.     July 2024  Here for follow up with the above  Overall doing well  Asthma symptoms controlled on Breo 100  Patient states reflux treatment has improved symptoms significantly  No new complaints    Feb 2025  Here for follow up of the above  Last few days has had a bit more cough and congestion and more difficulty breathing  Stopped using Breo because of the dry powder was just not agreeing with her  Currently just on Pulmicort nebs and as needed albuterol  Recently her RA medication was switched to Rinvoq  No increase in sputum, no fevers or chills    Review of Systems as per history of present illness otherwise negative  Objective:     Physical Exam   Constitutional: She is oriented to person, place, and time. She appears well-developed. No distress.   HENT:   Head: Normocephalic.   Cardiovascular: Normal  "rate and regular rhythm.   Pulmonary/Chest: Normal expansion, symmetric chest wall expansion, effort normal and breath sounds normal. She has no wheezes.   Musculoskeletal:      Cervical back: Neck supple.   Neurological: She is alert and oriented to person, place, and time.   Psychiatric: She has a normal mood and affect.   Nursing note and vitals reviewed.            2/13/2025     9:41 AM 2/13/2025     9:11 AM 1/30/2025    11:54 AM 1/30/2025    11:46 AM 1/30/2025    11:38 AM 1/30/2025    11:34 AM 1/30/2025    11:29 AM   Pulmonary Function Tests   SpO2  95 % 98 % 98 % 97 % 95 % 100 %   Height 5' 8" (1.727 m) 5' 8" (1.727 m)        Weight 81.1 kg (178 lb 12.7 oz) 81.1 kg (178 lb 12.7 oz)        BMI (Calculated) 27.2 27.2             Assessment:     1. Mild persistent asthma without complication    2. Adult bronchiectasis    3. Seropositive rheumatoid arthritis         Orders Placed This Encounter   Procedures    CT Chest Without Contrast     Standing Status:   Future     Standing Expiration Date:   2/13/2026     Order Specific Question:   May the Radiologist modify the order per protocol to meet the clinical needs of the patient?     Answer:   Yes       Plan:     We will start Symbicort 160 b.i.d.  Mucinex DM 12 hour tablets b.i.d.  Continue as needed albuterol  Continue to follow up with Rheumatology  Return in 6 months with a CT, sooner if needed     "

## 2025-03-27 ENCOUNTER — OFFICE VISIT (OUTPATIENT)
Dept: OPHTHALMOLOGY | Facility: CLINIC | Age: 70
End: 2025-03-27
Payer: MEDICARE

## 2025-03-27 DIAGNOSIS — Z96.1 PSEUDOPHAKIA OF BOTH EYES: Primary | ICD-10-CM

## 2025-03-27 DIAGNOSIS — H26.492 PCO (POSTERIOR CAPSULAR OPACIFICATION), LEFT: ICD-10-CM

## 2025-03-27 DIAGNOSIS — H52.222 REGULAR ASTIGMATISM OF LEFT EYE: ICD-10-CM

## 2025-03-27 DIAGNOSIS — H52.4 PRESBYOPIA: ICD-10-CM

## 2025-03-27 PROCEDURE — 92015 DETERMINE REFRACTIVE STATE: CPT | Mod: S$GLB,,, | Performed by: OPTOMETRIST

## 2025-03-27 PROCEDURE — 92014 COMPRE OPH EXAM EST PT 1/>: CPT | Mod: S$GLB,,, | Performed by: OPTOMETRIST

## 2025-03-27 PROCEDURE — 1159F MED LIST DOCD IN RCRD: CPT | Mod: CPTII,S$GLB,, | Performed by: OPTOMETRIST

## 2025-03-27 PROCEDURE — 99999 PR PBB SHADOW E&M-EST. PATIENT-LVL II: CPT | Mod: PBBFAC,,, | Performed by: OPTOMETRIST

## 2025-03-27 PROCEDURE — 1160F RVW MEDS BY RX/DR IN RCRD: CPT | Mod: CPTII,S$GLB,, | Performed by: OPTOMETRIST

## 2025-03-27 NOTE — PROGRESS NOTES
HPI     Annual Exam            Comments: RTC 1 yr for dilated eye exam.  1. PC IOL OD 12/9/21   2. PC IOL OS 04/14/2021  3. MonoVA CTL  Vision changes since last eye exam?: yes, lots of meds and chemo     Any eye pain today: no    Other ocular symptoms: same floaters    Interested in contact lens fitting today? yes                     Last edited by Ruth Vázquez on 3/27/2025  3:35 PM.            Assessment /Plan     For exam results, see Encounter Report.    Pseudophakia of both eyes  PCO (posterior capsular opacification), left  Stable OU  Mild PCO OS, YAG not yet indicated  Monitor 12 months    Regular astigmatism of left eye  Presbyopia  Eyeglass Final Rx       Eyeglass Final Rx         Sphere Cylinder Axis Add    Right -0.50   +2.50    Left [place] +1.00 175 +2.50      Expiration Date: 3/27/2026                  Contact Lens Prescription (3/27/2025)          Brand Base Curve Diameter Sphere    Right Acuvue 1-Day Moist 8.5 14.2 +1.75    Left no lens         Expiration Date: 3/27/2026    Replacement: Daily    Wearing Schedule: Daily Wear            RTC 1 yr for dilated eye exam or PRN if any problems.   Discussed above and answered questions.

## 2025-03-31 ENCOUNTER — OFFICE VISIT (OUTPATIENT)
Dept: CARDIOLOGY | Facility: CLINIC | Age: 70
End: 2025-03-31
Payer: MEDICARE

## 2025-03-31 VITALS
HEART RATE: 97 BPM | BODY MASS INDEX: 27.19 KG/M2 | HEIGHT: 68 IN | DIASTOLIC BLOOD PRESSURE: 78 MMHG | WEIGHT: 179.44 LBS | OXYGEN SATURATION: 96 % | SYSTOLIC BLOOD PRESSURE: 132 MMHG

## 2025-03-31 DIAGNOSIS — R06.09 DOE (DYSPNEA ON EXERTION): ICD-10-CM

## 2025-03-31 DIAGNOSIS — Z96.651 S/P REVISION OF TOTAL KNEE, RIGHT: ICD-10-CM

## 2025-03-31 DIAGNOSIS — I70.0 AORTIC ATHEROSCLEROSIS: Primary | ICD-10-CM

## 2025-03-31 DIAGNOSIS — M05.9 SEROPOSITIVE RHEUMATOID ARTHRITIS: ICD-10-CM

## 2025-03-31 DIAGNOSIS — R07.9 CHEST PAIN, UNSPECIFIED TYPE: ICD-10-CM

## 2025-03-31 DIAGNOSIS — F41.9 ANXIETY: ICD-10-CM

## 2025-03-31 PROCEDURE — 1159F MED LIST DOCD IN RCRD: CPT | Mod: CPTII,S$GLB,, | Performed by: INTERNAL MEDICINE

## 2025-03-31 PROCEDURE — 3008F BODY MASS INDEX DOCD: CPT | Mod: CPTII,S$GLB,, | Performed by: INTERNAL MEDICINE

## 2025-03-31 PROCEDURE — 1126F AMNT PAIN NOTED NONE PRSNT: CPT | Mod: CPTII,S$GLB,, | Performed by: INTERNAL MEDICINE

## 2025-03-31 PROCEDURE — 3075F SYST BP GE 130 - 139MM HG: CPT | Mod: CPTII,S$GLB,, | Performed by: INTERNAL MEDICINE

## 2025-03-31 PROCEDURE — 1101F PT FALLS ASSESS-DOCD LE1/YR: CPT | Mod: CPTII,S$GLB,, | Performed by: INTERNAL MEDICINE

## 2025-03-31 PROCEDURE — 3288F FALL RISK ASSESSMENT DOCD: CPT | Mod: CPTII,S$GLB,, | Performed by: INTERNAL MEDICINE

## 2025-03-31 PROCEDURE — 99999 PR PBB SHADOW E&M-EST. PATIENT-LVL III: CPT | Mod: PBBFAC,,, | Performed by: INTERNAL MEDICINE

## 2025-03-31 PROCEDURE — 3078F DIAST BP <80 MM HG: CPT | Mod: CPTII,S$GLB,, | Performed by: INTERNAL MEDICINE

## 2025-03-31 PROCEDURE — 99214 OFFICE O/P EST MOD 30 MIN: CPT | Mod: S$GLB,,, | Performed by: INTERNAL MEDICINE

## 2025-03-31 RX ORDER — ATENOLOL 25 MG/1
25 TABLET ORAL DAILY
Qty: 30 TABLET | Refills: 11 | Status: SHIPPED | OUTPATIENT
Start: 2025-03-31 | End: 2026-03-31

## 2025-03-31 NOTE — PROGRESS NOTES
Subjective:   Patient ID:  Caitlin Ahmadi is a 70 y.o. female who presents for evaluation of Chest Pain, Shortness of Breath, Dizziness, and Tingling (LEFT SIDE)      Chest Pain   Associated symptoms include dizziness and shortness of breath. Pertinent negatives include no palpitations or syncope.   Shortness of Breath  Associated symptoms include chest pain. Pertinent negatives include no syncope.   Dizziness:    Associated symptoms: chest pain.no syncope and no palpitations.    3.31.2025  Chest pain with walking and resolves with rest , 5/10, 2-3 mins, no radiation once or twice a week   No palpitations   [place]a unchanged from before she states   Chest pain even without exertion, anxious and stressed    2.2024  69 yo female, with history of rheumatoid arthritis, not diabetic, no history of stroke, symptoms are controlled with methotrexate.    Follows with rheumatology as well.    She is here today for preop assessment and evaluation prior to going for a knee surgery.  She had prior knee arthroplasty in the past.    Now going for a total knee replacement.    She states that she runs all her friends she takes her and grandchild to school.  Denies any limitations.  She states that she may have sometimes dyspnea on exertion with NYHA 1.  However no lower extremity swelling, palpitations, syncope or presyncope.    No orthopnea or PND.       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      Mild persistent asthma without complication 07/25/2024    PONV (postoperative nausea and vomiting)     Seropositive rheumatoid arthritis  May begin gradual 30 minute daily increments to transition to a tennis shoe.    May participate in low impact exercise such as stationary bike, elliptical and swimming.     Avoid high impact activities such as running, jumping and squatting.     2022       Past Surgical History:   Procedure Laterality Date    ADENOIDECTOMY      CATARACT EXTRACTION W/  INTRAOCULAR LENS IMPLANT Left 2020    CATARACT EXTRACTION W/  INTRAOCULAR LENS IMPLANT      CHOLECYSTECTOMY      COLONOSCOPY N/A 10/13/2021    Procedure: COLONOSCOPY;  Surgeon: Elissa Yoon MD;  Location: [facility] ENDO;  Service: Endoscopy;  Laterality: N/A;    HERNIA REPAIR      HYSTERECTOMY      INJECTION OF ANESTHETIC AGENT INTO SACROILIAC JOINT Bilateral 2024    Procedure: bilateral SIJ + bilateral GTB;  Surgeon: Manuel Lemon MD;  Location: [facility] PAIN MGT;  Service: Pain Management;  Laterality: Bilateral;    REVISION OF KNEE ARTHROPLASTY Right 3/12/2024    Procedure: REVISION, ARTHROPLASTY, KNEE;  Surgeon: Jalen Rangel MD;  Location: [facility] OR;  Service: General;  Laterality: Right;    TONSILLECTOMY      TOTAL KNEE ARTHROPLASTY Bilateral     TRANSFORAMINAL EPIDURAL INJECTION OF STEROID Left 2025    Procedure: left L2/3 + L3/4 TF CARINA;  Surgeon: Manuel Lemon MD;  Location: [facility] PAIN MGT;  Service: Pain Management;  Laterality: Left;       Social History     Tobacco Use    Smoking status: Former     Current packs/day: 0.00     Average packs/day: 1 pack/day for 20.0 years (20.0 ttl pk-yrs)     Types: Cigarettes     Start date: 1970     Quit date: 1990     Years since quittin.8    Smokeless tobacco: Never   Substance Use Topics    Alcohol use: Yes     Alcohol/week: 5.0 standard drinks of alcohol     Types: 5 Glasses of wine per week     Comment: no longer drinkning    Drug use: Never       Family History   Problem Relation Name Age of Onset    No Known Problems Mother      Alzheimer's disease Father         Review of Systems   Cardiovascular:  Positive for chest pain. Negative for dyspnea on exertion, palpitations and syncope.   Respiratory:  Positive for shortness of breath.    Genitourinary: Negative.    Neurological:  Positive for dizziness.        Current Outpatient Medications on File Prior to Visit   Medication Sig    albuterol (PROVENTIL/VENTOLIN HFA) 90 mcg/actuation inhaler INHALE 2 PUFFS INTO THE LUNGS EVERY 6 HOURS AS NEEDED FOR WHEEZING    amitriptyline (ELAVIL) 100 MG tablet TAKE 3 TABLETS(300 MG) BY MOUTH EVERY EVENING    budesonide (PULMICORT) 0.5 mg/2 mL nebulizer solution Take 2 mLs (0.5 mg total) by nebulization 2 (two) times daily. Controller    budesonide-formoterol 160-4.5 mcg (SYMBICORT) 160-4.5 mcg/actuation HFAA Inhale 2 puffs into the lungs every 12 (twelve) hours. Controller    fluticasone propionate (FLONASE) 50 mcg/actuation nasal spray SHAKE LIQUID AND USE 2 SPRAYS(100 MCG) IN EACH NOSTRIL EVERY DAY    ibuprofen (ADVIL,MOTRIN) 200 MG tablet Take 600 mg by mouth every 6 (six) hours as needed.    ibuprofen (ADVIL,MOTRIN) 600 MG tablet Take 1 tablet (600 mg total) by mouth 2 (two) times daily as needed for Pain.    methotrexate 2.5 MG Tab Take 8 tablets (20 mg total) by mouth every 7 days.    pantoprazole (PROTONIX) 40 MG tablet Take 1 tablet (40 mg total) by mouth once daily.    upadacitinib (RINVOQ) 15 mg 24 hr tablet Take 1 tablet (15 mg total) by mouth once daily.     No current facility-administered medications on file prior to visit.       Objective:   Objective:  Wt Readings from Last 3 Encounters:   03/31/25 81.4 kg (179 lb 7.3 oz)   02/13/25 81.1 kg (178 lb 12.7 oz)   02/13/25 81.1 kg (178 lb 12.7 oz)     Temp Readings from Last 3 Encounters:   01/30/25 97.4 °F (36.3 °C) (Temporal)   11/07/24 97.5 °F (36.4 °C) (Temporal)   06/06/24 98.1 °F (36.7 °C) (Tympanic)     BP Readings from Last 3 Encounters:   03/31/25 132/78   02/13/25 130/80   01/30/25 131/62     Pulse Readings from Last 3 Encounters:   03/31/25 97   02/13/25 87   01/30/25 88       Physical Exam  Vitals reviewed.   Constitutional:       Appearance: She is well-developed.   Neck:      Vascular: No carotid bruit.   Cardiovascular:      Rate and Rhythm: Normal rate  "and regular rhythm.      Pulses: Intact distal pulses.      Heart sounds: Normal heart sounds. No murmur heard.  Pulmonary:      Breath sounds: Normal breath sounds.   Neurological:      Mental Status: She is oriented to person, place, and time.         Lab Results   Component Value Date    CHOL 219 (H) 06/07/2024    CHOL 241 (H) 09/26/2022    CHOL 247 (H) 07/06/2020     Lab Results   Component Value Date    HDL 53 06/07/2024    HDL 50 09/26/2022    HDL 59 07/06/2020     Lab Results   Component Value Date    LDLCALC 132.0 06/07/2024    LDLCALC 135.6 09/26/2022    LDLCALC 149.0 07/06/2020     Lab Results   Component Value Date    TRIG 170 (H) 06/07/2024    TRIG 277 (H) 09/26/2022    TRIG 195 (H) 07/06/2020     Lab Results   Component Value Date    CHOLHDL 24.2 06/07/2024    CHOLHDL 20.7 09/26/2022    CHOLHDL 23.9 07/06/2020       Chemistry        Component Value Date/Time     12/05/2024 0846    K 4.4 12/05/2024 0846     12/05/2024 0846    CO2 27 12/05/2024 0846    BUN 11 12/05/2024 0846    CREATININE 0.8 12/05/2024 0846     (H) 12/05/2024 0846        Component Value Date/Time    CALCIUM 9.5 12/05/2024 0846    ALKPHOS 89 12/05/2024 0846    AST 23 12/05/2024 0846    ALT 32 12/05/2024 0846    BILITOT 0.4 12/05/2024 0846    ESTGFRAFRICA >60 07/19/2022 1438    EGFRNONAA >60 07/19/2022 1438          Lab Results   Component Value Date    TSH 3.556 06/07/2024     No results found for: "INR", "PROTIME"  Lab Results   Component Value Date    WBC 5.32 12/05/2024    HGB 12.0 12/05/2024    HCT 36.7 (L) 12/05/2024    MCV 91 12/05/2024     12/05/2024     BNP  @LABRCNTIP(BNP,BNPTRIAGEBLO)@  CrCl cannot be calculated (Patient's most recent lab result is older than the maximum 7 days allowed.).     Imaging:  ======    No results found for this or any previous visit.    No results found for this or any previous visit.    Results for orders placed during the hospital encounter of 07/12/22    X-Ray Chest PA And " Lateral    Narrative  EXAMINATION:  XR CHEST PA AND LATERAL    CLINICAL HISTORY:  Pneumonia, unspecified organism    TECHNIQUE:  PA and lateral views of the chest were performed.    COMPARISON:  12/18/2020    FINDINGS:  Cardiac silhouette and mediastinal contours are normal.  Lungs are clear.  Osseous structures are intact.    Impression  No acute cardiopulmonary process.      Electronically signed by: Dangelo Irene MD  Date:    07/12/2022  Time:    11:26    No results found for this or any previous visit.    No valid procedures specified.    No results found for this or any previous visit.      No results found for this or any previous visit.      Results for orders placed during the hospital encounter of 12/18/20    Echo Color Flow Doppler? Yes    Interpretation Summary  · With left ventricular concentric remodeling and normal systolic function. The estimated ejection fraction is 60%.  · Grade I left ventricular diastolic dysfunction.      Diagnostic Results:  ECG: Reviewed  Sinus rhythm with 1st degree A-V block   Low voltage QRS   Cannot rule out Anterior infarct ,age undetermined   Abnormal ECG   No previous ECGs available   Confirmed by BRONWYN VICENTE, LOLY ([id number]) on 2/1/2024 11:09:12 PM       The 10-year ASCVD risk score (Mario DK, et al., 2019) is: 15.2%    Values used to calculate the score:      Age: 70 years      Sex: Female      Is Non- : No      Diabetic: No      Tobacco smoker: No      Systolic Blood Pressure: 132 mmHg      Is BP treated: Yes      HDL Cholesterol: 53 mg/dL      Total Cholesterol: 219 mg/dL        Assessment and Plan:   Aortic atherosclerosis    Anxiety    Seropositive rheumatoid arthritis    Chest pain, unspecified type  -     Echo; Future  -     Nuclear Stress - Cardiology Interpreted; Future  -     atenoloL (TENORMIN) 25 MG tablet; Take 1 tablet (25 mg total) by mouth once daily.  Dispense: 30 tablet; Refill: 11    S/P revision of total knee, right    [place]  (dyspnea on exertion)  -     Echo; Future  -     Nuclear Stress - Cardiology Interpreted; Future          Reviewed all tests and above medical conditions with patient in detail and formulated treatment plan.  Risk factor modification discussed.   Cardiac low salt diet discussed.  Maintaining healthy weight and weight loss goals were discussed in clinic.  Hyperlipidemia wants to continue with lifestyle changes.  Resume atenolol stopped in the past    Follow up in  6 months

## 2025-04-09 ENCOUNTER — HOSPITAL ENCOUNTER (OUTPATIENT)
Dept: RADIOLOGY | Facility: HOSPITAL | Age: 70
Discharge: HOME | End: 2025-04-09
Attending: INTERNAL MEDICINE
Payer: MEDICARE

## 2025-04-09 ENCOUNTER — HOSPITAL ENCOUNTER (OUTPATIENT)
Dept: CARDIOLOGY | Facility: HOSPITAL | Age: 70
Discharge: HOME | End: 2025-04-09
Attending: INTERNAL MEDICINE
Payer: MEDICARE

## 2025-04-09 VITALS
WEIGHT: 179 LBS | SYSTOLIC BLOOD PRESSURE: 132 MMHG | HEIGHT: 68 IN | BODY MASS INDEX: 27.13 KG/M2 | DIASTOLIC BLOOD PRESSURE: 78 MMHG

## 2025-04-09 DIAGNOSIS — R06.09 DOE (DYSPNEA ON EXERTION): ICD-10-CM

## 2025-04-09 DIAGNOSIS — R07.9 CHEST PAIN, UNSPECIFIED TYPE: ICD-10-CM

## 2025-04-09 LAB
AORTIC ROOT ANNULUS: 2.37 CM
ASCENDING AORTA: 2.34 CM
AV INDEX (PROSTH): 1
AV MEAN GRADIENT: 4 MMHG
AV PEAK GRADIENT: 6 MMHG
AV VALVE AREA BY VELOCITY RATIO: 2.8 CM²
AV VALVE AREA: 2.8 CM²
AV VELOCITY RATIO: 1
BSA FOR ECHO PROCEDURE: 1.97 M2
CV ECHO LV RWT: 0.44 CM
DOP CALC AO PEAK VEL: 1.2 M/S
DOP CALC AO VTI: 28.2 CM
DOP CALC LVOT AREA: 2.8 CM2
DOP CALC LVOT DIAMETER: 1.9 CM
DOP CALC LVOT PEAK VEL: 1.2 M/S
DOP CALC LVOT STROKE VOLUME: 79.9 CM3
DOP CALC RVOT PEAK VEL: 0.68 M/S
DOP CALC RVOT VTI: 17.3 CM
DOP CALCLVOT PEAK VEL VTI: 28.2 CM
E WAVE DECELERATION TIME: 146 MSEC
E/A RATIO: 0.86
E/E' RATIO: 11 M/S
ECHO LV POSTERIOR WALL: 1 CM (ref 0.6–1.1)
EJECTION FRACTION: 60 %
FRACTIONAL SHORTENING: 31.1 % (ref 28–44)
INTERVENTRICULAR SEPTUM: 1.1 CM (ref 0.6–1.1)
IVC DIAMETER: 1.66 CM
IVRT: 97 MSEC
LA MAJOR: 5 CM
LA MINOR: 4.7 CM
LA WIDTH: 3.8 CM
LEFT ATRIUM AREA SYSTOLIC (APICAL 2 CHAMBER): 14.29 CM2
LEFT ATRIUM AREA SYSTOLIC (APICAL 4 CHAMBER): 18.51 CM2
LEFT ATRIUM SIZE: 3.6 CM
LEFT ATRIUM VOLUME INDEX MOD: 23 ML/M2
LEFT ATRIUM VOLUME INDEX: 29 ML/M2
LEFT ATRIUM VOLUME MOD: 44 ML
LEFT ATRIUM VOLUME: 56 CM3
LEFT INTERNAL DIMENSION IN SYSTOLE: 3.1 CM (ref 2.1–4)
LEFT VENTRICLE DIASTOLIC VOLUME INDEX: 47.18 ML/M2
LEFT VENTRICLE DIASTOLIC VOLUME: 92 ML
LEFT VENTRICLE END SYSTOLIC VOLUME APICAL 2 CHAMBER: 35.5 ML
LEFT VENTRICLE END SYSTOLIC VOLUME APICAL 4 CHAMBER: 50.73 ML
LEFT VENTRICLE MASS INDEX: 84.1 G/M2
LEFT VENTRICLE SYSTOLIC VOLUME INDEX: 20 ML/M2
LEFT VENTRICLE SYSTOLIC VOLUME: 39 ML
LEFT VENTRICULAR INTERNAL DIMENSION IN DIASTOLE: 4.5 CM (ref 3.5–6)
LEFT VENTRICULAR MASS: 164 G
LV LATERAL E/E' RATIO: 11.1 M/S
LV SEPTAL E/E' RATIO: 11.1 M/S
LVED V (TEICH): 92.29 ML
LVES V (TEICH): 39.11 ML
LVOT MG: 3.14 MMHG
LVOT MV: 0.83 CM/S
MV PEAK A VEL: 1.04 M/S
MV PEAK E VEL: 0.89 M/S
MV STENOSIS PRESSURE HALF TIME: 42.45 MS
MV VALVE AREA P 1/2 METHOD: 5.18 CM2
OHS CV RV/LV RATIO: 0.62 CM
PISA TR MAX VEL: 2.3 M/S
PV MEAN GRADIENT: 1 MMHG
PV PEAK GRADIENT: 2 MMHG
PV PEAK VELOCITY: 0.79 M/S
RA PRESSURE ESTIMATED: 3 MMHG
RIGHT VENTRICLE DIASTOLIC BASEL DIMENSION: 2.8 CM
RIGHT VENTRICULAR END-DIASTOLIC DIMENSION: 2.75 CM
RV TB RVSP: 5 MMHG
SINUS: 2.24 CM
STJ: 2.02 CM
TDI LATERAL: 0.08 M/S
TDI SEPTAL: 0.08 M/S
TDI: 0.08 M/S
TR MAX PG: 22 MMHG
TRICUSPID ANNULAR PLANE SYSTOLIC EXCURSION: 2.21 CM
TV REST PULMONARY ARTERY PRESSURE: 24 MMHG
Z-SCORE OF LEFT VENTRICULAR DIMENSION IN END DIASTOLE: -2.11
Z-SCORE OF LEFT VENTRICULAR DIMENSION IN END SYSTOLE: -0.78

## 2025-04-09 PROCEDURE — 93306 TTE W/DOPPLER COMPLETE: CPT | Mod: 26,,, | Performed by: INTERNAL MEDICINE

## 2025-04-09 PROCEDURE — 93018 CV STRESS TEST I&R ONLY: CPT | Mod: ,,, | Performed by: INTERNAL MEDICINE

## 2025-04-09 PROCEDURE — A9502 TC99M TETROFOSMIN: HCPCS | Performed by: INTERNAL MEDICINE

## 2025-04-09 PROCEDURE — 93016 CV STRESS TEST SUPVJ ONLY: CPT | Mod: ,,, | Performed by: INTERNAL MEDICINE

## 2025-04-09 PROCEDURE — 93017 CV STRESS TEST TRACING ONLY: CPT

## 2025-04-09 PROCEDURE — 78452 HT MUSCLE IMAGE SPECT MULT: CPT

## 2025-04-09 PROCEDURE — 78452 HT MUSCLE IMAGE SPECT MULT: CPT | Mod: 26,,, | Performed by: INTERNAL MEDICINE

## 2025-04-09 PROCEDURE — 63600175 PHARM REV CODE 636 W HCPCS: Performed by: INTERNAL MEDICINE

## 2025-04-09 PROCEDURE — 93306 TTE W/DOPPLER COMPLETE: CPT

## 2025-04-09 RX ORDER — REGADENOSON 0.08 MG/ML
0.4 INJECTION, SOLUTION INTRAVENOUS ONCE
Status: COMPLETED | OUTPATIENT
Start: 2025-04-09 | End: 2025-04-09

## 2025-04-09 RX ADMIN — REGADENOSON 0.4 MG: 0.08 INJECTION, SOLUTION INTRAVENOUS at 01:04

## 2025-04-09 RX ADMIN — TETROFOSMIN 9.8 MILLICURIE: 1.38 INJECTION, POWDER, LYOPHILIZED, FOR SOLUTION INTRAVENOUS at 11:04

## 2025-04-09 RX ADMIN — TETROFOSMIN 29.5 MILLICURIE: 1.38 INJECTION, POWDER, LYOPHILIZED, FOR SOLUTION INTRAVENOUS at 02:04

## 2025-04-10 LAB
CV STRESS BASE HR: 73 BPM
DIASTOLIC BLOOD PRESSURE: 71 MMHG
NUC REST EJECTION FRACTION: 88
NUC STRESS EJECTION FRACTION: 82 %
OHS CV CPX 85 PERCENT MAX PREDICTED HEART RATE MALE: 127
OHS CV CPX MAX PREDICTED HEART RATE: 149
OHS CV CPX PATIENT IS FEMALE: 1
OHS CV CPX PATIENT IS MALE: 0
OHS CV CPX PEAK DIASTOLIC BLOOD PRESSURE: 60 MMHG
OHS CV CPX PEAK HEAR RATE: 85 BPM
OHS CV CPX PEAK RATE PRESSURE PRODUCT: NORMAL
OHS CV CPX PEAK SYSTOLIC BLOOD PRESSURE: 140 MMHG
OHS CV CPX PERCENT MAX PREDICTED HEART RATE ACHIEVED: 59
OHS CV CPX RATE PRESSURE PRODUCT PRESENTING: NORMAL
OHS CV INITIAL DOSE: 9.8 MCG/KG/MIN
OHS CV PEAK DOSE: 29.5 MCG/KG/MIN
SYSTOLIC BLOOD PRESSURE: 146 MMHG

## 2025-04-14 ENCOUNTER — RESULTS FOLLOW-UP (OUTPATIENT)
Dept: CARDIOLOGY | Facility: CLINIC | Age: 70
End: 2025-04-14

## 2025-04-27 DIAGNOSIS — K21.9 GERD WITHOUT ESOPHAGITIS: ICD-10-CM

## 2025-04-29 RX ORDER — PANTOPRAZOLE SODIUM 40 MG/1
40 TABLET, DELAYED RELEASE ORAL DAILY
Qty: 90 TABLET | Refills: 3 | Status: SHIPPED | OUTPATIENT
Start: 2025-04-29

## 2025-04-30 DIAGNOSIS — M05.9 SEROPOSITIVE RHEUMATOID ARTHRITIS: ICD-10-CM

## 2025-05-01 ENCOUNTER — TELEPHONE (OUTPATIENT)
Dept: RHEUMATOLOGY | Facility: CLINIC | Age: 70
End: 2025-05-01
Payer: MEDICARE

## 2025-05-01 DIAGNOSIS — J30.89 SEASONAL ALLERGIC RHINITIS DUE TO OTHER ALLERGIC TRIGGER: ICD-10-CM

## 2025-05-01 RX ORDER — METHOTREXATE 2.5 MG/1
20 TABLET ORAL
Qty: 96 TABLET | Refills: 1 | Status: SHIPPED | OUTPATIENT
Start: 2025-05-01 | End: 2025-10-28

## 2025-05-01 RX ORDER — FLUTICASONE PROPIONATE 50 MCG
SPRAY, SUSPENSION (ML) NASAL
Qty: 16 G | Refills: 11 | Status: SHIPPED | OUTPATIENT
Start: 2025-05-01

## 2025-05-01 NOTE — TELEPHONE ENCOUNTER
Outgoing phone call re: patient's concerns w/ Rinvoq.   Nausea that does not subside for a day or so, typically within 12 hours of a dose.   Has taken MTX + FA as directed. Nausea is not due to the MTX.   Recommend sooner follow up appt w/ Dr. Hernadez to discuss alternatives. May benefit from alternative Rachel.

## 2025-05-02 ENCOUNTER — TELEPHONE (OUTPATIENT)
Dept: RHEUMATOLOGY | Facility: CLINIC | Age: 70
End: 2025-05-02
Payer: MEDICARE

## 2025-05-02 NOTE — TELEPHONE ENCOUNTER
----- Message from Pharmacist Marianne sent at 5/1/2025 12:20 PM CDT -----  Hefrancisco Cruz Mahi, Could you please find an earlier appt for this patient? She is having intolerable side effects with Rinvoq and would like to change therapy. Thanks so much!

## 2025-05-02 NOTE — TELEPHONE ENCOUNTER
Spoke with patient . Scheduled with Satya Snider for 5-7 at '[facility]

## 2025-05-08 ENCOUNTER — OFFICE VISIT (OUTPATIENT)
Dept: ORTHOPEDICS | Facility: CLINIC | Age: 70
End: 2025-05-08
Payer: MEDICARE

## 2025-05-08 VITALS — WEIGHT: 179 LBS | BODY MASS INDEX: 27.13 KG/M2 | HEIGHT: 68 IN

## 2025-05-08 DIAGNOSIS — Z96.652 STATUS POST LEFT PARTIAL KNEE REPLACEMENT: ICD-10-CM

## 2025-05-08 DIAGNOSIS — M54.50 LOW BACK PAIN, UNSPECIFIED BACK PAIN LATERALITY, UNSPECIFIED CHRONICITY, UNSPECIFIED WHETHER SCIATICA PRESENT: ICD-10-CM

## 2025-05-08 DIAGNOSIS — M51.360 DEGENERATION OF INTERVERTEBRAL DISC OF LUMBAR REGION WITH DISCOGENIC BACK PAIN: ICD-10-CM

## 2025-05-08 DIAGNOSIS — Z96.651 HISTORY OF REVISION OF TOTAL REPLACEMENT OF RIGHT KNEE JOINT: Primary | ICD-10-CM

## 2025-05-08 DIAGNOSIS — M70.61 GREATER TROCHANTERIC BURSITIS OF BOTH HIPS: ICD-10-CM

## 2025-05-08 DIAGNOSIS — M70.62 GREATER TROCHANTERIC BURSITIS OF BOTH HIPS: ICD-10-CM

## 2025-05-08 DIAGNOSIS — M43.16 SPONDYLOLISTHESIS, LUMBAR REGION: ICD-10-CM

## 2025-05-08 PROCEDURE — 99999 PR PBB SHADOW E&M-EST. PATIENT-LVL III: CPT | Mod: PBBFAC,,, | Performed by: ORTHOPAEDIC SURGERY

## 2025-05-08 NOTE — PATIENT INSTRUCTIONS
You are doing extremely well with the your knees you not having any pain with a revision or the left knee  You are still taking right now methotrexate and reamed voc and Dr. Hernadez put you on prednisone 10 mg daily which helps   The epidurals in you back did not seem to help much   Shoes with arch supports seems to be the trick it does make a difference for you   Doing well as far as her knees are concerned you do have a partial knee on the left and a we revise the partial on the right side and they both doing well   I will see you at 1 year follow-up we will obtain new x-rays on both of her knees

## 2025-05-08 NOTE — PROGRESS NOTES
Subjective:     Patient ID: Caitlin Ahmadi is a 70 y.o. female.    Chief Complaint: Pain of the Right Knee  01/11/2024  HPI:  Bilateral knee pain with the right worse than the left   Bilateral hip pains   Low back pain   Patient states in 2017 had right partial knee replacement by Dr. Wheeler, in 2018 she had left partial knee replacement done by Dr. randhawa at [facility].  She said she did well with both and all of a sudden the right knee became severely painful over the last 3 months.  She has been having pain on and off with it and swelling.  She made her appointment today.  In the meantime she was having pain in her hips for 6 months and no history of trauma she can not sleep on the right side she sleeps on the left.  She has been seen by Rheumatology and primary care and other providers and no thing was offered to her.  She tried Motrin mixed with Tylenol 2 tablets of each may taken twice a day seems to ease things up a little bit.  She was not placed on other anti-inflammatories by prescription.  She has not taking steroid pills because she is on methotrexate.  Patient gives history of infection occurring into her thumb was on IV antibiotics and was given tramadol for the pain and not having any medications at this time for pain.  I 1 point she said she is having back pain then she said it has not hurting her as much.  There is no radicular symptoms type going below the knee.  She did receive an injection of the right knee awhile back by Rheumatology over the pes anserinus bursa on the medial tibial flare according to the patient      1/26/24    Right knee severe pain.  On x-ray looks like it is loose and malrotated.  Workup for infection has been negative.  Sed rate, CRP, crystals, WBC, knee aspirate cultures have been negative also.  I went over does labs with the patient.  I think at this time she needs to have right knee revised.  Spent a long time discussing the revision and the pros and cons of  surgery in details and allow her to ask questions.  Went over the instructions.  She needs to be seen by Cardiology.  She can not take Mobic or Celebrex  She can take ibuprofen with Tylenol   No fever no chills no shortness of breath or difficulty with chewing swallowing loss of bowel bladder control  She does complain of numbness on the outside of her knee as well in her foot.  I did tell her the numbness in the foot has nothing to do with the knee and that could be coming from her back    02/17/2024   Right total knee revision 03/12/2024.  Her partial knee replacement was completely loose patella was severely arthritic.  She said she is definitely much better than before surgery.  She claims that her pain is 8/10 because having back issues.  Primary care obtained an x-ray and told her that she has some arthritis.  I did tell her I do not treat back pain and I reviewed her x-rays with her from the total knee as well as the lumbar spine that was obtained by primary care.  She is having some burning pains that going down the legs and around the anterior thigh and below the knee joint.  She goes to physical therapy at [facility] on the [place].  She says definitely is better than before surgery but the other creams are bothering her quite a bit.  She has history of rheumatoid arthritis.  She is taking ibuprofen 600 and Tylenol and it has not helping.  She can not take meloxicam and Celebrex.  I did tell him maybe the anti-inflammatories stop working for her maybe should switch her.  We did recommend Protonix and she said she does take it to.  I recommended that her primary care refer her to pain management and Pain Clinic at this time.  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      Mild persistent asthma without complication 07/25/2024    PONV (postoperative nausea and vomiting)     Seropositive rheumatoid arthritis 02/17/2022     Past Surgical History:   Procedure Laterality Date    ADENOIDECTOMY      CATARACT EXTRACTION W/  INTRAOCULAR LENS IMPLANT Left 12/09/2020    CATARACT EXTRACTION W/  INTRAOCULAR LENS IMPLANT      CHOLECYSTECTOMY      COLONOSCOPY N/A 10/13/2021    Procedure: COLONOSCOPY;  Surgeon: Elissa Yoon MD;  Location: [facility] ENDO;  Service: Endoscopy;  Laterality: N/A;    HERNIA REPAIR      HYSTERECTOMY      INJECTION OF ANESTHETIC AGENT INTO SACROILIAC JOINT Bilateral 11/7/2024    Procedure: bilateral SIJ + bilateral GTB;  Surgeon: Manuel eLmon MD;  Location: [facility] PAIN MGT;  Service: Pain Management;  Laterality: Bilateral;    REVISION OF KNEE ARTHROPLASTY Right 3/12/2024    Procedure: REVISION, ARTHROPLASTY, KNEE;  Surgeon: Jalen Rangel MD;  Location: [facility] OR;  Service: General;  Laterality: Right;    TONSILLECTOMY      TOTAL KNEE ARTHROPLASTY Bilateral     TRANSFORAMINAL EPIDURAL INJECTION OF STEROID Left 1/30/2025    Procedure: left L2/3 + L3/4 TF CARINA;  Surgeon: Manuel Lemon MD;  Location: [facility] PAIN MGT;  Service: Pain Management;  Laterality: Left;     Family History   Problem Relation Name Age of Onset    No Known Problems Mother      Alzheimer's disease Father       Social History     Socioeconomic History    Marital status:    Tobacco Use    Smoking status: Former     Current packs/day: 0.00     Average packs/day: 1 pack/day for 20.0  years (20.0 ttl pk-yrs)     Types: Cigarettes     Start date: 1970     Quit date: 1990     Years since quittin.9    Smokeless tobacco: Never   Substance and Sexual Activity    Alcohol use: Yes     Alcohol/week: 5.0 standard drinks of alcohol     Types: 5 Glasses of wine per week     Comment: no longer drinkning    Drug use: Never    Sexual activity: Yes     Partners: Male     Medication List with Changes/Refills   Current Medications    ALBUTEROL (PROVENTIL/VENTOLIN HFA) 90 MCG/ACTUATION INHALER    INHALE 2 PUFFS INTO THE LUNGS EVERY 6 HOURS AS NEEDED FOR WHEEZING    AMITRIPTYLINE (ELAVIL) 100 MG TABLET    TAKE 3 TABLETS(300 MG) BY MOUTH EVERY EVENING    ATENOLOL (TENORMIN) 25 MG TABLET    Take 1 tablet (25 mg total) by mouth once daily.    BUDESONIDE (PULMICORT) 0.5 MG/2 ML NEBULIZER SOLUTION    Take 2 mLs (0.5 mg total) by nebulization 2 (two) times daily. Controller    BUDESONIDE-FORMOTEROL 160-4.5 MCG (SYMBICORT) 160-4.5 MCG/ACTUATION HFAA    Inhale 2 puffs into the lungs every 12 (twelve) hours. Controller    FLUTICASONE PROPIONATE (FLONASE) 50 MCG/ACTUATION NASAL SPRAY    SHAKE LIQUID AND USE 2 SPRAYS(100 MCG) IN EACH NOSTRIL EVERY DAY    IBUPROFEN (ADVIL,MOTRIN) 200 MG TABLET    Take 600 mg by mouth every 6 (six) hours as needed.    IBUPROFEN (ADVIL,MOTRIN) 600 MG TABLET    Take 1 tablet (600 mg total) by mouth 2 (two) times daily as needed for Pain.    METHOTREXATE 2.5 MG TAB    Take 8 tablets (20 mg total) by mouth every 7 days.    PANTOPRAZOLE (PROTONIX) 40 MG TABLET    Take 1 tablet (40 mg total) by mouth once daily.    UPADACITINIB (RINVOQ) 15 MG 24 HR TABLET    Take 1 tablet (15 mg total) by mouth once daily.     Review of patient's allergies indicates:   Allergen Reactions    Pneumococcal vaccine Swelling    Celebrex [celecoxib] Itching and Rash    Hydrocodone-acetaminophen Nausea Only and Nausea And Vomiting    Meloxicam Rash    Sulfa (sulfonamide antibiotics) Rash     Review of Systems    Constitutional: Negative for decreased appetite.   HENT:  Negative for tinnitus.    Eyes:  Negative for double vision.   Cardiovascular:  Negative for chest pain.   Respiratory:  Negative for wheezing.    Hematologic/Lymphatic: Negative for bleeding problem.   Skin:  Negative for dry skin.   Musculoskeletal:  Positive for arthritis and back pain. Negative for gout, joint pain, muscle weakness, myalgias, neck pain and stiffness.   Gastrointestinal:  Negative for abdominal pain.   Genitourinary:  Negative for bladder incontinence.   Neurological:  Negative for numbness, paresthesias and sensory change.   Psychiatric/Behavioral:  Negative for altered mental status.        Objective:   Body mass index is 27.22 kg/m².  There were no vitals filed for this visit.       General    Constitutional: She is oriented to person, place, and time. She appears well-developed.   HENT:   Head: Atraumatic.   Eyes: EOM are normal.   Pulmonary/Chest: Effort normal.   Neurological: She is alert and oriented to person, place, and time.   Psychiatric: Judgment normal.           Ambulating without any assistive devices   Pelvis is level   Negative straight leg raising bilaterally   Bilateral hips passive motion no pain in the groin and excellent range of motion   Hip flexors, abductors and adductors were slightly weak at 5-/5   Right knee preop with a incision or you knee replacement the scar healed well.  There is an area highly suspicious of psoriasis approximately a cm and a half by cm and half just a medial to the surgical incision.  There is swelling and pain over the medial aspect of the knee joint and over the medial tibial flare.  She does have almost full motion no defect in the patella or quadriceps tendon.  Collaterals are stable.  Negative anterior drawer.  .  Possible patellofemoral crepitus to range of motion and compression    Right knee postop revision surgical scar healed well.  She has 0-135 degrees of flexion.  No  swelling.  Stable in extension and in flexion.  .  There is also some tenderness distally.  There is no defect in the patella or quadriceps tendon.  Minimal tenderness over the pes anserinus bursa    The left knee with anterior surgical scar healed well.  She has 0-130 degrees of flexion stable to varus valgus stressing.  There is no swelling in the knee joint.  There is no tenderness to palpation there is some crepitus to compression on the patella and slight tenderness at that point.  Negative anterior drawer  Calves are soft nontender  Ankle motion intact   Skin is warm to touch    Relevant imaging results reviewed and interpreted by me, discussed with the patient and / or family today   X-ray 06/12/2024 right total knee revision using primary united femoral component and tibial tray with a stem very well fixed in excellent alignment and patella is midline   The left knee with partial knee replacement still looking okay  X-ray of the lumbar spine showing L2-3 and L3-4 degenerative disc disease with mild spondylolisthesis and very mild scoliotic curvature    X-ray 01/11/2024 of the pelvis and the hips showing excellent joint space.  There is small osteophyte over the greater trochanters consistent with bursitis of the hips.  The bone look osteopenic   X-ray 1/11/24 bilateral knees showing left knee medial partial knee replacement still in good alignment.  There is some mild patellofemoral arthritic changes and small marginal osteophyte.  Right knee looks like the femoral component of the partial knee replacement medially is loose there is radiolucent line on the lateral view like a when she will type of movement of the femoral component and does not look where it is supposed to probably rotated due to loosening.  There is also radiolucent line on the tibial component on the lateral view posteriorly.  Indicative of loosened partial knee replacement  Assessment:     Encounter Diagnoses   Name Primary?    History of  revision of total replacement of right knee joint Yes    Status post left partial knee replacement     Greater trochanteric bursitis of both hips     Low back pain, unspecified back pain laterality, unspecified chronicity, unspecified whether sciatica present     Degeneration of intervertebral disc of lumbar region with discogenic back pain     Spondylolisthesis, lumbar region         Plan:   History of revision of total replacement of right knee joint    Status post left partial knee replacement    Greater trochanteric bursitis of both hips    Low back pain, unspecified back pain laterality, unspecified chronicity, unspecified whether sciatica present    Degeneration of intervertebral disc of lumbar region with discogenic back pain    Spondylolisthesis, lumbar region         Patient Instructions   You are doing extremely well with the your knees you not having any pain with a revision or the left knee  You are still taking right now methotrexate and reamed voc and Dr. Hernadez put you on prednisone 10 mg daily which helps   The epidurals in you back did not seem to help much   Shoes with arch supports seems to be the trick it does make a difference for you   Doing well as far as her knees are concerned you do have a partial knee on the left and a we revise the partial on the right side and they both doing well   I will see you at 1 year follow-up we will obtain new x-rays on both of her knees  Patient is immunocompromised on methotrexate which carries a little bit longer healing process      Disclaimer: This note was prepared using a voice recognition system and is likely to have sound alike errors within the text.

## 2025-05-09 DIAGNOSIS — Z12.11 COLON CANCER SCREENING: Primary | ICD-10-CM

## 2025-05-11 ENCOUNTER — PATIENT MESSAGE (OUTPATIENT)
Dept: OPTOMETRY | Facility: CLINIC | Age: 70
End: 2025-05-11
Payer: MEDICARE

## 2025-05-12 DIAGNOSIS — F33.41 RECURRENT MAJOR DEPRESSIVE DISORDER, IN PARTIAL REMISSION: ICD-10-CM

## 2025-05-12 RX ORDER — AMITRIPTYLINE HYDROCHLORIDE 100 MG/1
TABLET ORAL
Qty: 270 TABLET | Refills: 0 | Status: SHIPPED | OUTPATIENT
Start: 2025-05-12

## 2025-05-12 NOTE — TELEPHONE ENCOUNTER
No care due was identified.  [facility] Embedded Care Due Messages. Reference number: [id number].   5/12/2025 3:15:37 AM CDT

## 2025-05-12 NOTE — TELEPHONE ENCOUNTER
Refill Decision Note   Caitlin Ahmadi  is requesting a refill authorization.  Brief Assessment and Rationale for Refill:  Approve     Medication Therapy Plan:         Comments:     Note composed:12:16 PM 05/12/2025

## 2025-05-13 DIAGNOSIS — F33.41 RECURRENT MAJOR DEPRESSIVE DISORDER, IN PARTIAL REMISSION: ICD-10-CM

## 2025-05-13 RX ORDER — AMITRIPTYLINE HYDROCHLORIDE 100 MG/1
TABLET ORAL
Qty: 270 TABLET | Refills: 0 | Status: CANCELLED | OUTPATIENT
Start: 2025-05-13

## 2025-05-13 NOTE — TELEPHONE ENCOUNTER
No care due was identified.  [facility] Embedded Care Due Messages. Reference number: [id number].   5/13/2025 1:18:36 PM CDT

## 2025-06-10 RX ORDER — PREDNISONE 5 MG/1
5 TABLET ORAL DAILY
COMMUNITY
End: 2025-06-10 | Stop reason: SDUPTHER

## 2025-06-10 RX ORDER — PREDNISONE 5 MG/1
5 TABLET ORAL DAILY
Qty: 30 TABLET | Refills: 1 | Status: SHIPPED | OUTPATIENT
Start: 2025-06-10

## 2025-06-24 ENCOUNTER — TELEPHONE (OUTPATIENT)
Dept: RHEUMATOLOGY | Facility: CLINIC | Age: 70
End: 2025-06-24
Payer: MEDICARE

## 2025-06-24 NOTE — TELEPHONE ENCOUNTER
Caitlin Ahmadi (Key: [id number])  PA Case ID #: [id number]  Need Help? Call us at [phone]  Status  Sent to Plan today  Drug  Rinvoq 15MG er tablets    Form  [employer] Medicare Part D Electronic Prior Authorization Form (2017 NCPDP)

## 2025-07-12 DIAGNOSIS — F33.41 RECURRENT MAJOR DEPRESSIVE DISORDER, IN PARTIAL REMISSION: ICD-10-CM

## 2025-07-12 NOTE — TELEPHONE ENCOUNTER
No care due was identified.  [facility] Embedded Care Due Messages. Reference number: [id number].   7/12/2025 6:49:31 PM CDT

## 2025-07-16 DIAGNOSIS — F33.41 RECURRENT MAJOR DEPRESSIVE DISORDER, IN PARTIAL REMISSION: ICD-10-CM

## 2025-07-16 RX ORDER — AMITRIPTYLINE HYDROCHLORIDE 100 MG/1
TABLET ORAL
Qty: 270 TABLET | Refills: 0 | OUTPATIENT
Start: 2025-07-16

## 2025-07-16 RX ORDER — AMITRIPTYLINE HYDROCHLORIDE 100 MG/1
TABLET ORAL
Qty: 270 TABLET | Refills: 0 | Status: SHIPPED | OUTPATIENT
Start: 2025-07-16

## 2025-07-16 NOTE — TELEPHONE ENCOUNTER
No care due was identified.  [facility] Embedded Care Due Messages. Reference number: [id number].   7/16/2025 1:32:14 PM CDT

## 2025-07-23 ENCOUNTER — LAB VISIT (OUTPATIENT)
Dept: LAB | Facility: HOSPITAL | Age: 70
End: 2025-07-23
Attending: INTERNAL MEDICINE
Payer: MEDICARE

## 2025-07-23 DIAGNOSIS — M05.9 SEROPOSITIVE RHEUMATOID ARTHRITIS: ICD-10-CM

## 2025-07-23 DIAGNOSIS — D84.821 IMMUNODEFICIENCY DUE TO DRUGS (CODE): ICD-10-CM

## 2025-07-23 DIAGNOSIS — Z51.81 MEDICATION MONITORING ENCOUNTER: ICD-10-CM

## 2025-07-23 LAB
ABSOLUTE EOSINOPHIL (OHS): 0.17 K/UL
ABSOLUTE MONOCYTE (OHS): 0.73 K/UL (ref 0.3–1)
ABSOLUTE NEUTROPHIL COUNT (OHS): 3.67 K/UL (ref 1.8–7.7)
ALBUMIN SERPL BCP-MCNC: 3.4 G/DL (ref 3.5–5.2)
ALP SERPL-CCNC: 96 UNIT/L (ref 40–150)
ALT SERPL W/O P-5'-P-CCNC: 43 UNIT/L (ref 10–44)
ANION GAP (OHS): 9 MMOL/L (ref 8–16)
AST SERPL-CCNC: 29 UNIT/L (ref 11–45)
BASOPHILS # BLD AUTO: 0.07 K/UL
BASOPHILS NFR BLD AUTO: 1.1 %
BILIRUB SERPL-MCNC: 0.2 MG/DL (ref 0.1–1)
BUN SERPL-MCNC: 12 MG/DL (ref 8–23)
CALCIUM SERPL-MCNC: 8.9 MG/DL (ref 8.7–10.5)
CHLORIDE SERPL-SCNC: 107 MMOL/L (ref 95–110)
CO2 SERPL-SCNC: 21 MMOL/L (ref 23–29)
CREAT SERPL-MCNC: 0.9 MG/DL (ref 0.5–1.4)
CRP SERPL-MCNC: 4.2 MG/L
ERYTHROCYTE [DISTWIDTH] IN BLOOD BY AUTOMATED COUNT: 14.3 % (ref 11.5–14.5)
GFR SERPLBLD CREATININE-BSD FMLA CKD-EPI: >60 ML/MIN/1.73/M2
GLUCOSE SERPL-MCNC: 230 MG/DL (ref 70–110)
HCT VFR BLD AUTO: 35.8 % (ref 37–48.5)
HGB BLD-MCNC: 11.6 GM/DL (ref 12–16)
IMM GRANULOCYTES # BLD AUTO: 0.06 K/UL (ref 0–0.04)
IMM GRANULOCYTES NFR BLD AUTO: 0.9 % (ref 0–0.5)
LYMPHOCYTES # BLD AUTO: 1.74 K/UL (ref 1–4.8)
MCH RBC QN AUTO: 30.1 PG (ref 27–31)
MCHC RBC AUTO-ENTMCNC: 32.4 G/DL (ref 32–36)
MCV RBC AUTO: 93 FL (ref 82–98)
NUCLEATED RBC (/100WBC) (OHS): 0 /100 WBC
PLATELET # BLD AUTO: 183 K/UL (ref 150–450)
PMV BLD AUTO: 10 FL (ref 9.2–12.9)
POTASSIUM SERPL-SCNC: 4.1 MMOL/L (ref 3.5–5.1)
PROT SERPL-MCNC: 7.1 GM/DL (ref 6–8.4)
RBC # BLD AUTO: 3.86 M/UL (ref 4–5.4)
RELATIVE EOSINOPHIL (OHS): 2.6 %
RELATIVE LYMPHOCYTE (OHS): 27 % (ref 18–48)
RELATIVE MONOCYTE (OHS): 11.3 % (ref 4–15)
RELATIVE NEUTROPHIL (OHS): 57.1 % (ref 38–73)
SODIUM SERPL-SCNC: 137 MMOL/L (ref 136–145)
WBC # BLD AUTO: 6.44 K/UL (ref 3.9–12.7)

## 2025-07-23 PROCEDURE — 85025 COMPLETE CBC W/AUTO DIFF WBC: CPT

## 2025-07-23 PROCEDURE — 86140 C-REACTIVE PROTEIN: CPT

## 2025-07-23 PROCEDURE — 82040 ASSAY OF SERUM ALBUMIN: CPT

## 2025-07-23 PROCEDURE — 36415 COLL VENOUS BLD VENIPUNCTURE: CPT

## 2025-07-24 ENCOUNTER — PATIENT MESSAGE (OUTPATIENT)
Dept: RHEUMATOLOGY | Facility: CLINIC | Age: 70
End: 2025-07-24
Payer: MEDICARE

## 2025-07-25 ENCOUNTER — LAB VISIT (OUTPATIENT)
Dept: LAB | Facility: HOSPITAL | Age: 70
End: 2025-07-25
Attending: INTERNAL MEDICINE
Payer: MEDICARE

## 2025-07-25 ENCOUNTER — OFFICE VISIT (OUTPATIENT)
Dept: INTERNAL MEDICINE | Facility: CLINIC | Age: 70
End: 2025-07-25
Payer: MEDICARE

## 2025-07-25 VITALS
TEMPERATURE: 98 F | HEIGHT: 68 IN | OXYGEN SATURATION: 97 % | DIASTOLIC BLOOD PRESSURE: 72 MMHG | WEIGHT: 185.19 LBS | BODY MASS INDEX: 28.07 KG/M2 | SYSTOLIC BLOOD PRESSURE: 120 MMHG | HEART RATE: 72 BPM

## 2025-07-25 DIAGNOSIS — M85.89 OSTEOPENIA OF MULTIPLE SITES: ICD-10-CM

## 2025-07-25 DIAGNOSIS — J45.30 MILD PERSISTENT ASTHMA WITHOUT COMPLICATION: Chronic | ICD-10-CM

## 2025-07-25 DIAGNOSIS — D84.821 IMMUNODEFICIENCY DUE TO DRUGS (CODE): ICD-10-CM

## 2025-07-25 DIAGNOSIS — I10 PRIMARY HYPERTENSION: ICD-10-CM

## 2025-07-25 DIAGNOSIS — E78.5 HYPERLIPIDEMIA, UNSPECIFIED HYPERLIPIDEMIA TYPE: ICD-10-CM

## 2025-07-25 DIAGNOSIS — I38 HEART VALVE DISEASE: ICD-10-CM

## 2025-07-25 DIAGNOSIS — C18.1 MALIGNANT NEOPLASM OF APPENDIX: ICD-10-CM

## 2025-07-25 DIAGNOSIS — R73.9 HYPERGLYCEMIA: ICD-10-CM

## 2025-07-25 DIAGNOSIS — J45.31 MILD PERSISTENT ASTHMA WITH ACUTE EXACERBATION: ICD-10-CM

## 2025-07-25 DIAGNOSIS — R73.9 HYPERGLYCEMIA: Primary | ICD-10-CM

## 2025-07-25 LAB
ANION GAP (OHS): 9 MMOL/L (ref 8–16)
BUN SERPL-MCNC: 12 MG/DL (ref 8–23)
CALCIUM SERPL-MCNC: 9 MG/DL (ref 8.7–10.5)
CHLORIDE SERPL-SCNC: 106 MMOL/L (ref 95–110)
CO2 SERPL-SCNC: 24 MMOL/L (ref 23–29)
CREAT SERPL-MCNC: 0.8 MG/DL (ref 0.5–1.4)
EAG (OHS): 200 MG/DL (ref 68–131)
GFR SERPLBLD CREATININE-BSD FMLA CKD-EPI: >60 ML/MIN/1.73/M2
GLUCOSE SERPL-MCNC: 177 MG/DL (ref 70–110)
HBA1C MFR BLD: 8.6 % (ref 4–5.6)
POTASSIUM SERPL-SCNC: 4.3 MMOL/L (ref 3.5–5.1)
SODIUM SERPL-SCNC: 139 MMOL/L (ref 136–145)

## 2025-07-25 PROCEDURE — 3288F FALL RISK ASSESSMENT DOCD: CPT | Mod: CPTII,S$GLB,, | Performed by: INTERNAL MEDICINE

## 2025-07-25 PROCEDURE — 99214 OFFICE O/P EST MOD 30 MIN: CPT | Mod: S$GLB,,, | Performed by: INTERNAL MEDICINE

## 2025-07-25 PROCEDURE — 83036 HEMOGLOBIN GLYCOSYLATED A1C: CPT

## 2025-07-25 PROCEDURE — 1126F AMNT PAIN NOTED NONE PRSNT: CPT | Mod: CPTII,S$GLB,, | Performed by: INTERNAL MEDICINE

## 2025-07-25 PROCEDURE — 3074F SYST BP LT 130 MM HG: CPT | Mod: CPTII,S$GLB,, | Performed by: INTERNAL MEDICINE

## 2025-07-25 PROCEDURE — 36415 COLL VENOUS BLD VENIPUNCTURE: CPT

## 2025-07-25 PROCEDURE — 3078F DIAST BP <80 MM HG: CPT | Mod: CPTII,S$GLB,, | Performed by: INTERNAL MEDICINE

## 2025-07-25 PROCEDURE — 99999 PR PBB SHADOW E&M-EST. PATIENT-LVL III: CPT | Mod: PBBFAC,,, | Performed by: INTERNAL MEDICINE

## 2025-07-25 PROCEDURE — G2211 COMPLEX E/M VISIT ADD ON: HCPCS | Mod: S$GLB,,, | Performed by: INTERNAL MEDICINE

## 2025-07-25 PROCEDURE — 3052F HG A1C>EQUAL 8.0%<EQUAL 9.0%: CPT | Mod: CPTII,S$GLB,, | Performed by: INTERNAL MEDICINE

## 2025-07-25 PROCEDURE — 80048 BASIC METABOLIC PNL TOTAL CA: CPT

## 2025-07-25 PROCEDURE — 3008F BODY MASS INDEX DOCD: CPT | Mod: CPTII,S$GLB,, | Performed by: INTERNAL MEDICINE

## 2025-07-25 PROCEDURE — 1159F MED LIST DOCD IN RCRD: CPT | Mod: CPTII,S$GLB,, | Performed by: INTERNAL MEDICINE

## 2025-07-25 PROCEDURE — 1101F PT FALLS ASSESS-DOCD LE1/YR: CPT | Mod: CPTII,S$GLB,, | Performed by: INTERNAL MEDICINE

## 2025-07-25 NOTE — PROGRESS NOTES
"Subjective:      Patient ID: Caitlin Ahmadi is a 70 y.o. female.    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      Mild persistent asthma without complication 07/25/2024    PONV (postoperative nausea and vomiting)     Seropositive rheumatoid arthritis 02/17/2022     Here today c/o elevated glucose. 200s on recent labs with rheum. She is on low dose daily steroids.     Review of Systems   Constitutional:  Negative for chills and fever.   HENT:  Negative for ear pain and sore throat.    Respiratory:  Negative for cough.    Cardiovascular:  Negative for chest pain.   Gastrointestinal:  Negative for abdominal pain and blood in stool.   Genitourinary:  Negative for dysuria and hematuria.   Neurological:  Negative for seizures and syncope.     Objective:   /72   Pulse 72   Temp 97.6 °F (36.4 °C)   Ht 5' 8" (1.727 m)   Wt 84 kg (185 lb 3 oz)   SpO2 97%   BMI 28.16 kg/m²     Physical Exam  Constitutional:       General: She is awake.      Appearance: Normal appearance.   HENT:      Head: Normocephalic and atraumatic.   Eyes:      Conjunctiva/sclera: Conjunctivae normal.   Pulmonary:      Effort: Pulmonary effort is normal.   Musculoskeletal:      Cervical back: Normal range of motion.   Neurological:      Mental Status: She is alert. Mental status is at baseline.   Psychiatric:         Mood and Affect: Mood normal.         Behavior: Behavior normal. Behavior is cooperative.         Thought " Content: Thought content normal.         Judgment: Judgment normal.         Lab Results   Component Value Date    WBC 6.44 07/23/2025    HGB 11.6 (L) 07/23/2025    HGB 12.0 12/05/2024    HGB 12.3 05/16/2024    HCT 35.8 (L) 07/23/2025    MCV 93 07/23/2025    MCV 91 12/05/2024    MCV 87 05/16/2024     07/23/2025    CHOL 219 (H) 06/07/2024    TRIG 170 (H) 06/07/2024    HDL 53 06/07/2024    LDLCALC 132.0 06/07/2024    LDLCALC 135.6 09/26/2022    LDLCALC 149.0 07/06/2020    ALT 43 07/23/2025    AST 29 07/23/2025     07/25/2025    K 4.3 07/25/2025    CALCIUM 9.0 07/25/2025     07/25/2025    CO2 24 07/25/2025    BUN 12 07/25/2025    CREATININE 0.8 07/25/2025    CREATININE 0.9 07/23/2025    CREATININE 0.8 12/05/2024    EGFRNORACEVR >60 07/25/2025    EGFRNORACEVR >60 07/23/2025    EGFRNORACEVR >60 12/05/2024    TSH 3.556 06/07/2024    TSH 2.218 09/26/2022    TSH 2.397 07/06/2020     (H) 07/25/2025    HGBA1C 8.6 (H) 07/25/2025    [id number] 18 (L) 05/16/2024    BNP 19 08/18/2020          The 10-year ASCVD risk score (Mario DK, et al., 2019) is: 12.7%    Values used to calculate the score:      Age: 70 years      Sex: Female      Is Non- : No      Diabetic: No      Tobacco smoker: No      Systolic Blood Pressure: 120 mmHg      Is BP treated: Yes      HDL Cholesterol: 53 mg/dL      Total Cholesterol: 219 mg/dL     Assessment:     1. Hyperglycemia    2. Mild persistent asthma without complication    3. Primary hypertension    4. Hyperlipidemia, unspecified hyperlipidemia type    5. Osteopenia of multiple sites    6. Heart valve disease    7. Mild persistent asthma with acute exacerbation    8. Immunodeficiency due to drugs (CODE)    9. Malignant neoplasm of appendix      Plan:   1. Hyperglycemia  Likely diabetes.  New dx. Check A1c.  Treat accordingly once resulted.   -     Hemoglobin A1C; Future; Expected date: 07/25/2025  -     Basic Metabolic Panel; Future; Expected date:  07/25/2025    2. Mild persistent asthma without complication    3. Primary hypertension  Overview:  Controlled.  Continue diet and exercise.        4. Hyperlipidemia, unspecified hyperlipidemia type  Overview:  Hyperlipidemia (Problem)      5. Osteopenia of multiple sites  Overview:  Low fx risk on dxa 7/2020      6. Heart valve disease  Overview:  Heart valve disorder (Problem)      7. Mild persistent asthma with acute exacerbation  Overview:  stable          There are no Patient Instructions on file for this visit.    Future Appointments   Date Time Provider Department Center   7/30/2025  2:30 PM Akhil Hernadez MD [facility]   8/14/2025  9:30 AM HG CT1 LIMIT 500 LBS HG CT SCAN [facility]   8/14/2025  9:45 AM Keshawn Coelho MD [facility] PULMSVC [facility]   9/11/2025  8:20 AM Amol Steve MD [facility] IM [facility]   10/27/2025 11:00 AM Sunny Lloyd MD [facility] CARDIO [facility]       Lab Frequency Next Occurrence   CT Chest Without Contrast Once 08/13/2025   Ambulatory referral/consult to Endo Procedure  Once 05/10/2025   Protein electrophoresis, serum     Immunofixation Electrophoresis     CBC Auto Differential     Comprehensive Metabolic Panel     C-Reactive Protein         Follow up if symptoms worsen or fail to improve, for labs today.           Visit today included increased complexity  addressed and managing the longitudinal care of the patient due to the serious and/or complex managed problem(s)          [1]   Patient Active Problem List  Diagnosis    Recurrent major depressive disorder, in partial remission    Osteopenia of multiple sites    Anxiety    General weakness    Seropositive rheumatoid arthritis    Primary hypertension    Immunodeficiency due to drugs (CODE)    SOB (shortness of breath)    Mild persistent asthma with acute exacerbation    Adult bronchiectasis    Fracture of nasal bone    Hyperlipidemia    Heart valve disease    Malignant neoplasm of appendix    Nausea  and vomiting    Aortic atherosclerosis    Primary osteoarthritis of both knees    Gastroesophageal reflux disease without esophagitis    Total knee replacement status    S/P revision of total knee, right    Decreased functional mobility and endurance    Decreased range of motion of lumbar spine    Proximal muscle weakness    Mild persistent asthma without complication

## 2025-07-26 ENCOUNTER — PATIENT MESSAGE (OUTPATIENT)
Dept: INTERNAL MEDICINE | Facility: CLINIC | Age: 70
End: 2025-07-26
Payer: MEDICARE

## 2025-07-29 ENCOUNTER — TELEPHONE (OUTPATIENT)
Dept: INTERNAL MEDICINE | Facility: CLINIC | Age: 70
End: 2025-07-29
Payer: MEDICARE

## 2025-07-29 ENCOUNTER — PATIENT MESSAGE (OUTPATIENT)
Dept: INTERNAL MEDICINE | Facility: CLINIC | Age: 70
End: 2025-07-29
Payer: MEDICARE

## 2025-07-29 NOTE — TELEPHONE ENCOUNTER
Copied from CRM #[id number]. Topic: Medications - Medication Authorization  >> Jul 29, 2025 12:14 PM Elvira wrote:  .Type:  Medication Authorization     Name of Caller: Hoda/  Pharmacy Name: [facility]   Prescription Name: semaglutide (OZEMPIC) 0.25 mg or 0.5 mg (2 mg/3 mL) pen injector  What do they need to clarify?: Needs additional information   Best Call Back Number: [phone]   Case# [id number]  Additional Information:  Hoda states she will be faxing the clinical questionnaire over again on today and if information is not received back before 07/31 @ 4:47am cst the authorization will be denied

## 2025-07-30 ENCOUNTER — TELEPHONE (OUTPATIENT)
Dept: RHEUMATOLOGY | Facility: CLINIC | Age: 70
End: 2025-07-30

## 2025-07-30 ENCOUNTER — LAB VISIT (OUTPATIENT)
Dept: LAB | Facility: HOSPITAL | Age: 70
End: 2025-07-30
Attending: INTERNAL MEDICINE
Payer: MEDICARE

## 2025-07-30 ENCOUNTER — OFFICE VISIT (OUTPATIENT)
Dept: RHEUMATOLOGY | Facility: CLINIC | Age: 70
End: 2025-07-30
Payer: MEDICARE

## 2025-07-30 VITALS
WEIGHT: 183.63 LBS | HEIGHT: 68 IN | DIASTOLIC BLOOD PRESSURE: 72 MMHG | BODY MASS INDEX: 27.83 KG/M2 | SYSTOLIC BLOOD PRESSURE: 128 MMHG | HEART RATE: 79 BPM

## 2025-07-30 DIAGNOSIS — R52 BREAKTHROUGH PAIN: ICD-10-CM

## 2025-07-30 DIAGNOSIS — Z79.899 IMMUNOSUPPRESSION DUE TO DRUG THERAPY: ICD-10-CM

## 2025-07-30 DIAGNOSIS — Z79.52 LONG TERM CURRENT USE OF SYSTEMIC STEROIDS: ICD-10-CM

## 2025-07-30 DIAGNOSIS — E11.65 TYPE 2 DIABETES MELLITUS WITH HYPERGLYCEMIA, UNSPECIFIED WHETHER LONG TERM INSULIN USE: ICD-10-CM

## 2025-07-30 DIAGNOSIS — Z51.81 MEDICATION MONITORING ENCOUNTER: ICD-10-CM

## 2025-07-30 DIAGNOSIS — M05.9 SEROPOSITIVE RHEUMATOID ARTHRITIS: Primary | ICD-10-CM

## 2025-07-30 DIAGNOSIS — Z78.9 FAILURE OF OUTPATIENT TREATMENT: ICD-10-CM

## 2025-07-30 DIAGNOSIS — Z71.89 COUNSELING ON HEALTH PROMOTION AND DISEASE PREVENTION: ICD-10-CM

## 2025-07-30 DIAGNOSIS — D84.821 IMMUNOSUPPRESSION DUE TO DRUG THERAPY: ICD-10-CM

## 2025-07-30 LAB
HBV CORE AB SERPL QL IA: NORMAL
HBV SURFACE AG SERPL QL IA: NORMAL

## 2025-07-30 PROCEDURE — 1126F AMNT PAIN NOTED NONE PRSNT: CPT | Mod: CPTII,S$GLB,, | Performed by: INTERNAL MEDICINE

## 2025-07-30 PROCEDURE — 3052F HG A1C>EQUAL 8.0%<EQUAL 9.0%: CPT | Mod: CPTII,S$GLB,, | Performed by: INTERNAL MEDICINE

## 2025-07-30 PROCEDURE — 1159F MED LIST DOCD IN RCRD: CPT | Mod: CPTII,S$GLB,, | Performed by: INTERNAL MEDICINE

## 2025-07-30 PROCEDURE — 1101F PT FALLS ASSESS-DOCD LE1/YR: CPT | Mod: CPTII,S$GLB,, | Performed by: INTERNAL MEDICINE

## 2025-07-30 PROCEDURE — 36415 COLL VENOUS BLD VENIPUNCTURE: CPT

## 2025-07-30 PROCEDURE — 3008F BODY MASS INDEX DOCD: CPT | Mod: CPTII,S$GLB,, | Performed by: INTERNAL MEDICINE

## 2025-07-30 PROCEDURE — 3288F FALL RISK ASSESSMENT DOCD: CPT | Mod: CPTII,S$GLB,, | Performed by: INTERNAL MEDICINE

## 2025-07-30 PROCEDURE — 86704 HEP B CORE ANTIBODY TOTAL: CPT

## 2025-07-30 PROCEDURE — 87340 HEPATITIS B SURFACE AG IA: CPT

## 2025-07-30 PROCEDURE — 99999 PR PBB SHADOW E&M-EST. PATIENT-LVL III: CPT | Mod: PBBFAC,,, | Performed by: INTERNAL MEDICINE

## 2025-07-30 PROCEDURE — 3074F SYST BP LT 130 MM HG: CPT | Mod: CPTII,S$GLB,, | Performed by: INTERNAL MEDICINE

## 2025-07-30 PROCEDURE — G2211 COMPLEX E/M VISIT ADD ON: HCPCS | Mod: S$GLB,,, | Performed by: INTERNAL MEDICINE

## 2025-07-30 PROCEDURE — 86480 TB TEST CELL IMMUN MEASURE: CPT

## 2025-07-30 PROCEDURE — 99215 OFFICE O/P EST HI 40 MIN: CPT | Mod: S$GLB,,, | Performed by: INTERNAL MEDICINE

## 2025-07-30 PROCEDURE — 3078F DIAST BP <80 MM HG: CPT | Mod: CPTII,S$GLB,, | Performed by: INTERNAL MEDICINE

## 2025-07-30 RX ORDER — FOLIC ACID 1 MG/1
1 TABLET ORAL DAILY
Qty: 90 TABLET | Refills: 3 | Status: SHIPPED | OUTPATIENT
Start: 2025-07-30 | End: 2026-07-30

## 2025-07-30 RX ORDER — ACETAMINOPHEN 325 MG/1
650 TABLET ORAL
OUTPATIENT
Start: 2025-07-30

## 2025-07-30 RX ORDER — METHOTREXATE 2.5 MG/1
20 TABLET ORAL
Qty: 96 TABLET | Refills: 1 | Status: SHIPPED | OUTPATIENT
Start: 2025-07-30 | End: 2026-01-26

## 2025-07-30 RX ORDER — EPINEPHRINE 0.3 MG/.3ML
0.3 INJECTION SUBCUTANEOUS ONCE AS NEEDED
OUTPATIENT
Start: 2025-07-30

## 2025-07-30 RX ORDER — HEPARIN 100 UNIT/ML
500 SYRINGE INTRAVENOUS
OUTPATIENT
Start: 2025-07-30

## 2025-07-30 RX ORDER — SODIUM CHLORIDE 0.9 % (FLUSH) 0.9 %
10 SYRINGE (ML) INJECTION
OUTPATIENT
Start: 2025-07-30

## 2025-07-30 RX ORDER — ACETAMINOPHEN 325 MG/1
325 TABLET ORAL ONCE
OUTPATIENT
Start: 2025-07-30

## 2025-07-30 RX ORDER — DIPHENHYDRAMINE HYDROCHLORIDE 50 MG/ML
25 INJECTION, SOLUTION INTRAMUSCULAR; INTRAVENOUS
OUTPATIENT
Start: 2025-07-30

## 2025-07-30 RX ORDER — DIPHENHYDRAMINE HYDROCHLORIDE 50 MG/ML
50 INJECTION, SOLUTION INTRAMUSCULAR; INTRAVENOUS ONCE AS NEEDED
OUTPATIENT
Start: 2025-07-30

## 2025-07-30 RX ORDER — DIPHENHYDRAMINE HCL 25 MG
25 CAPSULE ORAL ONCE
OUTPATIENT
Start: 2025-07-30

## 2025-07-30 NOTE — TELEPHONE ENCOUNTER
ID labs collected. Awaiting results. Routed to [facility]. Reminder set for follow up.

## 2025-07-30 NOTE — Clinical Note
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  Worsening DM II.  Failure to MTX monotherapy.  Low likelihood of TNFi response per Lesly RA.  Intolerance to Rinvoq.  Suggest Orencia IV for 4m, plan to SC transition if adequate response by next visit.

## 2025-07-30 NOTE — PROGRESS NOTES
RHEUMATOLOGY OUTPATIENT CLINIC NOTE    7/30/2025    Attending Rheumatologist: Akhil Hernadez  Primary Care Provider/Physician Requesting Consultation: Amol Steve MD   Chief Complaint/Reason For Consultation:  Rheumatoid Arthritis and Osteoporosis      Subjective:     Caitlin Ahmadi is a 70 y.o. White female with SPRA    TNFi SC prescibed initially, recommended switch to Rachel (1/2025) following PRISMRA result suggesting 10% chance of TNFi response.  Patient reports intolerance to rinvoq (HA, N/V) dit not proceed with biologic.  Taking 5mf PDN QD for arthralgia relief.      Review of Systems   Constitutional:  Negative for fever.   Eyes:  Negative for pain.   Respiratory:  Negative for cough and shortness of breath.    Cardiovascular:  Negative for chest pain.   Gastrointestinal:  Negative for blood in stool.   Genitourinary:  Negative for dysuria and urgency.   Musculoskeletal:  Negative for joint pain.   Skin:  Negative for rash.   Neurological:  Negative for focal weakness.       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      Mild persistent asthma without complication 07/25/2024    PONV (postoperative nausea and vomiting)     Seropositive rheumatoid arthritis 02/17/2022     Past Surgical History:   Procedure Laterality Date    ADENOIDECTOMY      CATARACT EXTRACTION W/  INTRAOCULAR LENS IMPLANT Left 12/09/2020    CATARACT EXTRACTION W/  INTRAOCULAR LENS IMPLANT      CHOLECYSTECTOMY      COLONOSCOPY N/A 10/13/2021    Procedure:  COLONOSCOPY;  Surgeon: Elissa Yoon MD;  Location: [facility] ENDO;  Service: Endoscopy;  Laterality: N/A;    HERNIA REPAIR      HYSTERECTOMY      INJECTION OF ANESTHETIC AGENT INTO SACROILIAC JOINT Bilateral 11/7/2024    Procedure: bilateral SIJ + bilateral GTB;  Surgeon: Manuel Lemon MD;  Location: [facility] PAIN [facility]GT;  Service: Pain Management;  Laterality: Bilateral;    REVISION OF KNEE ARTHROPLASTY Right 3/12/2024    Procedure: REVISION, ARTHROPLASTY, KNEE;  Surgeon: Jalen Rangel MD;  Location: [facility] OR;  Service: General;  Laterality: Right;    TONSILLECTOMY      TOTAL KNEE ARTHROPLASTY Bilateral     TRANSFORAMINAL EPIDURAL INJECTION OF STEROID Left 1/30/2025    Procedure: left L2/3 + L3/4 TF CARINA;  Surgeon: Manuel Lemon MD;  Location: [facility] PAIN [facility]GT;  Service: Pain Management;  Laterality: Left;     Family History   Problem Relation Name Age of Onset    No Known Problems Mother      Alzheimer's disease Father       Tobacco Use History[1]  Current Medications[2]     Objective:     Vitals:    07/30/25 1430   BP: 128/72   Pulse: 79     Physical Exam   Pulmonary/Chest: Effort normal. No respiratory distress.   Musculoskeletal:         General: Deformity present. No swelling or tenderness.   Skin: No rash noted.       Reviewed available old and all outside pertinent medical records available.    All lab results personally reviewed and interpreted by me.       ASSESSMENT / PLAN     1. Seropositive rheumatoid arthritis  CDAI: moderate disease activity.  Low likelihood of TNFi response per PrismRA.  Trial of Orencia add on, c/ MTX 20mg po q7d.    C-Reactive Protein      2. Failure of outpatient treatment  Methotrexate monotherapy.  Rinvoq.      3. Long term current use of systemic steroids  Taper PDN to DC, 3m after biologic add on      4. Breakthrough pain  Acetaminophen OTC up to 3gm per day.  Limit PDN to 5mg BID PRN RA flares only.      5. Medication monitoring encounter  Comprehensive  Metabolic Panel      6. Immunosuppression due to drug therapy  Quantiferon Gold TB    Hepatitis B Surface Antigen    Hepatitis B Core Antibody, Total    CBC Auto Differential      7. Type 2 diabetes mellitus with hyperglycemia, unspecified whether long term insulin use  Caution advised with systemic steroid use      8. Counseling on health promotion and disease prevention  Ca/vit D supp /400mg BID  Hold immunosuppression in event of active infections or need for surgery.              Visit today included increased complexity associated with the care of the episodic problem Seropositive rheumatoid arthritis addressed and managing the longitudinal care of the patient due to the serious and/or complex managed problem(s) immunosuppression due to drug therapy.    45 minutes of total time spent on the encounter, which includes face to face time and non-face to face time preparing to see the patient (eg, review of tests), Obtaining and/or reviewing separately obtained history, Documenting clinical information in the electronic or other health record, Independently interpreting results (not separately reported) and communicating results to the patient/family/caregiver, or Care coordination (not separately reported).     Akhil Hernadez M.D.           [1]   Social History  Tobacco Use   Smoking Status Former    Current packs/day: 0.00    Average packs/day: 1 pack/day for 20.0 years (20.0 ttl pk-yrs)    Types: Cigarettes    Start date: 1970    Quit date: 1990    Years since quittin.1   Smokeless Tobacco Never   [2]   Current Outpatient Medications:     albuterol (PROVENTIL/VENTOLIN HFA) 90 mcg/actuation inhaler, INHALE 2 PUFFS INTO THE LUNGS EVERY 6 HOURS AS NEEDED FOR WHEEZING, Disp: 6.7 g, Rfl: 11    amitriptyline (ELAVIL) 100 MG tablet, TAKE 3 TABLETS(300 MG) BY MOUTH EVERY EVENING, Disp: 270 tablet, Rfl: 0    atenoloL (TENORMIN) 25 MG tablet, Take 1 tablet (25 mg total) by mouth once daily., Disp: 30  tablet, Rfl: 11    budesonide-formoterol 160-4.5 mcg (SYMBICORT) 160-4.5 mcg/actuation HFAA, Inhale 2 puffs into the lungs every 12 (twelve) hours. Controller, Disp: 10.2 g, Rfl: 6    fluticasone propionate (FLONASE) 50 mcg/actuation nasal spray, SHAKE LIQUID AND USE 2 SPRAYS(100 MCG) IN EACH NOSTRIL EVERY DAY, Disp: 16 g, Rfl: 11    ibuprofen (ADVIL,MOTRIN) 200 MG tablet, Take 600 mg by mouth every 6 (six) hours as needed., Disp: , Rfl:     ibuprofen (ADVIL,MOTRIN) 600 MG tablet, Take 1 tablet (600 mg total) by mouth 2 (two) times daily as needed for Pain., Disp: 60 tablet, Rfl: 1    methotrexate 2.5 MG Tab, Take 8 tablets (20 mg total) by mouth every 7 days., Disp: 96 tablet, Rfl: 1    pantoprazole (PROTONIX) 40 MG tablet, Take 1 tablet (40 mg total) by mouth once daily., Disp: 90 tablet, Rfl: 3    predniSONE (DELTASONE) 5 MG tablet, Take 1 tablet (5 mg total) by mouth once daily., Disp: 30 tablet, Rfl: 1    semaglutide (OZEMPIC) 0.25 mg or 0.5 mg (2 mg/3 mL) pen injector, Inject 0.5 mg into the skin every 7 days., Disp: 9 mL, Rfl: 1

## 2025-07-30 NOTE — TELEPHONE ENCOUNTER
----- Message from Akhil Hernadez MD sent at 7/30/2025  3:10 PM CDT -----  SPRA w/ refractory moderate/high disease activity off systemic corticosteroid.  Worsening DM II.  Failure to MTX monotherapy.  Low likelihood of TNFi response per Lesly RA.  Intolerance to Rinvoq.  Suggest Orencia IV for 4m, plan to SC transition if adequate response by next visit.

## 2025-07-31 ENCOUNTER — PATIENT MESSAGE (OUTPATIENT)
Dept: RHEUMATOLOGY | Facility: CLINIC | Age: 70
End: 2025-07-31
Payer: MEDICARE

## 2025-07-31 LAB
MITOGEN MINUS NIL (OHS): 9.99
NIL TB SYNCED (OHS): 0.01
QUANTIFERON GOLD INTERP (OHS): NEGATIVE
TB1 AG MINUS NIL (OHS): 0.02
TB2 AG MINUS NIL (OHS): 0

## 2025-08-01 ENCOUNTER — TELEPHONE (OUTPATIENT)
Dept: RHEUMATOLOGY | Facility: CLINIC | Age: 70
End: 2025-08-01
Payer: MEDICARE

## 2025-08-01 NOTE — TELEPHONE ENCOUNTER
----- Message from Pharmacist Marianne sent at 7/30/2025  3:37 PM CDT -----  Therapy plan entered and ID labs collected, pending results.  ----- Message -----  From: Akhil Hernadez MD  Sent: 7/30/2025   3:10 PM CDT  To: Marianne Thorne, [place]    SPRA w/ refractory moderate/high disease activity off systemic corticosteroid.  Worsening DM II.  Failure to MTX monotherapy.  Low likelihood of TNFi response per Lesly RA.  Intolerance to Rinvoq.  Suggest Orencia IV for 4m, plan to SC transition if adequate response by next visit.

## 2025-08-01 NOTE — TELEPHONE ENCOUNTER
Patient was contacted via phone and counseled on their upcoming IV infusions. Discussed medication expectations, administration process, and monitoring parameters. Patient verbalized understanding.  Patient was notified to expect a call from the infusion center to schedule both their infusion appointment and associated lab work.

## 2025-08-11 ENCOUNTER — INFUSION (OUTPATIENT)
Dept: INFUSION THERAPY | Facility: HOSPITAL | Age: 70
End: 2025-08-11
Attending: INTERNAL MEDICINE
Payer: MEDICARE

## 2025-08-11 VITALS
HEART RATE: 62 BPM | TEMPERATURE: 96 F | DIASTOLIC BLOOD PRESSURE: 57 MMHG | WEIGHT: 184.31 LBS | SYSTOLIC BLOOD PRESSURE: 99 MMHG | HEIGHT: 68 IN | BODY MASS INDEX: 27.93 KG/M2 | RESPIRATION RATE: 16 BRPM | OXYGEN SATURATION: 97 %

## 2025-08-11 DIAGNOSIS — M05.9 SEROPOSITIVE RHEUMATOID ARTHRITIS: Primary | ICD-10-CM

## 2025-08-11 PROCEDURE — 96375 TX/PRO/DX INJ NEW DRUG ADDON: CPT

## 2025-08-11 PROCEDURE — 25000003 PHARM REV CODE 250: Performed by: INTERNAL MEDICINE

## 2025-08-11 PROCEDURE — 63600175 PHARM REV CODE 636 W HCPCS: Mod: JZ,TB | Performed by: INTERNAL MEDICINE

## 2025-08-11 PROCEDURE — 96365 THER/PROPH/DIAG IV INF INIT: CPT

## 2025-08-11 RX ORDER — ACETAMINOPHEN 325 MG/1
325 TABLET ORAL ONCE
OUTPATIENT
Start: 2025-08-25

## 2025-08-11 RX ORDER — DIPHENHYDRAMINE HYDROCHLORIDE 50 MG/ML
50 INJECTION, SOLUTION INTRAMUSCULAR; INTRAVENOUS ONCE AS NEEDED
OUTPATIENT
Start: 2025-08-25

## 2025-08-11 RX ORDER — DIPHENHYDRAMINE HCL 25 MG
25 CAPSULE ORAL ONCE
OUTPATIENT
Start: 2025-08-25

## 2025-08-11 RX ORDER — EPINEPHRINE 0.3 MG/.3ML
0.3 INJECTION SUBCUTANEOUS ONCE AS NEEDED
OUTPATIENT
Start: 2025-08-25

## 2025-08-11 RX ORDER — DIPHENHYDRAMINE HCL 25 MG
25 CAPSULE ORAL ONCE
Status: COMPLETED | OUTPATIENT
Start: 2025-08-11 | End: 2025-08-11

## 2025-08-11 RX ORDER — HEPARIN 100 UNIT/ML
500 SYRINGE INTRAVENOUS
OUTPATIENT
Start: 2025-08-25

## 2025-08-11 RX ORDER — DIPHENHYDRAMINE HYDROCHLORIDE 50 MG/ML
25 INJECTION, SOLUTION INTRAMUSCULAR; INTRAVENOUS
OUTPATIENT
Start: 2025-08-25

## 2025-08-11 RX ORDER — SODIUM CHLORIDE 0.9 % (FLUSH) 0.9 %
10 SYRINGE (ML) INJECTION
OUTPATIENT
Start: 2025-08-25

## 2025-08-11 RX ORDER — ACETAMINOPHEN 325 MG/1
650 TABLET ORAL
OUTPATIENT
Start: 2025-08-25

## 2025-08-11 RX ORDER — ACETAMINOPHEN 325 MG/1
325 TABLET ORAL ONCE
Status: COMPLETED | OUTPATIENT
Start: 2025-08-11 | End: 2025-08-11

## 2025-08-11 RX ADMIN — ACETAMINOPHEN 325 MG: 325 TABLET ORAL at 09:08

## 2025-08-11 RX ADMIN — METHYLPREDNISOLONE SODIUM SUCCINATE 40 MG: 40 INJECTION, POWDER, FOR SOLUTION INTRAMUSCULAR; INTRAVENOUS at 09:08

## 2025-08-11 RX ADMIN — SODIUM CHLORIDE 750 MG: 9 INJECTION, SOLUTION INTRAVENOUS at 09:08

## 2025-08-11 RX ADMIN — DIPHENHYDRAMINE HYDROCHLORIDE 25 MG: 25 CAPSULE ORAL at 09:08

## 2025-08-12 DIAGNOSIS — F33.41 RECURRENT MAJOR DEPRESSIVE DISORDER, IN PARTIAL REMISSION: ICD-10-CM

## 2025-08-13 DIAGNOSIS — F33.41 RECURRENT MAJOR DEPRESSIVE DISORDER, IN PARTIAL REMISSION: ICD-10-CM

## 2025-08-13 RX ORDER — AMITRIPTYLINE HYDROCHLORIDE 100 MG/1
TABLET ORAL
Qty: 270 TABLET | Refills: 0 | OUTPATIENT
Start: 2025-08-13

## 2025-08-13 RX ORDER — AMITRIPTYLINE HYDROCHLORIDE 100 MG/1
TABLET ORAL
Qty: 270 TABLET | Refills: 3 | Status: SHIPPED | OUTPATIENT
Start: 2025-08-13

## 2025-08-14 ENCOUNTER — OFFICE VISIT (OUTPATIENT)
Dept: PULMONOLOGY | Facility: CLINIC | Age: 70
End: 2025-08-14
Payer: MEDICARE

## 2025-08-14 ENCOUNTER — HOSPITAL ENCOUNTER (OUTPATIENT)
Dept: RADIOLOGY | Facility: HOSPITAL | Age: 70
Discharge: HOME | End: 2025-08-14
Attending: INTERNAL MEDICINE
Payer: MEDICARE

## 2025-08-14 VITALS
RESPIRATION RATE: 15 BRPM | BODY MASS INDEX: 27.7 KG/M2 | SYSTOLIC BLOOD PRESSURE: 130 MMHG | HEIGHT: 68 IN | HEART RATE: 68 BPM | DIASTOLIC BLOOD PRESSURE: 79 MMHG | WEIGHT: 182.75 LBS | OXYGEN SATURATION: 94 %

## 2025-08-14 DIAGNOSIS — J47.9 ADULT BRONCHIECTASIS: ICD-10-CM

## 2025-08-14 DIAGNOSIS — J45.30 MILD PERSISTENT ASTHMA WITHOUT COMPLICATION: Primary | Chronic | ICD-10-CM

## 2025-08-14 PROCEDURE — 1101F PT FALLS ASSESS-DOCD LE1/YR: CPT | Mod: CPTII,S$GLB,, | Performed by: INTERNAL MEDICINE

## 2025-08-14 PROCEDURE — 3008F BODY MASS INDEX DOCD: CPT | Mod: CPTII,S$GLB,, | Performed by: INTERNAL MEDICINE

## 2025-08-14 PROCEDURE — 3288F FALL RISK ASSESSMENT DOCD: CPT | Mod: CPTII,S$GLB,, | Performed by: INTERNAL MEDICINE

## 2025-08-14 PROCEDURE — 1126F AMNT PAIN NOTED NONE PRSNT: CPT | Mod: CPTII,S$GLB,, | Performed by: INTERNAL MEDICINE

## 2025-08-14 PROCEDURE — 3052F HG A1C>EQUAL 8.0%<EQUAL 9.0%: CPT | Mod: CPTII,S$GLB,, | Performed by: INTERNAL MEDICINE

## 2025-08-14 PROCEDURE — 1160F RVW MEDS BY RX/DR IN RCRD: CPT | Mod: CPTII,S$GLB,, | Performed by: INTERNAL MEDICINE

## 2025-08-14 PROCEDURE — 1159F MED LIST DOCD IN RCRD: CPT | Mod: CPTII,S$GLB,, | Performed by: INTERNAL MEDICINE

## 2025-08-14 PROCEDURE — 99214 OFFICE O/P EST MOD 30 MIN: CPT | Mod: S$GLB,,, | Performed by: INTERNAL MEDICINE

## 2025-08-14 PROCEDURE — 3075F SYST BP GE 130 - 139MM HG: CPT | Mod: CPTII,S$GLB,, | Performed by: INTERNAL MEDICINE

## 2025-08-14 PROCEDURE — 71250 CT THORAX DX C-: CPT | Mod: 26,,, | Performed by: RADIOLOGY

## 2025-08-14 PROCEDURE — 99999 PR PBB SHADOW E&M-EST. PATIENT-LVL III: CPT | Mod: PBBFAC,,, | Performed by: INTERNAL MEDICINE

## 2025-08-14 PROCEDURE — 3078F DIAST BP <80 MM HG: CPT | Mod: CPTII,S$GLB,, | Performed by: INTERNAL MEDICINE

## 2025-08-14 PROCEDURE — 71250 CT THORAX DX C-: CPT | Mod: TC

## 2025-08-25 ENCOUNTER — INFUSION (OUTPATIENT)
Dept: INFUSION THERAPY | Facility: HOSPITAL | Age: 70
End: 2025-08-25
Attending: INTERNAL MEDICINE
Payer: MEDICARE

## 2025-08-25 ENCOUNTER — PATIENT MESSAGE (OUTPATIENT)
Dept: RHEUMATOLOGY | Facility: CLINIC | Age: 70
End: 2025-08-25
Payer: MEDICARE

## 2025-08-25 VITALS
TEMPERATURE: 98 F | SYSTOLIC BLOOD PRESSURE: 133 MMHG | DIASTOLIC BLOOD PRESSURE: 68 MMHG | HEART RATE: 64 BPM | OXYGEN SATURATION: 98 % | RESPIRATION RATE: 18 BRPM

## 2025-08-25 DIAGNOSIS — M05.9 SEROPOSITIVE RHEUMATOID ARTHRITIS: Primary | ICD-10-CM

## 2025-08-25 PROCEDURE — 25000003 PHARM REV CODE 250: Performed by: INTERNAL MEDICINE

## 2025-08-25 PROCEDURE — 96365 THER/PROPH/DIAG IV INF INIT: CPT

## 2025-08-25 PROCEDURE — 63600175 PHARM REV CODE 636 W HCPCS: Mod: JZ,JA,TB | Performed by: INTERNAL MEDICINE

## 2025-08-25 PROCEDURE — 96375 TX/PRO/DX INJ NEW DRUG ADDON: CPT

## 2025-08-25 RX ORDER — DIPHENHYDRAMINE HCL 25 MG
25 CAPSULE ORAL ONCE
OUTPATIENT
Start: 2025-08-25 | End: 2025-08-25

## 2025-08-25 RX ORDER — ONDANSETRON 4 MG/1
4 TABLET, FILM COATED ORAL
Status: COMPLETED | OUTPATIENT
Start: 2025-08-25 | End: 2025-08-25

## 2025-08-25 RX ORDER — ONDANSETRON 4 MG/1
4 TABLET, FILM COATED ORAL
Status: CANCELLED | OUTPATIENT
Start: 2025-08-25 | End: 2025-08-25

## 2025-08-25 RX ORDER — HEPARIN 100 UNIT/ML
500 SYRINGE INTRAVENOUS
OUTPATIENT
Start: 2025-08-25

## 2025-08-25 RX ORDER — ONDANSETRON 4 MG/1
4 TABLET, FILM COATED ORAL
OUTPATIENT
Start: 2025-08-25 | End: 2025-08-25

## 2025-08-25 RX ORDER — DIPHENHYDRAMINE HYDROCHLORIDE 50 MG/ML
25 INJECTION, SOLUTION INTRAMUSCULAR; INTRAVENOUS
OUTPATIENT
Start: 2025-08-25 | End: 2025-08-25

## 2025-08-25 RX ORDER — SODIUM CHLORIDE 0.9 % (FLUSH) 0.9 %
10 SYRINGE (ML) INJECTION
OUTPATIENT
Start: 2025-08-25

## 2025-08-25 RX ORDER — EPINEPHRINE 0.3 MG/.3ML
0.3 INJECTION SUBCUTANEOUS ONCE AS NEEDED
OUTPATIENT
Start: 2025-08-25 | End: 2037-01-21

## 2025-08-25 RX ORDER — ACETAMINOPHEN 325 MG/1
650 TABLET ORAL
OUTPATIENT
Start: 2025-08-25 | End: 2025-08-25

## 2025-08-25 RX ORDER — DIPHENHYDRAMINE HYDROCHLORIDE 50 MG/ML
50 INJECTION, SOLUTION INTRAMUSCULAR; INTRAVENOUS ONCE AS NEEDED
OUTPATIENT
Start: 2025-08-25 | End: 2037-01-21

## 2025-08-25 RX ORDER — DIPHENHYDRAMINE HCL 25 MG
25 CAPSULE ORAL ONCE
Status: COMPLETED | OUTPATIENT
Start: 2025-08-25 | End: 2025-08-25

## 2025-08-25 RX ORDER — ACETAMINOPHEN 325 MG/1
325 TABLET ORAL ONCE
OUTPATIENT
Start: 2025-08-25 | End: 2025-08-25

## 2025-08-25 RX ORDER — ACETAMINOPHEN 325 MG/1
325 TABLET ORAL ONCE
Status: COMPLETED | OUTPATIENT
Start: 2025-08-25 | End: 2025-08-25

## 2025-08-25 RX ADMIN — DIPHENHYDRAMINE HYDROCHLORIDE 25 MG: 25 CAPSULE ORAL at 09:08

## 2025-08-25 RX ADMIN — ACETAMINOPHEN 325 MG: 325 TABLET ORAL at 09:08

## 2025-08-25 RX ADMIN — SODIUM CHLORIDE 750 MG: 9 INJECTION, SOLUTION INTRAVENOUS at 09:08

## 2025-08-25 RX ADMIN — METHYLPREDNISOLONE SODIUM SUCCINATE 40 MG: 40 INJECTION, POWDER, FOR SOLUTION INTRAMUSCULAR; INTRAVENOUS at 09:08

## 2025-08-25 RX ADMIN — ONDANSETRON HYDROCHLORIDE 4 MG: 4 TABLET, FILM COATED ORAL at 10:08

## 2025-09-03 ENCOUNTER — PATIENT MESSAGE (OUTPATIENT)
Dept: INTERNAL MEDICINE | Facility: CLINIC | Age: 70
End: 2025-09-03
Payer: MEDICARE

## 2025-09-03 DIAGNOSIS — E11.69 TYPE 2 DIABETES MELLITUS WITH OTHER SPECIFIED COMPLICATION, WITHOUT LONG-TERM CURRENT USE OF INSULIN: Primary | ICD-10-CM

## 2025-09-04 ENCOUNTER — TELEPHONE (OUTPATIENT)
Dept: INFUSION THERAPY | Facility: HOSPITAL | Age: 70
End: 2025-09-04
Payer: MEDICARE

## (undated) DEVICE — CONTAINER SPECIMEN OR STER 4OZ

## (undated) DEVICE — GOWN NONREINF SET-IN SLV 2XL

## (undated) DEVICE — BLADE SAG 18.0X1.27X100

## (undated) DEVICE — STAPLER SKIN PROXIMATE WIDE

## (undated) DEVICE — SUT 0 27IN COATED VICRYL CP

## (undated) DEVICE — KIT IRR SUCTION HND PIECE

## (undated) DEVICE — BNDG COFLEX FOAM LF2 ST 4X5YD

## (undated) DEVICE — TUBING MEDI-VAC 20FT .25IN

## (undated) DEVICE — DRAPE T EXTRM SURG 121X128X90

## (undated) DEVICE — DRAPE STERI U-SHAPED 47X51IN

## (undated) DEVICE — YANKAUER FLEX NO VENT REG CAP

## (undated) DEVICE — APPLICATOR CHLORAPREP ORN 26ML

## (undated) DEVICE — PAD ABDOMINAL STERILE 8X10IN

## (undated) DEVICE — ELECTRODE REM PLYHSV RETURN 9

## (undated) DEVICE — SPONGE LAP 18X18 PREWASHED

## (undated) DEVICE — DRAPE U SPLIT SHEET 54X76IN

## (undated) DEVICE — DRAPE ORTH SPLIT 77X108IN

## (undated) DEVICE — NDL SPINAL 18GX3.5 SPINOCAN

## (undated) DEVICE — GOWN POLY REINF BRTH SLV XL

## (undated) DEVICE — COVER LIGHT HANDLE 80/CA

## (undated) DEVICE — SUT VICRYL 1 OB 36 CTX

## (undated) DEVICE — BLADE EZ CLEAN 2 1/2

## (undated) DEVICE — COVER TABLE HVY DTY 60X90IN

## (undated) DEVICE — MIXER BONE CEMENT

## (undated) DEVICE — DRAPE THREE-QTR REINF 53X77IN

## (undated) DEVICE — GLOVE BIOGEL PI ORTHO PRO SZ 8

## (undated) DEVICE — POUCH INSTRUMENT 2 POCKET

## (undated) DEVICE — BANDAGE ACE DOUBLE STER 6IN

## (undated) DEVICE — SYR 30CC LUER LOCK

## (undated) DEVICE — TOWEL OR DISP STRL BLUE 4/PK

## (undated) DEVICE — SOCKINETTE IMPERVIOUS 12X48IN

## (undated) DEVICE — GLOVE SURGICAL LATEX SZ 8

## (undated) DEVICE — HOOD FLYTE PEELWY STERISHIELD

## (undated) DEVICE — DRAPE FULL SHEET 70X100IN

## (undated) DEVICE — MANIFOLD 4 PORT

## (undated) DEVICE — ALCOHOL 70% ANTISEPTIC ISO 4OZ

## (undated) DEVICE — PACK BASIC SETUP SC BR

## (undated) DEVICE — SPONGE COTTON TRAY 4X4IN

## (undated) DEVICE — DRESSING PICO 7 TWO 10X30CM

## (undated) DEVICE — DRAPE INCISE IOBAN 2 23X33IN

## (undated) DEVICE — SOL NACL IRR 3000ML